# Patient Record
Sex: FEMALE | Race: WHITE | NOT HISPANIC OR LATINO | Employment: OTHER | URBAN - METROPOLITAN AREA
[De-identification: names, ages, dates, MRNs, and addresses within clinical notes are randomized per-mention and may not be internally consistent; named-entity substitution may affect disease eponyms.]

---

## 2017-01-03 ENCOUNTER — OFFICE VISIT (OUTPATIENT)
Dept: FAMILY MEDICINE CLINIC | Facility: CLINIC | Age: 57
End: 2017-01-03

## 2017-01-03 VITALS — HEIGHT: 62 IN | WEIGHT: 257.6 LBS | BODY MASS INDEX: 47.41 KG/M2 | TEMPERATURE: 97.7 F | RESPIRATION RATE: 18 BRPM

## 2017-01-03 DIAGNOSIS — E78.5 HYPERLIPIDEMIA, UNSPECIFIED HYPERLIPIDEMIA TYPE: ICD-10-CM

## 2017-01-03 DIAGNOSIS — Z00.00 HEALTH MAINTENANCE EXAMINATION: ICD-10-CM

## 2017-01-03 DIAGNOSIS — Z79.4 TYPE 2 DIABETES MELLITUS WITHOUT COMPLICATION, WITH LONG-TERM CURRENT USE OF INSULIN (HCC): ICD-10-CM

## 2017-01-03 DIAGNOSIS — Z12.11 ENCOUNTER FOR SCREENING COLONOSCOPY: ICD-10-CM

## 2017-01-03 DIAGNOSIS — I10 ESSENTIAL HYPERTENSION: Primary | ICD-10-CM

## 2017-01-03 DIAGNOSIS — K21.9 GASTROESOPHAGEAL REFLUX DISEASE WITHOUT ESOPHAGITIS: ICD-10-CM

## 2017-01-03 DIAGNOSIS — J45.20 ASTHMATIC BRONCHITIS, MILD INTERMITTENT, UNCOMPLICATED: ICD-10-CM

## 2017-01-03 DIAGNOSIS — E11.9 TYPE 2 DIABETES MELLITUS WITHOUT COMPLICATION, WITH LONG-TERM CURRENT USE OF INSULIN (HCC): ICD-10-CM

## 2017-01-03 DIAGNOSIS — M15.9 GENERALIZED OSTEOARTHRITIS: ICD-10-CM

## 2017-01-03 PROBLEM — I63.9 CEREBRAL INFARCTION (HCC): Status: ACTIVE | Noted: 2017-01-03

## 2017-01-03 PROCEDURE — 99203 OFFICE O/P NEW LOW 30 MIN: CPT | Performed by: INTERNAL MEDICINE

## 2017-01-03 RX ORDER — ATENOLOL 100 MG/1
100 TABLET ORAL DAILY
Qty: 30 TABLET | Refills: 5 | Status: SHIPPED | OUTPATIENT
Start: 2017-01-03 | End: 2017-06-17 | Stop reason: SDUPTHER

## 2017-01-03 RX ORDER — TRIAMTERENE AND HYDROCHLOROTHIAZIDE 37.5; 25 MG/1; MG/1
1 TABLET ORAL EVERY OTHER DAY
Qty: 30 TABLET | Refills: 5 | Status: SHIPPED | OUTPATIENT
Start: 2017-01-03 | End: 2018-03-28 | Stop reason: SDUPTHER

## 2017-01-03 RX ORDER — LISINOPRIL 20 MG/1
20 TABLET ORAL DAILY
Qty: 60 TABLET | Refills: 5 | Status: SHIPPED | OUTPATIENT
Start: 2017-01-03 | End: 2017-02-01 | Stop reason: SDUPTHER

## 2017-01-03 RX ORDER — AMLODIPINE BESYLATE 5 MG/1
5 TABLET ORAL DAILY
Qty: 30 TABLET | Refills: 5 | Status: SHIPPED | OUTPATIENT
Start: 2017-01-03 | End: 2017-02-09 | Stop reason: SDUPTHER

## 2017-01-03 RX ORDER — ATORVASTATIN CALCIUM 20 MG/1
20 TABLET, FILM COATED ORAL DAILY
Qty: 30 TABLET | Refills: 5 | Status: SHIPPED | OUTPATIENT
Start: 2017-01-03 | End: 2017-02-09 | Stop reason: SDUPTHER

## 2017-01-03 RX ORDER — ALBUTEROL SULFATE 90 UG/1
2 AEROSOL, METERED RESPIRATORY (INHALATION) EVERY 6 HOURS PRN
Qty: 1 INHALER | Refills: 11 | Status: SHIPPED | OUTPATIENT
Start: 2017-01-03 | End: 2019-04-09 | Stop reason: SDUPTHER

## 2017-01-03 NOTE — PROGRESS NOTES
Subjective   Bernice Rai is a 56 y.o. female. Patient is here today for a new patient visit to establish care.  She was seeing Dr. Fuentes.  The patient generally feels well aside from some left thoracic back pain that developed a few days ago while she was bending.  It hurts him with breathing but she's had no significant cough or fever or chills and it hurts also with any Movements or bending or twisting.  It's also tender to touch.  Otherwise she seems stable and is having no chest pain or breathing problems.  She does have known problems of hypertension some intermittent asthmatic bronchitis, some GERD that's doing well on Zantac, diabetes mellitus on some insulin some arthritis in the feet and hyperlipidemia.  She has a history of a temporal occipital stroke in 2001 affecting the vision in one eye.  She has had a flu shot already but has not had a colonoscopy.    Chief Complaint   Patient presents with   • Hypertension     NEW PT TO ESTABLISH   • Diabetes          Vitals:    01/03/17 0915   Resp: 18   Temp: 97.7 °F (36.5 °C)     The following portions of the patient's history were reviewed and updated as appropriate: allergies, current medications, past family history, past medical history, past social history, past surgical history and problem list.    Past Medical History   Diagnosis Date   • Diabetes mellitus    • Hypertension    • Stroke       No Known Allergies   Social History     Social History   • Marital status:      Spouse name: N/A   • Number of children: N/A   • Years of education: N/A     Occupational History   • Not on file.     Social History Main Topics   • Smoking status: Current Some Day Smoker     Packs/day: 0.50   • Smokeless tobacco: Not on file   • Alcohol use No   • Drug use: Not on file   • Sexual activity: Not on file     Other Topics Concern   • Not on file     Social History Narrative        Current Outpatient Prescriptions:   •  albuterol (PROVENTIL HFA) 108 (90 BASE)  MCG/ACT inhaler, Inhale 2 puffs Every 6 (Six) Hours As Needed for wheezing., Disp: 1 inhaler, Rfl: 11  •  amLODIPine (NORVASC) 5 MG tablet, Take 1 tablet by mouth Daily., Disp: 30 tablet, Rfl: 5  •  aspirin 81 MG chewable tablet, Chew 81 mg daily., Disp: , Rfl:   •  atenolol (TENORMIN) 100 MG tablet, Take 1 tablet by mouth Daily., Disp: 30 tablet, Rfl: 5  •  insulin NPH-insulin regular (NOVOLIN 70/30) (70-30) 100 UNIT/ML injection, Inject  under the skin. 130 150 daily / sliding scale, Disp: , Rfl:   •  Insulin Pen Needle (B-D UF III MINI PEN NEEDLES) 31G X 5 MM misc, , Disp: , Rfl:   •  lisinopril (PRINIVIL,ZESTRIL) 20 MG tablet, Take 1 tablet by mouth Daily., Disp: 60 tablet, Rfl: 5  •  traMADol (ULTRAM) 50 MG tablet, TAKE TWO TABLETS BY MOUTH ONCE DAILY AS NEEDED AS NEEDED FOR PAIN. MUST LAST 30 DAYS, Disp: 60 tablet, Rfl: 0  •  triamterene-hydrochlorothiazide (MAXZIDE-25) 37.5-25 MG per tablet, Take 1 tablet by mouth Every Other Day., Disp: 30 tablet, Rfl: 5  •  venlafaxine XR (EFFEXOR-XR) 75 MG 24 hr capsule, TAKE ONE CAPSULE BY MOUTH THREE TIMES DAILY, Disp: 90 capsule, Rfl: 0  •  atorvastatin (LIPITOR) 20 MG tablet, Take 1 tablet by mouth Daily., Disp: 30 tablet, Rfl: 5     Objective     History of Present Illness     Review of Systems   Constitutional: Negative.    HENT: Negative.    Eyes: Negative.    Respiratory: Negative for cough.    Cardiovascular: Negative.    Gastrointestinal: Negative.    Genitourinary: Negative.    Musculoskeletal: Positive for back pain.        Foot pain with a history of arthritis in the feet   Neurological: Negative.    Psychiatric/Behavioral: Negative.        Physical Exam   Constitutional: She is oriented to person, place, and time. She appears well-developed and well-nourished.   Pleasant, cooperative and in no distress but quite obese and with a blood pressure 130/70   HENT:   Head: Normocephalic and atraumatic.   Eyes: Conjunctivae are normal. Pupils are equal, round, and  reactive to light.   Neck: Normal range of motion. Neck supple.   Cardiovascular: Normal rate, regular rhythm and normal heart sounds.    Pulmonary/Chest: Effort normal and breath sounds normal. No respiratory distress. She has no wheezes. She has no rales.   Tenderness in the left back in the scapular area consistent with some muscular injury   Musculoskeletal: Normal range of motion. She exhibits no edema.   Neurological: She is alert and oriented to person, place, and time.   Skin: Skin is warm and dry.   Psychiatric: She has a normal mood and affect. Her behavior is normal.   Nursing note and vitals reviewed.      ASSESSMENT  patient currently seems stable with a well-controlled blood pressure 130/70, clear lungs and a regular cardiac rhythm.  She does have some muscular appearing left back pain that I think should resolve on its own.  Patient has a history of diabetes, hyperlipidemia, hypertension, asthmatic bronchitis, arthritis in the feet, some anxiety and depression and history of a temporal occipital stroke about 2001.  The patient's already had a flu shot and has had an eye examination several months ago and we'll try and get those records.  She's not had a colonoscopy.     Problem List Items Addressed This Visit        Cardiovascular and Mediastinum    Hypertension - Primary    Relevant Medications    atenolol (TENORMIN) 100 MG tablet    lisinopril (PRINIVIL,ZESTRIL) 20 MG tablet    amLODIPine (NORVASC) 5 MG tablet    triamterene-hydrochlorothiazide (MAXZIDE-25) 37.5-25 MG per tablet       Respiratory    Asthmatic bronchitis    Relevant Medications    albuterol (PROVENTIL HFA) 108 (90 BASE) MCG/ACT inhaler       Digestive    Gastroesophageal reflux disease       Endocrine    Diabetes mellitus       Musculoskeletal and Integument    Generalized osteoarthritis       Other    Hyperlipidemia    Relevant Medications    atorvastatin (LIPITOR) 20 MG tablet    Encounter for screening colonoscopy    Relevant  Orders    Ambulatory Referral to Gastroenterology      Other Visit Diagnoses     Health maintenance examination              PLAN  I refilled all the patient's medications as she is on for now.  I did discontinue the simvastatin because of interaction with the amlodipine and we'll try her on atorvastatin 20 mg daily in place of it.  I will refer the patient to the gastroenterologist, Dr. De La Rosa's group, for a routine colonoscopy.  I'd like to recheck her in 4-6 weeks with a CBC, CMP, lipid panel, TSH, hemoglobin A1c and urine microalbumin    There are no Patient Instructions on file for this visit.  Return in about 6 weeks (around 2/14/2017) for with labs.

## 2017-01-03 NOTE — MR AVS SNAPSHOT
Bernice Rai   1/3/2017 9:15 AM   Office Visit    Dept Phone:  [phone]   Encounter #:  [id number]    Provider:  Angel Herrera MD   Department:  [facility]                Your Full Care Plan              Today's Medication Changes          These changes are accurate as of: 1/3/17  9:51 AM.  If you have any questions, ask your nurse or doctor.               New Medication(s)Ordered:     atorvastatin 20 MG tablet   Commonly known as:  LIPITOR   Take 1 tablet by mouth Daily.   Started by:  Angel Herrera MD         Medication(s)that have changed:     albuterol 108 (90 BASE) MCG/ACT inhaler   Commonly known as:  PROVENTIL HFA   Inhale 2 puffs Every 6 (Six) Hours As Needed for wheezing.   What changed:    - how much to take  - how to take this  - when to take this  - reasons to take this   Changed by:  Angel Herrera MD       amLODIPine 5 MG tablet   Commonly known as:  NORVASC   Take 1 tablet by mouth Daily.   What changed:  See the new instructions.   Changed by:  Angel Herrera MD       atenolol 100 MG tablet   Commonly known as:  TENORMIN   Take 1 tablet by mouth Daily.   What changed:  See the new instructions.   Changed by:  Angel Herrera MD       lisinopril 20 MG tablet   Commonly known as:  PRINIVIL,ZESTRIL   Take 1 tablet by mouth Daily.   What changed:  See the new instructions.   Changed by:  Angel Herrera MD       triamterene-hydrochlorothiazide 37.5-25 MG per tablet   Commonly known as:  MAXZIDE-25   Take 1 tablet by mouth Every Other Day.   What changed:    - how much to take  - how to take this   Changed by:  Angel Herrera MD         Stop taking medication(s)listed here:     omeprazole 40 MG capsule   Commonly known as:  priLOSEC   Stopped by:  Angel Herrera MD           simvastatin 40 MG tablet   Commonly known as:  ZOCOR   Stopped by:  Angel Herrera MD                Where to Get Your  Medications      These medications were sent to [facility] [place], [place] - [address] - [phone]  - [phone]   [address] [address]     Phone:  [phone]     albuterol 108 (90 BASE) MCG/ACT inhaler    amLODIPine 5 MG tablet    atenolol 100 MG tablet    atorvastatin 20 MG tablet    lisinopril 20 MG tablet    triamterene-hydrochlorothiazide 37.5-25 MG per tablet                  Your Updated Medication List          This list is accurate as of: 1/3/17  9:51 AM.  Always use your most recent med list.                albuterol 108 (90 BASE) MCG/ACT inhaler   Commonly known as:  PROVENTIL HFA   Inhale 2 puffs Every 6 (Six) Hours As Needed for wheezing.       amLODIPine 5 MG tablet   Commonly known as:  NORVASC   Take 1 tablet by mouth Daily.       aspirin 81 MG chewable tablet       atenolol 100 MG tablet   Commonly known as:  TENORMIN   Take 1 tablet by mouth Daily.       atorvastatin 20 MG tablet   Commonly known as:  LIPITOR   Take 1 tablet by mouth Daily.       B-D UF III MINI PEN NEEDLES 31G X 5 MM misc   Generic drug:  Insulin Pen Needle       lisinopril 20 MG tablet   Commonly known as:  PRINIVIL,ZESTRIL   Take 1 tablet by mouth Daily.       NOVOLIN 70/30 (70-30) 100 UNIT/ML injection   Generic drug:  insulin NPH-insulin regular       traMADol 50 MG tablet   Commonly known as:  ULTRAM   TAKE TWO TABLETS BY MOUTH ONCE DAILY AS NEEDED AS NEEDED FOR PAIN. MUST LAST 30 DAYS       triamterene-hydrochlorothiazide 37.5-25 MG per tablet   Commonly known as:  MAXZIDE-25   Take 1 tablet by mouth Every Other Day.       venlafaxine XR 75 MG 24 hr capsule   Commonly known as:  EFFEXOR-XR   TAKE ONE CAPSULE BY MOUTH THREE TIMES DAILY               We Performed the Following     Ambulatory Referral to Gastroenterology       You Were Diagnosed With        Codes Comments    Essential hypertension    -  Primary ICD-10-CM: I10  ICD-9-CM: 401.9     Asthmatic  "bronchitis, mild intermittent, uncomplicated     ICD-10-CM: J45.20  ICD-9-CM: 493.90     Gastroesophageal reflux disease without esophagitis     ICD-10-CM: K21.9  ICD-9-CM: 530.81     Type 2 diabetes mellitus without complication, with long-term current use of insulin     ICD-10-CM: E11.9, Z79.4  ICD-9-CM: 250.00, V58.67     Generalized osteoarthritis     ICD-10-CM: M15.9  ICD-9-CM: 715.00     Health maintenance examination     ICD-10-CM: Z00.00  ICD-9-CM: V70.0     Hyperlipidemia, unspecified hyperlipidemia type     ICD-10-CM: E78.5  ICD-9-CM: 272.4     Encounter for screening colonoscopy     ICD-10-CM: Z12.11  ICD-9-CM: V76.51       Instructions     None    Patient Instructions History      Upcoming Appointments     Visit Type Date Time Department    NEW PATIENT 1/3/2017  9:15 AM [facility]      [employer] Signup     Our records indicate that you have an active [employer] account.    You can view your After Visit Summary by going to [employer] and logging in with your [employer] username and password.  If you don't have a [employer] username and password but a parent or guardian has access to your record, the parent or guardian should login with their own [employer] username and password and access your record to view the After Visit Summary.    If you have questions, you can email [email or web address] or call [phone] to talk to our [employer] staff.  Remember, [employer] is NOT to be used for urgent needs.  For medical emergencies, dial 911.               Other Info from Your Visit           Allergies     No Known Allergies      Reason for Visit     Hypertension NEW PT TO ESTABLISH    Diabetes           Vital Signs     Temperature Respirations Height Weight Body Mass Index Smoking Status    97.7 °F (36.5 °C) (Oral) 18 62\" (157.5 cm) 257 lb 9.6 oz (117 kg) 47.12 kg/m2 Current Some Day Smoker      Problems and Diagnoses Noted     Bronchitis    Stroke    Diabetes    Screen for colon " cancer    Acid reflux disease    Generalized osteoarthritis    High cholesterol or triglycerides    High blood pressure    Health maintenance examination

## 2017-01-27 DIAGNOSIS — I10 ESSENTIAL HYPERTENSION: ICD-10-CM

## 2017-01-27 DIAGNOSIS — E11.9 TYPE 2 DIABETES MELLITUS WITHOUT COMPLICATION, UNSPECIFIED LONG TERM INSULIN USE STATUS: ICD-10-CM

## 2017-01-27 DIAGNOSIS — E78.5 HYPERLIPIDEMIA, UNSPECIFIED HYPERLIPIDEMIA TYPE: Primary | ICD-10-CM

## 2017-02-01 DIAGNOSIS — I10 ESSENTIAL HYPERTENSION: ICD-10-CM

## 2017-02-01 RX ORDER — LISINOPRIL 20 MG/1
20 TABLET ORAL 2 TIMES DAILY
Qty: 60 TABLET | Refills: 5 | Status: SHIPPED | OUTPATIENT
Start: 2017-02-01 | End: 2017-08-12 | Stop reason: SDUPTHER

## 2017-02-04 LAB
ALBUMIN SERPL-MCNC: 3.3 G/DL (ref 3.5–5.2)
ALBUMIN/GLOB SERPL: 1.4 G/DL
ALP SERPL-CCNC: 74 U/L (ref 39–117)
ALT SERPL-CCNC: 13 U/L (ref 1–33)
AST SERPL-CCNC: 12 U/L (ref 1–32)
BASOPHILS # BLD AUTO: 0.03 10*3/MM3 (ref 0–0.2)
BASOPHILS NFR BLD AUTO: 0.3 % (ref 0–1.5)
BILIRUB SERPL-MCNC: 0.3 MG/DL (ref 0.1–1.2)
BUN SERPL-MCNC: 16 MG/DL (ref 6–20)
BUN/CREAT SERPL: 21.9 (ref 7–25)
CALCIUM SERPL-MCNC: 9.7 MG/DL (ref 8.6–10.5)
CHLORIDE SERPL-SCNC: 101 MMOL/L (ref 98–107)
CHOLEST SERPL-MCNC: 215 MG/DL (ref 0–200)
CO2 SERPL-SCNC: 23.5 MMOL/L (ref 22–29)
CREAT SERPL-MCNC: 0.73 MG/DL (ref 0.57–1)
EOSINOPHIL # BLD AUTO: 0.22 10*3/MM3 (ref 0–0.7)
EOSINOPHIL NFR BLD AUTO: 2.3 % (ref 0.3–6.2)
ERYTHROCYTE [DISTWIDTH] IN BLOOD BY AUTOMATED COUNT: 14 % (ref 11.7–13)
GLOBULIN SER CALC-MCNC: 2.4 GM/DL
GLUCOSE SERPL-MCNC: 215 MG/DL (ref 65–99)
HBA1C MFR BLD: 7.42 % (ref 4.8–5.6)
HCT VFR BLD AUTO: 46.6 % (ref 35.6–45.5)
HDLC SERPL-MCNC: 31 MG/DL (ref 40–60)
HGB BLD-MCNC: 15 G/DL (ref 11.9–15.5)
IMM GRANULOCYTES # BLD: 0 10*3/MM3 (ref 0–0.03)
IMM GRANULOCYTES NFR BLD: 0 % (ref 0–0.5)
LDLC SERPL CALC-MCNC: 113 MG/DL (ref 0–100)
LDLC/HDLC SERPL: 3.63 {RATIO}
LYMPHOCYTES # BLD AUTO: 3 10*3/MM3 (ref 0.9–4.8)
LYMPHOCYTES NFR BLD AUTO: 31.9 % (ref 19.6–45.3)
MCH RBC QN AUTO: 31.4 PG (ref 26.9–32)
MCHC RBC AUTO-ENTMCNC: 32.2 G/DL (ref 32.4–36.3)
MCV RBC AUTO: 97.5 FL (ref 80.5–98.2)
MICROALBUMIN UR-MCNC: 6825.7 UG/ML
MONOCYTES # BLD AUTO: 0.96 10*3/MM3 (ref 0.2–1.2)
MONOCYTES NFR BLD AUTO: 10.2 % (ref 5–12)
NEUTROPHILS # BLD AUTO: 5.2 10*3/MM3 (ref 1.9–8.1)
NEUTROPHILS NFR BLD AUTO: 55.3 % (ref 42.7–76)
PLATELET # BLD AUTO: 360 10*3/MM3 (ref 140–500)
POTASSIUM SERPL-SCNC: 4.8 MMOL/L (ref 3.5–5.2)
PROT SERPL-MCNC: 5.7 G/DL (ref 6–8.5)
RBC # BLD AUTO: 4.78 10*6/MM3 (ref 3.9–5.2)
SODIUM SERPL-SCNC: 139 MMOL/L (ref 136–145)
TRIGL SERPL-MCNC: 357 MG/DL (ref 0–150)
TSH SERPL DL<=0.005 MIU/L-ACNC: 1.69 MIU/ML (ref 0.27–4.2)
VLDLC SERPL CALC-MCNC: 71.4 MG/DL (ref 5–40)
WBC # BLD AUTO: 9.41 10*3/MM3 (ref 4.5–10.7)

## 2017-02-09 ENCOUNTER — OFFICE VISIT (OUTPATIENT)
Dept: FAMILY MEDICINE CLINIC | Facility: CLINIC | Age: 57
End: 2017-02-09

## 2017-02-09 VITALS
WEIGHT: 258 LBS | SYSTOLIC BLOOD PRESSURE: 142 MMHG | HEIGHT: 62 IN | DIASTOLIC BLOOD PRESSURE: 90 MMHG | BODY MASS INDEX: 47.48 KG/M2 | TEMPERATURE: 98.8 F | OXYGEN SATURATION: 97 % | HEART RATE: 85 BPM

## 2017-02-09 DIAGNOSIS — M15.9 GENERALIZED OSTEOARTHRITIS: ICD-10-CM

## 2017-02-09 DIAGNOSIS — I10 ESSENTIAL HYPERTENSION: Primary | ICD-10-CM

## 2017-02-09 DIAGNOSIS — Z79.4 TYPE 2 DIABETES MELLITUS WITHOUT COMPLICATION, WITH LONG-TERM CURRENT USE OF INSULIN (HCC): ICD-10-CM

## 2017-02-09 DIAGNOSIS — L30.9 DERMATITIS: ICD-10-CM

## 2017-02-09 DIAGNOSIS — Z12.31 VISIT FOR SCREENING MAMMOGRAM: ICD-10-CM

## 2017-02-09 DIAGNOSIS — E78.5 HYPERLIPIDEMIA, UNSPECIFIED HYPERLIPIDEMIA TYPE: ICD-10-CM

## 2017-02-09 DIAGNOSIS — E11.9 TYPE 2 DIABETES MELLITUS WITHOUT COMPLICATION, WITH LONG-TERM CURRENT USE OF INSULIN (HCC): ICD-10-CM

## 2017-02-09 PROCEDURE — 99214 OFFICE O/P EST MOD 30 MIN: CPT | Performed by: INTERNAL MEDICINE

## 2017-02-09 RX ORDER — DESOXIMETASONE 0.5 MG/G
CREAM TOPICAL 2 TIMES DAILY
Qty: 60 G | Refills: 1 | Status: SHIPPED | OUTPATIENT
Start: 2017-02-09 | End: 2017-02-14

## 2017-02-09 RX ORDER — AMLODIPINE BESYLATE 10 MG/1
10 TABLET ORAL DAILY
Qty: 30 TABLET | Refills: 5 | Status: SHIPPED | OUTPATIENT
Start: 2017-02-09 | End: 2017-08-12 | Stop reason: SDUPTHER

## 2017-02-09 RX ORDER — VENLAFAXINE HYDROCHLORIDE 150 MG/1
150 CAPSULE, EXTENDED RELEASE ORAL DAILY
Qty: 30 CAPSULE | Refills: 5 | Status: SHIPPED | OUTPATIENT
Start: 2017-02-09 | End: 2017-08-12 | Stop reason: SDUPTHER

## 2017-02-09 RX ORDER — ATORVASTATIN CALCIUM 40 MG/1
40 TABLET, FILM COATED ORAL DAILY
Qty: 30 TABLET | Refills: 5 | Status: SHIPPED | OUTPATIENT
Start: 2017-02-09 | End: 2017-08-12 | Stop reason: SDUPTHER

## 2017-02-09 NOTE — PROGRESS NOTES
Subjective   Bernice Rai is a 56 y.o. female. Patient is here today for follow-up on her hypertension, hyperlipidemia, diabetes mellitus, arthritis,.  The patient generally is been doing okay and her breathing is been pretty good.  She complains of some dermatitis on her left elbow and she's not had a gynecologic exam or mammograms and about 15 years.  They are going to schedule an eye exam since I got a notice from the eye doctor.  She has had a flu shot.    Chief Complaint   Patient presents with   • Follow-up     1 wk    • Labs Only     Discuss labs    • Med Refill     Effexor / tramadol           Vitals:    02/09/17 0805   BP: 142/90   Pulse: 85   Temp: 98.8 °F (37.1 °C)   SpO2: 97%     The following portions of the patient's history were reviewed and updated as appropriate: allergies, current medications, past family history, past medical history, past social history, past surgical history and problem list.    Past Medical History   Diagnosis Date   • Arthritis      FEET   • Diabetes mellitus    • Hyperlipidemia    • Hypertension    • Reflux esophagitis    • Stroke       No Known Allergies   Social History     Social History   • Marital status:      Spouse name: N/A   • Number of children: N/A   • Years of education: N/A     Occupational History   • Not on file.     Social History Main Topics   • Smoking status: Current Some Day Smoker     Packs/day: 0.50   • Smokeless tobacco: Not on file   • Alcohol use No   • Drug use: Defer   • Sexual activity: Defer     Other Topics Concern   • Not on file     Social History Narrative        Current Outpatient Prescriptions:   •  albuterol (PROVENTIL HFA) 108 (90 BASE) MCG/ACT inhaler, Inhale 2 puffs Every 6 (Six) Hours As Needed for wheezing., Disp: 1 inhaler, Rfl: 11  •  amLODIPine (NORVASC) 10 MG tablet, Take 1 tablet by mouth Daily., Disp: 30 tablet, Rfl: 5  •  aspirin 81 MG chewable tablet, Chew 81 mg daily., Disp: , Rfl:   •  atenolol (TENORMIN) 100 MG  tablet, Take 1 tablet by mouth Daily., Disp: 30 tablet, Rfl: 5  •  atorvastatin (LIPITOR) 40 MG tablet, Take 1 tablet by mouth Daily., Disp: 30 tablet, Rfl: 5  •  insulin NPH-insulin regular (NOVOLIN 70/30) (70-30) 100 UNIT/ML injection, Inject  under the skin. 130 150 daily / sliding scale, Disp: , Rfl:   •  Insulin Pen Needle (B-D UF III MINI PEN NEEDLES) 31G X 5 MM misc, , Disp: , Rfl:   •  lisinopril (PRINIVIL,ZESTRIL) 20 MG tablet, Take 1 tablet by mouth 2 (Two) Times a Day., Disp: 60 tablet, Rfl: 5  •  traMADol (ULTRAM) 50 MG tablet, TAKE TWO TABLETS BY MOUTH ONCE DAILY AS NEEDED AS NEEDED FOR PAIN. MUST LAST 30 DAYS, Disp: 60 tablet, Rfl: 0  •  triamterene-hydrochlorothiazide (MAXZIDE-25) 37.5-25 MG per tablet, Take 1 tablet by mouth Every Other Day., Disp: 30 tablet, Rfl: 5  •  venlafaxine XR (EFFEXOR-XR) 150 MG 24 hr capsule, Take 1 capsule by mouth Daily., Disp: 30 capsule, Rfl: 5  •  desoximetasone (TOPICORT) 0.05 % cream, Apply  topically 2 (Two) Times a Day., Disp: 60 g, Rfl: 1     Objective     History of Present Illness     Review of Systems   Constitutional: Negative.    HENT: Negative.    Eyes: Negative.    Respiratory: Negative.    Cardiovascular: Negative.    Gastrointestinal: Negative.    Genitourinary: Negative.    Musculoskeletal: Negative.    Skin: Negative.    Neurological: Negative.    Psychiatric/Behavioral: Negative.        Physical Exam   Constitutional: She is oriented to person, place, and time. She appears well-developed and well-nourished.   Pleasant, cooperative and in no distress with a blood pressure of about 140/90   HENT:   Head: Normocephalic and atraumatic.   Eyes: Conjunctivae are normal. Pupils are equal, round, and reactive to light.   Neck: Normal range of motion. Neck supple. No thyromegaly present.   Cardiovascular: Normal rate, regular rhythm and normal heart sounds.    Pulmonary/Chest: Effort normal and breath sounds normal. No respiratory distress. She has no wheezes.  She has no rales.   Musculoskeletal: Normal range of motion. She exhibits no edema.   Neurological: She is alert and oriented to person, place, and time.   Skin: Skin is warm and dry.   Thickened skin on the left elbow consistent with some eczema   Psychiatric: She has a normal mood and affect. Her behavior is normal.   Nursing note and vitals reviewed.      ASSESSMENT  generally the patient seems stable.  Her lungs are clear and her breathing is fine.  I get her blood pressure high at 140/90 today and she's not had any home readings.  Her CBC was essentially normal.  CMP had an elevated sugar and slightly low total protein and albumen.  Her urine microalbumin is quite high at over 6000.  Hemoglobin A1c is high at 7.42.  TSH was normal and her lipid panel was not as good as I like with the total cholesterol 2:15, HDL low at 31 LDL still high at 113 and triglycerides of 357     Problem List Items Addressed This Visit        Cardiovascular and Mediastinum    Hyperlipidemia    Relevant Medications    atorvastatin (LIPITOR) 40 MG tablet    Hypertension - Primary    Relevant Medications    amLODIPine (NORVASC) 10 MG tablet       Endocrine    Diabetes mellitus       Musculoskeletal and Integument    Generalized osteoarthritis    Dermatitis    Relevant Medications    desoximetasone (TOPICORT) 0.05 % cream      Other Visit Diagnoses     Visit for screening mammogram        Relevant Orders    Mammo Screening Bilateral With CAD    Ambulatory Referral to Gynecology          PLAN  I'm going to increase the patient's amlodipine to 10 mg daily.  I'm increasing her atorvastatin 40 mg daily.  The patient will continue her other medicines.  Encouraged her to try and lose some weight.  She needs to schedule for an eye exam.  I referred her to the gynecologist for routine care and have ordered a mammogram.  I got her some desoximetasone cream for the dermatitis on her elbow.  I want to recheck her in 3 months with a CMP, lipid panel,  hemoglobin A1c and urine microalbumin      There are no Patient Instructions on file for this visit.  Return in about 3 months (around 5/9/2017) for with labs.

## 2017-02-14 RX ORDER — CLOBETASOL PROPIONATE 0.5 MG/G
CREAM TOPICAL 2 TIMES DAILY
Qty: 45 G | Refills: 0 | Status: SHIPPED | OUTPATIENT
Start: 2017-02-14 | End: 2017-06-02

## 2017-02-15 ENCOUNTER — HOSPITAL ENCOUNTER (OUTPATIENT)
Dept: MAMMOGRAPHY | Facility: HOSPITAL | Age: 57
Discharge: HOME | End: 2017-02-15
Admitting: INTERNAL MEDICINE

## 2017-02-15 DIAGNOSIS — Z12.31 VISIT FOR SCREENING MAMMOGRAM: ICD-10-CM

## 2017-02-15 PROCEDURE — G0202 SCR MAMMO BI INCL CAD: HCPCS

## 2017-02-27 ENCOUNTER — OFFICE VISIT (OUTPATIENT)
Dept: OBSTETRICS AND GYNECOLOGY | Facility: CLINIC | Age: 57
End: 2017-02-27

## 2017-02-27 VITALS — DIASTOLIC BLOOD PRESSURE: 70 MMHG | SYSTOLIC BLOOD PRESSURE: 122 MMHG

## 2017-02-27 DIAGNOSIS — Z01.419 WELL WOMAN EXAM WITH ROUTINE GYNECOLOGICAL EXAM: Primary | ICD-10-CM

## 2017-02-27 PROCEDURE — 99386 PREV VISIT NEW AGE 40-64: CPT | Performed by: OBSTETRICS & GYNECOLOGY

## 2017-02-27 NOTE — PROGRESS NOTES
Subjective   Bernice Rai is a 56 y.o. female is here today as a self referral for annual.    History of Present Illness-here today for annual exam and checkup.  Sent by her primary care doctor, Dr. Herrera        The following portions of the patient's history were reviewed and updated as appropriate: allergies, current medications, past family history, past medical history, past social history, past surgical history and problem list.    Review of Systems   Constitutional: Negative.    HENT: Negative.    Eyes: Negative.    Respiratory: Negative.    Cardiovascular: Negative.    Gastrointestinal: Negative.    Endocrine: Negative.    Genitourinary: Negative.         Occasionally has episodes of stress incontinence.   Musculoskeletal: Negative.    Skin: Negative.    Allergic/Immunologic: Negative.    Neurological: Negative.    Hematological: Negative.    Psychiatric/Behavioral: Negative.        Objective   Physical Exam   Constitutional: She is oriented to person, place, and time. She appears well-developed and well-nourished.   HENT:   Head: Normocephalic and atraumatic.   Nose: Nose normal.   Eyes: Conjunctivae and EOM are normal. Pupils are equal, round, and reactive to light.   Neck: Normal range of motion. Neck supple. No thyromegaly present.   Cardiovascular: Normal rate, regular rhythm, normal heart sounds and intact distal pulses.  Exam reveals no gallop.    No murmur heard.  Pulmonary/Chest: Effort normal and breath sounds normal. No respiratory distress. She has no wheezes. She exhibits no mass, no tenderness, no swelling and no retraction. Right breast exhibits no inverted nipple, no mass, no nipple discharge, no skin change and no tenderness. Left breast exhibits no inverted nipple, no mass, no nipple discharge, no skin change and no tenderness.   Abdominal: Soft. Bowel sounds are normal. She exhibits no distension and no mass. There is no tenderness.   Genitourinary: Rectum normal, vagina normal and  uterus normal. There is no rash, tenderness, lesion or injury on the right labia. There is no rash, tenderness, lesion or injury on the left labia. Uterus is not enlarged and not tender. Cervix exhibits no motion tenderness and no discharge. Right adnexum displays no mass, no tenderness and no fullness. Left adnexum displays no mass, no tenderness and no fullness.   Musculoskeletal: Normal range of motion. She exhibits no edema, tenderness or deformity.   Neurological: She is alert and oriented to person, place, and time.   Skin: Skin is warm and dry.   Psychiatric: She has a normal mood and affect. Her behavior is normal. Judgment and thought content normal.   Nursing note and vitals reviewed.        Assessment/Plan   Problems Addressed this Visit     None      Visit Diagnoses     Well woman exam with routine gynecological exam    -  Primary    Relevant Orders    IGP,rfx Aptima HPV All Pth       Pap smear done today.  Mammogram done recently.  Discussed the risks and options of a TVT.  Pt not having to use a pad daily because of leakage episodes.

## 2017-02-28 LAB
CONV .: NORMAL
CYTOLOGIST CVX/VAG CYTO: NORMAL
CYTOLOGY CVX/VAG DOC THIN PREP: NORMAL
DX ICD CODE: NORMAL
HIV 1 & 2 AB SER-IMP: NORMAL
OTHER STN SPEC: NORMAL
PATH REPORT.FINAL DX SPEC: NORMAL
STAT OF ADQ CVX/VAG CYTO-IMP: NORMAL

## 2017-05-03 DIAGNOSIS — E11.9 TYPE 2 DIABETES MELLITUS WITHOUT COMPLICATION, WITH LONG-TERM CURRENT USE OF INSULIN (HCC): ICD-10-CM

## 2017-05-03 DIAGNOSIS — Z11.59 NEED FOR HEPATITIS C SCREENING TEST: Primary | ICD-10-CM

## 2017-05-03 DIAGNOSIS — Z79.4 TYPE 2 DIABETES MELLITUS WITHOUT COMPLICATION, WITH LONG-TERM CURRENT USE OF INSULIN (HCC): ICD-10-CM

## 2017-05-03 DIAGNOSIS — E78.5 HYPERLIPIDEMIA, UNSPECIFIED HYPERLIPIDEMIA TYPE: ICD-10-CM

## 2017-05-11 LAB
ALBUMIN SERPL-MCNC: 3.2 G/DL (ref 3.5–5.2)
ALBUMIN/GLOB SERPL: 1.1 G/DL
ALP SERPL-CCNC: 81 U/L (ref 39–117)
ALT SERPL-CCNC: 17 U/L (ref 1–33)
AST SERPL-CCNC: 15 U/L (ref 1–32)
BILIRUB SERPL-MCNC: 0.2 MG/DL (ref 0.1–1.2)
BUN SERPL-MCNC: 13 MG/DL (ref 6–20)
BUN/CREAT SERPL: 18.6 (ref 7–25)
CALCIUM SERPL-MCNC: 9.9 MG/DL (ref 8.6–10.5)
CHLORIDE SERPL-SCNC: 102 MMOL/L (ref 98–107)
CHOLEST SERPL-MCNC: 175 MG/DL (ref 0–200)
CO2 SERPL-SCNC: 22.9 MMOL/L (ref 22–29)
CREAT SERPL-MCNC: 0.7 MG/DL (ref 0.57–1)
GLOBULIN SER CALC-MCNC: 2.8 GM/DL
GLUCOSE SERPL-MCNC: 116 MG/DL (ref 65–99)
HBA1C MFR BLD: 6.62 % (ref 4.8–5.6)
HCV AB S/CO SERPL IA: <0.1 S/CO RATIO (ref 0–0.9)
HCV AB SERPL QL IA: NORMAL
HDLC SERPL-MCNC: 30 MG/DL (ref 40–60)
LDLC SERPL CALC-MCNC: 97 MG/DL (ref 0–100)
LDLC/HDLC SERPL: 3.23 {RATIO}
MICROALBUMIN UR-MCNC: 2455 UG/ML
POTASSIUM SERPL-SCNC: 5.1 MMOL/L (ref 3.5–5.2)
PROT SERPL-MCNC: 6 G/DL (ref 6–8.5)
SODIUM SERPL-SCNC: 139 MMOL/L (ref 136–145)
TRIGL SERPL-MCNC: 241 MG/DL (ref 0–150)
VLDLC SERPL CALC-MCNC: 48.2 MG/DL (ref 5–40)

## 2017-06-02 ENCOUNTER — OFFICE VISIT (OUTPATIENT)
Dept: FAMILY MEDICINE CLINIC | Facility: CLINIC | Age: 57
End: 2017-06-02

## 2017-06-02 VITALS
HEIGHT: 62 IN | SYSTOLIC BLOOD PRESSURE: 118 MMHG | WEIGHT: 253.8 LBS | OXYGEN SATURATION: 97 % | TEMPERATURE: 98.3 F | BODY MASS INDEX: 46.7 KG/M2 | DIASTOLIC BLOOD PRESSURE: 72 MMHG | HEART RATE: 65 BPM

## 2017-06-02 DIAGNOSIS — E11.9 TYPE 2 DIABETES MELLITUS WITHOUT COMPLICATION, WITH LONG-TERM CURRENT USE OF INSULIN (HCC): ICD-10-CM

## 2017-06-02 DIAGNOSIS — J01.40 ACUTE NON-RECURRENT PANSINUSITIS: ICD-10-CM

## 2017-06-02 DIAGNOSIS — Z79.4 TYPE 2 DIABETES MELLITUS WITHOUT COMPLICATION, WITH LONG-TERM CURRENT USE OF INSULIN (HCC): ICD-10-CM

## 2017-06-02 DIAGNOSIS — E78.5 HYPERLIPIDEMIA, UNSPECIFIED HYPERLIPIDEMIA TYPE: ICD-10-CM

## 2017-06-02 DIAGNOSIS — I10 ESSENTIAL HYPERTENSION: Primary | ICD-10-CM

## 2017-06-02 DIAGNOSIS — L98.9 SKIN LESION: ICD-10-CM

## 2017-06-02 PROCEDURE — 99214 OFFICE O/P EST MOD 30 MIN: CPT | Performed by: INTERNAL MEDICINE

## 2017-06-02 RX ORDER — AZITHROMYCIN 250 MG/1
TABLET, FILM COATED ORAL
Qty: 6 TABLET | Refills: 0 | Status: SHIPPED | OUTPATIENT
Start: 2017-06-02 | End: 2017-06-15 | Stop reason: SDUPTHER

## 2017-06-02 NOTE — PROGRESS NOTES
Subjective   Bernice Rai is a 56 y.o. female. Patient is here today for follow-up on her hypertension, hyperlipidemia, diabetes mellitus.  She generally is been doing okay but it's just got back from a trip and got ill on an airplane flight.  She's had sinus congestion, pressure and yellow drainage for 2 weeks and has not improved.  She has lost a little bit of weight and has been trying to watch her diet more closely.    Chief Complaint   Patient presents with   • Hypertension   • Cough   • Nasal Congestion     sinus drainage is yellow    • URI   • Fatigue   • Ear Fullness     left ear           Vitals:    06/02/17 0903   BP: 118/72   Pulse: 65   Temp: 98.3 °F (36.8 °C)   SpO2: 97%     The following portions of the patient's history were reviewed and updated as appropriate: allergies, current medications, past family history, past medical history, past social history, past surgical history and problem list.    Past Medical History:   Diagnosis Date   • Arthritis     FEET   • Diabetes mellitus    • Hyperlipidemia    • Hypertension    • Reflux esophagitis    • Stroke       No Known Allergies   Social History     Social History   • Marital status:      Spouse name: N/A   • Number of children: N/A   • Years of education: N/A     Occupational History   • Not on file.     Social History Main Topics   • Smoking status: Current Some Day Smoker     Packs/day: 0.50   • Smokeless tobacco: Not on file   • Alcohol use No   • Drug use: Defer   • Sexual activity: Defer     Other Topics Concern   • Not on file     Social History Narrative        Current Outpatient Prescriptions:   •  albuterol (PROVENTIL HFA) 108 (90 BASE) MCG/ACT inhaler, Inhale 2 puffs Every 6 (Six) Hours As Needed for wheezing., Disp: 1 inhaler, Rfl: 11  •  amLODIPine (NORVASC) 10 MG tablet, Take 1 tablet by mouth Daily., Disp: 30 tablet, Rfl: 5  •  aspirin 81 MG chewable tablet, Chew 81 mg daily., Disp: , Rfl:   •  atenolol (TENORMIN) 100 MG tablet,  Take 1 tablet by mouth Daily., Disp: 30 tablet, Rfl: 5  •  atorvastatin (LIPITOR) 40 MG tablet, Take 1 tablet by mouth Daily., Disp: 30 tablet, Rfl: 5  •  insulin NPH-insulin regular (NOVOLIN 70/30) (70-30) 100 UNIT/ML injection, Inject  under the skin. 130 150 daily / sliding scale, Disp: , Rfl:   •  Insulin Pen Needle (B-D UF III MINI PEN NEEDLES) 31G X 5 MM misc, , Disp: , Rfl:   •  lisinopril (PRINIVIL,ZESTRIL) 20 MG tablet, Take 1 tablet by mouth 2 (Two) Times a Day., Disp: 60 tablet, Rfl: 5  •  triamterene-hydrochlorothiazide (MAXZIDE-25) 37.5-25 MG per tablet, Take 1 tablet by mouth Every Other Day., Disp: 30 tablet, Rfl: 5  •  venlafaxine XR (EFFEXOR-XR) 150 MG 24 hr capsule, Take 1 capsule by mouth Daily., Disp: 30 capsule, Rfl: 5  •  azithromycin (ZITHROMAX Z-MONICA) 250 MG tablet, Take 2 tablets the first day, then 1 tablet daily for 4 days., Disp: 6 tablet, Rfl: 0  •  traMADol (ULTRAM) 50 MG tablet, TAKE TWO TABLETS BY MOUTH ONCE DAILY AS NEEDED AS NEEDED FOR PAIN. MUST LAST 30 DAYS, Disp: 60 tablet, Rfl: 0     Objective     History of Present Illness     Review of Systems   Constitutional: Negative.    HENT: Positive for congestion and sinus pressure.    Eyes: Negative.    Respiratory: Negative.    Cardiovascular: Negative.    Gastrointestinal: Negative.    Genitourinary: Negative.    Musculoskeletal: Negative.    Neurological: Negative.    Psychiatric/Behavioral: Negative.        Physical Exam   Constitutional: She appears well-developed and well-nourished.   Pleasant, cooperative and in no distress blood pressure of 110/60   HENT:   Head: Normocephalic and atraumatic.   Nose: Right sinus exhibits maxillary sinus tenderness and frontal sinus tenderness. Left sinus exhibits maxillary sinus tenderness and frontal sinus tenderness.   Eyes: Conjunctivae are normal.   Neck: Normal range of motion. Neck supple.   Cardiovascular: Normal rate, regular rhythm and normal heart sounds.    Pulmonary/Chest: Effort  normal and breath sounds normal. No respiratory distress. She has no wheezes. She has no rales.   Musculoskeletal: Normal range of motion.   Neurological: She is alert.   Skin: Skin is warm and dry.   Psychiatric: She has a normal mood and affect. Her behavior is normal.   Nursing note and vitals reviewed.      ASSESSMENT  overall the patient generally seems fine.  She does have some sinus congestion for 2 weeks and not improved.  Lungs sound clear and I hear nothing to suggest a pneumonia.  Her blood pressure was excellent.  CMP had an improve sugar 116 and was otherwise essentially normal and hemoglobin A1c was improved to 6.62.  Urine microalbumin was quite high but much improved.  Her lipid panel had a total cholesterol of 175, chronically low HDL but an LDL of 97.  Hepatitis C screen was negative.  Patient does have a couple of skin lesions on her face and wants to see a dermatologist.     Problem List Items Addressed This Visit        Cardiovascular and Mediastinum    Hyperlipidemia    Hypertension - Primary       Endocrine    Diabetes mellitus      Other Visit Diagnoses     Acute non-recurrent pansinusitis        Skin lesion        Relevant Orders    Ambulatory Referral to Dermatology          PLAN  I encouraged the patient to watch her diet and continue to lose some weight.  I'm referring him to Dr. Lenny Delgadillo to evaluate skin lesions on her face.  She will continue the same medications.  I'm getting her a Z-Gilberto for sinusitis.  I plan on rechecking her in 4 months with a CMP,  lipid panel, hemoglobin A1c, urine microalbumin    There are no Patient Instructions on file for this visit.  Return in about 4 months (around 10/2/2017) for with labs.

## 2017-06-15 ENCOUNTER — TELEPHONE (OUTPATIENT)
Dept: FAMILY MEDICINE CLINIC | Facility: CLINIC | Age: 57
End: 2017-06-15

## 2017-06-15 RX ORDER — AZITHROMYCIN 250 MG/1
TABLET, FILM COATED ORAL
Qty: 6 TABLET | Refills: 0 | Status: SHIPPED | OUTPATIENT
Start: 2017-06-15 | End: 2017-10-17

## 2017-06-15 NOTE — TELEPHONE ENCOUNTER
Called patient and notified her that we will refill her zpack and that she should warner an antihistamine OTC. She understood.    ----- Message from Angel Herrera MD sent at 6/15/2017  1:09 PM EDT -----  We can refill the Z-Gilberto for her and she should be on an over-the-counter any histamine like Claritin or Allegra or Zyrtec    ----- Message -----     From: Diana Soares MA     Sent: 6/15/2017  12:44 PM       To: Angel Herrera MD        ----- Message -----     From: Sylwia Hernandez     Sent: 6/15/2017  12:32 PM       To: Diana Soares MA    WANTING TO KNOW ANOTHER RX CAN BE CALLED IN FOR HER SHE IS STILL NPT ANY BETTER     STILL COUGHING AND HAS THE THICK YELLOW MUCUS STILL   EARS ARE STILL CLOGGED UP     PHARMACY   [facility]      PLEASE CALL TO LET HER KNOW IF ANYTHING WAS CALLED FOR HER     [phone]

## 2017-06-17 DIAGNOSIS — I10 ESSENTIAL HYPERTENSION: ICD-10-CM

## 2017-06-19 RX ORDER — ATENOLOL 100 MG/1
TABLET ORAL
Qty: 30 TABLET | Refills: 0 | Status: SHIPPED | OUTPATIENT
Start: 2017-06-19 | End: 2017-07-14 | Stop reason: SDUPTHER

## 2017-07-14 DIAGNOSIS — I10 ESSENTIAL HYPERTENSION: ICD-10-CM

## 2017-07-14 RX ORDER — ATENOLOL 100 MG/1
TABLET ORAL
Qty: 30 TABLET | Refills: 3 | Status: SHIPPED | OUTPATIENT
Start: 2017-07-14 | End: 2017-11-03 | Stop reason: SDUPTHER

## 2017-08-12 DIAGNOSIS — I10 ESSENTIAL HYPERTENSION: ICD-10-CM

## 2017-08-14 RX ORDER — VENLAFAXINE HYDROCHLORIDE 150 MG/1
CAPSULE, EXTENDED RELEASE ORAL
Qty: 30 CAPSULE | Refills: 5 | Status: SHIPPED | OUTPATIENT
Start: 2017-08-14 | End: 2018-01-24 | Stop reason: SDUPTHER

## 2017-08-14 RX ORDER — ATORVASTATIN CALCIUM 40 MG/1
TABLET, FILM COATED ORAL
Qty: 30 TABLET | Refills: 5 | Status: SHIPPED | OUTPATIENT
Start: 2017-08-14 | End: 2018-01-24 | Stop reason: SDUPTHER

## 2017-08-14 RX ORDER — AMLODIPINE BESYLATE 10 MG/1
TABLET ORAL
Qty: 30 TABLET | Refills: 5 | Status: SHIPPED | OUTPATIENT
Start: 2017-08-14 | End: 2018-01-24 | Stop reason: SDUPTHER

## 2017-08-14 RX ORDER — LISINOPRIL 20 MG/1
TABLET ORAL
Qty: 60 TABLET | Refills: 5 | Status: SHIPPED | OUTPATIENT
Start: 2017-08-14 | End: 2018-01-24 | Stop reason: SDUPTHER

## 2017-10-03 DIAGNOSIS — Z79.4 TYPE 2 DIABETES MELLITUS WITHOUT COMPLICATION, WITH LONG-TERM CURRENT USE OF INSULIN (HCC): ICD-10-CM

## 2017-10-03 DIAGNOSIS — E78.5 HYPERLIPIDEMIA, UNSPECIFIED HYPERLIPIDEMIA TYPE: ICD-10-CM

## 2017-10-03 DIAGNOSIS — E11.9 TYPE 2 DIABETES MELLITUS WITHOUT COMPLICATION, WITH LONG-TERM CURRENT USE OF INSULIN (HCC): ICD-10-CM

## 2017-10-10 LAB
ALBUMIN SERPL-MCNC: 3.8 G/DL (ref 3.5–5.2)
ALBUMIN/GLOB SERPL: 1.7 G/DL
ALP SERPL-CCNC: 76 U/L (ref 39–117)
ALT SERPL-CCNC: 14 U/L (ref 1–33)
AST SERPL-CCNC: 13 U/L (ref 1–32)
BILIRUB SERPL-MCNC: 0.2 MG/DL (ref 0.1–1.2)
BUN SERPL-MCNC: 18 MG/DL (ref 6–20)
BUN/CREAT SERPL: 21.7 (ref 7–25)
CALCIUM SERPL-MCNC: 9.9 MG/DL (ref 8.6–10.5)
CHLORIDE SERPL-SCNC: 104 MMOL/L (ref 98–107)
CHOLEST SERPL-MCNC: 156 MG/DL (ref 0–200)
CO2 SERPL-SCNC: 25.1 MMOL/L (ref 22–29)
CREAT SERPL-MCNC: 0.83 MG/DL (ref 0.57–1)
GLOBULIN SER CALC-MCNC: 2.2 GM/DL
GLUCOSE SERPL-MCNC: 108 MG/DL (ref 65–99)
HBA1C MFR BLD: 6.32 % (ref 4.8–5.6)
HDLC SERPL-MCNC: 28 MG/DL (ref 40–60)
LDLC SERPL CALC-MCNC: 91 MG/DL (ref 0–100)
LDLC/HDLC SERPL: 3.24 {RATIO}
MICROALBUMIN UR-MCNC: 1549.4 UG/ML
POTASSIUM SERPL-SCNC: 4.7 MMOL/L (ref 3.5–5.2)
PROT SERPL-MCNC: 6 G/DL (ref 6–8.5)
SODIUM SERPL-SCNC: 142 MMOL/L (ref 136–145)
TRIGL SERPL-MCNC: 186 MG/DL (ref 0–150)
VLDLC SERPL CALC-MCNC: 37.2 MG/DL (ref 5–40)

## 2017-10-17 ENCOUNTER — OFFICE VISIT (OUTPATIENT)
Dept: FAMILY MEDICINE CLINIC | Facility: CLINIC | Age: 57
End: 2017-10-17

## 2017-10-17 VITALS
SYSTOLIC BLOOD PRESSURE: 130 MMHG | RESPIRATION RATE: 16 BRPM | WEIGHT: 256.4 LBS | HEIGHT: 62 IN | TEMPERATURE: 98.6 F | DIASTOLIC BLOOD PRESSURE: 70 MMHG | BODY MASS INDEX: 47.18 KG/M2

## 2017-10-17 DIAGNOSIS — I10 ESSENTIAL HYPERTENSION: Primary | ICD-10-CM

## 2017-10-17 DIAGNOSIS — E11.29 TYPE 2 DIABETES MELLITUS WITH OTHER DIABETIC KIDNEY COMPLICATION, WITH LONG-TERM CURRENT USE OF INSULIN (HCC): ICD-10-CM

## 2017-10-17 DIAGNOSIS — K21.9 GASTROESOPHAGEAL REFLUX DISEASE WITHOUT ESOPHAGITIS: ICD-10-CM

## 2017-10-17 DIAGNOSIS — J45.20 MILD INTERMITTENT ASTHMATIC BRONCHITIS WITHOUT COMPLICATION: ICD-10-CM

## 2017-10-17 DIAGNOSIS — E78.5 HYPERLIPIDEMIA, UNSPECIFIED HYPERLIPIDEMIA TYPE: ICD-10-CM

## 2017-10-17 DIAGNOSIS — Z79.4 TYPE 2 DIABETES MELLITUS WITH OTHER DIABETIC KIDNEY COMPLICATION, WITH LONG-TERM CURRENT USE OF INSULIN (HCC): ICD-10-CM

## 2017-10-17 PROCEDURE — 99214 OFFICE O/P EST MOD 30 MIN: CPT | Performed by: INTERNAL MEDICINE

## 2017-10-17 PROCEDURE — 90471 IMMUNIZATION ADMIN: CPT | Performed by: INTERNAL MEDICINE

## 2017-10-17 PROCEDURE — 90656 IIV3 VACC NO PRSV 0.5 ML IM: CPT | Performed by: INTERNAL MEDICINE

## 2017-10-17 RX ORDER — AZITHROMYCIN 250 MG/1
TABLET, FILM COATED ORAL
Qty: 6 TABLET | Refills: 0 | Status: SHIPPED | OUTPATIENT
Start: 2017-10-17 | End: 2018-02-27

## 2017-10-17 NOTE — PROGRESS NOTES
Subjective   Bernice Rai is a 57 y.o. female. Patient is here today for follow-up on his hypertension, hyperlipidemia, asthmatic bronchitis, GERD, diabetes mellitus.  She is generally been feeling okay but has had some sinus congestion with yellow drainage and productive cough for over a week without improvement.  She has no fevers chills or pleurisy.    Chief Complaint   Patient presents with   • Diabetes     HLD, HTN- FOLLOW UP LABS   • Cough     CONGESTION, DRAINAGE- HAS BEEN TAKING OTC MEDS BUT NOT HELPING          Vitals:    10/17/17 0758   BP: 130/70   Resp: 16   Temp: 98.6 °F (37 °C)     The following portions of the patient's history were reviewed and updated as appropriate: allergies, current medications, past family history, past medical history, past social history, past surgical history and problem list.    Past Medical History:   Diagnosis Date   • Arthritis     FEET   • Diabetes mellitus    • Hyperlipidemia    • Hypertension    • Reflux esophagitis    • Stroke       No Known Allergies   Social History     Social History   • Marital status:      Spouse name: N/A   • Number of children: N/A   • Years of education: N/A     Occupational History   • Not on file.     Social History Main Topics   • Smoking status: Current Some Day Smoker     Packs/day: 0.50   • Smokeless tobacco: Not on file   • Alcohol use No   • Drug use: Defer   • Sexual activity: Defer     Other Topics Concern   • Not on file     Social History Narrative   • No narrative on file        Current Outpatient Prescriptions:   •  albuterol (PROVENTIL HFA) 108 (90 BASE) MCG/ACT inhaler, Inhale 2 puffs Every 6 (Six) Hours As Needed for wheezing., Disp: 1 inhaler, Rfl: 11  •  amLODIPine (NORVASC) 10 MG tablet, TAKE ONE TABLET BY MOUTH ONCE DAILY, Disp: 30 tablet, Rfl: 5  •  aspirin 81 MG chewable tablet, Chew 81 mg daily., Disp: , Rfl:   •  atenolol (TENORMIN) 100 MG tablet, TAKE ONE TABLET BY MOUTH ONCE DAILY, Disp: 30 tablet, Rfl:  3  •  atorvastatin (LIPITOR) 40 MG tablet, TAKE ONE TABLET BY MOUTH ONCE DAILY, Disp: 30 tablet, Rfl: 5  •  insulin NPH-insulin regular (NOVOLIN 70/30) (70-30) 100 UNIT/ML injection, Inject  under the skin. 130 150 daily / sliding scale, Disp: , Rfl:   •  Insulin Pen Needle (B-D UF III MINI PEN NEEDLES) 31G X 5 MM misc, , Disp: , Rfl:   •  lisinopril (PRINIVIL,ZESTRIL) 20 MG tablet, TAKE ONE TABLET BY MOUTH TWICE DAILY, Disp: 60 tablet, Rfl: 5  •  triamterene-hydrochlorothiazide (MAXZIDE-25) 37.5-25 MG per tablet, Take 1 tablet by mouth Every Other Day., Disp: 30 tablet, Rfl: 5  •  venlafaxine XR (EFFEXOR-XR) 150 MG 24 hr capsule, TAKE ONE CAPSULE BY MOUTH ONCE DAILY, Disp: 30 capsule, Rfl: 5  •  azithromycin (ZITHROMAX Z-MONICA) 250 MG tablet, Take 2 tablets the first day, then 1 tablet daily for 4 days., Disp: 6 tablet, Rfl: 0     Objective     History of Present Illness     Review of Systems   Constitutional: Negative.  Negative for chills and fever.   HENT: Positive for congestion, rhinorrhea, sinus pain and sinus pressure.    Eyes: Negative.    Respiratory: Positive for cough.    Cardiovascular: Negative.    Gastrointestinal: Negative.    Endocrine: Negative.    Genitourinary: Negative.    Musculoskeletal: Negative.    Neurological: Negative.    Psychiatric/Behavioral: Negative.        Physical Exam   Constitutional: She is oriented to person, place, and time. She appears well-developed and well-nourished.   Pleasant, cooperative no distress with a blood pressure 120/80   HENT:   Head: Normocephalic and atraumatic.   Eyes: Conjunctivae are normal. Pupils are equal, round, and reactive to light. No scleral icterus.   Neck: Normal range of motion. Neck supple. No thyromegaly present.   Cardiovascular: Normal rate, regular rhythm and normal heart sounds.    Pulmonary/Chest: Effort normal and breath sounds normal. No respiratory distress. She has no wheezes. She has no rales. She exhibits no tenderness.    Musculoskeletal: Normal range of motion. She exhibits no edema.   Neurological: She is alert and oriented to person, place, and time.   Skin: Skin is warm and dry.   Psychiatric: She has a normal mood and affect. Her behavior is normal.   Nursing note and vitals reviewed.      ASSESSMENT  CMP has an elevated sugar 108 that stable and otherwise is normal and hemoglobin A1c is improved to 6.32.  Urine microalbumin is high but improved to 1549 µg per mL.  Lipid panels generally acceptable at a total cholesterol 156, low HDL of 28 but an LDL of 91 and triglycerides slightly high at 186.  #1-hypertension, well controlled  #2-hyperlipidemia, under pretty reasonable control  #3 recent bronchitis that's not improved for over a week  #4-GERD, asymptomatic  #5-diabetes mellitus type 2, on insulin and under reasonable control     Problem List Items Addressed This Visit        Cardiovascular and Mediastinum    Hyperlipidemia    Hypertension - Primary       Respiratory    Asthmatic bronchitis       Digestive    Gastroesophageal reflux disease       Endocrine    Diabetes mellitus          PLAN  The patient received a flu shot today.  The patient will continue her current medicines.  She will try and schedule an eye exam.  I plan on rechecking her in 4 months with a CBC, CMP, lipid panel, hemoglobin A1c, urine microalbumin and TSH    There are no Patient Instructions on file for this visit.  Return in about 4 months (around 2/17/2018) for with labs.

## 2017-11-03 DIAGNOSIS — I10 ESSENTIAL HYPERTENSION: ICD-10-CM

## 2017-11-03 RX ORDER — ATENOLOL 100 MG/1
TABLET ORAL
Qty: 30 TABLET | Refills: 3 | Status: SHIPPED | OUTPATIENT
Start: 2017-11-03 | End: 2018-02-14 | Stop reason: SDUPTHER

## 2018-01-24 DIAGNOSIS — I10 ESSENTIAL HYPERTENSION: ICD-10-CM

## 2018-01-24 RX ORDER — LISINOPRIL 20 MG/1
TABLET ORAL
Qty: 60 TABLET | Refills: 5 | Status: SHIPPED | OUTPATIENT
Start: 2018-01-24 | End: 2018-03-26 | Stop reason: SDUPTHER

## 2018-01-24 RX ORDER — VENLAFAXINE HYDROCHLORIDE 150 MG/1
CAPSULE, EXTENDED RELEASE ORAL
Qty: 30 CAPSULE | Refills: 5 | Status: SHIPPED | OUTPATIENT
Start: 2018-01-24 | End: 2018-07-19 | Stop reason: SDUPTHER

## 2018-01-24 RX ORDER — ATORVASTATIN CALCIUM 40 MG/1
TABLET, FILM COATED ORAL
Qty: 30 TABLET | Refills: 5 | Status: SHIPPED | OUTPATIENT
Start: 2018-01-24 | End: 2018-03-26 | Stop reason: SDUPTHER

## 2018-01-24 RX ORDER — AMLODIPINE BESYLATE 10 MG/1
TABLET ORAL
Qty: 30 TABLET | Refills: 5 | Status: SHIPPED | OUTPATIENT
Start: 2018-01-24 | End: 2018-07-19 | Stop reason: SDUPTHER

## 2018-02-14 DIAGNOSIS — I10 ESSENTIAL HYPERTENSION: ICD-10-CM

## 2018-02-14 DIAGNOSIS — Z79.4 TYPE 2 DIABETES MELLITUS WITH OTHER DIABETIC KIDNEY COMPLICATION, WITH LONG-TERM CURRENT USE OF INSULIN (HCC): ICD-10-CM

## 2018-02-14 DIAGNOSIS — E11.29 TYPE 2 DIABETES MELLITUS WITH OTHER DIABETIC KIDNEY COMPLICATION, WITH LONG-TERM CURRENT USE OF INSULIN (HCC): ICD-10-CM

## 2018-02-14 DIAGNOSIS — E78.5 HYPERLIPIDEMIA, UNSPECIFIED HYPERLIPIDEMIA TYPE: ICD-10-CM

## 2018-02-14 RX ORDER — ATENOLOL 100 MG/1
TABLET ORAL
Qty: 30 TABLET | Refills: 3 | Status: SHIPPED | OUTPATIENT
Start: 2018-02-14 | End: 2018-07-02 | Stop reason: SDUPTHER

## 2018-02-20 LAB
ALBUMIN SERPL-MCNC: 3.7 G/DL (ref 3.5–5.2)
ALBUMIN/GLOB SERPL: 1.5 G/DL
ALP SERPL-CCNC: 86 U/L (ref 39–117)
ALT SERPL-CCNC: 11 U/L (ref 1–33)
AST SERPL-CCNC: 10 U/L (ref 1–32)
BASOPHILS # BLD AUTO: 0.05 10*3/MM3 (ref 0–0.2)
BASOPHILS NFR BLD AUTO: 0.5 % (ref 0–1.5)
BILIRUB SERPL-MCNC: 0.2 MG/DL (ref 0.1–1.2)
BUN SERPL-MCNC: 20 MG/DL (ref 6–20)
BUN/CREAT SERPL: 24.1 (ref 7–25)
CALCIUM SERPL-MCNC: 9.5 MG/DL (ref 8.6–10.5)
CHLORIDE SERPL-SCNC: 100 MMOL/L (ref 98–107)
CHOLEST SERPL-MCNC: 177 MG/DL (ref 0–200)
CO2 SERPL-SCNC: 25.1 MMOL/L (ref 22–29)
CREAT SERPL-MCNC: 0.83 MG/DL (ref 0.57–1)
EOSINOPHIL # BLD AUTO: 0.32 10*3/MM3 (ref 0–0.7)
EOSINOPHIL NFR BLD AUTO: 3 % (ref 0.3–6.2)
ERYTHROCYTE [DISTWIDTH] IN BLOOD BY AUTOMATED COUNT: 13.7 % (ref 11.7–13)
GFR SERPLBLD CREATININE-BSD FMLA CKD-EPI: 71 ML/MIN/1.73
GFR SERPLBLD CREATININE-BSD FMLA CKD-EPI: 86 ML/MIN/1.73
GLOBULIN SER CALC-MCNC: 2.4 GM/DL
GLUCOSE SERPL-MCNC: 162 MG/DL (ref 65–99)
HBA1C MFR BLD: 6.9 % (ref 4.8–5.6)
HCT VFR BLD AUTO: 47.6 % (ref 35.6–45.5)
HDLC SERPL-MCNC: 30 MG/DL (ref 40–60)
HGB BLD-MCNC: 15.2 G/DL (ref 11.9–15.5)
IMM GRANULOCYTES # BLD: 0.02 10*3/MM3 (ref 0–0.03)
IMM GRANULOCYTES NFR BLD: 0.2 % (ref 0–0.5)
LDLC SERPL CALC-MCNC: 84 MG/DL (ref 0–100)
LDLC/HDLC SERPL: 2.8 {RATIO}
LYMPHOCYTES # BLD AUTO: 3.69 10*3/MM3 (ref 0.9–4.8)
LYMPHOCYTES NFR BLD AUTO: 34.2 % (ref 19.6–45.3)
MCH RBC QN AUTO: 31.2 PG (ref 26.9–32)
MCHC RBC AUTO-ENTMCNC: 31.9 G/DL (ref 32.4–36.3)
MCV RBC AUTO: 97.7 FL (ref 80.5–98.2)
MICROALBUMIN UR-MCNC: 3017.8 UG/ML
MONOCYTES # BLD AUTO: 1.06 10*3/MM3 (ref 0.2–1.2)
MONOCYTES NFR BLD AUTO: 9.8 % (ref 5–12)
NEUTROPHILS # BLD AUTO: 5.65 10*3/MM3 (ref 1.9–8.1)
NEUTROPHILS NFR BLD AUTO: 52.3 % (ref 42.7–76)
PLATELET # BLD AUTO: 381 10*3/MM3 (ref 140–500)
POTASSIUM SERPL-SCNC: 5.1 MMOL/L (ref 3.5–5.2)
PROT SERPL-MCNC: 6.1 G/DL (ref 6–8.5)
RBC # BLD AUTO: 4.87 10*6/MM3 (ref 3.9–5.2)
SODIUM SERPL-SCNC: 140 MMOL/L (ref 136–145)
TRIGL SERPL-MCNC: 315 MG/DL (ref 0–150)
TSH SERPL DL<=0.005 MIU/L-ACNC: 2.59 MIU/ML (ref 0.27–4.2)
VLDLC SERPL CALC-MCNC: 63 MG/DL (ref 5–40)
WBC # BLD AUTO: 10.79 10*3/MM3 (ref 4.5–10.7)

## 2018-02-27 ENCOUNTER — OFFICE VISIT (OUTPATIENT)
Dept: FAMILY MEDICINE CLINIC | Facility: CLINIC | Age: 58
End: 2018-02-27

## 2018-02-27 VITALS
DIASTOLIC BLOOD PRESSURE: 74 MMHG | TEMPERATURE: 98.7 F | WEIGHT: 266.4 LBS | BODY MASS INDEX: 49.02 KG/M2 | HEIGHT: 62 IN | OXYGEN SATURATION: 96 % | HEART RATE: 68 BPM | SYSTOLIC BLOOD PRESSURE: 120 MMHG

## 2018-02-27 DIAGNOSIS — E78.5 HYPERLIPIDEMIA, UNSPECIFIED HYPERLIPIDEMIA TYPE: ICD-10-CM

## 2018-02-27 DIAGNOSIS — IMO0001 CLASS 3 OBESITY DUE TO EXCESS CALORIES WITH SERIOUS COMORBIDITY AND BODY MASS INDEX (BMI) OF 45.0 TO 49.9 IN ADULT: ICD-10-CM

## 2018-02-27 DIAGNOSIS — K21.9 GASTROESOPHAGEAL REFLUX DISEASE, ESOPHAGITIS PRESENCE NOT SPECIFIED: ICD-10-CM

## 2018-02-27 DIAGNOSIS — Z79.4 TYPE 2 DIABETES MELLITUS WITH OTHER DIABETIC KIDNEY COMPLICATION, WITH LONG-TERM CURRENT USE OF INSULIN (HCC): ICD-10-CM

## 2018-02-27 DIAGNOSIS — I10 ESSENTIAL HYPERTENSION: Primary | ICD-10-CM

## 2018-02-27 DIAGNOSIS — E11.29 TYPE 2 DIABETES MELLITUS WITH OTHER DIABETIC KIDNEY COMPLICATION, WITH LONG-TERM CURRENT USE OF INSULIN (HCC): ICD-10-CM

## 2018-02-27 PROCEDURE — 99214 OFFICE O/P EST MOD 30 MIN: CPT | Performed by: INTERNAL MEDICINE

## 2018-02-27 NOTE — PROGRESS NOTES
Subjective   Bernice Rai is a 57 y.o. female. Patient is here today for follow-up on her hypertension, hyperlipidemia, GERD, and diabetes mellitus.  She's also quite obese and has gained 10 pounds in weight since her last visit.  She's had no chest pain, shortness of breath, edema or myalgias and no hypoglycemia.  Chief Complaint   Patient presents with   • Diabetes     HLD, HTN- FOLLOW UP LABS          Vitals:    02/27/18 0910   BP: 120/74   Pulse: 68   Temp: 98.7 °F (37.1 °C)   SpO2: 96%     The following portions of the patient's history were reviewed and updated as appropriate: allergies, current medications, past family history, past medical history, past social history, past surgical history and problem list.    Past Medical History:   Diagnosis Date   • Arthritis     FEET   • Diabetes mellitus    • Hyperlipidemia    • Hypertension    • Reflux esophagitis    • Stroke       No Known Allergies   Social History     Social History   • Marital status:      Spouse name: N/A   • Number of children: N/A   • Years of education: N/A     Occupational History   • Not on file.     Social History Main Topics   • Smoking status: Current Some Day Smoker     Packs/day: 0.50   • Smokeless tobacco: Not on file   • Alcohol use No   • Drug use: Defer   • Sexual activity: Defer     Other Topics Concern   • Not on file     Social History Narrative        Current Outpatient Prescriptions:   •  albuterol (PROVENTIL HFA) 108 (90 BASE) MCG/ACT inhaler, Inhale 2 puffs Every 6 (Six) Hours As Needed for wheezing., Disp: 1 inhaler, Rfl: 11  •  amLODIPine (NORVASC) 10 MG tablet, TAKE ONE TABLET BY MOUTH ONCE DAILY, Disp: 30 tablet, Rfl: 5  •  aspirin 81 MG chewable tablet, Chew 81 mg daily., Disp: , Rfl:   •  atenolol (TENORMIN) 100 MG tablet, TAKE ONE TABLET BY MOUTH ONCE DAILY, Disp: 30 tablet, Rfl: 3  •  atorvastatin (LIPITOR) 40 MG tablet, TAKE ONE TABLET BY MOUTH ONCE DAILY, Disp: 30 tablet, Rfl: 5  •  insulin NPH-insulin  regular (NOVOLIN 70/30) (70-30) 100 UNIT/ML injection, Inject  under the skin. 130 150 daily / sliding scale, Disp: , Rfl:   •  Insulin Pen Needle (B-D UF III MINI PEN NEEDLES) 31G X 5 MM misc, , Disp: , Rfl:   •  lisinopril (PRINIVIL,ZESTRIL) 20 MG tablet, TAKE ONE TABLET BY MOUTH TWICE DAILY, Disp: 60 tablet, Rfl: 5  •  triamterene-hydrochlorothiazide (MAXZIDE-25) 37.5-25 MG per tablet, Take 1 tablet by mouth Every Other Day., Disp: 30 tablet, Rfl: 5  •  venlafaxine XR (EFFEXOR-XR) 150 MG 24 hr capsule, TAKE ONE CAPSULE BY MOUTH ONCE DAILY, Disp: 30 capsule, Rfl: 5  •  Empagliflozin 10 MG tablet, Take 10 mg by mouth Daily., Disp: 30 tablet, Rfl: 5     Objective     History of Present Illness     Review of Systems   Constitutional: Negative.    HENT: Negative.    Eyes: Negative.    Respiratory: Negative.    Cardiovascular: Negative.    Gastrointestinal: Negative.    Endocrine: Negative.    Genitourinary: Negative.    Musculoskeletal: Negative.    Skin: Negative.    Neurological: Negative.    Psychiatric/Behavioral: Negative.        Physical Exam   Constitutional: She is oriented to person, place, and time. She appears well-developed and well-nourished.   Pleasant, cooperative no distress blood pressure 130/80   HENT:   Head: Normocephalic and atraumatic.   Eyes: Conjunctivae are normal. Pupils are equal, round, and reactive to light. No scleral icterus.   Neck: Normal range of motion. Neck supple. No thyromegaly present.   Cardiovascular: Normal rate, regular rhythm and normal heart sounds.    Pulmonary/Chest: Effort normal and breath sounds normal. No respiratory distress. She has no wheezes. She has no rales.   Musculoskeletal: Normal range of motion. She exhibits no edema.   Neurological: She is alert and oriented to person, place, and time.   Skin: Skin is warm and dry.   Psychiatric: She has a normal mood and affect. Her behavior is normal.   Nursing note and vitals reviewed.      ASSESSMENT  CBC is  essentially normal.  CMP had an elevated sugar of 162 which is higher than usual and hemoglobin A1c is increased to 6.9.  Urine microalbumin is high at about 3000.  TSH was normal.  Lipid panel is stable with a total cholesterol 177, HDL low at 30, LDL of 84, triglycerides 315  #1-hypertension, adequate control  #2-hyperlipidemia, stable  #3-diabetes mellitus type 2, not as well-controlled as before  #4-obesity with 10 pound weight gain  #5-GERD, stable on over-the-counter medicine     Problem List Items Addressed This Visit        Cardiovascular and Mediastinum    Hyperlipidemia    Hypertension - Primary       Digestive    Gastroesophageal reflux disease       Endocrine    Diabetes mellitus    Relevant Medications    Empagliflozin 10 MG tablet          PLAN  I'm going to start the patient on Jardiance 10 mg daily.  She will continue her other medications.  I encouraged her to lose weight and watch her diet.  I want to recheck her in 6 weeks with a fasting BMP    There are no Patient Instructions on file for this visit.  Return in about 6 weeks (around 4/10/2018) for with labs.

## 2018-03-17 ENCOUNTER — OFFICE VISIT (OUTPATIENT)
Dept: RETAIL CLINIC | Facility: CLINIC | Age: 58
End: 2018-03-17

## 2018-03-17 VITALS
OXYGEN SATURATION: 98 % | DIASTOLIC BLOOD PRESSURE: 72 MMHG | RESPIRATION RATE: 16 BRPM | TEMPERATURE: 98.2 F | SYSTOLIC BLOOD PRESSURE: 119 MMHG | HEART RATE: 69 BPM

## 2018-03-17 DIAGNOSIS — H65.03 BILATERAL ACUTE SEROUS OTITIS MEDIA, RECURRENCE NOT SPECIFIED: Primary | ICD-10-CM

## 2018-03-17 DIAGNOSIS — B37.0 THRUSH, ORAL: ICD-10-CM

## 2018-03-17 LAB
EXPIRATION DATE: NORMAL
INTERNAL CONTROL: NORMAL
Lab: NORMAL
S PYO AG THROAT QL: NEGATIVE

## 2018-03-17 PROCEDURE — 87880 STREP A ASSAY W/OPTIC: CPT | Performed by: NURSE PRACTITIONER

## 2018-03-17 PROCEDURE — 99213 OFFICE O/P EST LOW 20 MIN: CPT | Performed by: NURSE PRACTITIONER

## 2018-03-17 RX ORDER — FLUTICASONE PROPIONATE 50 MCG
1 SPRAY, SUSPENSION (ML) NASAL 2 TIMES DAILY
Start: 2018-03-17 | End: 2018-03-31

## 2018-03-17 NOTE — PROGRESS NOTES
Subjective   Bernice Rai is a 57 y.o. female.     Earache    There is pain in the left ear. This is a new problem. The current episode started 1 to 4 weeks ago. The problem occurs constantly. The problem has been unchanged. There has been no fever. The pain is moderate. Associated symptoms include coughing (unchanged from baseline), neck pain (left sided only) and a sore throat. Pertinent negatives include no diarrhea, ear discharge, headaches, hearing loss, rhinorrhea or vomiting. She has tried NSAIDs for the symptoms. The treatment provided moderate relief. There is no history of a chronic ear infection or hearing loss.   Sore Throat    This is a new problem. The current episode started 1 to 4 weeks ago. The problem has been unchanged. Neither side of throat is experiencing more pain than the other. There has been no fever. The pain is mild. Associated symptoms include coughing (unchanged from baseline), ear pain, a plugged ear sensation and neck pain (left sided only). Pertinent negatives include no congestion, diarrhea, ear discharge, headaches, hoarse voice, shortness of breath, swollen glands, trouble swallowing or vomiting. She has had no exposure to strep or mono. She has tried NSAIDs for the symptoms. The treatment provided mild relief.       The following portions of the patient's history were reviewed and updated as appropriate: allergies, current medications, past family history, past medical history, past social history, past surgical history and problem list.    Review of Systems   Constitutional: Negative for appetite change, chills, diaphoresis, fatigue and fever.   HENT: Positive for ear pain and sore throat. Negative for congestion, ear discharge, facial swelling, hearing loss, hoarse voice, mouth sores, nosebleeds, postnasal drip, rhinorrhea, sinus pressure, sneezing, tinnitus, trouble swallowing and voice change.    Eyes: Negative for pain, discharge, redness and itching.   Respiratory:  Positive for cough (unchanged from baseline). Negative for chest tightness, shortness of breath and wheezing.    Cardiovascular: Negative for chest pain and palpitations.   Gastrointestinal: Negative for diarrhea, nausea and vomiting.   Genitourinary: Negative for decreased urine volume.   Musculoskeletal: Positive for neck pain (left sided only). Negative for myalgias and neck stiffness.   Skin: Negative for color change.   Allergic/Immunologic: Positive for environmental allergies and immunocompromised state.   Neurological: Positive for dizziness. Negative for syncope, weakness and headaches.       Objective   Physical Exam   Constitutional: She is oriented to person, place, and time. She appears well-developed and well-nourished. She is cooperative.  Non-toxic appearance. She does not appear ill. No distress.   HENT:   Right Ear: Hearing, external ear and ear canal normal. Tympanic membrane is not perforated, not erythematous, not retracted and not bulging. A middle ear effusion is present.   Left Ear: Hearing, external ear and ear canal normal. Tympanic membrane is not perforated, not erythematous, not retracted and not bulging. A middle ear effusion is present.   Nose: Rhinorrhea present. No mucosal edema. Right sinus exhibits no maxillary sinus tenderness and no frontal sinus tenderness. Left sinus exhibits no maxillary sinus tenderness and no frontal sinus tenderness.   Mouth/Throat: Uvula is midline and mucous membranes are normal. Mucous membranes are not pale, not dry and not cyanotic. No uvula swelling. Oropharyngeal exudate and posterior oropharyngeal erythema present. No posterior oropharyngeal edema. Tonsils are 2+ on the right. Tonsils are 2+ on the left. No tonsillar exudate.       Eyes: Conjunctivae and lids are normal.   Cardiovascular: Normal rate, regular rhythm, S1 normal and S2 normal.    Pulmonary/Chest: Effort normal. She has decreased breath sounds in the right upper field, the right middle  field, the right lower field, the left upper field, the left middle field and the left lower field. She has no wheezes. She has no rhonchi. She has no rales.   Abdominal: Bowel sounds are normal. There is no tenderness.   Lymphadenopathy:     She has no cervical adenopathy.   Neurological: She is alert and oriented to person, place, and time.   Skin: Skin is warm and dry. She is not diaphoretic. No pallor.   Vitals reviewed.      Assessment/Plan   Bernice was seen today for earache and sore throat.    Diagnoses and all orders for this visit:    Bilateral acute serous otitis media, recurrence not specified  -     fluticasone (FLONASE) 50 MCG/ACT nasal spray; 1 spray into each nostril 2 (Two) Times a Day for 14 days.    Thrush, oral  -     nystatin (MYCOSTATIN) 565127 UNIT/ML suspension; Swish and swallow 5 mL 4 (Four) Times a Day for 14 days.  -     POC Rapid Strep A          -     Follow up with PCP or urgent treatment for persistent or worsening symptoms     Lab Results (last 24 hours)     Procedure Component Value Units Date/Time    POC Rapid Strep A [[id number]] Collected:  03/17/18 1655    Specimen:  Swab Updated:  03/17/18 1655     Rapid Strep A Screen Negative     Internal Control Passed     Lot Number [id number]     Expiration Date 7/31/19

## 2018-03-17 NOTE — PATIENT INSTRUCTIONS
Oral Thrush, Adult  Oral thrush, also called oral candidiasis, is a fungal infection that develops in the mouth and throat and on the tongue. It causes white patches to form on the mouth and tongue. Thrush is most common in older adults, but it can occur at any age.  Many cases of thrush are mild, but this infection can also be serious. Thrush can be a repeated (recurrent) problem for certain people who have a weak body defense system (immune system). The weakness can be caused by chronic illnesses, or by taking medicines that limit the body's ability to fight infection. If a person has difficulty fighting infection, the fungus that causes thrush can spread through the body. This can cause life-threatening blood or organ infections.  What are the causes?  This condition is caused by a fungus (yeast) called Candida albicans.  · This fungus is normally present in small amounts in the mouth and on other mucous membranes. It usually causes no harm.  · If conditions are present that allow the fungus to grow without control, it invades surrounding tissues and becomes an infection.  · Other Candida species can also lead to thrush (rare).  What increases the risk?  This condition is more likely to develop in:  · People with a weakened immune system.  · Older adults.  · People with HIV (human immunodeficiency virus).  · People with diabetes.  · People with dry mouth (xerostomia).  · Pregnant women.  · People with poor dental care, especially people who have false teeth.  · People who use antibiotic medicines.  What are the signs or symptoms?  Symptoms of this condition can vary from mild and moderate to severe and persistent. Symptoms may include:  · A burning feeling in the mouth and throat. This can occur at the start of a thrush infection.  · White patches that stick to the mouth and tongue. The tissue around the patches may be red, raw, and painful. If rubbed (during tooth brushing, for example), the patches and the  tissue of the mouth may bleed easily.  · A bad taste in the mouth or difficulty tasting foods.  · A cottony feeling in the mouth.  · Pain during eating and swallowing.  · Poor appetite.  · Cracking at the corners of the mouth.  How is this diagnosed?  This condition is diagnosed based on:  · Physical exam. Your health care provider will look in your mouth.  · Health history. Your health care provider will ask you questions about your health.  How is this treated?  This condition is treated with medicines called antifungals, which prevent the growth of fungi. These medicines are either applied directly to the affected area (topical) or swallowed (oral). The treatment will depend on the severity of the condition.  Mild thrush   Mild cases of thrush may clear up with the use of an antifungal mouth rinse or lozenges. Treatment usually lasts about 14 days.  Moderate to severe thrush   · More severe thrush infections that have spread to the esophagus are treated with an oral antifungal medicine. A topical antifungal medicine may also be used.  · For some severe infections, treatment may need to continue for more than 14 days.  · Oral antifungal medicines are rarely used during pregnancy because they may be harmful to the unborn child. If you are pregnant, talk with your health care provider about options for treatment.  Persistent or recurrent thrush   For cases of thrush that do not go away or keep coming back:  · Treatment may be needed twice as long as the symptoms last.  · Treatment will include both oral and topical antifungal medicines.  · People with a weakened immune system can take an antifungal medicine on a continuous basis to prevent thrush infections.  It is important to treat conditions that make a person more likely to get thrush, such as diabetes or HIV.  Follow these instructions at home:  Medicines   · Take over-the-counter and prescription medicines only as told by your health care provider.  · Talk  with your health care provider about an over-the-counter medicine called gentian violet, which kills bacteria and fungi.  Relieving soreness and discomfort   To help reduce the discomfort of thrush:  · Drink cold liquids such as water or iced tea.  · Try flavored ice treats or frozen juices.  · Eat foods that are easy to swallow, such as gelatin, ice cream, or custard.  · Try drinking from a straw if the patches in your mouth are painful.  General instructions   · Eat plain, unflavored yogurt as directed by your health care provider. Check the label to make sure the yogurt contains live cultures. This yogurt can help healthy bacteria to grow in the mouth and can stop the growth of the fungus that causes thrush.  · If you wear dentures, remove the dentures before going to bed, brush them vigorously, and soak them in a cleaning solution as directed by your health care provider.  · Rinse your mouth with a warm salt-water mixture several times a day. To make a salt-water mixture, completely dissolve 1/2-1 tsp of salt in 1 cup of warm water.  Contact a health care provider if:  · Your symptoms are getting worse or are not improving within 7 days of starting treatment.  · You have symptoms of a spreading infection, such as white patches on the skin outside of the mouth.  This information is not intended to replace advice given to you by your health care provider. Make sure you discuss any questions you have with your health care provider.  Document Released: 09/12/2005 Document Revised: 09/11/2017 Document Reviewed: 09/11/2017  [employer] Interactive Patient Education © 2017 [employer]

## 2018-03-26 DIAGNOSIS — I10 ESSENTIAL HYPERTENSION: ICD-10-CM

## 2018-03-26 RX ORDER — ATORVASTATIN CALCIUM 40 MG/1
40 TABLET, FILM COATED ORAL DAILY
Qty: 90 TABLET | Refills: 1 | Status: SHIPPED | OUTPATIENT
Start: 2018-03-26 | End: 2018-09-06 | Stop reason: SDUPTHER

## 2018-03-26 RX ORDER — LISINOPRIL 20 MG/1
20 TABLET ORAL 2 TIMES DAILY
Qty: 180 TABLET | Refills: 1 | Status: SHIPPED | OUTPATIENT
Start: 2018-03-26 | End: 2018-07-20 | Stop reason: SDUPTHER

## 2018-03-28 RX ORDER — TRIAMTERENE AND HYDROCHLOROTHIAZIDE 37.5; 25 MG/1; MG/1
TABLET ORAL
Qty: 30 TABLET | Refills: 5 | Status: SHIPPED | OUTPATIENT
Start: 2018-03-28 | End: 2019-09-07 | Stop reason: SDUPTHER

## 2018-03-29 DIAGNOSIS — Z79.4 TYPE 2 DIABETES MELLITUS WITH OTHER DIABETIC KIDNEY COMPLICATION, WITH LONG-TERM CURRENT USE OF INSULIN (HCC): Primary | ICD-10-CM

## 2018-03-29 DIAGNOSIS — E11.29 TYPE 2 DIABETES MELLITUS WITH OTHER DIABETIC KIDNEY COMPLICATION, WITH LONG-TERM CURRENT USE OF INSULIN (HCC): Primary | ICD-10-CM

## 2018-04-03 LAB
BUN SERPL-MCNC: 15 MG/DL (ref 6–20)
BUN/CREAT SERPL: 19.5 (ref 7–25)
CALCIUM SERPL-MCNC: 10.1 MG/DL (ref 8.6–10.5)
CHLORIDE SERPL-SCNC: 104 MMOL/L (ref 98–107)
CO2 SERPL-SCNC: 23.4 MMOL/L (ref 22–29)
CREAT SERPL-MCNC: 0.77 MG/DL (ref 0.57–1)
GFR SERPLBLD CREATININE-BSD FMLA CKD-EPI: 78 ML/MIN/1.73
GFR SERPLBLD CREATININE-BSD FMLA CKD-EPI: 94 ML/MIN/1.73
GLUCOSE SERPL-MCNC: 140 MG/DL (ref 65–99)
POTASSIUM SERPL-SCNC: 4.8 MMOL/L (ref 3.5–5.2)
SODIUM SERPL-SCNC: 142 MMOL/L (ref 136–145)

## 2018-04-10 ENCOUNTER — OFFICE VISIT (OUTPATIENT)
Dept: FAMILY MEDICINE CLINIC | Facility: CLINIC | Age: 58
End: 2018-04-10

## 2018-04-10 VITALS
OXYGEN SATURATION: 97 % | TEMPERATURE: 98.7 F | HEART RATE: 75 BPM | HEIGHT: 62 IN | BODY MASS INDEX: 48.66 KG/M2 | SYSTOLIC BLOOD PRESSURE: 122 MMHG | WEIGHT: 264.4 LBS | DIASTOLIC BLOOD PRESSURE: 74 MMHG

## 2018-04-10 DIAGNOSIS — Z79.4 TYPE 2 DIABETES MELLITUS WITH OTHER DIABETIC KIDNEY COMPLICATION, WITH LONG-TERM CURRENT USE OF INSULIN (HCC): ICD-10-CM

## 2018-04-10 DIAGNOSIS — I10 ESSENTIAL HYPERTENSION: Primary | ICD-10-CM

## 2018-04-10 DIAGNOSIS — B37.31 MONILIAL VAGINITIS: ICD-10-CM

## 2018-04-10 DIAGNOSIS — K21.9 GASTROESOPHAGEAL REFLUX DISEASE, ESOPHAGITIS PRESENCE NOT SPECIFIED: ICD-10-CM

## 2018-04-10 DIAGNOSIS — E11.29 TYPE 2 DIABETES MELLITUS WITH OTHER DIABETIC KIDNEY COMPLICATION, WITH LONG-TERM CURRENT USE OF INSULIN (HCC): ICD-10-CM

## 2018-04-10 DIAGNOSIS — E78.5 HYPERLIPIDEMIA, UNSPECIFIED HYPERLIPIDEMIA TYPE: ICD-10-CM

## 2018-04-10 DIAGNOSIS — L30.9 DERMATITIS: ICD-10-CM

## 2018-04-10 PROCEDURE — 99213 OFFICE O/P EST LOW 20 MIN: CPT | Performed by: INTERNAL MEDICINE

## 2018-04-10 RX ORDER — FLUCONAZOLE 150 MG/1
150 TABLET ORAL
Qty: 4 TABLET | Refills: 0 | Status: SHIPPED | OUTPATIENT
Start: 2018-04-10 | End: 2018-07-19 | Stop reason: SDUPTHER

## 2018-07-02 DIAGNOSIS — I10 ESSENTIAL HYPERTENSION: ICD-10-CM

## 2018-07-02 RX ORDER — ATENOLOL 100 MG/1
TABLET ORAL
Qty: 30 TABLET | Refills: 3 | Status: SHIPPED | OUTPATIENT
Start: 2018-07-02 | End: 2018-10-25 | Stop reason: SDUPTHER

## 2018-07-03 DIAGNOSIS — Z79.4 TYPE 2 DIABETES MELLITUS WITH OTHER DIABETIC KIDNEY COMPLICATION, WITH LONG-TERM CURRENT USE OF INSULIN (HCC): ICD-10-CM

## 2018-07-03 DIAGNOSIS — E78.5 HYPERLIPIDEMIA, UNSPECIFIED HYPERLIPIDEMIA TYPE: ICD-10-CM

## 2018-07-03 DIAGNOSIS — E11.29 TYPE 2 DIABETES MELLITUS WITH OTHER DIABETIC KIDNEY COMPLICATION, WITH LONG-TERM CURRENT USE OF INSULIN (HCC): ICD-10-CM

## 2018-07-11 LAB
ALBUMIN SERPL-MCNC: 3.8 G/DL (ref 3.5–5.2)
ALBUMIN/GLOB SERPL: 1.8 G/DL
ALP SERPL-CCNC: 81 U/L (ref 39–117)
ALT SERPL-CCNC: 13 U/L (ref 1–33)
AST SERPL-CCNC: 9 U/L (ref 1–32)
BILIRUB SERPL-MCNC: 0.2 MG/DL (ref 0.1–1.2)
BUN SERPL-MCNC: 16 MG/DL (ref 6–20)
BUN/CREAT SERPL: 23.9 (ref 7–25)
CALCIUM SERPL-MCNC: 9.3 MG/DL (ref 8.6–10.5)
CHLORIDE SERPL-SCNC: 105 MMOL/L (ref 98–107)
CHOLEST SERPL-MCNC: 180 MG/DL (ref 0–200)
CO2 SERPL-SCNC: 24.3 MMOL/L (ref 22–29)
CREAT SERPL-MCNC: 0.67 MG/DL (ref 0.57–1)
GLOBULIN SER CALC-MCNC: 2.1 GM/DL
GLUCOSE SERPL-MCNC: 74 MG/DL (ref 65–99)
HBA1C MFR BLD: 6.6 % (ref 4.8–5.6)
HDLC SERPL-MCNC: 35 MG/DL (ref 40–60)
LDLC SERPL CALC-MCNC: 109 MG/DL (ref 0–100)
LDLC/HDLC SERPL: 3.11 {RATIO}
MICROALBUMIN UR-MCNC: 1302.1 UG/ML
POTASSIUM SERPL-SCNC: 4.9 MMOL/L (ref 3.5–5.2)
PROT SERPL-MCNC: 5.9 G/DL (ref 6–8.5)
SODIUM SERPL-SCNC: 141 MMOL/L (ref 136–145)
TRIGL SERPL-MCNC: 181 MG/DL (ref 0–150)
VLDLC SERPL CALC-MCNC: 36.2 MG/DL (ref 5–40)

## 2018-07-17 ENCOUNTER — OFFICE VISIT (OUTPATIENT)
Dept: FAMILY MEDICINE CLINIC | Facility: CLINIC | Age: 58
End: 2018-07-17

## 2018-07-17 VITALS
HEART RATE: 68 BPM | WEIGHT: 254 LBS | OXYGEN SATURATION: 97 % | HEIGHT: 62 IN | DIASTOLIC BLOOD PRESSURE: 64 MMHG | RESPIRATION RATE: 16 BRPM | SYSTOLIC BLOOD PRESSURE: 126 MMHG | BODY MASS INDEX: 46.74 KG/M2

## 2018-07-17 DIAGNOSIS — Z79.4 TYPE 2 DIABETES MELLITUS WITH OTHER DIABETIC KIDNEY COMPLICATION, WITH LONG-TERM CURRENT USE OF INSULIN (HCC): ICD-10-CM

## 2018-07-17 DIAGNOSIS — B37.31 MONILIAL VAGINITIS: ICD-10-CM

## 2018-07-17 DIAGNOSIS — IMO0001 CLASS 3 OBESITY DUE TO EXCESS CALORIES WITH SERIOUS COMORBIDITY AND BODY MASS INDEX (BMI) OF 45.0 TO 49.9 IN ADULT: ICD-10-CM

## 2018-07-17 DIAGNOSIS — E78.5 HYPERLIPIDEMIA, UNSPECIFIED HYPERLIPIDEMIA TYPE: ICD-10-CM

## 2018-07-17 DIAGNOSIS — I10 ESSENTIAL HYPERTENSION: Primary | ICD-10-CM

## 2018-07-17 DIAGNOSIS — Z12.11 ENCOUNTER FOR SCREENING COLONOSCOPY: ICD-10-CM

## 2018-07-17 DIAGNOSIS — E11.29 TYPE 2 DIABETES MELLITUS WITH OTHER DIABETIC KIDNEY COMPLICATION, WITH LONG-TERM CURRENT USE OF INSULIN (HCC): ICD-10-CM

## 2018-07-17 PROCEDURE — 99214 OFFICE O/P EST MOD 30 MIN: CPT | Performed by: INTERNAL MEDICINE

## 2018-07-17 NOTE — PROGRESS NOTES
Subjective   Bernice Rai is a 57 y.o. female. Patient is here today fo follow-up on her hypertension, hyperlipidemia, diabetes mellitus and obesity.  The patient's generally been stable.  She does get an occasional yeast infections since she's been on the diabetes pill.  She's had no hypoglycemia and generally is felt well.  She has lost about 10 pounds in weight  Chief Complaint   Patient presents with   • Diabetes   • Hyperlipidemia   • Hypertension          Vitals:    07/17/18 1430   BP: 126/64   Pulse: 68   Resp: 16   SpO2: 97%     The following portions of the patient's history were reviewed and updated as appropriate: allergies, current medications, past family history, past medical history, past social history, past surgical history and problem list.    Past Medical History:   Diagnosis Date   • Arthritis     FEET   • Diabetes mellitus (CMS/HCC)    • Hyperlipidemia    • Hypertension    • Reflux esophagitis    • Stroke (CMS/HCC)       No Known Allergies   Social History     Social History   • Marital status:      Spouse name: N/A   • Number of children: N/A   • Years of education: N/A     Occupational History   • Not on file.     Social History Main Topics   • Smoking status: Current Some Day Smoker     Packs/day: 0.50   • Smokeless tobacco: Never Used   • Alcohol use No   • Drug use: Unknown   • Sexual activity: Defer     Other Topics Concern   • Not on file     Social History Narrative   • No narrative on file        Current Outpatient Prescriptions:   •  albuterol (PROVENTIL HFA) 108 (90 BASE) MCG/ACT inhaler, Inhale 2 puffs Every 6 (Six) Hours As Needed for wheezing., Disp: 1 inhaler, Rfl: 11  •  amLODIPine (NORVASC) 10 MG tablet, TAKE ONE TABLET BY MOUTH ONCE DAILY, Disp: 30 tablet, Rfl: 5  •  aspirin 81 MG chewable tablet, Chew 81 mg daily., Disp: , Rfl:   •  atenolol (TENORMIN) 100 MG tablet, TAKE 1 TABLET BY MOUTH ONCE DAILY, Disp: 30 tablet, Rfl: 3  •  atorvastatin (LIPITOR) 40 MG tablet,  Take 1 tablet by mouth Daily., Disp: 90 tablet, Rfl: 1  •  Empagliflozin 10 MG tablet, Take 10 mg by mouth Daily., Disp: 90 tablet, Rfl: 1  •  fluconazole (DIFLUCAN) 150 MG tablet, Take 1 tablet by mouth Every 3 (Three) Days., Disp: 4 tablet, Rfl: 0  •  insulin NPH-insulin regular (NOVOLIN 70/30) (70-30) 100 UNIT/ML injection, Inject  under the skin. 130 150 daily / sliding scale, Disp: , Rfl:   •  Insulin Pen Needle (B-D UF III MINI PEN NEEDLES) 31G X 5 MM misc, , Disp: , Rfl:   •  lisinopril (PRINIVIL,ZESTRIL) 20 MG tablet, Take 1 tablet by mouth 2 (Two) Times a Day., Disp: 180 tablet, Rfl: 1  •  triamterene-hydrochlorothiazide (MAXZIDE-25) 37.5-25 MG per tablet, TAKE ONE TABLET BY MOUTH EVERY OTHER DAY, Disp: 30 tablet, Rfl: 5  •  venlafaxine XR (EFFEXOR-XR) 150 MG 24 hr capsule, TAKE ONE CAPSULE BY MOUTH ONCE DAILY, Disp: 30 capsule, Rfl: 5     Objective     History of Present Illness     Review of Systems   Constitutional: Negative.    HENT: Negative.    Eyes: Negative.    Respiratory: Negative.    Cardiovascular: Negative.    Gastrointestinal: Negative.    Endocrine: Negative.    Genitourinary: Negative.    Musculoskeletal: Negative.    Skin: Negative.    Neurological: Negative.    Psychiatric/Behavioral: Negative.        Physical Exam   Constitutional: She is oriented to person, place, and time. She appears well-developed and well-nourished.   Pleasant, cooperative no distress blood pressure control   HENT:   Head: Normocephalic and atraumatic.   Eyes: Conjunctivae are normal. Pupils are equal, round, and reactive to light. No scleral icterus.   Neck: Normal range of motion. Neck supple.   Cardiovascular: Normal rate, regular rhythm and normal heart sounds.    Pulmonary/Chest: Effort normal and breath sounds normal. No respiratory distress. She has no wheezes. She has no rales.   Musculoskeletal: Normal range of motion. She exhibits no edema.   Neurological: She is alert and oriented to person, place, and  time.   Skin: Skin is warm and dry.   Psychiatric: She has a normal mood and affect. Her behavior is normal.   Nursing note and vitals reviewed.      ASSESSMENT  CMP is stable with a normal Sugar of 74.  Hemoglobin A1c is improved and stable at 6.6.  Urine microalbumin is high but much improved at 1300.  Her lipid panel is fairly reasonable with total cholesterol 180, and HDL 35 and   #1-hypertension, well controlled  #2-hyperlipidemia, reasonable control  #3-diabetes mellitus type 2, adequate control on insulin and medication  #4-obesity with weight loss  #5-occasional yeast vaginitis       Problem List Items Addressed This Visit        Cardiovascular and Mediastinum    Hyperlipidemia    Hypertension - Primary       Endocrine    Diabetes mellitus (CMS/[facility])    Relevant Medications    Empagliflozin 10 MG tablet       Genitourinary    Monilial vaginitis       Other    Encounter for screening colonoscopy    Relevant Orders    Ambulatory Referral For Screening Colonoscopy          PLAN  The patient will continue current medicines as now and I recommended hepatitis A vaccinations.  I plan on rechecking her in 4 months with a CMP, lipid panel, hemoglobin A1c, urine microalbumin    There are no Patient Instructions on file for this visit.  Return in about 4 months (around 11/17/2018) for with labs.

## 2018-07-19 RX ORDER — FLUCONAZOLE 150 MG/1
TABLET ORAL
Qty: 4 TABLET | Refills: 0 | Status: SHIPPED | OUTPATIENT
Start: 2018-07-19 | End: 2019-02-12

## 2018-07-19 RX ORDER — VENLAFAXINE HYDROCHLORIDE 150 MG/1
CAPSULE, EXTENDED RELEASE ORAL
Qty: 30 CAPSULE | Refills: 5 | Status: SHIPPED | OUTPATIENT
Start: 2018-07-19 | End: 2018-10-25 | Stop reason: SDUPTHER

## 2018-07-19 RX ORDER — AMLODIPINE BESYLATE 10 MG/1
TABLET ORAL
Qty: 30 TABLET | Refills: 5 | Status: SHIPPED | OUTPATIENT
Start: 2018-07-19 | End: 2018-09-29 | Stop reason: SDUPTHER

## 2018-07-20 DIAGNOSIS — I10 ESSENTIAL HYPERTENSION: ICD-10-CM

## 2018-07-20 RX ORDER — LISINOPRIL 20 MG/1
TABLET ORAL
Qty: 180 TABLET | Refills: 1 | Status: SHIPPED | OUTPATIENT
Start: 2018-07-20 | End: 2019-03-28 | Stop reason: SDUPTHER

## 2018-09-06 RX ORDER — ATORVASTATIN CALCIUM 40 MG/1
TABLET, FILM COATED ORAL
Qty: 90 TABLET | Refills: 1 | Status: SHIPPED | OUTPATIENT
Start: 2018-09-06 | End: 2019-06-11 | Stop reason: SDUPTHER

## 2018-10-01 RX ORDER — AMLODIPINE BESYLATE 10 MG/1
TABLET ORAL
Qty: 90 TABLET | Refills: 1 | Status: SHIPPED | OUTPATIENT
Start: 2018-10-01 | End: 2020-01-29

## 2018-10-15 DIAGNOSIS — E11.29 TYPE 2 DIABETES MELLITUS WITH OTHER DIABETIC KIDNEY COMPLICATION, WITH LONG-TERM CURRENT USE OF INSULIN (HCC): ICD-10-CM

## 2018-10-15 DIAGNOSIS — I10 ESSENTIAL HYPERTENSION: ICD-10-CM

## 2018-10-15 DIAGNOSIS — E78.5 HYPERLIPIDEMIA, UNSPECIFIED HYPERLIPIDEMIA TYPE: Primary | ICD-10-CM

## 2018-10-15 DIAGNOSIS — Z79.4 TYPE 2 DIABETES MELLITUS WITH OTHER DIABETIC KIDNEY COMPLICATION, WITH LONG-TERM CURRENT USE OF INSULIN (HCC): ICD-10-CM

## 2018-10-18 LAB
ALBUMIN SERPL-MCNC: 3.4 G/DL (ref 3.5–5.2)
ALBUMIN/GLOB SERPL: 1.3 G/DL
ALP SERPL-CCNC: 87 U/L (ref 39–117)
ALT SERPL-CCNC: 15 U/L (ref 1–33)
AST SERPL-CCNC: 14 U/L (ref 1–32)
BILIRUB SERPL-MCNC: 0.3 MG/DL (ref 0.1–1.2)
BUN SERPL-MCNC: 13 MG/DL (ref 6–20)
BUN/CREAT SERPL: 17.6 (ref 7–25)
CALCIUM SERPL-MCNC: 9.8 MG/DL (ref 8.6–10.5)
CHLORIDE SERPL-SCNC: 103 MMOL/L (ref 98–107)
CHOLEST SERPL-MCNC: 180 MG/DL (ref 0–200)
CHOLEST/HDLC SERPL: 6.21 {RATIO}
CO2 SERPL-SCNC: 25.9 MMOL/L (ref 22–29)
CREAT SERPL-MCNC: 0.74 MG/DL (ref 0.57–1)
GLOBULIN SER CALC-MCNC: 2.6 GM/DL
GLUCOSE SERPL-MCNC: 139 MG/DL (ref 65–99)
HBA1C MFR BLD: 7.25 % (ref 4.8–5.6)
HDLC SERPL-MCNC: 29 MG/DL (ref 40–60)
LDLC SERPL CALC-MCNC: 102 MG/DL (ref 0–100)
POTASSIUM SERPL-SCNC: 5 MMOL/L (ref 3.5–5.2)
PROT SERPL-MCNC: 6 G/DL (ref 6–8.5)
SODIUM SERPL-SCNC: 141 MMOL/L (ref 136–145)
TRIGL SERPL-MCNC: 247 MG/DL (ref 0–150)
VLDLC SERPL CALC-MCNC: 49.4 MG/DL (ref 5–40)

## 2018-10-25 ENCOUNTER — OFFICE VISIT (OUTPATIENT)
Dept: FAMILY MEDICINE CLINIC | Facility: CLINIC | Age: 58
End: 2018-10-25

## 2018-10-25 VITALS
SYSTOLIC BLOOD PRESSURE: 134 MMHG | TEMPERATURE: 98.3 F | WEIGHT: 261.4 LBS | DIASTOLIC BLOOD PRESSURE: 78 MMHG | OXYGEN SATURATION: 95 % | HEIGHT: 62 IN | BODY MASS INDEX: 48.1 KG/M2 | HEART RATE: 68 BPM

## 2018-10-25 DIAGNOSIS — E11.29 TYPE 2 DIABETES MELLITUS WITH OTHER DIABETIC KIDNEY COMPLICATION, WITH LONG-TERM CURRENT USE OF INSULIN (HCC): ICD-10-CM

## 2018-10-25 DIAGNOSIS — Z23 NEED FOR IMMUNIZATION AGAINST INFLUENZA: Primary | ICD-10-CM

## 2018-10-25 DIAGNOSIS — Z79.4 TYPE 2 DIABETES MELLITUS WITH OTHER DIABETIC KIDNEY COMPLICATION, WITH LONG-TERM CURRENT USE OF INSULIN (HCC): ICD-10-CM

## 2018-10-25 DIAGNOSIS — I10 ESSENTIAL HYPERTENSION: ICD-10-CM

## 2018-10-25 DIAGNOSIS — B37.31 MONILIAL VAGINITIS: ICD-10-CM

## 2018-10-25 DIAGNOSIS — E66.01 CLASS 3 SEVERE OBESITY DUE TO EXCESS CALORIES WITH BODY MASS INDEX (BMI) OF 45.0 TO 49.9 IN ADULT, UNSPECIFIED WHETHER SERIOUS COMORBIDITY PRESENT (HCC): ICD-10-CM

## 2018-10-25 DIAGNOSIS — E78.5 HYPERLIPIDEMIA, UNSPECIFIED HYPERLIPIDEMIA TYPE: ICD-10-CM

## 2018-10-25 PROBLEM — E66.09 OBESITY DUE TO EXCESS CALORIES: Status: ACTIVE | Noted: 2018-02-27

## 2018-10-25 PROCEDURE — 90732 PPSV23 VACC 2 YRS+ SUBQ/IM: CPT | Performed by: INTERNAL MEDICINE

## 2018-10-25 PROCEDURE — G0009 ADMIN PNEUMOCOCCAL VACCINE: HCPCS | Performed by: INTERNAL MEDICINE

## 2018-10-25 PROCEDURE — 99214 OFFICE O/P EST MOD 30 MIN: CPT | Performed by: INTERNAL MEDICINE

## 2018-10-25 PROCEDURE — G0008 ADMIN INFLUENZA VIRUS VAC: HCPCS | Performed by: INTERNAL MEDICINE

## 2018-10-25 PROCEDURE — 90674 CCIIV4 VAC NO PRSV 0.5 ML IM: CPT | Performed by: INTERNAL MEDICINE

## 2018-10-25 RX ORDER — ATENOLOL 100 MG/1
100 TABLET ORAL DAILY
Qty: 90 TABLET | Refills: 3 | Status: SHIPPED | OUTPATIENT
Start: 2018-10-25 | End: 2019-10-07 | Stop reason: SDUPTHER

## 2018-10-25 RX ORDER — DESOXIMETASONE 2.5 MG/G
OINTMENT TOPICAL EVERY 12 HOURS SCHEDULED
Qty: 60 G | Refills: 3 | Status: SHIPPED | OUTPATIENT
Start: 2018-10-25 | End: 2022-07-20 | Stop reason: SDUPTHER

## 2018-10-25 RX ORDER — VENLAFAXINE HYDROCHLORIDE 150 MG/1
150 CAPSULE, EXTENDED RELEASE ORAL DAILY
Qty: 90 CAPSULE | Refills: 3 | Status: SHIPPED | OUTPATIENT
Start: 2018-10-25 | End: 2019-10-28 | Stop reason: SDUPTHER

## 2018-10-25 NOTE — PROGRESS NOTES
Subjective   Bernice Rai is a 58 y.o. female. Patient is here today for follow-up on her hypertension, hyperlipidemia, obesity, diabetes mellitus type 2.  Patient's generally doing okay but had to stop the Jardiance because of recurrent yeast infections.  Otherwise she's had no chest pain, shortness of breath, edema or significant myalgias and is tolerating other medications.  Chief Complaint   Patient presents with   • Hyperlipidemia     lab follow up          Vitals:    10/25/18 1356   BP: 134/78   Pulse: 68   Temp: 98.3 °F (36.8 °C)   SpO2: 95%     The following portions of the patient's history were reviewed and updated as appropriate: allergies, current medications, past family history, past medical history, past social history, past surgical history and problem list.    Past Medical History:   Diagnosis Date   • Arthritis     FEET   • Diabetes mellitus (CMS/[facility])    • Hyperlipidemia    • Hypertension    • Reflux esophagitis    • Stroke (CMS/[facility])       Allergies   Allergen Reactions   • Jardiance [Empagliflozin] Itching     Recurrent yeast infections      Social History     Social History   • Marital status:      Spouse name: N/A   • Number of children: N/A   • Years of education: N/A     Occupational History   • Not on file.     Social History Main Topics   • Smoking status: Current Some Day Smoker     Packs/day: 0.50   • Smokeless tobacco: Never Used   • Alcohol use No   • Drug use: Unknown   • Sexual activity: Defer     Other Topics Concern   • Not on file     Social History Narrative   • No narrative on file        Current Outpatient Prescriptions:   •  albuterol (PROVENTIL HFA) 108 (90 BASE) MCG/ACT inhaler, Inhale 2 puffs Every 6 (Six) Hours As Needed for wheezing., Disp: 1 inhaler, Rfl: 11  •  amLODIPine (NORVASC) 10 MG tablet, TAKE 1 TABLET BY MOUTH ONCE DAILY, Disp: 90 tablet, Rfl: 1  •  aspirin 81 MG chewable tablet, Chew 81 mg daily., Disp: , Rfl:   •  atenolol (TENORMIN) 100 MG tablet,  Take 1 tablet by mouth Daily., Disp: 90 tablet, Rfl: 3  •  atorvastatin (LIPITOR) 40 MG tablet, TAKE 1 TABLET BY MOUTH ONCE DAILY, Disp: 90 tablet, Rfl: 1  •  fluconazole (DIFLUCAN) 150 MG tablet, TAKE 1 TABLET BY MOUTH EVERY 3 DAYS, Disp: 4 tablet, Rfl: 0  •  insulin NPH-insulin regular (NOVOLIN 70/30) (70-30) 100 UNIT/ML injection, Inject  under the skin. 130 150 daily / sliding scale, Disp: , Rfl:   •  Insulin Pen Needle (B-D UF III MINI PEN NEEDLES) 31G X 5 MM misc, , Disp: , Rfl:   •  lisinopril (PRINIVIL,ZESTRIL) 20 MG tablet, TAKE 1 TABLET BY MOUTH TWICE DAILY, Disp: 180 tablet, Rfl: 1  •  triamterene-hydrochlorothiazide (MAXZIDE-25) 37.5-25 MG per tablet, TAKE ONE TABLET BY MOUTH EVERY OTHER DAY, Disp: 30 tablet, Rfl: 5  •  venlafaxine XR (EFFEXOR-XR) 150 MG 24 hr capsule, Take 1 capsule by mouth Daily., Disp: 90 capsule, Rfl: 3  •  desoximetasone (TOPICORT) 0.25 % ointment, Apply  topically to the appropriate area as directed Every 12 (Twelve) Hours., Disp: 60 g, Rfl: 3  •  metFORMIN (GLUCOPHAGE) 1000 MG tablet, Take 1 tablet by mouth Daily With Breakfast., Disp: 90 tablet, Rfl: 3     Objective     History of Present Illness     Review of Systems   Constitutional: Negative.    HENT: Negative.    Eyes: Negative.    Respiratory: Negative.    Cardiovascular: Negative.    Gastrointestinal: Negative.    Genitourinary: Negative.    Musculoskeletal: Negative.    Skin: Negative.    Neurological: Negative.    Psychiatric/Behavioral: Negative.        Physical Exam   Constitutional: She is oriented to person, place, and time. She appears well-developed and well-nourished.   Pleasant, cooperative no distress   HENT:   Head: Normocephalic and atraumatic.   Eyes: Pupils are equal, round, and reactive to light. Conjunctivae are normal. No scleral icterus.   Neck: Normal range of motion. Neck supple. No thyromegaly present.   Cardiovascular: Normal rate, regular rhythm and normal heart sounds.    Pulmonary/Chest: Effort  normal and breath sounds normal. No respiratory distress. She has no wheezes. She has no rales.   Musculoskeletal: Normal range of motion. She exhibits no edema.   Neurological: She is alert and oriented to person, place, and time.   Skin: Skin is warm and dry.   Psychiatric: She has a normal mood and affect. Her behavior is normal.   Nursing note and vitals reviewed.      ASSESSMENT  sugar his elevated at 139 and other values were normal and CMP.  Hemoglobin A1c is higher at 7.25 and lipid panel is a bit high with a total cholesterol 180, HDL of 29 and .  Patient has been missing some of her statin.  #1-hypertension, controlled  #2-hyperlipidemia, fair control but needs to take statin regularly  #3-obesity with no weight loss, some weight gain  #4-diabetes mellitus type 2, not optimal control of the Jardiance  #5-recurrent monilia vaginitis with Jardiance, it's been discontinued       Problem List Items Addressed This Visit        Cardiovascular and Mediastinum    Hyperlipidemia    Hypertension    Relevant Medications    atenolol (TENORMIN) 100 MG tablet       Digestive    Obesity due to excess calories       Endocrine    Diabetes mellitus (CMS/[facility])    Relevant Medications    metFORMIN (GLUCOPHAGE) 1000 MG tablet       Genitourinary    Monilial vaginitis      Other Visit Diagnoses     Need for immunization against influenza    -  Primary    Relevant Orders    Flucelvax Quad=>4Years (PFS)          PLAN  the patient received a flu shot and a Pneumovax 23 immunization today.  I'm going to have her continue current medicines and I am adding and metformin 1000 mg daily for her diabetes.  I like to recheck her in 3 months with a CMP, lipid panel, hemoglobin A1c and urine microalbumin    There are no Patient Instructions on file for this visit.  Return in about 3 months (around 1/25/2019) for with labs.

## 2018-12-10 ENCOUNTER — OFFICE VISIT (OUTPATIENT)
Dept: RETAIL CLINIC | Facility: CLINIC | Age: 58
End: 2018-12-10

## 2018-12-10 VITALS
TEMPERATURE: 98.2 F | OXYGEN SATURATION: 98 % | SYSTOLIC BLOOD PRESSURE: 139 MMHG | HEART RATE: 73 BPM | DIASTOLIC BLOOD PRESSURE: 70 MMHG

## 2018-12-10 DIAGNOSIS — J32.9 SINUSITIS, UNSPECIFIED CHRONICITY, UNSPECIFIED LOCATION: Primary | ICD-10-CM

## 2018-12-10 DIAGNOSIS — J40 BRONCHITIS: ICD-10-CM

## 2018-12-10 PROCEDURE — 99213 OFFICE O/P EST LOW 20 MIN: CPT | Performed by: NURSE PRACTITIONER

## 2018-12-10 RX ORDER — BROMPHENIRAMINE MALEATE, PSEUDOEPHEDRINE HYDROCHLORIDE, AND DEXTROMETHORPHAN HYDROBROMIDE 2; 30; 10 MG/5ML; MG/5ML; MG/5ML
SYRUP ORAL
Qty: 240 ML | Refills: 0 | Status: SHIPPED | OUTPATIENT
Start: 2018-12-10 | End: 2018-12-28

## 2018-12-10 RX ORDER — AMOXICILLIN 500 MG/1
500 CAPSULE ORAL 2 TIMES DAILY
Qty: 20 CAPSULE | Refills: 0 | Status: SHIPPED | OUTPATIENT
Start: 2018-12-10 | End: 2018-12-20

## 2018-12-10 RX ORDER — GUAIFENESIN 600 MG/1
600 TABLET, EXTENDED RELEASE ORAL 2 TIMES DAILY
Qty: 28 TABLET | Refills: 0 | Status: SHIPPED | OUTPATIENT
Start: 2018-12-10 | End: 2018-12-24

## 2018-12-10 RX ORDER — PREDNISONE 20 MG/1
10 TABLET ORAL 2 TIMES DAILY
Qty: 10 TABLET | Refills: 0 | Status: SHIPPED | OUTPATIENT
Start: 2018-12-10 | End: 2018-12-15

## 2018-12-10 NOTE — PROGRESS NOTES
Subjective:     Bernice Rai is a 58 y.o.     Sinus Problem   This is a new problem. The current episode started in the past 7 days. There has been no fever. Associated symptoms include chills, congestion, coughing, ear pain (full) and sinus pressure (left sided). Pertinent negatives include no shortness of breath or sore throat. Headaches: left. Treatments tried: flonase. The treatment provided mild relief.         The following portions of the patient's history were reviewed and updated as appropriate: allergies, current medications, past family history, past medical history, past social history, past surgical history and problem list.      Review of Systems   Constitutional: Positive for chills.   HENT: Positive for congestion, ear pain (full), postnasal drip, sinus pressure (left sided) and sinus pain (left). Negative for sore throat.    Respiratory: Positive for cough and wheezing. Negative for shortness of breath.    Cardiovascular:        Hx: hypertension   Neurological: Headaches: left.         Objective:      Physical Exam   HENT:   Head: Normocephalic and atraumatic.   Right Ear: Ear canal normal. Right ear middle ear effusion: mild serous.   Left Ear: Ear canal normal. Left ear middle ear effusion: mild serous.   Mouth/Throat: Posterior oropharyngeal erythema present. No oropharyngeal exudate.   Mild purulent nasal congestion   Cardiovascular: Normal rate, regular rhythm, S1 normal, S2 normal and normal heart sounds.   Pulmonary/Chest: Effort normal. She has rhonchi in the right upper field and the left upper field.   Coughing intemmitenty t/o visit   Lymphadenopathy:     She has no cervical adenopathy.   Vitals reviewed.          Bernice was seen today for sinus problem.    Diagnoses and all orders for this visit:    Sinusitis, unspecified chronicity, unspecified location    Bronchitis    Other orders  -     amoxicillin (AMOXIL) 500 MG capsule; Take 1 capsule by mouth 2 (Two) Times a Day for 10 days.  -      guaiFENesin (MUCINEX) 600 MG 12 hr tablet; Take 1 tablet by mouth 2 (Two) Times a Day for 14 days.  -     predniSONE (DELTASONE) 20 MG tablet; Take 0.5 tablets by mouth 2 (Two) Times a Day for 5 days.  -     brompheniramine-pseudoephedrine-DM (BROMFED DM) 30-2-10 MG/5ML syrup; 5 to 10 cc every 4 hours as needed for cough, congestion, allergies

## 2018-12-28 ENCOUNTER — OFFICE VISIT (OUTPATIENT)
Dept: FAMILY MEDICINE CLINIC | Facility: CLINIC | Age: 58
End: 2018-12-28

## 2018-12-28 VITALS
RESPIRATION RATE: 20 BRPM | DIASTOLIC BLOOD PRESSURE: 60 MMHG | HEIGHT: 62 IN | HEART RATE: 74 BPM | BODY MASS INDEX: 49.13 KG/M2 | TEMPERATURE: 98.5 F | SYSTOLIC BLOOD PRESSURE: 126 MMHG | WEIGHT: 267 LBS | OXYGEN SATURATION: 97 %

## 2018-12-28 DIAGNOSIS — F17.200 TOBACCO DEPENDENCY: ICD-10-CM

## 2018-12-28 DIAGNOSIS — H66.90 SUBACUTE OTITIS MEDIA, UNSPECIFIED OTITIS MEDIA TYPE: Primary | ICD-10-CM

## 2018-12-28 DIAGNOSIS — R05.9 COUGH: ICD-10-CM

## 2018-12-28 PROCEDURE — 99213 OFFICE O/P EST LOW 20 MIN: CPT | Performed by: INTERNAL MEDICINE

## 2018-12-28 RX ORDER — PREDNISONE 20 MG/1
20 TABLET ORAL 2 TIMES DAILY
Qty: 10 TABLET | Refills: 0 | Status: SHIPPED | OUTPATIENT
Start: 2018-12-28 | End: 2019-06-11

## 2018-12-28 RX ORDER — CEFUROXIME AXETIL 250 MG/1
250 TABLET ORAL 2 TIMES DAILY
Qty: 20 TABLET | Refills: 0 | Status: SHIPPED | OUTPATIENT
Start: 2018-12-28 | End: 2019-01-17

## 2018-12-28 NOTE — PROGRESS NOTES
Subjective   Bernice Rai is a 58 y.o. female. Patient is here today for   Chief Complaint   Patient presents with   • Sinusitis     and Bronchitis, was seen at  on 12/10/18. Not feeling better.           Vitals:    12/28/18 1540   BP: 126/60   Pulse: 74   Resp: 20   Temp: 98.5 °F (36.9 °C)   SpO2: 97%     The following portions of the patient's history were reviewed and updated as appropriate: allergies, current medications, past family history, past medical history, past social history, past surgical history and problem list.    Past Medical History:   Diagnosis Date   • Arthritis     FEET   • Diabetes mellitus (CMS/[facility])    • Hyperlipidemia    • Hypertension    • Reflux esophagitis    • Stroke (CMS/[facility])       Allergies   Allergen Reactions   • Jardiance [Empagliflozin] Itching     Recurrent yeast infections      Social History     Socioeconomic History   • Marital status:      Spouse name: Not on file   • Number of children: Not on file   • Years of education: Not on file   • Highest education level: Not on file   Social Needs   • Financial resource strain: Not on file   • Food insecurity - worry: Not on file   • Food insecurity - inability: Not on file   • Transportation needs - medical: Not on file   • Transportation needs - non-medical: Not on file   Occupational History   • Not on file   Tobacco Use   • Smoking status: Current Some Day Smoker     Packs/day: 0.50   • Smokeless tobacco: Never Used   Substance and Sexual Activity   • Alcohol use: No   • Drug use: Defer   • Sexual activity: Defer   Other Topics Concern   • Not on file   Social History Narrative   • Not on file        Current Outpatient Medications:   •  albuterol (PROVENTIL HFA) 108 (90 BASE) MCG/ACT inhaler, Inhale 2 puffs Every 6 (Six) Hours As Needed for wheezing., Disp: 1 inhaler, Rfl: 11  •  amLODIPine (NORVASC) 10 MG tablet, TAKE 1 TABLET BY MOUTH ONCE DAILY, Disp: 90 tablet, Rfl: 1  •  aspirin 81 MG chewable tablet, Chew 81 mg  daily., Disp: , Rfl:   •  atenolol (TENORMIN) 100 MG tablet, Take 1 tablet by mouth Daily., Disp: 90 tablet, Rfl: 3  •  atorvastatin (LIPITOR) 40 MG tablet, TAKE 1 TABLET BY MOUTH ONCE DAILY, Disp: 90 tablet, Rfl: 1  •  cefuroxime (CEFTIN) 250 MG tablet, Take 1 tablet by mouth 2 (Two) Times a Day., Disp: 20 tablet, Rfl: 0  •  desoximetasone (TOPICORT) 0.25 % ointment, Apply  topically to the appropriate area as directed Every 12 (Twelve) Hours., Disp: 60 g, Rfl: 3  •  fluconazole (DIFLUCAN) 150 MG tablet, TAKE 1 TABLET BY MOUTH EVERY 3 DAYS, Disp: 4 tablet, Rfl: 0  •  insulin NPH-insulin regular (NOVOLIN 70/30) (70-30) 100 UNIT/ML injection, Inject  under the skin. 130 150 daily / sliding scale, Disp: , Rfl:   •  Insulin Pen Needle (B-D UF III MINI PEN NEEDLES) 31G X 5 MM misc, , Disp: , Rfl:   •  lisinopril (PRINIVIL,ZESTRIL) 20 MG tablet, TAKE 1 TABLET BY MOUTH TWICE DAILY, Disp: 180 tablet, Rfl: 1  •  metFORMIN (GLUCOPHAGE) 1000 MG tablet, Take 1 tablet by mouth Daily With Breakfast., Disp: 90 tablet, Rfl: 3  •  predniSONE (DELTASONE) 20 MG tablet, Take 1 tablet by mouth 2 (Two) Times a Day., Disp: 10 tablet, Rfl: 0  •  triamterene-hydrochlorothiazide (MAXZIDE-25) 37.5-25 MG per tablet, TAKE ONE TABLET BY MOUTH EVERY OTHER DAY, Disp: 30 tablet, Rfl: 5  •  venlafaxine XR (EFFEXOR-XR) 150 MG 24 hr capsule, Take 1 capsule by mouth Daily., Disp: 90 capsule, Rfl: 3     Objective     History of Present Illness Bernice complains of a cough, wheezing, shortness of breath, nasal and sinus congestion, and headache that started 3 weeks ago.  She went to urgent care 2 weeks ago and was placed on amoxicillin and steroids.  She continues to cough and have wheezing as well as sinus congestion.  She continues to smoke.  She does have albuterol inhaler.    Review of Systems   Constitutional: Positive for chills. Negative for fever.   HENT: Positive for postnasal drip, rhinorrhea and sore throat. Negative for ear pain.     Respiratory: Positive for cough, shortness of breath and wheezing.    Cardiovascular: Negative for chest pain.   Musculoskeletal: Positive for myalgias.   Skin: Negative for rash.   Neurological: Positive for headaches.       Physical Exam   Constitutional: She appears well-developed and well-nourished.   HENT:   Nose: Nose normal.   Mouth/Throat: Oropharynx is clear and moist.   Right tympanic membrane is dull, erythematous, and bulging   Cardiovascular: Normal rate, regular rhythm and normal heart sounds.   Pulmonary/Chest: Effort normal. She has no rales.   Very slight wheeze   Neurological: She is alert.   Psychiatric: She has a normal mood and affect. Her behavior is normal. Judgment and thought content normal.   Vitals reviewed.      ASSESSMENT     Problem List Items Addressed This Visit        Respiratory    Cough       Nervous and Auditory    Subacute otitis media - Primary       Other    Tobacco dependency          PLAN  Patient Instructions   Start cefuroxime 250 mg twice a day and prednisone as instructed.  Discussed importance of not smoking.  Suggest nicotine patches, gum.    Return if symptoms worsen or fail to improve.

## 2018-12-28 NOTE — PATIENT INSTRUCTIONS
Start cefuroxime 250 mg twice a day and prednisone as instructed.  Discussed importance of not smoking.  Suggest nicotine patches, gum.

## 2019-01-09 DIAGNOSIS — Z79.899 LONG TERM USE OF DRUG: ICD-10-CM

## 2019-01-09 DIAGNOSIS — E78.5 HYPERLIPIDEMIA, UNSPECIFIED HYPERLIPIDEMIA TYPE: ICD-10-CM

## 2019-01-09 DIAGNOSIS — E11.9 TYPE 2 DIABETES MELLITUS WITHOUT COMPLICATION, WITHOUT LONG-TERM CURRENT USE OF INSULIN (HCC): Primary | ICD-10-CM

## 2019-01-11 LAB
ALBUMIN SERPL-MCNC: 3.2 G/DL (ref 3.5–5.2)
ALBUMIN/GLOB SERPL: 1.3 G/DL
ALP SERPL-CCNC: 64 U/L (ref 39–117)
ALT SERPL-CCNC: 12 U/L (ref 1–33)
AST SERPL-CCNC: 9 U/L (ref 1–32)
BILIRUB SERPL-MCNC: 0.3 MG/DL (ref 0.1–1.2)
BUN SERPL-MCNC: 17 MG/DL (ref 6–20)
BUN/CREAT SERPL: 25 (ref 7–25)
CALCIUM SERPL-MCNC: 9.9 MG/DL (ref 8.6–10.5)
CHLORIDE SERPL-SCNC: 106 MMOL/L (ref 98–107)
CHOLEST SERPL-MCNC: 193 MG/DL (ref 0–200)
CO2 SERPL-SCNC: 24 MMOL/L (ref 22–29)
CREAT SERPL-MCNC: 0.68 MG/DL (ref 0.57–1)
GLOBULIN SER CALC-MCNC: 2.4 GM/DL
GLUCOSE SERPL-MCNC: 163 MG/DL (ref 65–99)
HBA1C MFR BLD: 7 % (ref 4.8–5.6)
HDLC SERPL-MCNC: 32 MG/DL (ref 40–60)
LDLC SERPL CALC-MCNC: 117 MG/DL (ref 0–100)
LDLC/HDLC SERPL: 3.66 {RATIO}
MICROALBUMIN UR-MCNC: 5938.6 UG/ML
POTASSIUM SERPL-SCNC: 5.2 MMOL/L (ref 3.5–5.2)
PROT SERPL-MCNC: 5.6 G/DL (ref 6–8.5)
SODIUM SERPL-SCNC: 141 MMOL/L (ref 136–145)
TRIGL SERPL-MCNC: 219 MG/DL (ref 0–150)
VLDLC SERPL CALC-MCNC: 43.8 MG/DL (ref 5–40)

## 2019-01-17 ENCOUNTER — OFFICE VISIT (OUTPATIENT)
Dept: FAMILY MEDICINE CLINIC | Facility: CLINIC | Age: 59
End: 2019-01-17

## 2019-01-17 VITALS
HEIGHT: 62 IN | RESPIRATION RATE: 16 BRPM | SYSTOLIC BLOOD PRESSURE: 120 MMHG | DIASTOLIC BLOOD PRESSURE: 82 MMHG | OXYGEN SATURATION: 98 % | HEART RATE: 83 BPM | BODY MASS INDEX: 48.76 KG/M2 | WEIGHT: 265 LBS | TEMPERATURE: 98 F

## 2019-01-17 DIAGNOSIS — E11.29 TYPE 2 DIABETES MELLITUS WITH OTHER DIABETIC KIDNEY COMPLICATION, WITH LONG-TERM CURRENT USE OF INSULIN (HCC): ICD-10-CM

## 2019-01-17 DIAGNOSIS — E78.5 HYPERLIPIDEMIA, UNSPECIFIED HYPERLIPIDEMIA TYPE: ICD-10-CM

## 2019-01-17 DIAGNOSIS — J45.20 MILD INTERMITTENT ASTHMATIC BRONCHITIS WITHOUT COMPLICATION: ICD-10-CM

## 2019-01-17 DIAGNOSIS — I10 ESSENTIAL HYPERTENSION: Primary | ICD-10-CM

## 2019-01-17 DIAGNOSIS — Z79.4 TYPE 2 DIABETES MELLITUS WITH OTHER DIABETIC KIDNEY COMPLICATION, WITH LONG-TERM CURRENT USE OF INSULIN (HCC): ICD-10-CM

## 2019-01-17 PROCEDURE — 99214 OFFICE O/P EST MOD 30 MIN: CPT | Performed by: INTERNAL MEDICINE

## 2019-01-17 RX ORDER — AZITHROMYCIN 250 MG/1
TABLET, FILM COATED ORAL
Qty: 6 TABLET | Refills: 0 | Status: SHIPPED | OUTPATIENT
Start: 2019-01-17 | End: 2019-02-12

## 2019-01-17 NOTE — PROGRESS NOTES
Subjective   Bernice Rai is a 58 y.o. female. Patient is here today for complaints of asthmatic bronchitis and cough with yellow mucus.  Additionally she's here for follow-up on her hypertension, hyperlipidemia, diabetes mellitus type 2.  She has been on amoxicillin and cefuroxime for her chest as well as some inhalers and 2 courses of prednisone.  She continues with cough with yellow mucus, some tightness and wheezing despite albuterol.  She is tolerating thousand milligrams of metformin.but does have some diarrhea with it.  Chief Complaint   Patient presents with   • Diabetes   • Hypertension   • Hyperlipidemia          Vitals:    01/17/19 1412   BP: 120/82   Pulse: 83   Resp: 16   Temp: 98 °F (36.7 °C)   SpO2: 98%     The following portions of the patient's history were reviewed and updated as appropriate: allergies, current medications, past family history, past medical history, past social history, past surgical history and problem list.    Past Medical History:   Diagnosis Date   • Arthritis     FEET   • Diabetes mellitus (CMS/[facility])    • Hyperlipidemia    • Hypertension    • Reflux esophagitis    • Stroke (CMS/[facility])       Allergies   Allergen Reactions   • Jardiance [Empagliflozin] Itching     Recurrent yeast infections      Social History     Socioeconomic History   • Marital status:      Spouse name: Not on file   • Number of children: Not on file   • Years of education: Not on file   • Highest education level: Not on file   Social Needs   • Financial resource strain: Not on file   • Food insecurity - worry: Not on file   • Food insecurity - inability: Not on file   • Transportation needs - medical: Not on file   • Transportation needs - non-medical: Not on file   Occupational History   • Not on file   Tobacco Use   • Smoking status: Current Some Day Smoker     Packs/day: 0.50   • Smokeless tobacco: Never Used   Substance and Sexual Activity   • Alcohol use: No   • Drug use: Defer   • Sexual  activity: Defer   Other Topics Concern   • Not on file   Social History Narrative   • Not on file        Current Outpatient Medications:   •  albuterol (PROVENTIL HFA) 108 (90 BASE) MCG/ACT inhaler, Inhale 2 puffs Every 6 (Six) Hours As Needed for wheezing., Disp: 1 inhaler, Rfl: 11  •  amLODIPine (NORVASC) 10 MG tablet, TAKE 1 TABLET BY MOUTH ONCE DAILY, Disp: 90 tablet, Rfl: 1  •  aspirin 81 MG chewable tablet, Chew 81 mg daily., Disp: , Rfl:   •  atenolol (TENORMIN) 100 MG tablet, Take 1 tablet by mouth Daily., Disp: 90 tablet, Rfl: 3  •  atorvastatin (LIPITOR) 40 MG tablet, TAKE 1 TABLET BY MOUTH ONCE DAILY, Disp: 90 tablet, Rfl: 1  •  azithromycin (ZITHROMAX Z-MONICA) 250 MG tablet, Take 2 tablets the first day, then 1 tablet daily for 4 days., Disp: 6 tablet, Rfl: 0  •  desoximetasone (TOPICORT) 0.25 % ointment, Apply  topically to the appropriate area as directed Every 12 (Twelve) Hours., Disp: 60 g, Rfl: 3  •  fluconazole (DIFLUCAN) 150 MG tablet, TAKE 1 TABLET BY MOUTH EVERY 3 DAYS, Disp: 4 tablet, Rfl: 0  •  Fluticasone-Umeclidin-Vilant (TRELEGY ELLIPTA) 100-62.5-25 MCG/INH aerosol powder , Inhale 1 each Daily., Disp: 1 each, Rfl: 0  •  insulin NPH-insulin regular (NOVOLIN 70/30) (70-30) 100 UNIT/ML injection, Inject  under the skin. 130 150 daily / sliding scale, Disp: , Rfl:   •  Insulin Pen Needle (B-D UF III MINI PEN NEEDLES) 31G X 5 MM misc, , Disp: , Rfl:   •  lisinopril (PRINIVIL,ZESTRIL) 20 MG tablet, TAKE 1 TABLET BY MOUTH TWICE DAILY, Disp: 180 tablet, Rfl: 1  •  predniSONE (DELTASONE) 20 MG tablet, Take 1 tablet by mouth 2 (Two) Times a Day., Disp: 10 tablet, Rfl: 0  •  SITagliptin-MetFORMIN HCl ER (JANUMET XR) 100-1000 MG tablet, Take 1 tablet by mouth Daily., Disp: 30 tablet, Rfl: 5  •  triamterene-hydrochlorothiazide (MAXZIDE-25) 37.5-25 MG per tablet, TAKE ONE TABLET BY MOUTH EVERY OTHER DAY, Disp: 30 tablet, Rfl: 5  •  venlafaxine XR (EFFEXOR-XR) 150 MG 24 hr capsule, Take 1 capsule by mouth  Daily., Disp: 90 capsule, Rfl: 3     Objective     History of Present Illness     Review of Systems   Constitutional: Negative.    HENT: Positive for congestion and postnasal drip.    Eyes: Negative.    Respiratory: Positive for cough and wheezing.    Cardiovascular: Negative.    Gastrointestinal: Negative.    Genitourinary: Negative.    Musculoskeletal: Negative.    Skin: Negative.    Neurological: Negative.    Psychiatric/Behavioral: Negative.        Physical Exam   Constitutional: She is oriented to person, place, and time. She appears well-developed and well-nourished.   Pleasant, cooperative no distress but with a frequent cough and some audible wheezing   HENT:   Head: Normocephalic and atraumatic.   Eyes: Conjunctivae are normal. Pupils are equal, round, and reactive to light. No scleral icterus.   Neck: Normal range of motion. Neck supple.   Cardiovascular: Normal rate, regular rhythm and normal heart sounds.   Pulmonary/Chest: Effort normal. No respiratory distress. She has wheezes. She has no rales.   Musculoskeletal: Normal range of motion. She exhibits no edema.   Lymphadenopathy:     She has no cervical adenopathy.   Neurological: She is alert and oriented to person, place, and time.   Skin: Skin is warm and dry.   Psychiatric: She has a normal mood and affect. Her behavior is normal.   Nursing note and vitals reviewed.      ASSESSMENT  CMP has a sugar of 163 and some slightly low total protein and albumen but is otherwise normal.  Hemoglobin A1c is acceptable at 7.0 but I'd like it lower.  Urine microalbumin 6 extremely high at almost 6000 µg per mL.  Lipid panel has a total cholesterol 193, chronically low HDL of 32 and .  The patient's lungs sound clear but she does have some wheezing although reasonable air movement  #1-asthmatic bronchitis and sinusitis  #2-diabetes mellitus type 2  #3-hyperlipidemia, fair control  #4-hypertension, adequate control  #5-proteinuria, related to the diabetes      Problem List Items Addressed This Visit        Cardiovascular and Mediastinum    Hyperlipidemia    Hypertension - Primary       Respiratory    Asthmatic bronchitis    Relevant Medications    Fluticasone-Umeclidin-Vilant (TRELEGY ELLIPTA) 100-62.5-25 MCG/INH aerosol powder        Endocrine    Diabetes mellitus (CMS/HCC)    Relevant Medications    SITagliptin-MetFORMIN HCl ER (JANUMET XR) 100-1000 MG tablet          PLAN  I'm going to try the patient on a Z-Gilberto.  I gave her samples of Janumet -1000 to try once a day and a sample of Trelegy Ellipta to do one inhalation a day.  I'd like to recheck her in 2 weeks with a breathing test    There are no Patient Instructions on file for this visit.  Return in about 2 weeks (around 1/31/2019) for with labs.

## 2019-02-12 ENCOUNTER — OFFICE VISIT (OUTPATIENT)
Dept: FAMILY MEDICINE CLINIC | Facility: CLINIC | Age: 59
End: 2019-02-12

## 2019-02-12 VITALS
HEART RATE: 90 BPM | BODY MASS INDEX: 48.95 KG/M2 | HEIGHT: 62 IN | TEMPERATURE: 97 F | RESPIRATION RATE: 18 BRPM | DIASTOLIC BLOOD PRESSURE: 70 MMHG | OXYGEN SATURATION: 98 % | SYSTOLIC BLOOD PRESSURE: 134 MMHG | WEIGHT: 266 LBS

## 2019-02-12 DIAGNOSIS — J45.20 MILD INTERMITTENT ASTHMATIC BRONCHITIS WITHOUT COMPLICATION: ICD-10-CM

## 2019-02-12 DIAGNOSIS — R06.02 SOB (SHORTNESS OF BREATH): Primary | ICD-10-CM

## 2019-02-12 DIAGNOSIS — I10 ESSENTIAL HYPERTENSION: ICD-10-CM

## 2019-02-12 PROCEDURE — 99214 OFFICE O/P EST MOD 30 MIN: CPT | Performed by: INTERNAL MEDICINE

## 2019-02-12 PROCEDURE — 94010 BREATHING CAPACITY TEST: CPT | Performed by: INTERNAL MEDICINE

## 2019-02-12 NOTE — PROGRESS NOTES
Subjective   Bernice Rai is a 58 y.o. female. Patient is here today for follow-up on her asthmatic bronchitis that is saw couple of weeks ago.  She was wheezing at that time and I put her on a sample of Trelegy which helped a lot.  She returns for a recheck and a breathing test today and is feeling much better.  Chief Complaint   Patient presents with   • Shortness of Breath     f/u breathing  test          Vitals:    02/12/19 1547   BP: 134/70   Pulse: 90   Resp: 18   Temp: 97 °F (36.1 °C)   SpO2: 98%     The following portions of the patient's history were reviewed and updated as appropriate: allergies, current medications, past family history, past medical history, past social history, past surgical history and problem list.    Past Medical History:   Diagnosis Date   • Arthritis     FEET   • Diabetes mellitus (CMS/[facility])    • Hyperlipidemia    • Hypertension    • Reflux esophagitis    • Stroke (CMS/[facility])       Allergies   Allergen Reactions   • Jardiance [Empagliflozin] Itching     Recurrent yeast infections      Social History     Socioeconomic History   • Marital status:      Spouse name: Not on file   • Number of children: Not on file   • Years of education: Not on file   • Highest education level: Not on file   Social Needs   • Financial resource strain: Not on file   • Food insecurity - worry: Not on file   • Food insecurity - inability: Not on file   • Transportation needs - medical: Not on file   • Transportation needs - non-medical: Not on file   Occupational History   • Not on file   Tobacco Use   • Smoking status: Current Some Day Smoker     Packs/day: 0.50   • Smokeless tobacco: Never Used   Substance and Sexual Activity   • Alcohol use: No   • Drug use: Defer   • Sexual activity: Defer   Other Topics Concern   • Not on file   Social History Narrative   • Not on file        Current Outpatient Medications:   •  albuterol (PROVENTIL HFA) 108 (90 BASE) MCG/ACT inhaler, Inhale 2 puffs Every 6  (Six) Hours As Needed for wheezing., Disp: 1 inhaler, Rfl: 11  •  amLODIPine (NORVASC) 10 MG tablet, TAKE 1 TABLET BY MOUTH ONCE DAILY, Disp: 90 tablet, Rfl: 1  •  aspirin 81 MG chewable tablet, Chew 81 mg daily., Disp: , Rfl:   •  atenolol (TENORMIN) 100 MG tablet, Take 1 tablet by mouth Daily., Disp: 90 tablet, Rfl: 3  •  atorvastatin (LIPITOR) 40 MG tablet, TAKE 1 TABLET BY MOUTH ONCE DAILY, Disp: 90 tablet, Rfl: 1  •  desoximetasone (TOPICORT) 0.25 % ointment, Apply  topically to the appropriate area as directed Every 12 (Twelve) Hours., Disp: 60 g, Rfl: 3  •  Fluticasone Furoate-Vilanterol (BREO ELLIPTA) 100-25 MCG/INH inhaler, Inhale 1 puff Daily., Disp: 1 each, Rfl: 0  •  insulin NPH-insulin regular (NOVOLIN 70/30) (70-30) 100 UNIT/ML injection, Inject  under the skin. 130 150 daily / sliding scale, Disp: , Rfl:   •  Insulin Pen Needle (B-D UF III MINI PEN NEEDLES) 31G X 5 MM misc, , Disp: , Rfl:   •  lisinopril (PRINIVIL,ZESTRIL) 20 MG tablet, TAKE 1 TABLET BY MOUTH TWICE DAILY, Disp: 180 tablet, Rfl: 1  •  predniSONE (DELTASONE) 20 MG tablet, Take 1 tablet by mouth 2 (Two) Times a Day., Disp: 10 tablet, Rfl: 0  •  SITagliptin-MetFORMIN HCl ER (JANUMET XR) 100-1000 MG tablet, Take 1 tablet by mouth Daily., Disp: 30 tablet, Rfl: 5  •  triamterene-hydrochlorothiazide (MAXZIDE-25) 37.5-25 MG per tablet, TAKE ONE TABLET BY MOUTH EVERY OTHER DAY, Disp: 30 tablet, Rfl: 5  •  venlafaxine XR (EFFEXOR-XR) 150 MG 24 hr capsule, Take 1 capsule by mouth Daily., Disp: 90 capsule, Rfl: 3     Objective     History of Present Illness     Review of Systems   Constitutional: Negative.    HENT: Negative.    Eyes: Negative.    Respiratory: Negative.    Cardiovascular: Negative.    Gastrointestinal: Negative.    Genitourinary: Negative.    Musculoskeletal: Negative.    Skin: Negative.    Neurological: Negative.    Psychiatric/Behavioral: Negative.        Physical Exam   Constitutional: She is oriented to person, place, and time.  She appears well-developed and well-nourished.   Pleasant, cooperative and in no distress and obese with no cough or respiratory distress   HENT:   Head: Normocephalic and atraumatic.   Eyes: Conjunctivae are normal. Pupils are equal, round, and reactive to light. No scleral icterus.   Neck: Normal range of motion. Neck supple.   Cardiovascular: Normal rate, regular rhythm and normal heart sounds.   Pulmonary/Chest: Effort normal and breath sounds normal. No respiratory distress. She has no wheezes. She has no rales.   Musculoskeletal: Normal range of motion. She exhibits no edema.   Neurological: She is alert and oriented to person, place, and time.   Skin: Skin is warm and dry.   Psychiatric: She has a normal mood and affect. Her behavior is normal.   Nursing note and vitals reviewed.      ASSESSMENT  the patient's breathing test shows some evidence for restriction due to her obesity but no signs of significant obstruction.  Her lungs sound clear and I hear no wheezing and she's having no respiratory distress     Problem List Items Addressed This Visit        Cardiovascular and Mediastinum    Hypertension       Respiratory    Asthmatic bronchitis    Relevant Medications    Fluticasone Furoate-Vilanterol (BREO ELLIPTA) 100-25 MCG/INH inhaler      Other Visit Diagnoses     SOB (shortness of breath)    -  Primary    Relevant Orders    Breathing Capacity Test          PLAN  I gave the patient a sample of Breo Ellipta for 2 weeks and then she will just stay on her albuterol inhaler as needed.  I would like to recheck her in 4 months with a CBC, CMP, lipid panel, hemoglobin A1c, urine microalbumin and TSH and free T4    There are no Patient Instructions on file for this visit.  Return in about 4 months (around 6/12/2019) for with labs.

## 2019-03-28 DIAGNOSIS — I10 ESSENTIAL HYPERTENSION: ICD-10-CM

## 2019-03-28 RX ORDER — LISINOPRIL 20 MG/1
TABLET ORAL
Qty: 180 TABLET | Refills: 1 | Status: SHIPPED | OUTPATIENT
Start: 2019-03-28 | End: 2019-10-07 | Stop reason: SDUPTHER

## 2019-04-09 ENCOUNTER — OFFICE VISIT (OUTPATIENT)
Dept: RETAIL CLINIC | Facility: CLINIC | Age: 59
End: 2019-04-09

## 2019-04-09 VITALS
HEART RATE: 92 BPM | TEMPERATURE: 98.3 F | DIASTOLIC BLOOD PRESSURE: 68 MMHG | OXYGEN SATURATION: 97 % | SYSTOLIC BLOOD PRESSURE: 118 MMHG | RESPIRATION RATE: 18 BRPM

## 2019-04-09 DIAGNOSIS — J40 BRONCHITIS: Primary | ICD-10-CM

## 2019-04-09 DIAGNOSIS — H65.01 RIGHT ACUTE SEROUS OTITIS MEDIA, RECURRENCE NOT SPECIFIED: ICD-10-CM

## 2019-04-09 DIAGNOSIS — J01.40 ACUTE PANSINUSITIS, RECURRENCE NOT SPECIFIED: ICD-10-CM

## 2019-04-09 PROCEDURE — 99213 OFFICE O/P EST LOW 20 MIN: CPT | Performed by: NURSE PRACTITIONER

## 2019-04-09 RX ORDER — FLUTICASONE PROPIONATE 50 MCG
2 SPRAY, SUSPENSION (ML) NASAL DAILY
Qty: 15.8 ML | Refills: 0 | Status: SHIPPED | OUTPATIENT
Start: 2019-04-09

## 2019-04-09 RX ORDER — CETIRIZINE HYDROCHLORIDE 10 MG/1
10 TABLET ORAL DAILY
Qty: 30 TABLET | Refills: 0 | Status: SHIPPED | OUTPATIENT
Start: 2019-04-09 | End: 2019-05-09

## 2019-04-09 RX ORDER — ALBUTEROL SULFATE 90 UG/1
2 AEROSOL, METERED RESPIRATORY (INHALATION) EVERY 6 HOURS PRN
Qty: 1 INHALER | Refills: 11 | Status: SHIPPED | OUTPATIENT
Start: 2019-04-09 | End: 2022-03-23 | Stop reason: SDUPTHER

## 2019-04-09 RX ORDER — AZITHROMYCIN 250 MG/1
TABLET, FILM COATED ORAL
Qty: 6 TABLET | Refills: 0 | Status: SHIPPED | OUTPATIENT
Start: 2019-04-09 | End: 2019-06-11

## 2019-04-09 NOTE — PATIENT INSTRUCTIONS
Acute Bronchitis, Adult  Acute bronchitis is sudden (acute) swelling of the air tubes (bronchi) in the lungs. Acute bronchitis causes these tubes to fill with mucus, which can make it hard to breathe. It can also cause coughing or wheezing.  In adults, acute bronchitis usually goes away within 2 weeks. A cough caused by bronchitis may last up to 3 weeks. Smoking, allergies, and asthma can make the condition worse. Repeated episodes of bronchitis may cause further lung problems, such as chronic obstructive pulmonary disease (COPD).  What are the causes?  This condition can be caused by germs and by substances that irritate the lungs, including:  · Cold and flu viruses. This condition is most often caused by the same virus that causes a cold.  · Bacteria.  · Exposure to tobacco smoke, dust, fumes, and air pollution.    What increases the risk?  This condition is more likely to develop in people who:  · Have close contact with someone with acute bronchitis.  · Are exposed to lung irritants, such as tobacco smoke, dust, fumes, and vapors.  · Have a weak immune system.  · Have a respiratory condition such as asthma.    What are the signs or symptoms?  Symptoms of this condition include:  · A cough.  · Coughing up clear, yellow, or green mucus.  · Wheezing.  · Chest congestion.  · Shortness of breath.  · A fever.  · Body aches.  · Chills.  · A sore throat.    How is this diagnosed?  This condition is usually diagnosed with a physical exam. During the exam, your health care provider may order tests, such as chest X-rays, to rule out other conditions. He or she may also:  · Test a sample of your mucus for bacterial infection.  · Check the level of oxygen in your blood. This is done to check for pneumonia.  · Do a chest X-ray or lung function testing to rule out pneumonia and other conditions.  · Perform blood tests.    Your health care provider will also ask about your symptoms and medical history.  How is this  treated?  Most cases of acute bronchitis clear up over time without treatment. Your health care provider may recommend:  · Drinking more fluids. Drinking more makes your mucus thinner, which may make it easier to breathe.  · Taking a medicine for a fever or cough.  · Taking an antibiotic medicine.  · Using an inhaler to help improve shortness of breath and to control a cough.  · Using a cool mist vaporizer or humidifier to make it easier to breathe.    Follow these instructions at home:  Medicines  · Take over-the-counter and prescription medicines only as told by your health care provider.  · If you were prescribed an antibiotic, take it as told by your health care provider. Do not stop taking the antibiotic even if you start to feel better.  General instructions  · Get plenty of rest.  · Drink enough fluids to keep your urine pale yellow.  · Avoid smoking and secondhand smoke. Exposure to cigarette smoke or irritating chemicals will make bronchitis worse. If you smoke and you need help quitting, ask your health care provider. Quitting smoking will help your lungs heal faster.  · Use an inhaler, cool mist vaporizer, or humidifier as told by your health care provider.  · Keep all follow-up visits as told by your health care provider. This is important.  How is this prevented?  To lower your risk of getting this condition again:  · Wash your hands often with soap and water. If soap and water are not available, use hand .  · Avoid contact with people who have cold symptoms.  · Try not to touch your hands to your mouth, nose, or eyes.  · Make sure to get the flu shot every year.    Contact a health care provider if:  · Your symptoms do not improve in 2 weeks of treatment.  Get help right away if:  · You cough up blood.  · You have chest pain.  · You have severe shortness of breath.  · You become dehydrated.  · You faint or keep feeling like you are going to faint.  · You keep vomiting.  · You have a severe  headache.  · Your fever or chills gets worse.  This information is not intended to replace advice given to you by your health care provider. Make sure you discuss any questions you have with your health care provider.  Document Released: 01/25/2006 Document Revised: 08/01/2018 Document Reviewed: 06/07/2017  [employer] Interactive Patient Education © 2019 [employer]  Sinusitis, Adult  Sinusitis is soreness and inflammation of your sinuses. Sinuses are hollow spaces in the bones around your face. Your sinuses are located:  · Around your eyes.  · In the middle of your forehead.  · Behind your nose.  · In your cheekbones.    Your sinuses and nasal passages are lined with a stringy fluid (mucus). Mucus normally drains out of your sinuses. When your nasal tissues become inflamed or swollen, mucus can become trapped or blocked. Blocked or trapped mucus makes it difficult for air to flow through your sinuses. This allows bacteria, viruses, and funguses to grow, which leads to infection. Most infections of the sinuses are caused by a virus.  Sinusitis can develop quickly. It can last for 7?10 days (acute) or for more than 12 weeks (chronic). Sinusitis often develops after a cold.  What are the causes?  This condition is caused by anything that creates swelling in the sinuses or stops mucus from draining, including:  · Allergies.  · Asthma.  · Bacterial or viral infection.  · Abnormally shaped bones between the nasal passages.  · Nasal growths that contain mucus (nasal polyps).  · Narrow sinus openings.  · Pollutants, such as chemicals or irritants in the air.  · A foreign object stuck in the nose.  · A fungal infection. This is rare.    What increases the risk?  The following factors may make you more likely to develop this condition:  · Having allergies or asthma.  · Having had a recent cold or respiratory tract infection.  · Having structural deformities or blockages in your nose or sinuses.  · Having a weak immune  system.  · Doing a lot of swimming or diving.  · Overusing nasal sprays.  · Smoking.    What are the signs or symptoms?  The main symptoms of this condition are pain and a feeling of pressure around the affected sinuses. Other symptoms include:  · Upper toothache.  · Earache.  · Headache.  · Bad breath.  · Decreased sense of smell and taste.  · A cough that may get worse at night.  · Fatigue.  · Fever.  · Thick drainage from your nose. The drainage is often green and it may contain pus (purulent).  · Stuffy nose or congestion.  · Postnasal drip. This is when extra mucus collects in the throat or back of the nose.  · Swelling and warmth over the affected sinuses.  · Sore throat.  · Sensitivity to light.    How is this diagnosed?  This condition is diagnosed based on symptoms, a medical history, and a physical exam. To find out if your condition is acute or chronic, your health care provider may:  · Look in your nose for signs of nasal polyps.  · Tap over the affected sinus to check for signs of infection.  · View the inside of your sinuses using an imaging device that has a light attached (endoscope).    If your health care provider suspects that you have chronic sinusitis, you may also:  · Be tested for allergies.  · Have a sample of mucus taken from your nose (nasal culture) and checked for bacteria.  · Have a mucus sample examined to see if your sinusitis is related to an allergy.    If your sinusitis does not respond to treatment and it lasts longer than 8 weeks, you may have an MRI or CT scan to check your sinuses. These scans also help to determine how severe your infection is.  In rare cases, a bone biopsy may be done to rule out more serious types of fungal sinus disease.  How is this treated?  Treatment for sinusitis depends on the cause and whether your condition is chronic or acute. If a virus is causing your sinusitis, your symptoms will go away on their own within 10 days. You may be given medicines to  relieve your symptoms, including:  · Topical nasal decongestants. They shrink swollen nasal passages and let mucus drain from your sinuses.  · Antihistamines. These drugs block inflammation that is triggered by allergies. This can help to ease swelling in your nose and sinuses.  · Topical nasal corticosteroids. These are nasal sprays that ease inflammation and swelling in your nose and sinuses.  · Nasal saline washes. These rinses can help to get rid of thick mucus in your nose.    If your condition is caused by bacteria, your health care provider may recommend waiting to see if your symptoms improve. Most bacterial infections will get better without antibiotic medicine. If you have a severe infection or a weak immune system, you may be prescribed antibiotics.  Surgery may be needed to correct underlying conditions, such as narrow nasal passages. Surgery may also be needed to remove polyps.  Follow these instructions at home:  Medicines  · Take, use, or apply over-the-counter and prescription medicines only as told by your health care provider. These may include nasal sprays.  · If you were prescribed an antibiotic, take it as told by your health care provider. Do not stop taking the antibiotic even if you start to feel better.  Hydrate and Humidify  · Drink enough water to keep your urine clear or pale yellow. Staying hydrated will help to thin your mucus.  · Use a cool mist humidifier to keep the humidity level in your home above 50%.  · Inhale steam for 10-15 minutes, 3-4 times a day or as told by your health care provider. You can do this in the bathroom while a hot shower is running.  · Limit your exposure to cool or dry air.  Rest  · Rest as much as possible.  · Sleep with your head raised (elevated).  · Make sure to get enough sleep each night.  General instructions  · Apply a warm, moist washcloth to your face 3-4 times a day or as told by your health care provider. This will help with discomfort.  · Wash  your hands often with soap and water to reduce your exposure to viruses and other germs. If soap and water are not available, use hand .  · Do not smoke. Avoid being around people who are smoking (secondhand smoke).  · Keep all follow-up visits as told by your health care provider. This is important.  Contact a health care provider if:  · You have a fever.  · Your symptoms get worse.  · Your symptoms do not improve within 10 days.  Get help right away if:  · You have a severe headache.  · You have persistent vomiting.  · You have pain or swelling around your face or eyes.  · You have vision problems.  · You develop confusion.  · Your neck is stiff.  · You have trouble breathing.  This information is not intended to replace advice given to you by your health care provider. Make sure you discuss any questions you have with your health care provider.  Document Released: 12/18/2006 Document Revised: 06/28/2018 Document Reviewed: 10/12/2016  [employer] Interactive Patient Education © 2019 [employer]

## 2019-04-09 NOTE — PROGRESS NOTES
Subjective   Bernice Rai is a 58 y.o. female.     URI    This is a new problem. The current episode started in the past 7 days. The problem has been gradually worsening. There has been no fever. Associated symptoms include congestion, coughing, ear pain (left ear), headaches, rhinorrhea, sinus pain and a sore throat. Pertinent negatives include no nausea or vomiting. Associated symptoms comments: Diarrhea, ribs sore from coughing. Treatments tried: nasal spray, mucinex. The treatment provided mild relief.        The following portions of the patient's history were reviewed and updated as appropriate: allergies, current medications, past family history, past medical history, past social history, past surgical history and problem list.    Review of Systems   HENT: Positive for congestion, ear pain (left ear), rhinorrhea, sinus pressure and sore throat.    Respiratory: Positive for cough.    Cardiovascular: Negative.    Gastrointestinal: Negative.  Negative for nausea and vomiting.   Musculoskeletal: Negative.    Neurological: Positive for headache.       Objective   Physical Exam   Constitutional: She is cooperative. No distress.   HENT:   Head: Normocephalic.   Right Ear: Hearing, external ear and ear canal normal. A middle ear effusion is present.   Left Ear: Hearing, external ear and ear canal normal. A middle ear effusion is present.   Nose: Mucosal edema, rhinorrhea and sinus tenderness present. Right sinus exhibits maxillary sinus tenderness and frontal sinus tenderness. Left sinus exhibits maxillary sinus tenderness and frontal sinus tenderness.   Mouth/Throat: Oropharynx is clear and moist.   Eyes: Conjunctivae, EOM and lids are normal. Pupils are equal, round, and reactive to light.   Neck: Trachea normal and full passive range of motion without pain.   Cardiovascular: Normal rate, regular rhythm and normal pulses.   Pulmonary/Chest: Effort normal and breath sounds normal.   Lymphadenopathy:        Head  (left side): Posterior auricular adenopathy present.     She has cervical adenopathy.        Right cervical: Superficial cervical adenopathy present.        Left cervical: Superficial cervical adenopathy present.   Neurological: She is alert.   Skin: Skin is warm. Capillary refill takes less than 2 seconds.   Psychiatric: She has a normal mood and affect. Her speech is normal and behavior is normal.   Vitals reviewed.        Assessment/Plan   Bernice was seen today for uri.    Diagnoses and all orders for this visit:    Bronchitis  -     albuterol sulfate HFA (PROVENTIL HFA) 108 (90 Base) MCG/ACT inhaler; Inhale 2 puffs Every 6 (Six) Hours As Needed for Wheezing.    Acute pansinusitis, recurrence not specified  -     cetirizine (zyrTEC) 10 MG tablet; Take 1 tablet by mouth Daily for 30 days.  -     fluticasone (FLONASE) 50 MCG/ACT nasal spray; 2 sprays into the nostril(s) as directed by provider Daily.  -     azithromycin (ZITHROMAX Z-MONICA) 250 MG tablet; Take 2 tablets the first day, then 1 tablet daily for 4 days.    Right acute serous otitis media, recurrence not specified  -     cetirizine (zyrTEC) 10 MG tablet; Take 1 tablet by mouth Daily for 30 days.

## 2019-05-06 RX ORDER — AMLODIPINE BESYLATE 10 MG/1
TABLET ORAL
Qty: 90 TABLET | Refills: 1 | Status: SHIPPED | OUTPATIENT
Start: 2019-05-06 | End: 2019-06-11 | Stop reason: SDUPTHER

## 2019-05-30 DIAGNOSIS — Z79.4 TYPE 2 DIABETES MELLITUS WITH OTHER DIABETIC KIDNEY COMPLICATION, WITH LONG-TERM CURRENT USE OF INSULIN (HCC): ICD-10-CM

## 2019-05-30 DIAGNOSIS — E78.5 HYPERLIPIDEMIA, UNSPECIFIED HYPERLIPIDEMIA TYPE: Primary | ICD-10-CM

## 2019-05-30 DIAGNOSIS — E11.29 TYPE 2 DIABETES MELLITUS WITH OTHER DIABETIC KIDNEY COMPLICATION, WITH LONG-TERM CURRENT USE OF INSULIN (HCC): ICD-10-CM

## 2019-05-30 DIAGNOSIS — I10 ESSENTIAL HYPERTENSION: ICD-10-CM

## 2019-06-05 LAB
ALBUMIN SERPL-MCNC: 3.1 G/DL (ref 3.5–5.2)
ALBUMIN/CREAT UR: 5405.9 MG/G CREAT (ref 0–30)
ALBUMIN/GLOB SERPL: 1.3 G/DL
ALP SERPL-CCNC: 75 U/L (ref 39–117)
ALT SERPL-CCNC: 5 U/L (ref 1–33)
AST SERPL-CCNC: 8 U/L (ref 1–32)
BASOPHILS # BLD AUTO: 0.09 10*3/MM3 (ref 0–0.2)
BASOPHILS NFR BLD AUTO: 1.1 % (ref 0–1.5)
BILIRUB SERPL-MCNC: 0.2 MG/DL (ref 0.2–1.2)
BUN SERPL-MCNC: 12 MG/DL (ref 6–20)
BUN/CREAT SERPL: 17.6 (ref 7–25)
CALCIUM SERPL-MCNC: 10.2 MG/DL (ref 8.6–10.5)
CHLORIDE SERPL-SCNC: 105 MMOL/L (ref 98–107)
CHOLEST SERPL-MCNC: 177 MG/DL (ref 0–200)
CO2 SERPL-SCNC: 27.6 MMOL/L (ref 22–29)
CREAT SERPL-MCNC: 0.68 MG/DL (ref 0.57–1)
CREAT UR-MCNC: 101.1 MG/DL
EOSINOPHIL # BLD AUTO: 0.22 10*3/MM3 (ref 0–0.4)
EOSINOPHIL NFR BLD AUTO: 2.7 % (ref 0.3–6.2)
ERYTHROCYTE [DISTWIDTH] IN BLOOD BY AUTOMATED COUNT: 13.7 % (ref 12.3–15.4)
GLOBULIN SER CALC-MCNC: 2.3 GM/DL
GLUCOSE SERPL-MCNC: 141 MG/DL (ref 65–99)
HBA1C MFR BLD: 7.8 % (ref 4.8–5.6)
HCT VFR BLD AUTO: 47.3 % (ref 34–46.6)
HDLC SERPL-MCNC: 30 MG/DL (ref 40–60)
HGB BLD-MCNC: 14.8 G/DL (ref 12–15.9)
IMM GRANULOCYTES # BLD AUTO: 0.01 10*3/MM3 (ref 0–0.05)
IMM GRANULOCYTES NFR BLD AUTO: 0.1 % (ref 0–0.5)
LDLC SERPL CALC-MCNC: 101 MG/DL (ref 0–100)
LDLC/HDLC SERPL: 3.37 {RATIO}
LYMPHOCYTES # BLD AUTO: 2.77 10*3/MM3 (ref 0.7–3.1)
LYMPHOCYTES NFR BLD AUTO: 33.7 % (ref 19.6–45.3)
MCH RBC QN AUTO: 30.5 PG (ref 26.6–33)
MCHC RBC AUTO-ENTMCNC: 31.3 G/DL (ref 31.5–35.7)
MCV RBC AUTO: 97.5 FL (ref 79–97)
MICROALBUMIN UR-MCNC: 5465.4 UG/ML
MONOCYTES # BLD AUTO: 0.86 10*3/MM3 (ref 0.1–0.9)
MONOCYTES NFR BLD AUTO: 10.5 % (ref 5–12)
NEUTROPHILS # BLD AUTO: 4.27 10*3/MM3 (ref 1.7–7)
NEUTROPHILS NFR BLD AUTO: 51.9 % (ref 42.7–76)
NRBC BLD AUTO-RTO: 0 /100 WBC (ref 0–0.2)
PLATELET # BLD AUTO: 369 10*3/MM3 (ref 140–450)
POTASSIUM SERPL-SCNC: 4.9 MMOL/L (ref 3.5–5.2)
PROT SERPL-MCNC: 5.4 G/DL (ref 6–8.5)
RBC # BLD AUTO: 4.85 10*6/MM3 (ref 3.77–5.28)
SODIUM SERPL-SCNC: 141 MMOL/L (ref 136–145)
T4 FREE SERPL-MCNC: 1 NG/DL (ref 0.93–1.7)
TRIGL SERPL-MCNC: 230 MG/DL (ref 0–150)
TSH SERPL DL<=0.005 MIU/L-ACNC: 2.09 MIU/ML (ref 0.27–4.2)
VLDLC SERPL CALC-MCNC: 46 MG/DL
WBC # BLD AUTO: 8.22 10*3/MM3 (ref 3.4–10.8)

## 2019-06-11 ENCOUNTER — OFFICE VISIT (OUTPATIENT)
Dept: FAMILY MEDICINE CLINIC | Facility: CLINIC | Age: 59
End: 2019-06-11

## 2019-06-11 VITALS
RESPIRATION RATE: 16 BRPM | HEIGHT: 62 IN | DIASTOLIC BLOOD PRESSURE: 70 MMHG | TEMPERATURE: 98 F | HEART RATE: 69 BPM | WEIGHT: 263 LBS | OXYGEN SATURATION: 98 % | SYSTOLIC BLOOD PRESSURE: 124 MMHG | BODY MASS INDEX: 48.4 KG/M2

## 2019-06-11 DIAGNOSIS — I10 ESSENTIAL HYPERTENSION: Primary | ICD-10-CM

## 2019-06-11 DIAGNOSIS — E78.5 HYPERLIPIDEMIA, UNSPECIFIED HYPERLIPIDEMIA TYPE: ICD-10-CM

## 2019-06-11 DIAGNOSIS — K21.9 GASTROESOPHAGEAL REFLUX DISEASE, ESOPHAGITIS PRESENCE NOT SPECIFIED: ICD-10-CM

## 2019-06-11 DIAGNOSIS — J45.20 MILD INTERMITTENT ASTHMATIC BRONCHITIS WITHOUT COMPLICATION: ICD-10-CM

## 2019-06-11 DIAGNOSIS — E11.29 TYPE 2 DIABETES MELLITUS WITH OTHER DIABETIC KIDNEY COMPLICATION, WITH LONG-TERM CURRENT USE OF INSULIN (HCC): ICD-10-CM

## 2019-06-11 DIAGNOSIS — E66.01 CLASS 3 SEVERE OBESITY DUE TO EXCESS CALORIES WITH BODY MASS INDEX (BMI) OF 45.0 TO 49.9 IN ADULT, UNSPECIFIED WHETHER SERIOUS COMORBIDITY PRESENT (HCC): ICD-10-CM

## 2019-06-11 DIAGNOSIS — Z79.4 TYPE 2 DIABETES MELLITUS WITH OTHER DIABETIC KIDNEY COMPLICATION, WITH LONG-TERM CURRENT USE OF INSULIN (HCC): ICD-10-CM

## 2019-06-11 PROCEDURE — 99214 OFFICE O/P EST MOD 30 MIN: CPT | Performed by: INTERNAL MEDICINE

## 2019-06-11 RX ORDER — ATORVASTATIN CALCIUM 40 MG/1
40 TABLET, FILM COATED ORAL DAILY
Qty: 90 TABLET | Refills: 3 | Status: SHIPPED | OUTPATIENT
Start: 2019-06-11 | End: 2020-07-20

## 2019-06-11 NOTE — PROGRESS NOTES
Subjective   Bernice Rai is a 58 y.o. female. Patient is here today for follow-up on her hypertension, hyperlipidemia, asthmatic bronchitis, GERD, diabetes mellitus type 2.  Patient's generally been stable.  She reports that the Brio inhaler does better than the albuterol and she would like to get a prescription for it.  Had some stuffiness in the ears but no pain and otherwise denies any chest pain, shortness of breath, edema or myalgias.  Chief Complaint   Patient presents with   • Shortness of Breath     FOLLOW UP   • Med Refill     REQUESTING RX FOR BREO. HAS ONLY RECEIVED SAMPLE          Vitals:    06/11/19 1416   BP: 124/70   Pulse: 69   Resp: 16   Temp: 98 °F (36.7 °C)   SpO2: 98%     The following portions of the patient's history were reviewed and updated as appropriate: allergies, current medications, past family history, past medical history, past social history, past surgical history and problem list.    Past Medical History:   Diagnosis Date   • Arthritis     FEET   • Diabetes mellitus (CMS/[facility])    • Hyperlipidemia    • Hypertension    • Reflux esophagitis    • Stroke (CMS/[facility])       Allergies   Allergen Reactions   • Jardiance [Empagliflozin] Itching     Recurrent yeast infections      Social History     Socioeconomic History   • Marital status:      Spouse name: Not on file   • Number of children: Not on file   • Years of education: Not on file   • Highest education level: Not on file   Tobacco Use   • Smoking status: Current Some Day Smoker     Packs/day: 0.50   • Smokeless tobacco: Never Used   Substance and Sexual Activity   • Alcohol use: No   • Drug use: Defer   • Sexual activity: Defer        Current Outpatient Medications:   •  albuterol sulfate HFA (PROVENTIL HFA) 108 (90 Base) MCG/ACT inhaler, Inhale 2 puffs Every 6 (Six) Hours As Needed for Wheezing., Disp: 1 inhaler, Rfl: 11  •  amLODIPine (NORVASC) 10 MG tablet, TAKE 1 TABLET BY MOUTH ONCE DAILY, Disp: 90 tablet, Rfl: 1  •   aspirin 81 MG chewable tablet, Chew 81 mg daily., Disp: , Rfl:   •  atenolol (TENORMIN) 100 MG tablet, Take 1 tablet by mouth Daily., Disp: 90 tablet, Rfl: 3  •  atorvastatin (LIPITOR) 40 MG tablet, Take 1 tablet by mouth Daily., Disp: 90 tablet, Rfl: 3  •  desoximetasone (TOPICORT) 0.25 % ointment, Apply  topically to the appropriate area as directed Every 12 (Twelve) Hours., Disp: 60 g, Rfl: 3  •  fluticasone (FLONASE) 50 MCG/ACT nasal spray, 2 sprays into the nostril(s) as directed by provider Daily., Disp: 15.8 mL, Rfl: 0  •  Fluticasone Furoate-Vilanterol (BREO ELLIPTA) 100-25 MCG/INH inhaler, Inhale 1 puff Daily., Disp: 1 each, Rfl: 11  •  insulin NPH-insulin regular (NOVOLIN 70/30) (70-30) 100 UNIT/ML injection, Inject  under the skin. 130 150 daily / sliding scale, Disp: , Rfl:   •  Insulin Pen Needle (B-D UF III MINI PEN NEEDLES) 31G X 5 MM misc, , Disp: , Rfl:   •  lisinopril (PRINIVIL,ZESTRIL) 20 MG tablet, TAKE 1 TABLET BY MOUTH TWICE DAILY, Disp: 180 tablet, Rfl: 1  •  SITagliptin-MetFORMIN HCl ER (JANUMET XR) 100-1000 MG tablet, Take 1 tablet by mouth Daily., Disp: 30 tablet, Rfl: 5  •  triamterene-hydrochlorothiazide (MAXZIDE-25) 37.5-25 MG per tablet, TAKE ONE TABLET BY MOUTH EVERY OTHER DAY, Disp: 30 tablet, Rfl: 5  •  venlafaxine XR (EFFEXOR-XR) 150 MG 24 hr capsule, Take 1 capsule by mouth Daily., Disp: 90 capsule, Rfl: 3     Objective     History of Present Illness     Review of Systems   Constitutional: Negative.    HENT: Negative.    Eyes: Negative.    Respiratory: Negative.    Cardiovascular: Negative.    Gastrointestinal: Negative.    Genitourinary: Negative.    Musculoskeletal: Negative.    Skin: Negative.    Neurological: Negative.    Psychiatric/Behavioral: Negative.        Physical Exam   Constitutional: She is oriented to person, place, and time. She appears well-developed and well-nourished.   Pleasant, cooperative in no distress, obese with blood pressure 130/80   HENT:   Head:  Normocephalic and atraumatic.   Right Ear: Hearing, tympanic membrane, external ear and ear canal normal.   Left Ear: Hearing, tympanic membrane, external ear and ear canal normal.   Eyes: Conjunctivae are normal. Pupils are equal, round, and reactive to light. No scleral icterus.   Neck: Normal range of motion. Neck supple.   Cardiovascular: Normal rate, regular rhythm and normal heart sounds.   Pulmonary/Chest: Effort normal and breath sounds normal. No respiratory distress. She has no wheezes. She has no rales.   Musculoskeletal: Normal range of motion.   Neurological: She is alert and oriented to person, place, and time.   Skin: Skin is warm and dry.   Psychiatric: She has a normal mood and affect. Her behavior is normal.   Nursing note and vitals reviewed.      ASSESSMENT CBC is essentially normal.  CMP has an elevated sugar of 141 and slightly low proteins of 5.4 and was otherwise generally normal.  Lipid panel is stable with total cholesterol 177, HDL of 30, .  Hemoglobin A1c is elevated but not unreasonable at 7.8.  Urine microalbumin is high at 5400  #1-diabetes mellitus type 2, fair control  #2-hypertension, controlled  #3-hyperlipidemia, reasonable control  #4-obesity with slight weight loss  #5-GERD, controlled     Problem List Items Addressed This Visit        Cardiovascular and Mediastinum    Hyperlipidemia    Relevant Medications    atorvastatin (LIPITOR) 40 MG tablet    Hypertension - Primary       Respiratory    Asthmatic bronchitis    Relevant Medications    Fluticasone Furoate-Vilanterol (BREO ELLIPTA) 100-25 MCG/INH inhaler       Digestive    Gastroesophageal reflux disease    Obesity due to excess calories       Endocrine    Diabetes mellitus (CMS/[facility])          PLAN I encouraged the patient to continue to lose weight and watch dietary carbs.  She will continue on the same medications.  I did get her prescription for Breo Ellipta.  I would like to recheck her in 4 months with a CMP, lipid  panel, hemoglobin A1c and urine microalbumin    There are no Patient Instructions on file for this visit.  Return in about 4 months (around 10/11/2019) for with labs.

## 2019-09-09 RX ORDER — TRIAMTERENE AND HYDROCHLOROTHIAZIDE 37.5; 25 MG/1; MG/1
TABLET ORAL
Qty: 30 TABLET | Refills: 5 | Status: SHIPPED | OUTPATIENT
Start: 2019-09-09 | End: 2020-08-11 | Stop reason: SDUPTHER

## 2019-10-07 DIAGNOSIS — E78.5 HYPERLIPIDEMIA, UNSPECIFIED HYPERLIPIDEMIA TYPE: ICD-10-CM

## 2019-10-07 DIAGNOSIS — Z79.4 TYPE 2 DIABETES MELLITUS WITH OTHER DIABETIC KIDNEY COMPLICATION, WITH LONG-TERM CURRENT USE OF INSULIN (HCC): ICD-10-CM

## 2019-10-07 DIAGNOSIS — E11.29 TYPE 2 DIABETES MELLITUS WITH OTHER DIABETIC KIDNEY COMPLICATION, WITH LONG-TERM CURRENT USE OF INSULIN (HCC): ICD-10-CM

## 2019-10-07 DIAGNOSIS — I10 ESSENTIAL HYPERTENSION: ICD-10-CM

## 2019-10-07 RX ORDER — ATENOLOL 100 MG/1
TABLET ORAL
Qty: 90 TABLET | Refills: 1 | Status: SHIPPED | OUTPATIENT
Start: 2019-10-07 | End: 2020-02-26

## 2019-10-07 RX ORDER — LISINOPRIL 20 MG/1
TABLET ORAL
Qty: 180 TABLET | Refills: 1 | Status: SHIPPED | OUTPATIENT
Start: 2019-10-07 | End: 2020-02-26

## 2019-10-09 LAB
ALBUMIN SERPL-MCNC: 3.5 G/DL (ref 3.5–5.2)
ALBUMIN/GLOB SERPL: 1.8 G/DL
ALP SERPL-CCNC: 79 U/L (ref 39–117)
ALT SERPL-CCNC: 15 U/L (ref 1–33)
AST SERPL-CCNC: 12 U/L (ref 1–32)
BILIRUB SERPL-MCNC: 0.2 MG/DL (ref 0.2–1.2)
BUN SERPL-MCNC: 15 MG/DL (ref 6–20)
BUN/CREAT SERPL: 18.1 (ref 7–25)
CALCIUM SERPL-MCNC: 9.9 MG/DL (ref 8.6–10.5)
CHLORIDE SERPL-SCNC: 102 MMOL/L (ref 98–107)
CHOLEST SERPL-MCNC: 227 MG/DL (ref 0–200)
CO2 SERPL-SCNC: 26.1 MMOL/L (ref 22–29)
CREAT SERPL-MCNC: 0.83 MG/DL (ref 0.57–1)
GLOBULIN SER CALC-MCNC: 2 GM/DL
GLUCOSE SERPL-MCNC: 197 MG/DL (ref 65–99)
HBA1C MFR BLD: 7.7 % (ref 4.8–5.6)
HDLC SERPL-MCNC: 30 MG/DL (ref 40–60)
LDLC SERPL CALC-MCNC: ABNORMAL MG/DL
LDLC/HDLC SERPL: ABNORMAL {RATIO}
Lab: NORMAL
MICROALBUMIN UR-MCNC: 4478.3 UG/ML
POTASSIUM SERPL-SCNC: 4.7 MMOL/L (ref 3.5–5.2)
PROT SERPL-MCNC: 5.5 G/DL (ref 6–8.5)
SODIUM SERPL-SCNC: 139 MMOL/L (ref 136–145)
TRIGL SERPL-MCNC: 492 MG/DL (ref 0–150)
VLDLC SERPL CALC-MCNC: ABNORMAL MG/DL

## 2019-10-15 ENCOUNTER — OFFICE VISIT (OUTPATIENT)
Dept: FAMILY MEDICINE CLINIC | Facility: CLINIC | Age: 59
End: 2019-10-15

## 2019-10-15 VITALS
WEIGHT: 270 LBS | HEART RATE: 68 BPM | BODY MASS INDEX: 49.69 KG/M2 | OXYGEN SATURATION: 96 % | TEMPERATURE: 98.2 F | HEIGHT: 62 IN | RESPIRATION RATE: 16 BRPM | SYSTOLIC BLOOD PRESSURE: 120 MMHG | DIASTOLIC BLOOD PRESSURE: 72 MMHG

## 2019-10-15 DIAGNOSIS — I10 ESSENTIAL HYPERTENSION: ICD-10-CM

## 2019-10-15 DIAGNOSIS — K21.9 GASTROESOPHAGEAL REFLUX DISEASE, ESOPHAGITIS PRESENCE NOT SPECIFIED: ICD-10-CM

## 2019-10-15 DIAGNOSIS — R80.9 ALBUMINURIA: ICD-10-CM

## 2019-10-15 DIAGNOSIS — E78.5 HYPERLIPIDEMIA, UNSPECIFIED HYPERLIPIDEMIA TYPE: ICD-10-CM

## 2019-10-15 DIAGNOSIS — E11.29 TYPE 2 DIABETES MELLITUS WITH OTHER DIABETIC KIDNEY COMPLICATION, WITH LONG-TERM CURRENT USE OF INSULIN (HCC): ICD-10-CM

## 2019-10-15 DIAGNOSIS — Z23 NEED FOR IMMUNIZATION AGAINST INFLUENZA: Primary | ICD-10-CM

## 2019-10-15 DIAGNOSIS — Z79.4 TYPE 2 DIABETES MELLITUS WITH OTHER DIABETIC KIDNEY COMPLICATION, WITH LONG-TERM CURRENT USE OF INSULIN (HCC): ICD-10-CM

## 2019-10-15 DIAGNOSIS — E66.01 CLASS 3 SEVERE OBESITY DUE TO EXCESS CALORIES WITH SERIOUS COMORBIDITY AND BODY MASS INDEX (BMI) OF 45.0 TO 49.9 IN ADULT (HCC): ICD-10-CM

## 2019-10-15 PROCEDURE — 90674 CCIIV4 VAC NO PRSV 0.5 ML IM: CPT | Performed by: INTERNAL MEDICINE

## 2019-10-15 PROCEDURE — G0008 ADMIN INFLUENZA VIRUS VAC: HCPCS | Performed by: INTERNAL MEDICINE

## 2019-10-15 PROCEDURE — 90715 TDAP VACCINE 7 YRS/> IM: CPT | Performed by: INTERNAL MEDICINE

## 2019-10-15 PROCEDURE — 90471 IMMUNIZATION ADMIN: CPT | Performed by: INTERNAL MEDICINE

## 2019-10-15 PROCEDURE — 99214 OFFICE O/P EST MOD 30 MIN: CPT | Performed by: INTERNAL MEDICINE

## 2019-10-15 NOTE — PROGRESS NOTES
Subjective   Bernice Rai is a 59 y.o. female. Patient is here today for follow-up on her hypertension, hyperlipidemia, GERD, obesity, diabetes mellitus type 2 and albuminuria.  Patient's been stable and has no real acute complaints but she has not lost any significant weight.  Chief Complaint   Patient presents with   • Diabetes     HLD, HTN- FOLLOW UP LABS          Vitals:    10/15/19 1336   BP: 120/72   Pulse: 68   Resp: 16   Temp: 98.2 °F (36.8 °C)   SpO2: 96%     The following portions of the patient's history were reviewed and updated as appropriate: allergies, current medications, past family history, past medical history, past social history, past surgical history and problem list.    Past Medical History:   Diagnosis Date   • Arthritis     FEET   • Diabetes mellitus (CMS/HCC)    • Hyperlipidemia    • Hypertension    • Reflux esophagitis    • Stroke (CMS/HCC)       Allergies   Allergen Reactions   • Jardiance [Empagliflozin] Itching     Recurrent yeast infections      Social History     Socioeconomic History   • Marital status:      Spouse name: Not on file   • Number of children: Not on file   • Years of education: Not on file   • Highest education level: Not on file   Tobacco Use   • Smoking status: Current Some Day Smoker     Packs/day: 0.50   • Smokeless tobacco: Never Used   Substance and Sexual Activity   • Alcohol use: No   • Drug use: Defer   • Sexual activity: Defer        Current Outpatient Medications:   •  albuterol sulfate HFA (PROVENTIL HFA) 108 (90 Base) MCG/ACT inhaler, Inhale 2 puffs Every 6 (Six) Hours As Needed for Wheezing., Disp: 1 inhaler, Rfl: 11  •  amLODIPine (NORVASC) 10 MG tablet, TAKE 1 TABLET BY MOUTH ONCE DAILY, Disp: 90 tablet, Rfl: 1  •  aspirin 81 MG chewable tablet, Chew 81 mg daily., Disp: , Rfl:   •  atenolol (TENORMIN) 100 MG tablet, TAKE 1 TABLET BY MOUTH ONCE DAILY, Disp: 90 tablet, Rfl: 1  •  atorvastatin (LIPITOR) 40 MG tablet, Take 1 tablet by mouth Daily.,  Disp: 90 tablet, Rfl: 3  •  desoximetasone (TOPICORT) 0.25 % ointment, Apply  topically to the appropriate area as directed Every 12 (Twelve) Hours., Disp: 60 g, Rfl: 3  •  fluticasone (FLONASE) 50 MCG/ACT nasal spray, 2 sprays into the nostril(s) as directed by provider Daily., Disp: 15.8 mL, Rfl: 0  •  Fluticasone Furoate-Vilanterol (BREO ELLIPTA) 100-25 MCG/INH inhaler, Inhale 1 puff Daily., Disp: 1 each, Rfl: 11  •  insulin NPH-insulin regular (NOVOLIN 70/30) (70-30) 100 UNIT/ML injection, Inject  under the skin. 130 150 daily / sliding scale, Disp: , Rfl:   •  lisinopril (PRINIVIL,ZESTRIL) 20 MG tablet, TAKE 1 TABLET BY MOUTH TWICE DAILY, Disp: 180 tablet, Rfl: 1  •  SITagliptin-metFORMIN HCl ER (JANUMET XR) 100-1000 MG tablet, Take 1 tablet by mouth Daily., Disp: 30 tablet, Rfl: 5  •  triamterene-hydrochlorothiazide (MAXZIDE-25) 37.5-25 MG per tablet, TAKE 1 TABLET BY MOUTH EVERY OTHER DAY, Disp: 30 tablet, Rfl: 5  •  venlafaxine XR (EFFEXOR-XR) 150 MG 24 hr capsule, Take 1 capsule by mouth Daily., Disp: 90 capsule, Rfl: 3  •  Canagliflozin (INVOKANA) 100 MG tablet, Take 1 tablet by mouth Daily., Disp: 30 tablet, Rfl: 5     Objective     History of Present Illness     Review of Systems   Constitutional: Negative.    HENT: Negative.    Eyes: Negative.    Respiratory: Negative.    Cardiovascular: Negative.    Gastrointestinal: Negative.    Genitourinary: Negative.    Musculoskeletal: Negative.    Skin: Negative.    Neurological: Negative.    Psychiatric/Behavioral: Negative.        Physical Exam   Constitutional: She is oriented to person, place, and time. She appears well-developed and well-nourished.   Pleasant, cooperative no acute distress, home blood pressure readings 130/70 or less   HENT:   Head: Normocephalic and atraumatic.   Eyes: Conjunctivae are normal. Pupils are equal, round, and reactive to light. No scleral icterus.   Neck: Normal range of motion. Neck supple.   Cardiovascular: Normal rate,  regular rhythm and normal heart sounds.   Pulmonary/Chest: Effort normal and breath sounds normal. No respiratory distress. She has no wheezes. She has no rales.   Musculoskeletal: Normal range of motion. She exhibits no edema.   Neurological: She is alert and oriented to person, place, and time.   Skin: Skin is warm and dry.   Psychiatric: She has a normal mood and affect. Her behavior is normal.   Nursing note and vitals reviewed.      ASSESSMENT CMP has an elevated sugar of 197 and slightly decreased total protein of 5.5.  Hemoglobin A1c is elevated at 7.7.  Lipid panel has total cholesterol of 227, triglycerides 492 and HDL of 30.  Urine microalbumin is quite elevated at 4478.  #1-hypertension, controlled  #2-hyperlipidemia, not optimally controlled today  #3-diabetes mellitus type 2, not optimally controlled but unsure whether the patient's been on Janumet or just plain Metformin  #4-significant albuminuria  #5-obesity with no significant weight loss     Problem List Items Addressed This Visit        Cardiovascular and Mediastinum    Hyperlipidemia    Hypertension    Relevant Orders    Ambulatory Referral to Nephrology       Digestive    Gastroesophageal reflux disease    Obesity due to excess calories       Endocrine    Diabetes mellitus (CMS/[facility])    Relevant Medications    SITagliptin-metFORMIN HCl ER (JANUMET XR) 100-1000 MG tablet    Canagliflozin (INVOKANA) 100 MG tablet    Other Relevant Orders    Ambulatory Referral to Nephrology       Other    Albuminuria      Other Visit Diagnoses     Need for immunization against influenza    -  Primary    Relevant Orders    Flucelvax Quad=>4Years (PFS)          PLAN the patient received a flu shot and a Tdap immunization.  I am getting her Janumet -1000 to take once a day and also want to start her on Invokana 100 mg daily because of the recent study on kidney protection.  She will continue other medications.  I encouraged her to get the hepatitis A and  shingles immunizations and also to lose weight.  I am going to refer the patient to nephrology, Dr. Hurtado because of her albuminuria.  I would like to recheck her in 3 months with a CMP, lipid panel, hemoglobin A1c and urine microalbumin    There are no Patient Instructions on file for this visit.  Return in about 3 months (around 1/15/2020) for with labs.

## 2019-10-29 RX ORDER — AMLODIPINE BESYLATE 10 MG/1
TABLET ORAL
Qty: 90 TABLET | Refills: 0 | Status: SHIPPED | OUTPATIENT
Start: 2019-10-29 | End: 2019-12-23

## 2019-10-29 RX ORDER — VENLAFAXINE HYDROCHLORIDE 150 MG/1
CAPSULE, EXTENDED RELEASE ORAL
Qty: 90 CAPSULE | Refills: 0 | Status: SHIPPED | OUTPATIENT
Start: 2019-10-29 | End: 2020-01-29

## 2019-12-23 ENCOUNTER — OFFICE VISIT (OUTPATIENT)
Dept: RETAIL CLINIC | Facility: CLINIC | Age: 59
End: 2019-12-23

## 2019-12-23 VITALS
OXYGEN SATURATION: 94 % | TEMPERATURE: 98.1 F | SYSTOLIC BLOOD PRESSURE: 122 MMHG | DIASTOLIC BLOOD PRESSURE: 60 MMHG | HEART RATE: 81 BPM

## 2019-12-23 DIAGNOSIS — J01.40 ACUTE NON-RECURRENT PANSINUSITIS: ICD-10-CM

## 2019-12-23 DIAGNOSIS — R09.89 RHONCHI AT BOTH LUNG BASES: Primary | ICD-10-CM

## 2019-12-23 DIAGNOSIS — H65.01 NON-RECURRENT ACUTE SEROUS OTITIS MEDIA OF RIGHT EAR: ICD-10-CM

## 2019-12-23 PROCEDURE — 99213 OFFICE O/P EST LOW 20 MIN: CPT | Performed by: NURSE PRACTITIONER

## 2019-12-23 RX ORDER — FLUCONAZOLE 150 MG/1
150 TABLET ORAL ONCE
Qty: 1 TABLET | Refills: 0 | Status: SHIPPED | OUTPATIENT
Start: 2019-12-23 | End: 2019-12-23

## 2019-12-23 RX ORDER — AMOXICILLIN 500 MG/1
1000 CAPSULE ORAL 2 TIMES DAILY
Qty: 40 CAPSULE | Refills: 0 | Status: SHIPPED | OUTPATIENT
Start: 2019-12-23 | End: 2020-01-02

## 2019-12-23 NOTE — PATIENT INSTRUCTIONS
Use Albuterol inhaler every 6 hours until shortness of breath improves and then as needed for shortness of breath and wheezing.     Instructed pt. To follow-up with PCP if no improvement in symptoms.      Sinusitis, Adult  Sinusitis is inflammation of your sinuses. Sinuses are hollow spaces in the bones around your face. Your sinuses are located:  · Around your eyes.  · In the middle of your forehead.  · Behind your nose.  · In your cheekbones.  Mucus normally drains out of your sinuses. When your nasal tissues become inflamed or swollen, mucus can become trapped or blocked. This allows bacteria, viruses, and fungi to grow, which leads to infection. Most infections of the sinuses are caused by a virus.  Sinusitis can develop quickly. It can last for up to 4 weeks (acute) or for more than 12 weeks (chronic). Sinusitis often develops after a cold.  What are the causes?  This condition is caused by anything that creates swelling in the sinuses or stops mucus from draining. This includes:  · Allergies.  · Asthma.  · Infection from bacteria or viruses.  · Deformities or blockages in your nose or sinuses.  · Abnormal growths in the nose (nasal polyps).  · Pollutants, such as chemicals or irritants in the air.  · Infection from fungi (rare).  What increases the risk?  You are more likely to develop this condition if you:  · Have a weak body defense system (immune system).  · Do a lot of swimming or diving.  · Overuse nasal sprays.  · Smoke.  What are the signs or symptoms?  The main symptoms of this condition are pain and a feeling of pressure around the affected sinuses. Other symptoms include:  · Stuffy nose or congestion.  · Thick drainage from your nose.  · Swelling and warmth over the affected sinuses.  · Headache.  · Upper toothache.  · A cough that may get worse at night.  · Extra mucus that collects in the throat or the back of the nose (postnasal drip).  · Decreased sense of smell and taste.  · Fatigue.  · A  fever.  · Sore throat.  · Bad breath.  How is this diagnosed?  This condition is diagnosed based on:  · Your symptoms.  · Your medical history.  · A physical exam.  · Tests to find out if your condition is acute or chronic. This may include:  ? Checking your nose for nasal polyps.  ? Viewing your sinuses using a device that has a light (endoscope).  ? Testing for allergies or bacteria.  ? Imaging tests, such as an MRI or CT scan.  In rare cases, a bone biopsy may be done to rule out more serious types of fungal sinus disease.  How is this treated?  Treatment for sinusitis depends on the cause and whether your condition is chronic or acute.  · If caused by a virus, your symptoms should go away on their own within 10 days. You may be given medicines to relieve symptoms. They include:  ? Medicines that shrink swollen nasal passages (topical intranasal decongestants).  ? Medicines that treat allergies (antihistamines).  ? A spray that eases inflammation of the nostrils (topical intranasal corticosteroids).  ? Rinses that help get rid of thick mucus in your nose (nasal saline washes).  · If caused by bacteria, your health care provider may recommend waiting to see if your symptoms improve. Most bacterial infections will get better without antibiotic medicine. You may be given antibiotics if you have:  ? A severe infection.  ? A weak immune system.  · If caused by narrow nasal passages or nasal polyps, you may need to have surgery.  Follow these instructions at home:  Medicines  · Take, use, or apply over-the-counter and prescription medicines only as told by your health care provider. These may include nasal sprays.  · If you were prescribed an antibiotic medicine, take it as told by your health care provider. Do not stop taking the antibiotic even if you start to feel better.  Hydrate and humidify    · Drink enough fluid to keep your urine pale yellow. Staying hydrated will help to thin your mucus.  · Use a cool mist  humidifier to keep the humidity level in your home above 50%.  · Inhale steam for 10-15 minutes, 3-4 times a day, or as told by your health care provider. You can do this in the bathroom while a hot shower is running.  · Limit your exposure to cool or dry air.  Rest  · Rest as much as possible.  · Sleep with your head raised (elevated).  · Make sure you get enough sleep each night.  General instructions    · Apply a warm, moist washcloth to your face 3-4 times a day or as told by your health care provider. This will help with discomfort.  · Wash your hands often with soap and water to reduce your exposure to germs. If soap and water are not available, use hand .  · Do not smoke. Avoid being around people who are smoking (secondhand smoke).  · Keep all follow-up visits as told by your health care provider. This is important.  Contact a health care provider if:  · You have a fever.  · Your symptoms get worse.  · Your symptoms do not improve within 10 days.  Get help right away if:  · You have a severe headache.  · You have persistent vomiting.  · You have severe pain or swelling around your face or eyes.  · You have vision problems.  · You develop confusion.  · Your neck is stiff.  · You have trouble breathing.  Summary  · Sinusitis is soreness and inflammation of your sinuses. Sinuses are hollow spaces in the bones around your face.  · This condition is caused by nasal tissues that become inflamed or swollen. The swelling traps or blocks the flow of mucus. This allows bacteria, viruses, and fungi to grow, which leads to infection.  · If you were prescribed an antibiotic medicine, take it as told by your health care provider. Do not stop taking the antibiotic even if you start to feel better.  · Keep all follow-up visits as told by your health care provider. This is important.  This information is not intended to replace advice given to you by your health care provider. Make sure you discuss any questions you  have with your health care provider.  Document Released: 12/18/2006 Document Revised: 05/20/2019 Document Reviewed: 05/20/2019  [employer]vier Interactive Patient Education © 2019 [employer]

## 2019-12-23 NOTE — PROGRESS NOTES
"Subjective   Bernice Rai is a 59 y.o. female.     URI    This is a new problem. The current episode started in the past 7 days (3 days ago). The problem has been gradually worsening. Maximum temperature: hasn't taken with thermometer. Associated symptoms include congestion, coughing (productive yellow-iveth at times, mostly dry hacking cough), headaches, a plugged ear sensation, rhinorrhea, sinus pain, a sore throat and wheezing. Pertinent negatives include no diarrhea, ear pain (left worse than right), nausea, sneezing or vomiting. She has tried NSAIDs (nasacort) for the symptoms. The treatment provided mild relief.        The following portions of the patient's history were reviewed and updated as appropriate: allergies, current medications, past family history, past medical history, past social history, past surgical history and problem list.    Review of Systems   Constitutional: Positive for chills and fatigue. Negative for appetite change and fever.   HENT: Positive for congestion, postnasal drip, rhinorrhea, sinus pressure and sore throat. Negative for ear pain (left worse than right) and sneezing.    Respiratory: Positive for cough (productive yellow-iveth at times, mostly dry hacking cough), chest tightness (\"ribs are sore from coughing so much\"), shortness of breath and wheezing.    Cardiovascular: Negative.    Gastrointestinal: Negative for diarrhea, nausea and vomiting.   Musculoskeletal: Negative.    Neurological: Positive for headache.       Objective   Physical Exam   Constitutional: She is oriented to person, place, and time. She appears well-developed and well-nourished. No distress.   HENT:   Head: Normocephalic and atraumatic.   Right Ear: Hearing, external ear and ear canal normal. Tympanic membrane is erythematous and bulging. A middle ear effusion is present.   Left Ear: Hearing, external ear and ear canal normal. Tympanic membrane is erythematous. Tympanic membrane is not bulging.  No middle " ear effusion.   Nose: Congestion present. Right sinus exhibits maxillary sinus tenderness and frontal sinus tenderness. Left sinus exhibits maxillary sinus tenderness and frontal sinus tenderness.   Mouth/Throat: Uvula is midline and mucous membranes are normal. Uvula swelling present. Oropharyngeal exudate, posterior oropharyngeal edema (moderate clear postnasal drip) and posterior oropharyngeal erythema present. Tonsils are 1+ on the right. Tonsils are 1+ on the left. No tonsillar exudate.   Eyes: Pupils are equal, round, and reactive to light. Conjunctivae and EOM are normal.   Neck: Normal range of motion.   Cardiovascular: Normal rate, regular rhythm and normal heart sounds. Exam reveals no gallop and no friction rub.   No murmur heard.  Pulmonary/Chest: Effort normal. No respiratory distress. She has decreased breath sounds in the right lower field and the left lower field. She has wheezes in the right upper field and the left upper field. She has rhonchi in the right middle field, the right lower field, the left middle field and the left lower field.   Lymphadenopathy:        Head (right side): Tonsillar adenopathy present. No submental, no submandibular, no preauricular and no posterior auricular adenopathy present.        Head (left side): Tonsillar adenopathy present. No submental, no submandibular, no preauricular and no posterior auricular adenopathy present.     She has no cervical adenopathy.   Neurological: She is alert and oriented to person, place, and time.   Skin: Skin is warm and dry. She is not diaphoretic.   Psychiatric: She has a normal mood and affect.         Assessment/Plan   Diagnoses and all orders for this visit:    Rhonchi at both lung bases    Acute non-recurrent pansinusitis    Non-recurrent acute serous otitis media of right ear      Use Albuterol inhaler every 6 hours until shortness of breath improves and then as needed for shortness of breath and wheezing.     Instructed pt. To  follow-up with PCP if no improvement in symptoms.      Sinusitis, Adult  Sinusitis is inflammation of your sinuses. Sinuses are hollow spaces in the bones around your face. Your sinuses are located:  · Around your eyes.  · In the middle of your forehead.  · Behind your nose.  · In your cheekbones.  Mucus normally drains out of your sinuses. When your nasal tissues become inflamed or swollen, mucus can become trapped or blocked. This allows bacteria, viruses, and fungi to grow, which leads to infection. Most infections of the sinuses are caused by a virus.  Sinusitis can develop quickly. It can last for up to 4 weeks (acute) or for more than 12 weeks (chronic). Sinusitis often develops after a cold.  What are the causes?  This condition is caused by anything that creates swelling in the sinuses or stops mucus from draining. This includes:  · Allergies.  · Asthma.  · Infection from bacteria or viruses.  · Deformities or blockages in your nose or sinuses.  · Abnormal growths in the nose (nasal polyps).  · Pollutants, such as chemicals or irritants in the air.  · Infection from fungi (rare).  What increases the risk?  You are more likely to develop this condition if you:  · Have a weak body defense system (immune system).  · Do a lot of swimming or diving.  · Overuse nasal sprays.  · Smoke.  What are the signs or symptoms?  The main symptoms of this condition are pain and a feeling of pressure around the affected sinuses. Other symptoms include:  · Stuffy nose or congestion.  · Thick drainage from your nose.  · Swelling and warmth over the affected sinuses.  · Headache.  · Upper toothache.  · A cough that may get worse at night.  · Extra mucus that collects in the throat or the back of the nose (postnasal drip).  · Decreased sense of smell and taste.  · Fatigue.  · A fever.  · Sore throat.  · Bad breath.  How is this diagnosed?  This condition is diagnosed based on:  · Your symptoms.  · Your medical history.  · A  physical exam.  · Tests to find out if your condition is acute or chronic. This may include:  ? Checking your nose for nasal polyps.  ? Viewing your sinuses using a device that has a light (endoscope).  ? Testing for allergies or bacteria.  ? Imaging tests, such as an MRI or CT scan.  In rare cases, a bone biopsy may be done to rule out more serious types of fungal sinus disease.  How is this treated?  Treatment for sinusitis depends on the cause and whether your condition is chronic or acute.  · If caused by a virus, your symptoms should go away on their own within 10 days. You may be given medicines to relieve symptoms. They include:  ? Medicines that shrink swollen nasal passages (topical intranasal decongestants).  ? Medicines that treat allergies (antihistamines).  ? A spray that eases inflammation of the nostrils (topical intranasal corticosteroids).  ? Rinses that help get rid of thick mucus in your nose (nasal saline washes).  · If caused by bacteria, your health care provider may recommend waiting to see if your symptoms improve. Most bacterial infections will get better without antibiotic medicine. You may be given antibiotics if you have:  ? A severe infection.  ? A weak immune system.  · If caused by narrow nasal passages or nasal polyps, you may need to have surgery.  Follow these instructions at home:  Medicines  · Take, use, or apply over-the-counter and prescription medicines only as told by your health care provider. These may include nasal sprays.  · If you were prescribed an antibiotic medicine, take it as told by your health care provider. Do not stop taking the antibiotic even if you start to feel better.  Hydrate and humidify    · Drink enough fluid to keep your urine pale yellow. Staying hydrated will help to thin your mucus.  · Use a cool mist humidifier to keep the humidity level in your home above 50%.  · Inhale steam for 10-15 minutes, 3-4 times a day, or as told by your health care  provider. You can do this in the bathroom while a hot shower is running.  · Limit your exposure to cool or dry air.  Rest  · Rest as much as possible.  · Sleep with your head raised (elevated).  · Make sure you get enough sleep each night.  General instructions    · Apply a warm, moist washcloth to your face 3-4 times a day or as told by your health care provider. This will help with discomfort.  · Wash your hands often with soap and water to reduce your exposure to germs. If soap and water are not available, use hand .  · Do not smoke. Avoid being around people who are smoking (secondhand smoke).  · Keep all follow-up visits as told by your health care provider. This is important.  Contact a health care provider if:  · You have a fever.  · Your symptoms get worse.  · Your symptoms do not improve within 10 days.  Get help right away if:  · You have a severe headache.  · You have persistent vomiting.  · You have severe pain or swelling around your face or eyes.  · You have vision problems.  · You develop confusion.  · Your neck is stiff.  · You have trouble breathing.  Summary  · Sinusitis is soreness and inflammation of your sinuses. Sinuses are hollow spaces in the bones around your face.  · This condition is caused by nasal tissues that become inflamed or swollen. The swelling traps or blocks the flow of mucus. This allows bacteria, viruses, and fungi to grow, which leads to infection.  · If you were prescribed an antibiotic medicine, take it as told by your health care provider. Do not stop taking the antibiotic even if you start to feel better.  · Keep all follow-up visits as told by your health care provider. This is important.  This information is not intended to replace advice given to you by your health care provider. Make sure you discuss any questions you have with your health care provider.  Document Released: 12/18/2006 Document Revised: 05/20/2019 Document Reviewed: 05/20/2019  [employer]  Interactive Patient Education © 2019 [employer]

## 2020-01-16 DIAGNOSIS — Z79.4 TYPE 2 DIABETES MELLITUS WITH OTHER DIABETIC KIDNEY COMPLICATION, WITH LONG-TERM CURRENT USE OF INSULIN (HCC): ICD-10-CM

## 2020-01-16 DIAGNOSIS — E78.5 HYPERLIPIDEMIA, UNSPECIFIED HYPERLIPIDEMIA TYPE: ICD-10-CM

## 2020-01-16 DIAGNOSIS — E11.29 TYPE 2 DIABETES MELLITUS WITH OTHER DIABETIC KIDNEY COMPLICATION, WITH LONG-TERM CURRENT USE OF INSULIN (HCC): ICD-10-CM

## 2020-01-22 LAB
ALBUMIN SERPL-MCNC: 3.5 G/DL (ref 3.5–5.2)
ALBUMIN/GLOB SERPL: 1.8 G/DL
ALP SERPL-CCNC: 81 U/L (ref 39–117)
ALT SERPL-CCNC: 12 U/L (ref 1–33)
AST SERPL-CCNC: 13 U/L (ref 1–32)
BILIRUB SERPL-MCNC: 0.3 MG/DL (ref 0.2–1.2)
BUN SERPL-MCNC: 18 MG/DL (ref 6–20)
BUN/CREAT SERPL: 21.2 (ref 7–25)
CALCIUM SERPL-MCNC: 9.8 MG/DL (ref 8.6–10.5)
CHLORIDE SERPL-SCNC: 100 MMOL/L (ref 98–107)
CHOLEST SERPL-MCNC: 184 MG/DL (ref 0–200)
CO2 SERPL-SCNC: 22.1 MMOL/L (ref 22–29)
CREAT SERPL-MCNC: 0.85 MG/DL (ref 0.57–1)
GLOBULIN SER CALC-MCNC: 1.9 GM/DL
GLUCOSE SERPL-MCNC: 190 MG/DL (ref 65–99)
HBA1C MFR BLD: 7.4 % (ref 4.8–5.6)
HDLC SERPL-MCNC: 30 MG/DL (ref 40–60)
LDLC SERPL CALC-MCNC: 91 MG/DL (ref 0–100)
LDLC/HDLC SERPL: 3.03 {RATIO}
MICROALBUMIN UR-MCNC: 3079.1 UG/ML
POTASSIUM SERPL-SCNC: 4.5 MMOL/L (ref 3.5–5.2)
PROT SERPL-MCNC: 5.4 G/DL (ref 6–8.5)
SODIUM SERPL-SCNC: 136 MMOL/L (ref 136–145)
TRIGL SERPL-MCNC: 316 MG/DL (ref 0–150)
VLDLC SERPL CALC-MCNC: 63.2 MG/DL

## 2020-01-28 ENCOUNTER — OFFICE VISIT (OUTPATIENT)
Dept: FAMILY MEDICINE CLINIC | Facility: CLINIC | Age: 60
End: 2020-01-28

## 2020-01-28 VITALS
SYSTOLIC BLOOD PRESSURE: 120 MMHG | DIASTOLIC BLOOD PRESSURE: 66 MMHG | HEART RATE: 68 BPM | WEIGHT: 265.8 LBS | OXYGEN SATURATION: 98 % | HEIGHT: 62 IN | TEMPERATURE: 98.3 F | BODY MASS INDEX: 48.91 KG/M2 | RESPIRATION RATE: 18 BRPM

## 2020-01-28 DIAGNOSIS — E78.5 HYPERLIPIDEMIA, UNSPECIFIED HYPERLIPIDEMIA TYPE: ICD-10-CM

## 2020-01-28 DIAGNOSIS — I10 ESSENTIAL HYPERTENSION: Primary | ICD-10-CM

## 2020-01-28 DIAGNOSIS — Z79.4 TYPE 2 DIABETES MELLITUS WITH OTHER DIABETIC KIDNEY COMPLICATION, WITH LONG-TERM CURRENT USE OF INSULIN (HCC): ICD-10-CM

## 2020-01-28 DIAGNOSIS — E66.01 CLASS 3 SEVERE OBESITY DUE TO EXCESS CALORIES WITH SERIOUS COMORBIDITY AND BODY MASS INDEX (BMI) OF 45.0 TO 49.9 IN ADULT (HCC): ICD-10-CM

## 2020-01-28 DIAGNOSIS — E11.29 TYPE 2 DIABETES MELLITUS WITH OTHER DIABETIC KIDNEY COMPLICATION, WITH LONG-TERM CURRENT USE OF INSULIN (HCC): ICD-10-CM

## 2020-01-28 DIAGNOSIS — L98.9 SKIN LESION: ICD-10-CM

## 2020-01-28 DIAGNOSIS — R80.9 ALBUMINURIA: ICD-10-CM

## 2020-01-28 PROCEDURE — 99214 OFFICE O/P EST MOD 30 MIN: CPT | Performed by: INTERNAL MEDICINE

## 2020-01-28 RX ORDER — FLUCONAZOLE 150 MG/1
TABLET ORAL
Qty: 10 TABLET | Refills: 5 | Status: SHIPPED | OUTPATIENT
Start: 2020-01-28 | End: 2020-11-05 | Stop reason: SDUPTHER

## 2020-01-28 NOTE — PROGRESS NOTES
Subjective   Bernice Rai is a 59 y.o. female. Patient is here today for follow-up on her hypertension, hyperlipidemia, obesity, diabetes mellitus type 2 and albuminuria.  Patient's been stable.  She did not get in with the nephrologist.  She also has some skin lesions on her face that are changing and wants to see a dermatologist.  She has been tolerating medicines but does get some yeast infections with a Invokana although she is tolerating them.  Chief Complaint   Patient presents with   • Diabetes     HLD, HTN- FOLLOW UP LABS          Vitals:    01/28/20 1406   BP: 120/66   Pulse: 68   Resp: 18   Temp: 98.3 °F (36.8 °C)   SpO2: 98%     Body mass index is 48.6 kg/m².  The following portions of the patient's history were reviewed and updated as appropriate: allergies, current medications, past family history, past medical history, past social history, past surgical history and problem list.    Past Medical History:   Diagnosis Date   • Arthritis     FEET   • Diabetes mellitus (CMS/HCC)    • Hyperlipidemia    • Hypertension    • Reflux esophagitis    • Stroke (CMS/[facility])       Allergies   Allergen Reactions   • Jardiance [Empagliflozin] Itching     Recurrent yeast infections      Social History     Socioeconomic History   • Marital status:      Spouse name: Not on file   • Number of children: Not on file   • Years of education: Not on file   • Highest education level: Not on file   Tobacco Use   • Smoking status: Current Some Day Smoker     Packs/day: 0.50   • Smokeless tobacco: Never Used   Substance and Sexual Activity   • Alcohol use: No   • Drug use: Defer   • Sexual activity: Defer        Current Outpatient Medications:   •  albuterol sulfate HFA (PROVENTIL HFA) 108 (90 Base) MCG/ACT inhaler, Inhale 2 puffs Every 6 (Six) Hours As Needed for Wheezing., Disp: 1 inhaler, Rfl: 11  •  amLODIPine (NORVASC) 10 MG tablet, TAKE 1 TABLET BY MOUTH ONCE DAILY, Disp: 90 tablet, Rfl: 1  •  aspirin 81 MG chewable  tablet, Chew 81 mg daily., Disp: , Rfl:   •  atenolol (TENORMIN) 100 MG tablet, TAKE 1 TABLET BY MOUTH ONCE DAILY, Disp: 90 tablet, Rfl: 1  •  atorvastatin (LIPITOR) 40 MG tablet, Take 1 tablet by mouth Daily., Disp: 90 tablet, Rfl: 3  •  desoximetasone (TOPICORT) 0.25 % ointment, Apply  topically to the appropriate area as directed Every 12 (Twelve) Hours., Disp: 60 g, Rfl: 3  •  fluticasone (FLONASE) 50 MCG/ACT nasal spray, 2 sprays into the nostril(s) as directed by provider Daily., Disp: 15.8 mL, Rfl: 0  •  Fluticasone Furoate-Vilanterol (BREO ELLIPTA) 100-25 MCG/INH inhaler, Inhale 1 puff Daily., Disp: 1 each, Rfl: 11  •  insulin NPH-insulin regular (NOVOLIN 70/30) (70-30) 100 UNIT/ML injection, Inject  under the skin. 130 150 daily / sliding scale, Disp: , Rfl:   •  lisinopril (PRINIVIL,ZESTRIL) 20 MG tablet, TAKE 1 TABLET BY MOUTH TWICE DAILY, Disp: 180 tablet, Rfl: 1  •  SITagliptin-metFORMIN HCl ER (JANUMET XR) 100-1000 MG tablet, Take 1 tablet by mouth Daily., Disp: 30 tablet, Rfl: 5  •  triamterene-hydrochlorothiazide (MAXZIDE-25) 37.5-25 MG per tablet, TAKE 1 TABLET BY MOUTH EVERY OTHER DAY, Disp: 30 tablet, Rfl: 5  •  venlafaxine XR (EFFEXOR-XR) 150 MG 24 hr capsule, TAKE 1 CAPSULE BY MOUTH ONCE DAILY, Disp: 90 capsule, Rfl: 0  •  Canagliflozin (INVOKANA) 300 MG tablet, Take 300 mg by mouth Daily., Disp: 30 tablet, Rfl: 11  •  fluconazole (DIFLUCAN) 150 MG tablet, Take 1 po every 3rd day prn yeast infection, Disp: 10 tablet, Rfl: 5     Objective     History of Present Illness     Review of Systems   Constitutional: Negative.    HENT: Negative.    Respiratory: Negative.    Cardiovascular: Negative.    Gastrointestinal: Negative.    Genitourinary: Negative.    Musculoskeletal: Negative.    Skin: Negative.    Neurological: Negative.    Psychiatric/Behavioral: Negative.        Physical Exam   Constitutional: She is oriented to person, place, and time. She appears well-developed and well-nourished.    Pleasant, cooperative no acute distress but obese with blood pressure 130/80   HENT:   Head: Normocephalic and atraumatic.   Eyes: Pupils are equal, round, and reactive to light. Conjunctivae are normal. No scleral icterus.   Neck: Normal range of motion. Neck supple.   Cardiovascular: Normal rate, regular rhythm and normal heart sounds.   Pulmonary/Chest: Effort normal and breath sounds normal. No respiratory distress. She has no wheezes. She has no rales.   Musculoskeletal: Normal range of motion. She exhibits no edema.   Neurological: She is alert and oriented to person, place, and time.   Skin: Skin is warm and dry.   Psychiatric: She has a normal mood and affect. Her behavior is normal.   Nursing note and vitals reviewed.      ASSESSMENT CMP has a sugar of 190 and a total protein of 5.4 and is otherwise normal.  Lipid panel is reasonable with total cholesterol 184, and HDL of 30 and LDL of 91.  Hemoglobin A1c is improved to 7.4.  Urine microalbumin is high but improved at 3079  #1-hypertension controlled  #2-hyperlipidemia controlled  #3-diabetes mellitus type 2, slightly improved control  #4-diabetic nephropathy with significant proteinuria, improved  #5-some changing skin lesions on the face     Problem List Items Addressed This Visit        Cardiovascular and Mediastinum    Hyperlipidemia    Hypertension - Primary    Relevant Orders    Ambulatory Referral to Nephrology       Digestive    Obesity due to excess calories       Endocrine    Diabetes mellitus (CMS/[facility])    Relevant Medications    Canagliflozin (INVOKANA) 300 MG tablet    Other Relevant Orders    Ambulatory Referral to Nephrology       Musculoskeletal and Integument    Skin lesion    Relevant Orders    Ambulatory Referral to Dermatology       Other    Albuminuria    Relevant Orders    Ambulatory Referral to Nephrology          PLAN I am going to increase the patient's Invokana to 300 mg daily and continue her on other medications.  I am referring  her to nephrology because of her albuminuria and diabetes.  I am referring the patient to dermatology for the skin lesions on her face.  I would like to recheck her in 3 to 4 months with a CMP, lipid panel, hemoglobin A1c, urine microalbumin and TSH and free T4    There are no Patient Instructions on file for this visit.  Return in about 3 months (around 4/28/2020) for with labs.

## 2020-01-29 RX ORDER — VENLAFAXINE HYDROCHLORIDE 150 MG/1
CAPSULE, EXTENDED RELEASE ORAL
Qty: 90 CAPSULE | Refills: 1 | Status: SHIPPED | OUTPATIENT
Start: 2020-01-29 | End: 2020-07-20

## 2020-01-29 RX ORDER — AMLODIPINE BESYLATE 10 MG/1
TABLET ORAL
Qty: 90 TABLET | Refills: 1 | Status: SHIPPED | OUTPATIENT
Start: 2020-01-29 | End: 2020-07-20

## 2020-02-25 DIAGNOSIS — I10 ESSENTIAL HYPERTENSION: ICD-10-CM

## 2020-02-26 RX ORDER — LISINOPRIL 20 MG/1
TABLET ORAL
Qty: 180 TABLET | Refills: 0 | Status: SHIPPED | OUTPATIENT
Start: 2020-02-26 | End: 2020-06-22

## 2020-02-26 RX ORDER — ATENOLOL 100 MG/1
TABLET ORAL
Qty: 90 TABLET | Refills: 0 | Status: SHIPPED | OUTPATIENT
Start: 2020-02-26 | End: 2020-06-22

## 2020-06-20 DIAGNOSIS — I10 ESSENTIAL HYPERTENSION: ICD-10-CM

## 2020-06-22 RX ORDER — ATENOLOL 100 MG/1
TABLET ORAL
Qty: 90 TABLET | Refills: 0 | Status: SHIPPED | OUTPATIENT
Start: 2020-06-22 | End: 2020-08-04 | Stop reason: SDUPTHER

## 2020-06-22 RX ORDER — LISINOPRIL 20 MG/1
TABLET ORAL
Qty: 180 TABLET | Refills: 0 | Status: SHIPPED | OUTPATIENT
Start: 2020-06-22 | End: 2020-08-04 | Stop reason: SDUPTHER

## 2020-07-20 RX ORDER — ATORVASTATIN CALCIUM 40 MG/1
TABLET, FILM COATED ORAL
Qty: 90 TABLET | Refills: 0 | Status: SHIPPED | OUTPATIENT
Start: 2020-07-20 | End: 2020-11-03

## 2020-07-20 RX ORDER — VENLAFAXINE HYDROCHLORIDE 150 MG/1
CAPSULE, EXTENDED RELEASE ORAL
Qty: 90 CAPSULE | Refills: 0 | Status: SHIPPED | OUTPATIENT
Start: 2020-07-20 | End: 2020-08-17 | Stop reason: SDUPTHER

## 2020-07-20 RX ORDER — AMLODIPINE BESYLATE 10 MG/1
TABLET ORAL
Qty: 90 TABLET | Refills: 0 | Status: SHIPPED | OUTPATIENT
Start: 2020-07-20 | End: 2020-08-11 | Stop reason: SDUPTHER

## 2020-07-29 ENCOUNTER — OFFICE VISIT (OUTPATIENT)
Dept: FAMILY MEDICINE CLINIC | Facility: CLINIC | Age: 60
End: 2020-07-29

## 2020-07-29 ENCOUNTER — RESULTS ENCOUNTER (OUTPATIENT)
Dept: FAMILY MEDICINE CLINIC | Facility: CLINIC | Age: 60
End: 2020-07-29

## 2020-07-29 VITALS
OXYGEN SATURATION: 96 % | TEMPERATURE: 97.1 F | WEIGHT: 255.8 LBS | BODY MASS INDEX: 47.07 KG/M2 | SYSTOLIC BLOOD PRESSURE: 120 MMHG | HEIGHT: 62 IN | HEART RATE: 67 BPM | RESPIRATION RATE: 16 BRPM | DIASTOLIC BLOOD PRESSURE: 72 MMHG

## 2020-07-29 DIAGNOSIS — Z12.11 ENCOUNTER FOR SCREENING FOR MALIGNANT NEOPLASM OF COLON: ICD-10-CM

## 2020-07-29 DIAGNOSIS — Z12.11 ENCOUNTER FOR SCREENING FOR MALIGNANT NEOPLASM OF COLON: Primary | ICD-10-CM

## 2020-07-29 PROCEDURE — 96160 PT-FOCUSED HLTH RISK ASSMT: CPT | Performed by: INTERNAL MEDICINE

## 2020-07-29 PROCEDURE — G0439 PPPS, SUBSEQ VISIT: HCPCS | Performed by: INTERNAL MEDICINE

## 2020-07-29 NOTE — PROGRESS NOTES
The ABCs of the Annual Wellness Visit  Subsequent Medicare Wellness Visit    Chief Complaint   Patient presents with   • Medicare Wellness-subsequent       Subjective   History of Present Illness:  Bernice Rai is a 59 y.o. female who presents for a Subsequent Medicare Wellness Visit.    HEALTH RISK ASSESSMENT    Recent Hospitalizations:  No hospitalization(s) within the last year.    Current Medical Providers:  Patient Care Team:  Angel Herrera MD as PCP - General (Internal Medicine)    Smoking Status:  Social History     Tobacco Use   Smoking Status Current Some Day Smoker   • Packs/day: 0.50   Smokeless Tobacco Never Used       Alcohol Consumption:  Social History     Substance and Sexual Activity   Alcohol Use No       Depression Screen:   PHQ-2/PHQ-9 Depression Screening 7/29/2020   Little interest or pleasure in doing things 0   Feeling down, depressed, or hopeless 0   Total Score 0       Fall Risk Screen:  STEADI Fall Risk Assessment has not been completed.    Health Habits and Functional and Cognitive Screening:  Functional & Cognitive Status 7/29/2020   Do you have difficulty preparing food and eating? No   Do you have difficulty bathing yourself, getting dressed or grooming yourself? No   Do you have difficulty using the toilet? No   Do you have difficulty moving around from place to place? Yes   Do you have trouble with steps or getting out of a bed or a chair? Yes   Current Diet Diabetic Diet   Dental Exam Not up to date   Eye Exam Not up to date   Exercise (times per week) 2 times per week   Do you need help using the phone?  No   Are you deaf or do you have serious difficulty hearing?  No   Do you need help with transportation? No   Do you need help shopping? Yes   Do you need help preparing meals?  No   Do you need help with housework?  No   Do you need help with laundry? Yes   Do you need help taking your medications? No   Do you need help managing money? No   Do you ever drive or ride in a  car without wearing a seat belt? No   Have you felt unusual stress, anger or loneliness in the last month? Yes   Who do you live with? Spouse   If you need help, do you have trouble finding someone available to you? No   Have you been bothered in the last four weeks by sexual problems? No   Do you have difficulty concentrating, remembering or making decisions? Yes         Does the patient have evidence of cognitive impairment? No    Asprin use counseling:Taking ASA appropriately as indicated    Age-appropriate Screening Schedule:  Refer to the list below for future screening recommendations based on patient's age, sex and/or medical conditions. Orders for these recommended tests are listed in the plan section. The patient has been provided with a written plan.    Health Maintenance   Topic Date Due   • ZOSTER VACCINE (1 of 2) 08/31/2011   • COLONOSCOPY  02/22/2016   • MAMMOGRAM  02/15/2019   • DIABETIC EYE EXAM  02/27/2019   • DIABETIC FOOT EXAM  10/25/2019   • PAP SMEAR  02/27/2020   • HEMOGLOBIN A1C  07/21/2020   • INFLUENZA VACCINE  08/01/2020   • LIPID PANEL  01/21/2021   • URINE MICROALBUMIN  01/21/2021   • TDAP/TD VACCINES (3 - Td) 10/15/2029   • PNEUMOCOCCAL VACCINE (19-64 MEDIUM RISK)  Completed          The following portions of the patient's history were reviewed and updated as appropriate: allergies, current medications, past family history, past medical history, past social history, past surgical history and problem list.    Outpatient Medications Prior to Visit   Medication Sig Dispense Refill   • albuterol sulfate HFA (PROVENTIL HFA) 108 (90 Base) MCG/ACT inhaler Inhale 2 puffs Every 6 (Six) Hours As Needed for Wheezing. 1 inhaler 11   • amLODIPine (NORVASC) 10 MG tablet Take 1 tablet by mouth once daily 90 tablet 0   • aspirin 81 MG chewable tablet Chew 81 mg daily.     • atenolol (TENORMIN) 100 MG tablet Take 1 tablet by mouth once daily 90 tablet 0   • atorvastatin (LIPITOR) 40 MG tablet Take 1  tablet by mouth once daily 90 tablet 0   • Canagliflozin (INVOKANA) 300 MG tablet Take 300 mg by mouth Daily. 30 tablet 11   • desoximetasone (TOPICORT) 0.25 % ointment Apply  topically to the appropriate area as directed Every 12 (Twelve) Hours. 60 g 3   • fluconazole (DIFLUCAN) 150 MG tablet Take 1 po every 3rd day prn yeast infection 10 tablet 5   • fluticasone (FLONASE) 50 MCG/ACT nasal spray 2 sprays into the nostril(s) as directed by provider Daily. 15.8 mL 0   • Fluticasone Furoate-Vilanterol (BREO ELLIPTA) 100-25 MCG/INH inhaler Inhale 1 puff Daily. 1 each 11   • insulin NPH-insulin regular (NOVOLIN 70/30) (70-30) 100 UNIT/ML injection Inject  under the skin. 130 150 daily / sliding scale     • lisinopril (PRINIVIL,ZESTRIL) 20 MG tablet Take 1 tablet by mouth twice daily 180 tablet 0   • SITagliptin-metFORMIN HCl ER (JANUMET XR) 100-1000 MG tablet Take 1 tablet by mouth Daily. 30 tablet 5   • triamterene-hydrochlorothiazide (MAXZIDE-25) 37.5-25 MG per tablet TAKE 1 TABLET BY MOUTH EVERY OTHER DAY 30 tablet 5   • venlafaxine XR (EFFEXOR-XR) 150 MG 24 hr capsule Take 1 capsule by mouth once daily 90 capsule 0     No facility-administered medications prior to visit.        Patient Active Problem List   Diagnosis   • Asthmatic bronchitis   • Diabetes mellitus (CMS/HCC)   • Gastroesophageal reflux disease   • Generalized osteoarthritis   • Hyperlipidemia   • Hypertension   • Cerebral infarction (CMS/HCC)   • Encounter for screening colonoscopy   • Dermatitis   • Obesity due to excess calories   • Monilial vaginitis   • Subacute otitis media   • Cough   • Tobacco dependency   • Albuminuria   • Skin lesion       Advanced Care Planning:  ACP discussion was declined by the patient. Patient has an advance directive (not in EMR), copy requested.    Review of Systems    Compared to one year ago, the patient feels her physical health is better.  Compared to one year ago, the patient feels her mental health is  "better.    Reviewed chart for potential of high risk medication in the elderly: no  Reviewed chart for potential of harmful drug interactions in the elderly:no    Objective         Vitals:    07/29/20 1059   BP: 120/72   Pulse: 67   Resp: 16   Temp: 97.1 °F (36.2 °C)   TempSrc: Oral   SpO2: 96%   Weight: 116 kg (255 lb 12.8 oz)   Height: 157.5 cm (62.01\")       Body mass index is 46.77 kg/m².  Discussed the patient's BMI with her. The BMI is above average; BMI management plan is completed.    Physical Exam          Assessment/Plan   Medicare Risks and Personalized Health Plan  CMS Preventative Services Quick Reference  Diabetic Lab Screening     The above risks/problems have been discussed with the patient.  Pertinent information has been shared with the patient in the After Visit Summary.  Follow up plans and orders are seen below in the Assessment/Plan Section.    There are no diagnoses linked to this encounter.  Follow Up:  No follow-ups on file.     An After Visit Summary and PPPS were given to the patient.             "

## 2020-08-04 DIAGNOSIS — I10 ESSENTIAL HYPERTENSION: ICD-10-CM

## 2020-08-04 RX ORDER — ATENOLOL 100 MG/1
100 TABLET ORAL DAILY
Qty: 90 TABLET | Refills: 1 | Status: SHIPPED | OUTPATIENT
Start: 2020-08-04 | End: 2020-09-22

## 2020-08-04 RX ORDER — LISINOPRIL 20 MG/1
20 TABLET ORAL 2 TIMES DAILY
Qty: 180 TABLET | Refills: 1 | Status: SHIPPED | OUTPATIENT
Start: 2020-08-04 | End: 2020-09-22

## 2020-08-11 RX ORDER — TRIAMTERENE AND HYDROCHLOROTHIAZIDE 37.5; 25 MG/1; MG/1
1 TABLET ORAL EVERY OTHER DAY
Qty: 30 TABLET | Refills: 5 | Status: SHIPPED | OUTPATIENT
Start: 2020-08-11 | End: 2021-08-09

## 2020-08-11 RX ORDER — AMLODIPINE BESYLATE 10 MG/1
10 TABLET ORAL DAILY
Qty: 90 TABLET | Refills: 0 | Status: SHIPPED | OUTPATIENT
Start: 2020-08-11 | End: 2021-01-21

## 2020-08-17 RX ORDER — VENLAFAXINE HYDROCHLORIDE 150 MG/1
150 CAPSULE, EXTENDED RELEASE ORAL DAILY
Qty: 90 CAPSULE | Refills: 0 | Status: SHIPPED | OUTPATIENT
Start: 2020-08-17 | End: 2020-10-19

## 2020-09-22 DIAGNOSIS — I10 ESSENTIAL HYPERTENSION: ICD-10-CM

## 2020-09-22 RX ORDER — ATENOLOL 100 MG/1
TABLET ORAL
Qty: 90 TABLET | Refills: 0 | Status: SHIPPED | OUTPATIENT
Start: 2020-09-22 | End: 2020-12-15

## 2020-09-22 RX ORDER — LISINOPRIL 20 MG/1
TABLET ORAL
Qty: 180 TABLET | Refills: 0 | Status: SHIPPED | OUTPATIENT
Start: 2020-09-22 | End: 2020-12-15

## 2020-10-20 RX ORDER — VENLAFAXINE HYDROCHLORIDE 150 MG/1
CAPSULE, EXTENDED RELEASE ORAL
Qty: 90 CAPSULE | Refills: 1 | Status: SHIPPED | OUTPATIENT
Start: 2020-10-20 | End: 2021-05-12 | Stop reason: SDUPTHER

## 2020-10-22 DIAGNOSIS — E11.29 TYPE 2 DIABETES MELLITUS WITH OTHER DIABETIC KIDNEY COMPLICATION, WITH LONG-TERM CURRENT USE OF INSULIN (HCC): ICD-10-CM

## 2020-10-22 DIAGNOSIS — E78.5 HYPERLIPIDEMIA, UNSPECIFIED HYPERLIPIDEMIA TYPE: ICD-10-CM

## 2020-10-22 DIAGNOSIS — Z79.4 TYPE 2 DIABETES MELLITUS WITH OTHER DIABETIC KIDNEY COMPLICATION, WITH LONG-TERM CURRENT USE OF INSULIN (HCC): ICD-10-CM

## 2020-10-29 ENCOUNTER — RESULTS ENCOUNTER (OUTPATIENT)
Dept: FAMILY MEDICINE CLINIC | Facility: CLINIC | Age: 60
End: 2020-10-29

## 2020-10-29 DIAGNOSIS — E78.5 HYPERLIPIDEMIA, UNSPECIFIED HYPERLIPIDEMIA TYPE: ICD-10-CM

## 2020-10-29 DIAGNOSIS — E11.29 TYPE 2 DIABETES MELLITUS WITH OTHER DIABETIC KIDNEY COMPLICATION, WITH LONG-TERM CURRENT USE OF INSULIN (HCC): ICD-10-CM

## 2020-10-29 DIAGNOSIS — Z79.4 TYPE 2 DIABETES MELLITUS WITH OTHER DIABETIC KIDNEY COMPLICATION, WITH LONG-TERM CURRENT USE OF INSULIN (HCC): ICD-10-CM

## 2020-10-30 LAB
ALBUMIN SERPL-MCNC: 3.8 G/DL (ref 3.5–5.2)
ALBUMIN/GLOB SERPL: 2.2 G/DL
ALP SERPL-CCNC: 88 U/L (ref 39–117)
ALT SERPL-CCNC: 14 U/L (ref 1–33)
AST SERPL-CCNC: 11 U/L (ref 1–32)
BILIRUB SERPL-MCNC: 0.2 MG/DL (ref 0–1.2)
BUN SERPL-MCNC: 17 MG/DL (ref 6–20)
BUN/CREAT SERPL: 20.7 (ref 7–25)
CALCIUM SERPL-MCNC: 9.5 MG/DL (ref 8.6–10.5)
CHLORIDE SERPL-SCNC: 105 MMOL/L (ref 98–107)
CHOLEST SERPL-MCNC: 173 MG/DL (ref 0–200)
CO2 SERPL-SCNC: 26.2 MMOL/L (ref 22–29)
CREAT SERPL-MCNC: 0.82 MG/DL (ref 0.57–1)
GLOBULIN SER CALC-MCNC: 1.7 GM/DL
GLUCOSE SERPL-MCNC: 144 MG/DL (ref 65–99)
HBA1C MFR BLD: 7.2 % (ref 4.8–5.6)
HDLC SERPL-MCNC: 32 MG/DL (ref 40–60)
LDLC SERPL CALC-MCNC: 102 MG/DL (ref 0–100)
LDLC/HDLC SERPL: 3 {RATIO}
MICROALBUMIN UR-MCNC: 3967.6 UG/ML
POTASSIUM SERPL-SCNC: 4.5 MMOL/L (ref 3.5–5.2)
PROT SERPL-MCNC: 5.5 G/DL (ref 6–8.5)
SODIUM SERPL-SCNC: 141 MMOL/L (ref 136–145)
T4 FREE SERPL-MCNC: 1.05 NG/DL (ref 0.93–1.7)
TRIGL SERPL-MCNC: 225 MG/DL (ref 0–150)
TSH SERPL DL<=0.005 MIU/L-ACNC: 2.32 UIU/ML (ref 0.27–4.2)
VLDLC SERPL CALC-MCNC: 39 MG/DL (ref 5–40)

## 2020-11-03 RX ORDER — ATORVASTATIN CALCIUM 40 MG/1
TABLET, FILM COATED ORAL
Qty: 90 TABLET | Refills: 0 | Status: SHIPPED | OUTPATIENT
Start: 2020-11-03 | End: 2021-05-12 | Stop reason: SDUPTHER

## 2020-11-05 ENCOUNTER — OFFICE VISIT (OUTPATIENT)
Dept: FAMILY MEDICINE CLINIC | Facility: CLINIC | Age: 60
End: 2020-11-05

## 2020-11-05 VITALS
HEART RATE: 80 BPM | SYSTOLIC BLOOD PRESSURE: 120 MMHG | TEMPERATURE: 98.4 F | WEIGHT: 260.8 LBS | HEIGHT: 62 IN | BODY MASS INDEX: 47.99 KG/M2 | RESPIRATION RATE: 16 BRPM | OXYGEN SATURATION: 99 % | DIASTOLIC BLOOD PRESSURE: 64 MMHG

## 2020-11-05 DIAGNOSIS — E11.29 TYPE 2 DIABETES MELLITUS WITH OTHER DIABETIC KIDNEY COMPLICATION, WITH LONG-TERM CURRENT USE OF INSULIN (HCC): ICD-10-CM

## 2020-11-05 DIAGNOSIS — R80.9 ALBUMINURIA: ICD-10-CM

## 2020-11-05 DIAGNOSIS — E66.01 CLASS 3 SEVERE OBESITY DUE TO EXCESS CALORIES WITH SERIOUS COMORBIDITY AND BODY MASS INDEX (BMI) OF 45.0 TO 49.9 IN ADULT (HCC): ICD-10-CM

## 2020-11-05 DIAGNOSIS — I10 ESSENTIAL HYPERTENSION: Primary | ICD-10-CM

## 2020-11-05 DIAGNOSIS — K21.9 GASTROESOPHAGEAL REFLUX DISEASE, UNSPECIFIED WHETHER ESOPHAGITIS PRESENT: ICD-10-CM

## 2020-11-05 DIAGNOSIS — E78.5 HYPERLIPIDEMIA, UNSPECIFIED HYPERLIPIDEMIA TYPE: ICD-10-CM

## 2020-11-05 DIAGNOSIS — B37.31 MONILIAL VAGINITIS: ICD-10-CM

## 2020-11-05 DIAGNOSIS — Z79.4 TYPE 2 DIABETES MELLITUS WITH OTHER DIABETIC KIDNEY COMPLICATION, WITH LONG-TERM CURRENT USE OF INSULIN (HCC): ICD-10-CM

## 2020-11-05 PROCEDURE — 99214 OFFICE O/P EST MOD 30 MIN: CPT | Performed by: INTERNAL MEDICINE

## 2020-11-05 RX ORDER — FLUCONAZOLE 150 MG/1
TABLET ORAL
Qty: 30 TABLET | Refills: 5 | Status: SHIPPED | OUTPATIENT
Start: 2020-11-05 | End: 2022-07-20

## 2020-11-05 NOTE — PROGRESS NOTES
Subjective   Bernice Rai is a 60 y.o. female. Patient is here today for follow-up on her hypertension, hyperlipidemia, GERD, obesity, diabetes mellitus type 2 and frequent monilia vaginitis.  The patient's weight is up about 5 pounds.  She is having problems affording the Jardiance or Invokana.  She has been using over-the-counter medications for yeast infection.  Otherwise she has had no chest pain, shortness of breath, edema or myalgias and no hypoglycemia.  Chief Complaint   Patient presents with   • Diabetes     HTN, HLD- FOLLOW UP LABS          Vitals:    11/05/20 0943   BP: 120/64   Pulse: 80   Resp: 16   Temp: 98.4 °F (36.9 °C)   SpO2: 99%     Body mass index is 47.69 kg/m².  The following portions of the patient's history were reviewed and updated as appropriate: allergies, current medications, past family history, past medical history, past social history, past surgical history and problem list.    Past Medical History:   Diagnosis Date   • Arthritis     FEET   • Diabetes mellitus (CMS/[facility])    • Hyperlipidemia    • Hypertension    • Reflux esophagitis    • Stroke (CMS/[facility])       Allergies   Allergen Reactions   • Jardiance [Empagliflozin] Itching     Recurrent yeast infections      Social History     Socioeconomic History   • Marital status:      Spouse name: Not on file   • Number of children: Not on file   • Years of education: Not on file   • Highest education level: Not on file   Tobacco Use   • Smoking status: Current Some Day Smoker     Packs/day: 0.50   • Smokeless tobacco: Never Used   Substance and Sexual Activity   • Alcohol use: No   • Drug use: Defer   • Sexual activity: Defer        Current Outpatient Medications:   •  albuterol sulfate HFA (PROVENTIL HFA) 108 (90 Base) MCG/ACT inhaler, Inhale 2 puffs Every 6 (Six) Hours As Needed for Wheezing., Disp: 1 inhaler, Rfl: 11  •  amLODIPine (NORVASC) 10 MG tablet, Take 1 tablet by mouth Daily., Disp: 90 tablet, Rfl: 0  •  aspirin 81 MG  chewable tablet, Chew 81 mg daily., Disp: , Rfl:   •  atenolol (TENORMIN) 100 MG tablet, Take 1 tablet by mouth once daily, Disp: 90 tablet, Rfl: 0  •  atorvastatin (LIPITOR) 40 MG tablet, Take 1 tablet by mouth once daily, Disp: 90 tablet, Rfl: 0  •  Canagliflozin (INVOKANA) 300 MG tablet, Take 300 mg by mouth Daily., Disp: 30 tablet, Rfl: 11  •  desoximetasone (TOPICORT) 0.25 % ointment, Apply  topically to the appropriate area as directed Every 12 (Twelve) Hours., Disp: 60 g, Rfl: 3  •  fluconazole (Diflucan) 150 MG tablet, Take 1 po every 3rd day prn yeast infection, Disp: 30 tablet, Rfl: 5  •  fluticasone (FLONASE) 50 MCG/ACT nasal spray, 2 sprays into the nostril(s) as directed by provider Daily., Disp: 15.8 mL, Rfl: 0  •  Fluticasone Furoate-Vilanterol (BREO ELLIPTA) 100-25 MCG/INH inhaler, Inhale 1 puff Daily., Disp: 1 each, Rfl: 11  •  insulin NPH-insulin regular (NOVOLIN 70/30) (70-30) 100 UNIT/ML injection, Inject  under the skin. 130 150 daily / sliding scale, Disp: , Rfl:   •  lisinopril (PRINIVIL,ZESTRIL) 20 MG tablet, Take 1 tablet by mouth twice daily, Disp: 180 tablet, Rfl: 0  •  triamterene-hydrochlorothiazide (MAXZIDE-25) 37.5-25 MG per tablet, Take 1 tablet by mouth Every Other Day., Disp: 30 tablet, Rfl: 5  •  venlafaxine XR (EFFEXOR-XR) 150 MG 24 hr capsule, Take 1 capsule by mouth once daily, Disp: 90 capsule, Rfl: 1     Objective     History of Present Illness     Review of Systems   Constitutional: Positive for fatigue.   HENT: Negative.    Respiratory: Negative.    Cardiovascular: Negative.  Negative for chest pain.   Gastrointestinal: Negative.    Endocrine: Positive for polydipsia and polyphagia. Negative for polyuria.   Genitourinary: Negative.    Musculoskeletal: Negative.    Skin: Negative.  Negative for pallor.   Neurological: Positive for dizziness and weakness. Negative for tremors, seizures, speech difficulty and headaches.   Psychiatric/Behavioral: Negative for confusion. The  patient is nervous/anxious.        Physical Exam  Vitals signs and nursing note reviewed.   Constitutional:       General: She is not in acute distress.     Appearance: Normal appearance. She is obese. She is not ill-appearing.   HENT:      Head: Normocephalic and atraumatic.   Eyes:      General: No scleral icterus.     Conjunctiva/sclera: Conjunctivae normal.   Neck:      Musculoskeletal: Normal range of motion and neck supple.   Cardiovascular:      Rate and Rhythm: Normal rate and regular rhythm.      Heart sounds: Normal heart sounds.   Pulmonary:      Effort: Pulmonary effort is normal. No respiratory distress.      Breath sounds: Normal breath sounds. No wheezing, rhonchi or rales.   Musculoskeletal: Normal range of motion.   Skin:     General: Skin is warm and dry.   Neurological:      General: No focal deficit present.      Mental Status: She is alert and oriented to person, place, and time.   Psychiatric:         Mood and Affect: Mood normal.         Behavior: Behavior normal.         ASSESSMENT TSH and free T4 are normal.  Urine microalbumin remains significantly elevated at 3967.  CMP had an improved sugar of 144 and total protein minimally low at 5.5 and otherwise normal.  Hemoglobin A1c is improved to 7.2.  Lipid panel is stable with total cholesterol 173, HDL 32 and   #1-hyperlipidemia, reasonable control on medication  #2-hypertension, well controlled on medication  #3-GERD, stable  #4-diabetes mellitus type 2, stable with fairly acceptable hemoglobin A1c, on medications  #5-obesity with 5 pound weight gain  #6-monilia vaginitis, related to diabetes and medications     Problems Addressed this Visit        Cardiovascular and Mediastinum    Hyperlipidemia    Hypertension - Primary       Digestive    Gastroesophageal reflux disease    Obesity due to excess calories       Endocrine    Diabetes mellitus (CMS/[facility])       Genitourinary    Monilial vaginitis    Relevant Medications    fluconazole  (Diflucan) 150 MG tablet       Other    Albuminuria      Diagnoses       Codes Comments    Essential hypertension    -  Primary ICD-10-CM: I10  ICD-9-CM: 401.9     Hyperlipidemia, unspecified hyperlipidemia type     ICD-10-CM: E78.5  ICD-9-CM: 272.4     Gastroesophageal reflux disease, unspecified whether esophagitis present     ICD-10-CM: K21.9  ICD-9-CM: 530.81     Class 3 severe obesity due to excess calories with serious comorbidity and body mass index (BMI) of 45.0 to 49.9 in adult (CMS/[facility])     ICD-10-CM: E66.01, Z68.42  ICD-9-CM: 278.01, V85.42     Type 2 diabetes mellitus with other diabetic kidney complication, with long-term current use of insulin (CMS/[facility])     ICD-10-CM: E11.29, Z79.4  ICD-9-CM: 250.40, V58.67     Monilial vaginitis     ICD-10-CM: B37.3  ICD-9-CM: 112.1     Albuminuria     ICD-10-CM: R80.9  ICD-9-CM: 791.0           PLAN the patient wants to wait to see nephrology until the COVID-19 pandemic settles down some and I am agreeable with this.  I gave her samples of Farxiga 10 mg to try for the diabetes and she will check and see if any of them are cheaper for her.  She will continue current medicines as now.  I am starting her on Diflucan 150 mg every third day as needed for monilia vaginitis.  I plan on rechecking her in 6 months with a CBC, CMP, lipid panel, hemoglobin A1c, urine microalbumin    There are no Patient Instructions on file for this visit.  Return in about 6 months (around 5/5/2021) for with labs.

## 2020-12-15 DIAGNOSIS — I10 ESSENTIAL HYPERTENSION: ICD-10-CM

## 2020-12-15 RX ORDER — LISINOPRIL 20 MG/1
TABLET ORAL
Qty: 180 TABLET | Refills: 1 | Status: SHIPPED | OUTPATIENT
Start: 2020-12-15 | End: 2020-12-18 | Stop reason: SDUPTHER

## 2020-12-15 RX ORDER — ATENOLOL 100 MG/1
TABLET ORAL
Qty: 90 TABLET | Refills: 1 | Status: SHIPPED | OUTPATIENT
Start: 2020-12-15 | End: 2020-12-18 | Stop reason: SDUPTHER

## 2020-12-18 DIAGNOSIS — I10 ESSENTIAL HYPERTENSION: ICD-10-CM

## 2020-12-18 RX ORDER — LISINOPRIL 20 MG/1
20 TABLET ORAL 2 TIMES DAILY
Qty: 180 TABLET | Refills: 1 | Status: SHIPPED | OUTPATIENT
Start: 2020-12-18 | End: 2021-09-15 | Stop reason: SDUPTHER

## 2020-12-18 RX ORDER — ATENOLOL 100 MG/1
100 TABLET ORAL DAILY
Qty: 90 TABLET | Refills: 1 | Status: SHIPPED | OUTPATIENT
Start: 2020-12-18 | End: 2021-09-15 | Stop reason: SDUPTHER

## 2021-01-21 RX ORDER — AMLODIPINE BESYLATE 10 MG/1
TABLET ORAL
Qty: 90 TABLET | Refills: 2 | Status: SHIPPED | OUTPATIENT
Start: 2021-01-21 | End: 2021-09-15 | Stop reason: SDUPTHER

## 2021-03-26 ENCOUNTER — BULK ORDERING (OUTPATIENT)
Dept: CASE MANAGEMENT | Facility: OTHER | Age: 61
End: 2021-03-26

## 2021-03-26 DIAGNOSIS — Z23 IMMUNIZATION DUE: ICD-10-CM

## 2021-04-01 ENCOUNTER — IMMUNIZATION (OUTPATIENT)
Dept: VACCINE CLINIC | Facility: HOSPITAL | Age: 61
End: 2021-04-01

## 2021-04-01 DIAGNOSIS — Z23 IMMUNIZATION DUE: ICD-10-CM

## 2021-04-01 PROCEDURE — 91300 HC SARSCOV02 VAC 30MCG/0.3ML IM: CPT | Performed by: INTERNAL MEDICINE

## 2021-04-01 PROCEDURE — 0001A: CPT | Performed by: INTERNAL MEDICINE

## 2021-04-22 ENCOUNTER — IMMUNIZATION (OUTPATIENT)
Dept: VACCINE CLINIC | Facility: HOSPITAL | Age: 61
End: 2021-04-22

## 2021-04-22 PROCEDURE — 91300 HC SARSCOV02 VAC 30MCG/0.3ML IM: CPT | Performed by: INTERNAL MEDICINE

## 2021-04-22 PROCEDURE — 0002A: CPT | Performed by: INTERNAL MEDICINE

## 2021-04-27 DIAGNOSIS — E78.5 HYPERLIPIDEMIA, UNSPECIFIED HYPERLIPIDEMIA TYPE: Primary | ICD-10-CM

## 2021-04-27 DIAGNOSIS — Z79.4 TYPE 2 DIABETES MELLITUS WITH OTHER DIABETIC KIDNEY COMPLICATION, WITH LONG-TERM CURRENT USE OF INSULIN (HCC): ICD-10-CM

## 2021-04-27 DIAGNOSIS — E11.29 TYPE 2 DIABETES MELLITUS WITH OTHER DIABETIC KIDNEY COMPLICATION, WITH LONG-TERM CURRENT USE OF INSULIN (HCC): ICD-10-CM

## 2021-05-12 ENCOUNTER — OFFICE VISIT (OUTPATIENT)
Dept: FAMILY MEDICINE CLINIC | Facility: CLINIC | Age: 61
End: 2021-05-12

## 2021-05-12 VITALS
DIASTOLIC BLOOD PRESSURE: 70 MMHG | RESPIRATION RATE: 18 BRPM | OXYGEN SATURATION: 100 % | WEIGHT: 262.6 LBS | BODY MASS INDEX: 48.32 KG/M2 | HEART RATE: 62 BPM | SYSTOLIC BLOOD PRESSURE: 120 MMHG | HEIGHT: 62 IN | TEMPERATURE: 97.8 F

## 2021-05-12 DIAGNOSIS — R80.1 PERSISTENT PROTEINURIA: ICD-10-CM

## 2021-05-12 DIAGNOSIS — E66.01 CLASS 3 SEVERE OBESITY DUE TO EXCESS CALORIES WITH SERIOUS COMORBIDITY AND BODY MASS INDEX (BMI) OF 45.0 TO 49.9 IN ADULT (HCC): ICD-10-CM

## 2021-05-12 DIAGNOSIS — I10 ESSENTIAL HYPERTENSION: ICD-10-CM

## 2021-05-12 DIAGNOSIS — E11.29 TYPE 2 DIABETES MELLITUS WITH OTHER DIABETIC KIDNEY COMPLICATION, WITH LONG-TERM CURRENT USE OF INSULIN (HCC): Primary | ICD-10-CM

## 2021-05-12 DIAGNOSIS — E78.5 HYPERLIPIDEMIA, UNSPECIFIED HYPERLIPIDEMIA TYPE: ICD-10-CM

## 2021-05-12 DIAGNOSIS — Z79.4 TYPE 2 DIABETES MELLITUS WITH OTHER DIABETIC KIDNEY COMPLICATION, WITH LONG-TERM CURRENT USE OF INSULIN (HCC): Primary | ICD-10-CM

## 2021-05-12 PROCEDURE — 99214 OFFICE O/P EST MOD 30 MIN: CPT | Performed by: INTERNAL MEDICINE

## 2021-05-12 RX ORDER — ATORVASTATIN CALCIUM 40 MG/1
40 TABLET, FILM COATED ORAL DAILY
Qty: 90 TABLET | Refills: 2 | Status: SHIPPED | OUTPATIENT
Start: 2021-05-12 | End: 2022-03-23

## 2021-05-12 RX ORDER — VENLAFAXINE HYDROCHLORIDE 150 MG/1
150 CAPSULE, EXTENDED RELEASE ORAL DAILY
Qty: 90 CAPSULE | Refills: 3 | Status: SHIPPED | OUTPATIENT
Start: 2021-05-12 | End: 2022-03-23 | Stop reason: SDUPTHER

## 2021-05-12 NOTE — PROGRESS NOTES
Subjective   Bernice Rai is a 60 y.o. female. Patient is here today for follow-up on her hypertension, hyperlipidemia, diabetes mellitus type 2, severe obesity, persistent proteinuria.  Patient's generally been feeling stable and has no acute complaints.  She could not stay on Invokana because of loose stools.  She has been continuing with insulin shots twice a day  Chief Complaint   Patient presents with   • Hyperlipidemia     HYPERTENSION, DIABETES- FOLLOW UP LABS           Vitals:    05/12/21 1307   BP: 120/70   Pulse: 62   Resp: 18   Temp: 97.8 °F (36.6 °C)   SpO2: 100%     Body mass index is 48.02 kg/m².  The following portions of the patient's history were reviewed and updated as appropriate: allergies, current medications, past family history, past medical history, past social history, past surgical history and problem list.    Past Medical History:   Diagnosis Date   • Arthritis     FEET   • Diabetes mellitus (CMS/HCC)    • Hyperlipidemia    • Hypertension    • Reflux esophagitis    • Stroke (CMS/[facility])       Allergies   Allergen Reactions   • Jardiance [Empagliflozin] Itching     Recurrent yeast infections      Social History     Socioeconomic History   • Marital status:      Spouse name: Not on file   • Number of children: Not on file   • Years of education: Not on file   • Highest education level: Not on file   Tobacco Use   • Smoking status: Current Some Day Smoker     Packs/day: 0.50   • Smokeless tobacco: Never Used   Substance and Sexual Activity   • Alcohol use: No   • Drug use: Defer   • Sexual activity: Defer        Current Outpatient Medications:   •  albuterol sulfate HFA (PROVENTIL HFA) 108 (90 Base) MCG/ACT inhaler, Inhale 2 puffs Every 6 (Six) Hours As Needed for Wheezing., Disp: 1 inhaler, Rfl: 11  •  amLODIPine (NORVASC) 10 MG tablet, Take 1 tablet by mouth once daily, Disp: 90 tablet, Rfl: 2  •  aspirin 81 MG chewable tablet, Chew 81 mg daily., Disp: , Rfl:   •  atenolol (TENORMIN)  100 MG tablet, Take 1 tablet by mouth Daily., Disp: 90 tablet, Rfl: 1  •  atorvastatin (LIPITOR) 40 MG tablet, Take 1 tablet by mouth Daily., Disp: 90 tablet, Rfl: 2  •  desoximetasone (TOPICORT) 0.25 % ointment, Apply  topically to the appropriate area as directed Every 12 (Twelve) Hours., Disp: 60 g, Rfl: 3  •  fluconazole (Diflucan) 150 MG tablet, Take 1 po every 3rd day prn yeast infection, Disp: 30 tablet, Rfl: 5  •  fluticasone (FLONASE) 50 MCG/ACT nasal spray, 2 sprays into the nostril(s) as directed by provider Daily., Disp: 15.8 mL, Rfl: 0  •  Fluticasone Furoate-Vilanterol (Breo Ellipta) 100-25 MCG/INH inhaler, Inhale 1 puff Daily., Disp: 1 each, Rfl: 11  •  insulin NPH-insulin regular (NOVOLIN 70/30) (70-30) 100 UNIT/ML injection, Inject  under the skin. 130 150 daily / sliding scale, Disp: , Rfl:   •  lisinopril (PRINIVIL,ZESTRIL) 20 MG tablet, Take 1 tablet by mouth 2 (Two) Times a Day., Disp: 180 tablet, Rfl: 1  •  triamterene-hydrochlorothiazide (MAXZIDE-25) 37.5-25 MG per tablet, Take 1 tablet by mouth Every Other Day., Disp: 30 tablet, Rfl: 5  •  venlafaxine XR (EFFEXOR-XR) 150 MG 24 hr capsule, Take 1 capsule by mouth Daily., Disp: 90 capsule, Rfl: 3     Objective     History of Present Illness     Review of Systems   Constitutional: Positive for fatigue.   HENT: Negative.    Respiratory: Negative.    Cardiovascular: Negative.  Negative for chest pain.   Gastrointestinal: Negative.    Endocrine: Negative.  Negative for polydipsia, polyphagia and polyuria.   Genitourinary: Negative.    Musculoskeletal: Negative.    Skin: Negative.  Negative for pallor.   Neurological: Positive for dizziness, tremors and weakness. Negative for seizures, speech difficulty and headaches.   Psychiatric/Behavioral: Negative for confusion. The patient is nervous/anxious.        Physical Exam  Vitals (133/80) and nursing note reviewed.   Constitutional:       General: She is not in acute distress.     Appearance: Normal  appearance. She is obese. She is not ill-appearing.   HENT:      Head: Normocephalic and atraumatic.   Eyes:      General: No scleral icterus.     Conjunctiva/sclera: Conjunctivae normal.   Cardiovascular:      Rate and Rhythm: Normal rate and regular rhythm.      Heart sounds: Normal heart sounds.   Pulmonary:      Effort: Pulmonary effort is normal. No respiratory distress.      Breath sounds: Normal breath sounds. No wheezing or rales.   Musculoskeletal:         General: Normal range of motion.      Cervical back: Normal range of motion and neck supple.   Skin:     General: Skin is warm and dry.   Neurological:      General: No focal deficit present.      Mental Status: She is alert and oriented to person, place, and time.   Psychiatric:         Mood and Affect: Mood normal.         Behavior: Behavior normal.         ASSESSMENT CBC is normal.  CMP had a sugar of 111 and a total protein low at 5.5 and albumin low at 3.4 and otherwise normal.  Hemoglobin A1c is improved at 6.7.  Urine microalbumin is quite high at 5600 and lipid panel is high with total cholesterol 212, HDL 32 and   #1-diabetes mellitus type 2  #2-severe proteinuria  #3-hypertension  #4-hyperlipidemia, stable     Problems Addressed this Visit        Cardiac and Vasculature    Hyperlipidemia    Relevant Medications    atorvastatin (LIPITOR) 40 MG tablet    Hypertension    Relevant Orders    Ambulatory Referral to Nephrology       Endocrine and Metabolic    Diabetes mellitus (CMS/[facility]) - Primary    Relevant Orders    Ambulatory Referral to Endocrinology       Genitourinary and Reproductive     Persistent proteinuria    Relevant Orders    Ambulatory Referral to Nephrology      Diagnoses       Codes Comments    Type 2 diabetes mellitus with other diabetic kidney complication, with long-term current use of insulin (CMS/[facility])    -  Primary ICD-10-CM: E11.29, Z79.4  ICD-9-CM: 250.40, V58.67     Persistent proteinuria     ICD-10-CM: R80.1  ICD-9-CM:  791.0     Essential hypertension     ICD-10-CM: I10  ICD-9-CM: 401.9     Hyperlipidemia, unspecified hyperlipidemia type     ICD-10-CM: E78.5  ICD-9-CM: 272.4           PLAN I am going to try and get the patient with endocrinology to help better manage her diabetes and with nephrology for her persistent proteinuria.  She will continue other medicines and I would like to recheck her in 4 months with a CMP, lipid panel, hemoglobin A1c, urine microalbumin and TSH and free T4    There are no Patient Instructions on file for this visit.  Return in about 4 months (around 9/12/2021) for with labs.

## 2021-08-09 RX ORDER — TRIAMTERENE AND HYDROCHLOROTHIAZIDE 37.5; 25 MG/1; MG/1
1 TABLET ORAL EVERY OTHER DAY
Qty: 45 TABLET | Refills: 0 | Status: SHIPPED | OUTPATIENT
Start: 2021-08-09 | End: 2021-09-15 | Stop reason: SDUPTHER

## 2021-09-08 DIAGNOSIS — E78.5 HYPERLIPIDEMIA, UNSPECIFIED HYPERLIPIDEMIA TYPE: Primary | ICD-10-CM

## 2021-09-08 DIAGNOSIS — Z79.4 TYPE 2 DIABETES MELLITUS WITH OTHER DIABETIC KIDNEY COMPLICATION, WITH LONG-TERM CURRENT USE OF INSULIN (HCC): ICD-10-CM

## 2021-09-08 DIAGNOSIS — E11.29 TYPE 2 DIABETES MELLITUS WITH OTHER DIABETIC KIDNEY COMPLICATION, WITH LONG-TERM CURRENT USE OF INSULIN (HCC): ICD-10-CM

## 2021-09-08 LAB
ALBUMIN SERPL-MCNC: 3.2 G/DL (ref 3.5–5.2)
ALBUMIN/GLOB SERPL: 2 G/DL
ALP SERPL-CCNC: 84 U/L (ref 39–117)
ALT SERPL-CCNC: 9 U/L (ref 1–33)
AST SERPL-CCNC: 11 U/L (ref 1–32)
BILIRUB SERPL-MCNC: 0.2 MG/DL (ref 0–1.2)
BUN SERPL-MCNC: 11 MG/DL (ref 8–23)
BUN/CREAT SERPL: 15.3 (ref 7–25)
CALCIUM SERPL-MCNC: 9.5 MG/DL (ref 8.6–10.5)
CHLORIDE SERPL-SCNC: 105 MMOL/L (ref 98–107)
CHOLEST SERPL-MCNC: 301 MG/DL (ref 0–200)
CO2 SERPL-SCNC: 22.6 MMOL/L (ref 22–29)
CREAT SERPL-MCNC: 0.72 MG/DL (ref 0.57–1)
GLOBULIN SER CALC-MCNC: 1.6 GM/DL
GLUCOSE SERPL-MCNC: 99 MG/DL (ref 65–99)
HBA1C MFR BLD: 6.7 % (ref 4.8–5.6)
HDLC SERPL-MCNC: 30 MG/DL (ref 40–60)
LDLC SERPL CALC-MCNC: 194 MG/DL (ref 0–100)
LDLC/HDLC SERPL: 6.51 {RATIO}
POTASSIUM SERPL-SCNC: 4.2 MMOL/L (ref 3.5–5.2)
PROT SERPL-MCNC: 4.8 G/DL (ref 6–8.5)
SODIUM SERPL-SCNC: 137 MMOL/L (ref 136–145)
T4 FREE SERPL-MCNC: 0.98 NG/DL (ref 0.93–1.7)
TRIGL SERPL-MCNC: 379 MG/DL (ref 0–150)
TSH SERPL DL<=0.005 MIU/L-ACNC: 3.36 UIU/ML (ref 0.27–4.2)
VLDLC SERPL CALC-MCNC: 77 MG/DL (ref 5–40)

## 2021-09-09 LAB
ALBUMIN/CREAT UR: 7190 MG/G CREAT (ref 0–29)
CREAT UR-MCNC: 52.3 MG/DL
MICROALBUMIN UR-MCNC: 3760.6 UG/ML

## 2021-09-15 ENCOUNTER — OFFICE VISIT (OUTPATIENT)
Dept: FAMILY MEDICINE CLINIC | Facility: CLINIC | Age: 61
End: 2021-09-15

## 2021-09-15 VITALS
BODY MASS INDEX: 48.36 KG/M2 | RESPIRATION RATE: 18 BRPM | OXYGEN SATURATION: 96 % | HEIGHT: 62 IN | SYSTOLIC BLOOD PRESSURE: 130 MMHG | HEART RATE: 72 BPM | WEIGHT: 262.8 LBS | TEMPERATURE: 96.9 F | DIASTOLIC BLOOD PRESSURE: 70 MMHG

## 2021-09-15 DIAGNOSIS — M15.9 GENERALIZED OSTEOARTHRITIS: ICD-10-CM

## 2021-09-15 DIAGNOSIS — E78.5 HYPERLIPIDEMIA, UNSPECIFIED HYPERLIPIDEMIA TYPE: ICD-10-CM

## 2021-09-15 DIAGNOSIS — I10 ESSENTIAL HYPERTENSION: Primary | ICD-10-CM

## 2021-09-15 DIAGNOSIS — R80.1 PERSISTENT PROTEINURIA: ICD-10-CM

## 2021-09-15 DIAGNOSIS — E66.01 CLASS 3 SEVERE OBESITY DUE TO EXCESS CALORIES WITH SERIOUS COMORBIDITY AND BODY MASS INDEX (BMI) OF 45.0 TO 49.9 IN ADULT (HCC): ICD-10-CM

## 2021-09-15 DIAGNOSIS — Z79.4 TYPE 2 DIABETES MELLITUS WITH OTHER DIABETIC KIDNEY COMPLICATION, WITH LONG-TERM CURRENT USE OF INSULIN (HCC): ICD-10-CM

## 2021-09-15 DIAGNOSIS — E11.29 TYPE 2 DIABETES MELLITUS WITH OTHER DIABETIC KIDNEY COMPLICATION, WITH LONG-TERM CURRENT USE OF INSULIN (HCC): ICD-10-CM

## 2021-09-15 DIAGNOSIS — Z12.31 SCREENING MAMMOGRAM, ENCOUNTER FOR: ICD-10-CM

## 2021-09-15 PROCEDURE — 99214 OFFICE O/P EST MOD 30 MIN: CPT | Performed by: INTERNAL MEDICINE

## 2021-09-15 RX ORDER — TRIAMTERENE AND HYDROCHLOROTHIAZIDE 37.5; 25 MG/1; MG/1
1 TABLET ORAL EVERY OTHER DAY
Qty: 45 TABLET | Refills: 3 | Status: SHIPPED | OUTPATIENT
Start: 2021-09-15 | End: 2022-03-23

## 2021-09-15 RX ORDER — ATENOLOL 100 MG/1
100 TABLET ORAL DAILY
Qty: 90 TABLET | Refills: 3 | Status: SHIPPED | OUTPATIENT
Start: 2021-09-15 | End: 2022-08-15

## 2021-09-15 RX ORDER — AMLODIPINE BESYLATE 10 MG/1
10 TABLET ORAL DAILY
Qty: 90 TABLET | Refills: 3 | Status: SHIPPED | OUTPATIENT
Start: 2021-09-15 | End: 2023-02-17

## 2021-09-15 RX ORDER — LISINOPRIL 20 MG/1
20 TABLET ORAL 2 TIMES DAILY
Qty: 180 TABLET | Refills: 3 | Status: SHIPPED | OUTPATIENT
Start: 2021-09-15 | End: 2022-08-15

## 2021-09-15 NOTE — PROGRESS NOTES
Subjective   Bernice Rai is a 61 y.o. female. Patient is here today for follow-up on her hypertension, hyperlipidemia, diabetes mellitus type 2, obesity, proteinuria and albuminuria.  Patient's generally stable.  Her weight is stable.  She has a Cologuard test but has not opened it yet.  She is not having any really acute complaints.  Chief Complaint   Patient presents with   • Hypertension     HYPERLIPIDEMIA, DIABETES- F/U LABS          Vitals:    09/15/21 1252   BP: 130/70   Pulse: 72   Resp: 18   Temp: 96.9 °F (36.1 °C)   SpO2: 96%     Body mass index is 48.05 kg/m².  The following portions of the patient's history were reviewed and updated as appropriate: allergies, current medications, past family history, past medical history, past social history, past surgical history and problem list.    Past Medical History:   Diagnosis Date   • Arthritis     FEET   • Diabetes mellitus (CMS/HCC)    • Hyperlipidemia    • Hypertension    • Reflux esophagitis    • Stroke (CMS/[facility])       Allergies   Allergen Reactions   • Jardiance [Empagliflozin] Itching     Recurrent yeast infections      Social History     Socioeconomic History   • Marital status:      Spouse name: Not on file   • Number of children: Not on file   • Years of education: Not on file   • Highest education level: Not on file   Tobacco Use   • Smoking status: Current Some Day Smoker     Packs/day: 0.50   • Smokeless tobacco: Never Used   Substance and Sexual Activity   • Alcohol use: No   • Drug use: Defer   • Sexual activity: Defer        Current Outpatient Medications:   •  albuterol sulfate HFA (PROVENTIL HFA) 108 (90 Base) MCG/ACT inhaler, Inhale 2 puffs Every 6 (Six) Hours As Needed for Wheezing., Disp: 1 inhaler, Rfl: 11  •  amLODIPine (NORVASC) 10 MG tablet, Take 1 tablet by mouth Daily., Disp: 90 tablet, Rfl: 3  •  aspirin 81 MG chewable tablet, Chew 81 mg daily., Disp: , Rfl:   •  atenolol (TENORMIN) 100 MG tablet, Take 1 tablet by mouth  Daily., Disp: 90 tablet, Rfl: 3  •  atorvastatin (LIPITOR) 40 MG tablet, Take 1 tablet by mouth Daily., Disp: 90 tablet, Rfl: 2  •  desoximetasone (TOPICORT) 0.25 % ointment, Apply  topically to the appropriate area as directed Every 12 (Twelve) Hours., Disp: 60 g, Rfl: 3  •  fluconazole (Diflucan) 150 MG tablet, Take 1 po every 3rd day prn yeast infection, Disp: 30 tablet, Rfl: 5  •  fluticasone (FLONASE) 50 MCG/ACT nasal spray, 2 sprays into the nostril(s) as directed by provider Daily., Disp: 15.8 mL, Rfl: 0  •  Fluticasone Furoate-Vilanterol (Breo Ellipta) 100-25 MCG/INH inhaler, Inhale 1 puff Daily., Disp: 1 each, Rfl: 11  •  insulin NPH-insulin regular (NOVOLIN 70/30) (70-30) 100 UNIT/ML injection, Inject  under the skin. 130 150 daily / sliding scale, Disp: , Rfl:   •  lisinopril (PRINIVIL,ZESTRIL) 20 MG tablet, Take 1 tablet by mouth 2 (Two) Times a Day., Disp: 180 tablet, Rfl: 3  •  triamterene-hydrochlorothiazide (MAXZIDE-25) 37.5-25 MG per tablet, Take 1 tablet by mouth Every Other Day., Disp: 45 tablet, Rfl: 3  •  venlafaxine XR (EFFEXOR-XR) 150 MG 24 hr capsule, Take 1 capsule by mouth Daily., Disp: 90 capsule, Rfl: 3     Objective     History of Present Illness     Review of Systems   Constitutional: Negative.    HENT: Negative.    Respiratory: Negative.    Cardiovascular: Negative.    Gastrointestinal: Negative.    Genitourinary: Negative.    Musculoskeletal: Negative.    Skin: Negative.    Neurological: Negative.    Hematological: Negative.    Psychiatric/Behavioral: Negative.        Physical Exam  Vitals (130/70) and nursing note reviewed.   Constitutional:       General: She is not in acute distress.     Appearance: Normal appearance. She is obese. She is not ill-appearing.   HENT:      Head: Normocephalic and atraumatic.   Eyes:      General: No scleral icterus.     Conjunctiva/sclera: Conjunctivae normal.   Cardiovascular:      Rate and Rhythm: Normal rate and regular rhythm.      Heart sounds:  Normal heart sounds.   Pulmonary:      Effort: Pulmonary effort is normal. No respiratory distress.      Breath sounds: Normal breath sounds. No wheezing or rales.   Musculoskeletal:         General: Normal range of motion.      Cervical back: Normal range of motion and neck supple.      Right lower leg: No edema.      Left lower leg: No edema.   Skin:     General: Skin is warm and dry.   Neurological:      General: No focal deficit present.      Mental Status: She is alert and oriented to person, place, and time.   Psychiatric:         Mood and Affect: Mood normal.         Behavior: Behavior normal.         ASSESSMENT urine microalbumin is quite elevated at 3760.  Lipid panel is quite high because patient is not been taking her medication with a total cholesterol of 301, HDL of 30 and .  CMP had a sugar of 99, total protein low at 4.8 and albumin low at 3.2 and other values normal.  Hemoglobin A1c was stable at 6.7.  TSH and free T4 were normal.  #1-hypertension controlled  #2-proteinuria, albuminuria, severe  #3-diabetes mellitus type 2  #4-hyperlipidemia, noncompliant on medication  #5-severe obesity with no significant weight loss     Problems Addressed this Visit        Cardiac and Vasculature    Hyperlipidemia    Hypertension - Primary    Relevant Medications    lisinopril (PRINIVIL,ZESTRIL) 20 MG tablet    atenolol (TENORMIN) 100 MG tablet    triamterene-hydrochlorothiazide (MAXZIDE-25) 37.5-25 MG per tablet    amLODIPine (NORVASC) 10 MG tablet       Endocrine and Metabolic    Diabetes mellitus (CMS/HCC)    Relevant Orders    Ambulatory Referral to Nephrology    Ambulatory Referral to Endocrinology    Obesity due to excess calories       Genitourinary and Reproductive     Persistent proteinuria    Relevant Orders    Ambulatory Referral to Nephrology    Ambulatory Referral to Endocrinology       Musculoskeletal and Injuries    Generalized osteoarthritis      Other Visit Diagnoses     Screening  mammogram, encounter for        Relevant Orders    Mammo Screening Bilateral With CAD      Diagnoses       Codes Comments    Essential hypertension    -  Primary ICD-10-CM: I10  ICD-9-CM: 401.9     Hyperlipidemia, unspecified hyperlipidemia type     ICD-10-CM: E78.5  ICD-9-CM: 272.4     Type 2 diabetes mellitus with other diabetic kidney complication, with long-term current use of insulin (CMS/[facility])     ICD-10-CM: E11.29, Z79.4  ICD-9-CM: 250.40, V58.67     Class 3 severe obesity due to excess calories with serious comorbidity and body mass index (BMI) of 45.0 to 49.9 in adult (CMS/[facility])     ICD-10-CM: E66.01, Z68.42  ICD-9-CM: 278.01, V85.42     Persistent proteinuria     ICD-10-CM: R80.1  ICD-9-CM: 791.0     Generalized osteoarthritis     ICD-10-CM: M15.9  ICD-9-CM: 715.00     Screening mammogram, encounter for     ICD-10-CM: Z12.31  ICD-9-CM: V76.12           PLAN patient needs to get her eyes checked.  She has a Cologuard test at home that she needs to complete.  I am referring the patient to nephrology for her proteinuria and also to endocrinology for her diabetes.  She is also due to see the gynecologist.  She will continue current medicines as now but needs to take the atorvastatin regularly.  I plan on rechecking her in 6 months with a CBC, CMP, lipid panel, hemoglobin A1c, urine microalbumin    There are no Patient Instructions on file for this visit.  Return in about 6 months (around 3/15/2022) for with labs.

## 2021-09-21 ENCOUNTER — TRANSCRIBE ORDERS (OUTPATIENT)
Dept: ADMINISTRATIVE | Facility: HOSPITAL | Age: 61
End: 2021-09-21

## 2021-09-21 DIAGNOSIS — Z12.31 VISIT FOR SCREENING MAMMOGRAM: Primary | ICD-10-CM

## 2021-11-10 ENCOUNTER — HOSPITAL ENCOUNTER (OUTPATIENT)
Dept: MAMMOGRAPHY | Facility: HOSPITAL | Age: 61
Discharge: HOME | End: 2021-11-10
Admitting: INTERNAL MEDICINE

## 2021-11-10 DIAGNOSIS — Z12.31 VISIT FOR SCREENING MAMMOGRAM: ICD-10-CM

## 2021-11-10 PROCEDURE — 77067 SCR MAMMO BI INCL CAD: CPT

## 2021-11-10 PROCEDURE — 77063 BREAST TOMOSYNTHESIS BI: CPT

## 2022-03-08 DIAGNOSIS — Z79.4 TYPE 2 DIABETES MELLITUS WITH OTHER DIABETIC KIDNEY COMPLICATION, WITH LONG-TERM CURRENT USE OF INSULIN: ICD-10-CM

## 2022-03-08 DIAGNOSIS — E11.29 TYPE 2 DIABETES MELLITUS WITH OTHER DIABETIC KIDNEY COMPLICATION, WITH LONG-TERM CURRENT USE OF INSULIN: ICD-10-CM

## 2022-03-08 DIAGNOSIS — E78.5 HYPERLIPIDEMIA, UNSPECIFIED HYPERLIPIDEMIA TYPE: Primary | ICD-10-CM

## 2022-03-23 ENCOUNTER — OFFICE VISIT (OUTPATIENT)
Dept: FAMILY MEDICINE CLINIC | Facility: CLINIC | Age: 62
End: 2022-03-23

## 2022-03-23 VITALS
DIASTOLIC BLOOD PRESSURE: 80 MMHG | WEIGHT: 260.8 LBS | TEMPERATURE: 96.6 F | RESPIRATION RATE: 18 BRPM | SYSTOLIC BLOOD PRESSURE: 130 MMHG | BODY MASS INDEX: 47.99 KG/M2 | OXYGEN SATURATION: 97 % | HEART RATE: 70 BPM | HEIGHT: 62 IN

## 2022-03-23 DIAGNOSIS — J40 BRONCHITIS: ICD-10-CM

## 2022-03-23 DIAGNOSIS — I10 PRIMARY HYPERTENSION: ICD-10-CM

## 2022-03-23 DIAGNOSIS — E66.01 CLASS 3 SEVERE OBESITY DUE TO EXCESS CALORIES WITH SERIOUS COMORBIDITY AND BODY MASS INDEX (BMI) OF 45.0 TO 49.9 IN ADULT: ICD-10-CM

## 2022-03-23 DIAGNOSIS — R80.1 PERSISTENT PROTEINURIA: ICD-10-CM

## 2022-03-23 DIAGNOSIS — Z79.4 TYPE 2 DIABETES MELLITUS WITH OTHER DIABETIC KIDNEY COMPLICATION, WITH LONG-TERM CURRENT USE OF INSULIN: ICD-10-CM

## 2022-03-23 DIAGNOSIS — E78.5 HYPERLIPIDEMIA, UNSPECIFIED HYPERLIPIDEMIA TYPE: Primary | ICD-10-CM

## 2022-03-23 DIAGNOSIS — E11.29 TYPE 2 DIABETES MELLITUS WITH OTHER DIABETIC KIDNEY COMPLICATION, WITH LONG-TERM CURRENT USE OF INSULIN: ICD-10-CM

## 2022-03-23 DIAGNOSIS — K21.9 GASTROESOPHAGEAL REFLUX DISEASE, UNSPECIFIED WHETHER ESOPHAGITIS PRESENT: ICD-10-CM

## 2022-03-23 PROCEDURE — 99214 OFFICE O/P EST MOD 30 MIN: CPT | Performed by: INTERNAL MEDICINE

## 2022-03-23 RX ORDER — LISINOPRIL 20 MG/1
TABLET ORAL
COMMUNITY
Start: 2022-02-27 | End: 2022-03-23 | Stop reason: SDUPTHER

## 2022-03-23 RX ORDER — VENLAFAXINE HYDROCHLORIDE 150 MG/1
150 CAPSULE, EXTENDED RELEASE ORAL DAILY
Qty: 90 CAPSULE | Refills: 3 | Status: SHIPPED | OUTPATIENT
Start: 2022-03-23 | End: 2023-01-17

## 2022-03-23 RX ORDER — FINERENONE 20 MG/1
1 TABLET, FILM COATED ORAL DAILY
COMMUNITY
Start: 2022-03-18 | End: 2022-10-25 | Stop reason: SDUPTHER

## 2022-03-23 RX ORDER — ROSUVASTATIN CALCIUM 10 MG/1
10 TABLET, COATED ORAL DAILY
Qty: 30 TABLET | Refills: 5 | Status: SHIPPED | OUTPATIENT
Start: 2022-03-23 | End: 2022-10-25

## 2022-03-23 RX ORDER — ALBUTEROL SULFATE 90 UG/1
2 AEROSOL, METERED RESPIRATORY (INHALATION) EVERY 6 HOURS PRN
Qty: 18 G | Refills: 11 | Status: SHIPPED | OUTPATIENT
Start: 2022-03-23

## 2022-03-23 RX ORDER — HYDRALAZINE HYDROCHLORIDE 50 MG/1
TABLET, FILM COATED ORAL 3 TIMES DAILY
COMMUNITY
End: 2022-07-20

## 2022-03-23 NOTE — PROGRESS NOTES
Subjective   Bernice Rai is a 61 y.o. female. Patient is here today for follow-up on her hyperlipidemia, hypertension, diabetes mellitus type 2, obesity, kidney disease with proteinuria.  Patient's generally been stable.  She is followed by renal and also sees endocrinology.  She continues extremely obese.  Chief Complaint   Patient presents with   • Hypertension     HYPERLIPIDEMIA, DIABETES- F/U LABS          Vitals:    03/23/22 1357   BP: 130/80   Pulse: 70   Resp: 18   Temp: 96.6 °F (35.9 °C)   SpO2: 97%     Body mass index is 47.69 kg/m².  The following portions of the patient's history were reviewed and updated as appropriate: allergies, current medications, past family history, past medical history, past social history, past surgical history and problem list.    Past Medical History:   Diagnosis Date   • Allergic Years ago    Took allergy shots   • Anxiety     Stroke   • Arthritis     FEET   • Asthma     Inhaler   • Clotting disorder (HCC)     Taking asa   • COPD (chronic obstructive pulmonary disease) ([facility])     At times bronchitis   • Depression 10/22/01    Stroke   • Diabetes mellitus ([facility])    • GERD (gastroesophageal reflux disease) 1987    Gerd   • Hyperlipidemia    • Hypertension    • Obesity 1988   • Reflux esophagitis    • Renal insufficiency 2021   • Stroke ([facility])    • Urinary tract infection     Occasionaly   • Visual impairment 10/2001    1/2 vision each eye      Allergies   Allergen Reactions   • Jardiance [Empagliflozin] Itching     Recurrent yeast infections      Social History     Socioeconomic History   • Marital status:    Tobacco Use   • Smoking status: Current Every Day Smoker     Packs/day: 0.50     Years: 40.00     Pack years: 20.00     Types: Cigarettes   • Smokeless tobacco: Never Used   Substance and Sexual Activity   • Alcohol use: No   • Drug use: Never   • Sexual activity: Not Currently     Partners: Male     Birth control/protection: Post-menopausal        Current  Outpatient Medications:   •  albuterol sulfate HFA (Proventil HFA) 108 (90 Base) MCG/ACT inhaler, Inhale 2 puffs Every 6 (Six) Hours As Needed for Wheezing., Disp: 18 g, Rfl: 11  •  amLODIPine (NORVASC) 10 MG tablet, Take 1 tablet by mouth Daily., Disp: 90 tablet, Rfl: 3  •  aspirin 81 MG chewable tablet, Chew 81 mg daily., Disp: , Rfl:   •  atenolol (TENORMIN) 100 MG tablet, Take 1 tablet by mouth Daily., Disp: 90 tablet, Rfl: 3  •  desoximetasone (TOPICORT) 0.25 % ointment, Apply  topically to the appropriate area as directed Every 12 (Twelve) Hours., Disp: 60 g, Rfl: 3  •  fluconazole (Diflucan) 150 MG tablet, Take 1 po every 3rd day prn yeast infection, Disp: 30 tablet, Rfl: 5  •  fluticasone (FLONASE) 50 MCG/ACT nasal spray, 2 sprays into the nostril(s) as directed by provider Daily., Disp: 15.8 mL, Rfl: 0  •  Fluticasone Furoate-Vilanterol (Breo Ellipta) 100-25 MCG/INH inhaler, Inhale 1 puff Daily., Disp: 1 each, Rfl: 11  •  hydrALAZINE (APRESOLINE) 50 MG tablet, 3 (Three) Times a Day., Disp: , Rfl:   •  insulin NPH-insulin regular (humuLIN 70/30,novoLIN 70/30) (70-30) 100 UNIT/ML injection, Inject  under the skin. 130 150 daily / sliding scale, Disp: , Rfl:   •  Kerendia 20 MG tablet, Take 1 tablet by mouth Daily., Disp: , Rfl:   •  lisinopril (PRINIVIL,ZESTRIL) 20 MG tablet, Take 1 tablet by mouth 2 (Two) Times a Day., Disp: 180 tablet, Rfl: 3  •  metFORMIN (GLUCOPHAGE) 500 MG tablet, , Disp: , Rfl:   •  PROBIOTIC PRODUCT PO, Take  by mouth., Disp: , Rfl:   •  venlafaxine XR (EFFEXOR-XR) 150 MG 24 hr capsule, Take 1 capsule by mouth Daily., Disp: 90 capsule, Rfl: 3  •  rosuvastatin (Crestor) 10 MG tablet, Take 1 tablet by mouth Daily., Disp: 30 tablet, Rfl: 5     Objective     History of Present Illness     Review of Systems   All other systems reviewed and are negative.      Physical Exam  Vitals and nursing note reviewed.   Constitutional:       General: She is not in acute distress.     Appearance: Normal  appearance. She is obese. She is not ill-appearing.   HENT:      Head: Normocephalic and atraumatic.   Cardiovascular:      Rate and Rhythm: Normal rate and regular rhythm.      Heart sounds: Normal heart sounds.   Pulmonary:      Effort: Pulmonary effort is normal. No respiratory distress.      Breath sounds: Normal breath sounds. No wheezing or rales.   Skin:     General: Skin is warm and dry.   Neurological:      General: No focal deficit present.      Mental Status: She is alert and oriented to person, place, and time.   Psychiatric:         Mood and Affect: Mood normal.         Behavior: Behavior normal.         ASSESSMENT CBC was normal.  CMP had a sugar of 138, total protein low at 5.8 and albumin low at 3.3 and other values normal and urine microalbumin was 7667.  Hemoglobin A1c is 7.2, a bit high and lipid panel was somewhat improved with total cholesterol 225, HDL 29,   #1-renal disease with proteinuria  #2-diabetes mellitus type 2  #3-hypertension, controlled  #4-hyperlipidemia  #5-obesity     Problems Addressed this Visit        Cardiac and Vasculature    Hyperlipidemia - Primary    Relevant Medications    rosuvastatin (Crestor) 10 MG tablet    Hypertension    Relevant Medications    hydrALAZINE (APRESOLINE) 50 MG tablet       Endocrine and Metabolic    Diabetes mellitus (HCC)    Relevant Medications    metFORMIN (GLUCOPHAGE) 500 MG tablet    Obesity due to excess calories       Gastrointestinal Abdominal     Gastroesophageal reflux disease       Genitourinary and Reproductive     Persistent proteinuria      Other Visit Diagnoses     Bronchitis        Relevant Medications    Fluticasone Furoate-Vilanterol (Breo Ellipta) 100-25 MCG/INH inhaler    albuterol sulfate HFA (Proventil HFA) 108 (90 Base) MCG/ACT inhaler      Diagnoses       Codes Comments    Hyperlipidemia, unspecified hyperlipidemia type    -  Primary ICD-10-CM: E78.5  ICD-9-CM: 272.4     Primary hypertension     ICD-10-CM:  I10  ICD-9-CM: 401.9     Type 2 diabetes mellitus with other diabetic kidney complication, with long-term current use of insulin ([facility])     ICD-10-CM: E11.29, Z79.4  ICD-9-CM: 250.40, V58.67     Class 3 severe obesity due to excess calories with serious comorbidity and body mass index (BMI) of 45.0 to 49.9 in adult ([facility])     ICD-10-CM: E66.01, Z68.42  ICD-9-CM: 278.01, V85.42     Gastroesophageal reflux disease, unspecified whether esophagitis present     ICD-10-CM: K21.9  ICD-9-CM: 530.81     Persistent proteinuria     ICD-10-CM: R80.1  ICD-9-CM: 791.0     Bronchitis     ICD-10-CM: J40  ICD-9-CM: 490           PLAN I refilled patient's medications.  Because of complaints of diarrhea with the atorvastatin, I am going to try her on rosuvastatin 10 mg daily.  She will continue other medicines as now she has follow-up with nephrology and endocrinology.  I would like to recheck her in 4 months with laboratory studies    There are no Patient Instructions on file for this visit.  Return in about 4 months (around 7/23/2022) for with labs.

## 2022-07-15 LAB
ALBUMIN SERPL-MCNC: 3.1 G/DL (ref 3.8–4.9)
ALBUMIN/CREAT UR: 3837 MG/G CREAT (ref 0–29)
ALBUMIN/GLOB SERPL: 1.2 {RATIO} (ref 1.2–2.2)
ALP SERPL-CCNC: 78 IU/L (ref 44–121)
ALT SERPL-CCNC: 11 IU/L (ref 0–32)
AST SERPL-CCNC: 18 IU/L (ref 0–40)
BASOPHILS # BLD AUTO: 0.1 X10E3/UL (ref 0–0.2)
BASOPHILS NFR BLD AUTO: 1 %
BILIRUB SERPL-MCNC: <0.2 MG/DL (ref 0–1.2)
BUN SERPL-MCNC: 21 MG/DL (ref 8–27)
BUN/CREAT SERPL: 19 (ref 12–28)
CALCIUM SERPL-MCNC: 9.6 MG/DL (ref 8.7–10.3)
CHLORIDE SERPL-SCNC: 104 MMOL/L (ref 96–106)
CHOLEST SERPL-MCNC: 264 MG/DL (ref 100–199)
CO2 SERPL-SCNC: 20 MMOL/L (ref 20–29)
CREAT SERPL-MCNC: 1.1 MG/DL (ref 0.57–1)
CREAT UR-MCNC: 206.3 MG/DL
EGFRCR SERPLBLD CKD-EPI 2021: 58 ML/MIN/1.73
EOSINOPHIL # BLD AUTO: 0.2 X10E3/UL (ref 0–0.4)
EOSINOPHIL NFR BLD AUTO: 2 %
ERYTHROCYTE [DISTWIDTH] IN BLOOD BY AUTOMATED COUNT: 12.1 % (ref 11.7–15.4)
GLOBULIN SER CALC-MCNC: 2.6 G/DL (ref 1.5–4.5)
GLUCOSE SERPL-MCNC: 136 MG/DL (ref 65–99)
HBA1C MFR BLD: 6.8 % (ref 4.8–5.6)
HCT VFR BLD AUTO: 45.1 % (ref 34–46.6)
HDLC SERPL-MCNC: 28 MG/DL
HGB BLD-MCNC: 15.2 G/DL (ref 11.1–15.9)
IMM GRANULOCYTES # BLD AUTO: 0 X10E3/UL (ref 0–0.1)
IMM GRANULOCYTES NFR BLD AUTO: 0 %
LDLC SERPL CALC-MCNC: 175 MG/DL (ref 0–99)
LDLC/HDLC SERPL: 6.3 RATIO (ref 0–3.2)
LYMPHOCYTES # BLD AUTO: 2.3 X10E3/UL (ref 0.7–3.1)
LYMPHOCYTES NFR BLD AUTO: 22 %
MCH RBC QN AUTO: 30.3 PG (ref 26.6–33)
MCHC RBC AUTO-ENTMCNC: 33.7 G/DL (ref 31.5–35.7)
MCV RBC AUTO: 90 FL (ref 79–97)
MICROALBUMIN UR-MCNC: 7916.4 UG/ML
MONOCYTES # BLD AUTO: 0.9 X10E3/UL (ref 0.1–0.9)
MONOCYTES NFR BLD AUTO: 9 %
NEUTROPHILS # BLD AUTO: 6.6 X10E3/UL (ref 1.4–7)
NEUTROPHILS NFR BLD AUTO: 66 %
PLATELET # BLD AUTO: 406 X10E3/UL (ref 150–450)
POTASSIUM SERPL-SCNC: 5 MMOL/L (ref 3.5–5.2)
PROT SERPL-MCNC: 5.7 G/DL (ref 6–8.5)
RBC # BLD AUTO: 5.02 X10E6/UL (ref 3.77–5.28)
SODIUM SERPL-SCNC: 139 MMOL/L (ref 134–144)
TRIGL SERPL-MCNC: 316 MG/DL (ref 0–149)
VLDLC SERPL CALC-MCNC: 61 MG/DL (ref 5–40)
WBC # BLD AUTO: 10.1 X10E3/UL (ref 3.4–10.8)

## 2022-07-20 ENCOUNTER — OFFICE VISIT (OUTPATIENT)
Dept: FAMILY MEDICINE CLINIC | Facility: CLINIC | Age: 62
End: 2022-07-20

## 2022-07-20 VITALS
WEIGHT: 254.8 LBS | OXYGEN SATURATION: 98 % | DIASTOLIC BLOOD PRESSURE: 80 MMHG | TEMPERATURE: 96.6 F | HEIGHT: 62 IN | SYSTOLIC BLOOD PRESSURE: 140 MMHG | HEART RATE: 98 BPM | BODY MASS INDEX: 46.89 KG/M2

## 2022-07-20 DIAGNOSIS — R80.1 PERSISTENT PROTEINURIA: ICD-10-CM

## 2022-07-20 DIAGNOSIS — I10 PRIMARY HYPERTENSION: ICD-10-CM

## 2022-07-20 DIAGNOSIS — E11.29 TYPE 2 DIABETES MELLITUS WITH OTHER DIABETIC KIDNEY COMPLICATION, WITH LONG-TERM CURRENT USE OF INSULIN: ICD-10-CM

## 2022-07-20 DIAGNOSIS — E78.5 HYPERLIPIDEMIA, UNSPECIFIED HYPERLIPIDEMIA TYPE: Primary | ICD-10-CM

## 2022-07-20 DIAGNOSIS — R80.9 ALBUMINURIA: ICD-10-CM

## 2022-07-20 DIAGNOSIS — E66.01 CLASS 3 SEVERE OBESITY DUE TO EXCESS CALORIES WITH SERIOUS COMORBIDITY AND BODY MASS INDEX (BMI) OF 45.0 TO 49.9 IN ADULT: ICD-10-CM

## 2022-07-20 DIAGNOSIS — K21.9 GASTROESOPHAGEAL REFLUX DISEASE, UNSPECIFIED WHETHER ESOPHAGITIS PRESENT: ICD-10-CM

## 2022-07-20 DIAGNOSIS — Z79.4 TYPE 2 DIABETES MELLITUS WITH OTHER DIABETIC KIDNEY COMPLICATION, WITH LONG-TERM CURRENT USE OF INSULIN: ICD-10-CM

## 2022-07-20 PROCEDURE — 99214 OFFICE O/P EST MOD 30 MIN: CPT | Performed by: INTERNAL MEDICINE

## 2022-07-20 RX ORDER — DESOXIMETASONE 2.5 MG/G
OINTMENT TOPICAL EVERY 12 HOURS SCHEDULED
Qty: 60 G | Refills: 3 | Status: SHIPPED | OUTPATIENT
Start: 2022-07-20

## 2022-07-20 RX ORDER — HYDRALAZINE HYDROCHLORIDE 50 MG/1
TABLET, FILM COATED ORAL
COMMUNITY
Start: 2022-04-23 | End: 2022-10-25 | Stop reason: SDUPTHER

## 2022-07-20 RX ORDER — PIOGLITAZONEHYDROCHLORIDE 30 MG/1
30 TABLET ORAL DAILY
Qty: 90 TABLET | Refills: 3 | Status: SHIPPED | OUTPATIENT
Start: 2022-07-20 | End: 2022-10-25 | Stop reason: SDUPTHER

## 2022-08-14 DIAGNOSIS — I10 ESSENTIAL HYPERTENSION: ICD-10-CM

## 2022-08-15 RX ORDER — ATENOLOL 100 MG/1
TABLET ORAL
Qty: 90 TABLET | Refills: 0 | Status: SHIPPED | OUTPATIENT
Start: 2022-08-15 | End: 2022-11-28

## 2022-08-15 RX ORDER — LISINOPRIL 20 MG/1
TABLET ORAL
Qty: 180 TABLET | Refills: 0 | Status: SHIPPED | OUTPATIENT
Start: 2022-08-15 | End: 2022-11-01

## 2022-10-25 ENCOUNTER — OFFICE VISIT (OUTPATIENT)
Dept: FAMILY MEDICINE CLINIC | Facility: CLINIC | Age: 62
End: 2022-10-25

## 2022-10-25 VITALS
DIASTOLIC BLOOD PRESSURE: 60 MMHG | SYSTOLIC BLOOD PRESSURE: 124 MMHG | BODY MASS INDEX: 46.56 KG/M2 | HEIGHT: 62 IN | HEART RATE: 60 BPM | OXYGEN SATURATION: 96 % | TEMPERATURE: 97.9 F | WEIGHT: 253 LBS

## 2022-10-25 DIAGNOSIS — R80.1 PERSISTENT PROTEINURIA: ICD-10-CM

## 2022-10-25 DIAGNOSIS — Z87.891 PERSONAL HISTORY OF NICOTINE DEPENDENCE: ICD-10-CM

## 2022-10-25 DIAGNOSIS — E66.01 CLASS 3 SEVERE OBESITY DUE TO EXCESS CALORIES WITH SERIOUS COMORBIDITY AND BODY MASS INDEX (BMI) OF 45.0 TO 49.9 IN ADULT: ICD-10-CM

## 2022-10-25 DIAGNOSIS — F17.200 TOBACCO DEPENDENCY: ICD-10-CM

## 2022-10-25 DIAGNOSIS — E78.5 HYPERLIPIDEMIA, UNSPECIFIED HYPERLIPIDEMIA TYPE: Primary | ICD-10-CM

## 2022-10-25 DIAGNOSIS — R39.81 FUNCTIONAL URINARY INCONTINENCE: ICD-10-CM

## 2022-10-25 DIAGNOSIS — I10 PRIMARY HYPERTENSION: ICD-10-CM

## 2022-10-25 DIAGNOSIS — E11.29 TYPE 2 DIABETES MELLITUS WITH OTHER DIABETIC KIDNEY COMPLICATION, WITH LONG-TERM CURRENT USE OF INSULIN: ICD-10-CM

## 2022-10-25 DIAGNOSIS — Z12.11 ENCOUNTER FOR SCREENING FOR MALIGNANT NEOPLASM OF COLON: ICD-10-CM

## 2022-10-25 DIAGNOSIS — Z79.4 TYPE 2 DIABETES MELLITUS WITH OTHER DIABETIC KIDNEY COMPLICATION, WITH LONG-TERM CURRENT USE OF INSULIN: ICD-10-CM

## 2022-10-25 PROCEDURE — 99214 OFFICE O/P EST MOD 30 MIN: CPT | Performed by: INTERNAL MEDICINE

## 2022-10-25 RX ORDER — ATENOLOL 100 MG/1
TABLET ORAL
COMMUNITY
Start: 2022-08-31 | End: 2022-10-25

## 2022-10-25 RX ORDER — FINERENONE 20 MG/1
1 TABLET, FILM COATED ORAL DAILY
Qty: 90 TABLET
Start: 2022-10-25

## 2022-10-25 RX ORDER — ROSUVASTATIN CALCIUM 10 MG/1
TABLET, COATED ORAL
Qty: 90 TABLET | Refills: 0 | Status: SHIPPED | OUTPATIENT
Start: 2022-10-25 | End: 2023-01-20

## 2022-10-25 RX ORDER — FLUTICASONE FUROATE AND VILANTEROL 100; 25 UG/1; UG/1
POWDER RESPIRATORY (INHALATION)
COMMUNITY
Start: 2022-07-30 | End: 2022-10-25 | Stop reason: SDUPTHER

## 2022-10-25 RX ORDER — FINERENONE 20 MG/1
TABLET, FILM COATED ORAL
COMMUNITY
Start: 2022-08-01 | End: 2022-10-25 | Stop reason: SDUPTHER

## 2022-10-25 RX ORDER — FLUCONAZOLE 150 MG/1
TABLET ORAL
COMMUNITY

## 2022-10-25 RX ORDER — PIOGLITAZONEHYDROCHLORIDE 45 MG/1
45 TABLET ORAL DAILY
Qty: 90 TABLET | Refills: 3 | Status: SHIPPED | OUTPATIENT
Start: 2022-10-25

## 2022-10-25 RX ORDER — HYDRALAZINE HYDROCHLORIDE 50 MG/1
TABLET, FILM COATED ORAL
COMMUNITY
Start: 2022-07-30

## 2022-10-25 RX ORDER — VENLAFAXINE HYDROCHLORIDE 150 MG/1
CAPSULE, EXTENDED RELEASE ORAL
COMMUNITY
Start: 2022-07-30

## 2022-10-25 NOTE — PROGRESS NOTES
Subjective   Bernice Rai is a 62 y.o. female. Patient is here today for follow-up of her hypertension, hyperlipidemia, diabetes mellitus type 2, obesity and proteinuria.  Patient's generally stable.  She does complain of increasing urinary incontinence without significant urge symptoms.  She is due for gynecology appointment     Chief Complaint   Patient presents with   • Diabetes   • Hyperlipidemia     LAB FU          Vitals:    10/25/22 1306   BP: 124/60   Pulse: 60   Temp: 97.9 °F (36.6 °C)   SpO2: 96%     Body mass index is 46.26 kg/m².  The following portions of the patient's history were reviewed and updated as appropriate: allergies, current medications, past family history, past medical history, past social history, past surgical history and problem list.    Past Medical History:   Diagnosis Date   • Allergic Years ago    Took allergy shots   • Anxiety     Stroke   • Arthritis     FEET   • Asthma     Inhaler   • Clotting disorder (HCC)     Taking asa   • COPD (chronic obstructive pulmonary disease) ([facility])     At times bronchitis   • Depression 10/22/01    Stroke   • Diabetes mellitus ([facility])    • GERD (gastroesophageal reflux disease) 1987    Gerd   • Hyperlipidemia    • Hypertension    • Obesity 1988   • Reflux esophagitis    • Renal insufficiency 2021   • Stroke ([facility])    • Urinary tract infection     Occasionaly   • Visual impairment 10/2001    1/2 vision each eye      Allergies   Allergen Reactions   • Jardiance [Empagliflozin] Itching     Recurrent yeast infections      Social History     Socioeconomic History   • Marital status:    Tobacco Use   • Smoking status: Every Day     Packs/day: 0.50     Years: 40.00     Pack years: 20.00     Types: Cigarettes   • Smokeless tobacco: Never   Substance and Sexual Activity   • Alcohol use: No   • Drug use: Never   • Sexual activity: Not Currently     Partners: Male     Birth control/protection: Post-menopausal        Current Outpatient Medications:   •   albuterol sulfate HFA (Proventil HFA) 108 (90 Base) MCG/ACT inhaler, Inhale 2 puffs Every 6 (Six) Hours As Needed for Wheezing., Disp: 18 g, Rfl: 11  •  amLODIPine (NORVASC) 10 MG tablet, Take 1 tablet by mouth Daily., Disp: 90 tablet, Rfl: 3  •  aspirin 81 MG chewable tablet, Chew 81 mg daily., Disp: , Rfl:   •  atenolol (TENORMIN) 100 MG tablet, Take 1 tablet by mouth once daily, Disp: 90 tablet, Rfl: 0  •  desoximetasone (TOPICORT) 0.25 % ointment, Apply  topically to the appropriate area as directed Every 12 (Twelve) Hours., Disp: 60 g, Rfl: 3  •  fluconazole (DIFLUCAN) 150 MG tablet, , Disp: , Rfl:   •  fluticasone (FLONASE) 50 MCG/ACT nasal spray, 2 sprays into the nostril(s) as directed by provider Daily., Disp: 15.8 mL, Rfl: 0  •  Fluticasone Furoate-Vilanterol (Breo Ellipta) 100-25 MCG/INH inhaler, Inhale 1 puff Daily., Disp: 1 each, Rfl: 11  •  hydrALAZINE (APRESOLINE) 50 MG tablet, , Disp: , Rfl:   •  insulin NPH-insulin regular (humuLIN 70/30,novoLIN 70/30) (70-30) 100 UNIT/ML injection, Inject  under the skin. 130 150 daily / sliding scale, Disp: , Rfl:   •  Kerendia 20 MG tablet, Take 1 tablet by mouth Daily., Disp: 90 tablet, Rfl:   •  lisinopril (PRINIVIL,ZESTRIL) 20 MG tablet, Take 1 tablet by mouth twice daily, Disp: 180 tablet, Rfl: 0  •  Loperamide HCl (ANTI-DIARRHEAL PO), Take  by mouth. 1-2 pills a day, Disp: , Rfl:   •  metFORMIN (GLUCOPHAGE) 500 MG tablet, , Disp: , Rfl:   •  pioglitazone (Actos) 45 MG tablet, Take 1 tablet by mouth Daily., Disp: 90 tablet, Rfl: 3  •  PROBIOTIC PRODUCT PO, Take  by mouth., Disp: , Rfl:   •  rosuvastatin (CRESTOR) 10 MG tablet, Take 1 tablet by mouth once daily, Disp: 90 tablet, Rfl: 0  •  venlafaxine XR (EFFEXOR-XR) 150 MG 24 hr capsule, Take 1 capsule by mouth Daily., Disp: 90 capsule, Rfl: 3  •  venlafaxine XR (EFFEXOR-XR) 150 MG 24 hr capsule, , Disp: , Rfl:   •  atenolol (TENORMIN) 100 MG tablet, , Disp: , Rfl:      Objective     History of Present  Illness     Review of Systems    Physical Exam  Vitals and nursing note reviewed.   Constitutional:       General: She is not in acute distress.     Appearance: Normal appearance. She is obese. She is not ill-appearing.   HENT:      Head: Normocephalic and atraumatic.   Cardiovascular:      Rate and Rhythm: Normal rate and regular rhythm.      Heart sounds: Normal heart sounds.   Pulmonary:      Effort: Pulmonary effort is normal. No respiratory distress.      Breath sounds: Normal breath sounds. No wheezing or rales.   Musculoskeletal:         General: Normal range of motion.   Skin:     General: Skin is warm and dry.   Neurological:      General: No focal deficit present.      Mental Status: She is alert and oriented to person, place, and time.   Psychiatric:         Mood and Affect: Mood normal.         Behavior: Behavior normal.         ASSESSMENT CMP has a sugar of 192, total protein 5.7 and albumin 3.5, both slightly low and other values normal.  Urine microalbumin is quite high at 8700 mcg/mL.  Lipid panel is much improved with total cholesterol 160, HDL 29, LDL 99.  Hemoglobin A1cwas stable at 6.8.  1.-Hypertension controlled on medication  #2-hyperlipidemia controlled on medication  #3-diabetes mellitus type 2 with acceptable hemoglobin A1c  #4-severe obesity with no significant weight loss  #5-urinary incontinence          Problems Addressed this Visit        Cardiac and Vasculature    Hyperlipidemia - Primary    Hypertension    Relevant Medications    atenolol (TENORMIN) 100 MG tablet    hydrALAZINE (APRESOLINE) 50 MG tablet       Endocrine and Metabolic    Diabetes mellitus (HCC)    Relevant Medications    pioglitazone (Actos) 45 MG tablet    Obesity due to excess calories       Genitourinary and Reproductive     Persistent proteinuria       Tobacco    Tobacco dependency    Relevant Orders     CT Chest Low Dose Cancer Screening WO   Other Visit Diagnoses     Functional urinary incontinence         Relevant Orders    Ambulatory Referral to Gynecology    Encounter for screening for malignant neoplasm of colon        Relevant Orders    Cologuard - Stool, Per Rectum    Personal history of nicotine dependence        Relevant Orders     CT Chest Low Dose Cancer Screening WO      Diagnoses       Codes Comments    Hyperlipidemia, unspecified hyperlipidemia type    -  Primary ICD-10-CM: E78.5  ICD-9-CM: 272.4     Primary hypertension     ICD-10-CM: I10  ICD-9-CM: 401.9     Type 2 diabetes mellitus with other diabetic kidney complication, with long-term current use of insulin (HCC)     ICD-10-CM: E11.29, Z79.4  ICD-9-CM: 250.40, V58.67     Class 3 severe obesity due to excess calories with serious comorbidity and body mass index (BMI) of 45.0 to 49.9 in adult (HCC)     ICD-10-CM: E66.01, Z68.42  ICD-9-CM: 278.01, V85.42     Persistent proteinuria     ICD-10-CM: R80.1  ICD-9-CM: 791.0     Functional urinary incontinence     ICD-10-CM: R39.81  ICD-9-CM: 788.91     Encounter for screening for malignant neoplasm of colon     ICD-10-CM: Z12.11  ICD-9-CM: V76.51     Tobacco dependency     ICD-10-CM: F17.200  ICD-9-CM: 305.1     Personal history of nicotine dependence     ICD-10-CM: Z87.891  ICD-9-CM: V15.82           PLAN I am referring the patient to gynecology because she is due and also because of the urinary incontinence.  I am increasing the patient's pioglitazone to 45 milligrams daily for her diabetes.  I have also ordered a low-dose CT scan because of her smoking history and of ordered a Cologuard test as she is due for that.  I did recommend the patient get a COVID booster and a flu shot.  I would like to recheck her in 4 months with laboratory studies    There are no Patient Instructions on file for this visit.  Return in about 4 months (around 2/25/2023) for with labs.

## 2022-11-01 DIAGNOSIS — I10 ESSENTIAL HYPERTENSION: ICD-10-CM

## 2022-11-01 RX ORDER — LISINOPRIL 20 MG/1
TABLET ORAL
Qty: 180 TABLET | Refills: 1 | Status: SHIPPED | OUTPATIENT
Start: 2022-11-01

## 2022-11-18 ENCOUNTER — APPOINTMENT (OUTPATIENT)
Dept: CT IMAGING | Facility: HOSPITAL | Age: 62
End: 2022-11-18

## 2022-11-25 DIAGNOSIS — I10 ESSENTIAL HYPERTENSION: ICD-10-CM

## 2022-11-28 RX ORDER — ATENOLOL 100 MG/1
TABLET ORAL
Qty: 90 TABLET | Refills: 0 | Status: SHIPPED | OUTPATIENT
Start: 2022-11-28 | End: 2023-01-20

## 2023-01-10 ENCOUNTER — APPOINTMENT (OUTPATIENT)
Dept: GENERAL RADIOLOGY | Facility: HOSPITAL | Age: 63
End: 2023-01-10
Payer: MEDICARE

## 2023-01-10 ENCOUNTER — HOSPITAL ENCOUNTER (EMERGENCY)
Facility: HOSPITAL | Age: 63
Discharge: HOME | End: 2023-01-10
Attending: EMERGENCY MEDICINE | Admitting: EMERGENCY MEDICINE
Payer: MEDICARE

## 2023-01-10 VITALS
OXYGEN SATURATION: 93 % | BODY MASS INDEX: 42.33 KG/M2 | TEMPERATURE: 97.4 F | HEIGHT: 62 IN | RESPIRATION RATE: 22 BRPM | HEART RATE: 73 BPM | DIASTOLIC BLOOD PRESSURE: 73 MMHG | SYSTOLIC BLOOD PRESSURE: 153 MMHG | WEIGHT: 230 LBS

## 2023-01-10 DIAGNOSIS — R05.9 COUGH, UNSPECIFIED TYPE: ICD-10-CM

## 2023-01-10 DIAGNOSIS — J18.9 COMMUNITY ACQUIRED PNEUMONIA OF LEFT LUNG, UNSPECIFIED PART OF LUNG: Primary | ICD-10-CM

## 2023-01-10 DIAGNOSIS — R07.89 CHEST TIGHTNESS: ICD-10-CM

## 2023-01-10 DIAGNOSIS — Z87.891 HISTORY OF SMOKING: ICD-10-CM

## 2023-01-10 LAB
ALBUMIN SERPL-MCNC: 3.7 G/DL (ref 3.5–5.2)
ALBUMIN/GLOB SERPL: 1.4 G/DL
ALP SERPL-CCNC: 88 U/L (ref 39–117)
ALT SERPL W P-5'-P-CCNC: 9 U/L (ref 1–33)
ANION GAP SERPL CALCULATED.3IONS-SCNC: 12.1 MMOL/L (ref 5–15)
AST SERPL-CCNC: 9 U/L (ref 1–32)
BASOPHILS # BLD AUTO: 0.08 10*3/MM3 (ref 0–0.2)
BASOPHILS NFR BLD AUTO: 0.7 % (ref 0–1.5)
BILIRUB SERPL-MCNC: 0.2 MG/DL (ref 0–1.2)
BUN SERPL-MCNC: 11 MG/DL (ref 8–23)
BUN/CREAT SERPL: 11.5 (ref 7–25)
CALCIUM SPEC-SCNC: 10.2 MG/DL (ref 8.6–10.5)
CHLORIDE SERPL-SCNC: 108 MMOL/L (ref 98–107)
CO2 SERPL-SCNC: 22.9 MMOL/L (ref 22–29)
CREAT SERPL-MCNC: 0.96 MG/DL (ref 0.57–1)
DEPRECATED RDW RBC AUTO: 39.2 FL (ref 37–54)
EGFRCR SERPLBLD CKD-EPI 2021: 67.5 ML/MIN/1.73
EOSINOPHIL # BLD AUTO: 0.05 10*3/MM3 (ref 0–0.4)
EOSINOPHIL NFR BLD AUTO: 0.4 % (ref 0.3–6.2)
ERYTHROCYTE [DISTWIDTH] IN BLOOD BY AUTOMATED COUNT: 12 % (ref 12.3–15.4)
GLOBULIN UR ELPH-MCNC: 2.7 GM/DL
GLUCOSE SERPL-MCNC: 180 MG/DL (ref 65–99)
HCT VFR BLD AUTO: 46 % (ref 34–46.6)
HGB BLD-MCNC: 15.8 G/DL (ref 12–15.9)
IMM GRANULOCYTES # BLD AUTO: 0.05 10*3/MM3 (ref 0–0.05)
IMM GRANULOCYTES NFR BLD AUTO: 0.4 % (ref 0–0.5)
LIPASE SERPL-CCNC: 8 U/L (ref 13–60)
LYMPHOCYTES # BLD AUTO: 1.88 10*3/MM3 (ref 0.7–3.1)
LYMPHOCYTES NFR BLD AUTO: 15.7 % (ref 19.6–45.3)
MAGNESIUM SERPL-MCNC: 1.7 MG/DL (ref 1.6–2.4)
MCH RBC QN AUTO: 30.2 PG (ref 26.6–33)
MCHC RBC AUTO-ENTMCNC: 34.3 G/DL (ref 31.5–35.7)
MCV RBC AUTO: 87.8 FL (ref 79–97)
MONOCYTES # BLD AUTO: 0.85 10*3/MM3 (ref 0.1–0.9)
MONOCYTES NFR BLD AUTO: 7.1 % (ref 5–12)
NEUTROPHILS NFR BLD AUTO: 75.7 % (ref 42.7–76)
NEUTROPHILS NFR BLD AUTO: 9.06 10*3/MM3 (ref 1.7–7)
NRBC BLD AUTO-RTO: 0 /100 WBC (ref 0–0.2)
PLATELET # BLD AUTO: 402 10*3/MM3 (ref 140–450)
PMV BLD AUTO: 9.3 FL (ref 6–12)
POTASSIUM SERPL-SCNC: 3.9 MMOL/L (ref 3.5–5.2)
PROT SERPL-MCNC: 6.4 G/DL (ref 6–8.5)
QT INTERVAL: 411 MS
RBC # BLD AUTO: 5.24 10*6/MM3 (ref 3.77–5.28)
SODIUM SERPL-SCNC: 143 MMOL/L (ref 136–145)
TROPONIN T SERPL-MCNC: <0.01 NG/ML (ref 0–0.03)
WBC NRBC COR # BLD: 11.97 10*3/MM3 (ref 3.4–10.8)

## 2023-01-10 PROCEDURE — 83690 ASSAY OF LIPASE: CPT | Performed by: EMERGENCY MEDICINE

## 2023-01-10 PROCEDURE — 99284 EMERGENCY DEPT VISIT MOD MDM: CPT

## 2023-01-10 PROCEDURE — 71045 X-RAY EXAM CHEST 1 VIEW: CPT

## 2023-01-10 PROCEDURE — 83735 ASSAY OF MAGNESIUM: CPT | Performed by: EMERGENCY MEDICINE

## 2023-01-10 PROCEDURE — 93005 ELECTROCARDIOGRAM TRACING: CPT

## 2023-01-10 PROCEDURE — 93010 ELECTROCARDIOGRAM REPORT: CPT | Performed by: INTERNAL MEDICINE

## 2023-01-10 PROCEDURE — 85025 COMPLETE CBC W/AUTO DIFF WBC: CPT | Performed by: EMERGENCY MEDICINE

## 2023-01-10 PROCEDURE — 80053 COMPREHEN METABOLIC PANEL: CPT | Performed by: EMERGENCY MEDICINE

## 2023-01-10 PROCEDURE — 84484 ASSAY OF TROPONIN QUANT: CPT | Performed by: EMERGENCY MEDICINE

## 2023-01-10 RX ORDER — AZITHROMYCIN 250 MG/1
250 TABLET, FILM COATED ORAL DAILY
Qty: 6 TABLET | Refills: 0 | Status: SHIPPED | OUTPATIENT
Start: 2023-01-10

## 2023-01-10 RX ORDER — AMOXICILLIN AND CLAVULANATE POTASSIUM 875; 125 MG/1; MG/1
1 TABLET, FILM COATED ORAL 2 TIMES DAILY
Qty: 14 TABLET | Refills: 0 | Status: SHIPPED | OUTPATIENT
Start: 2023-01-10 | End: 2023-01-17

## 2023-01-10 NOTE — ED TRIAGE NOTES
Patient to er from home with c/o she stopped smoking two days ago. Patient stated this pain is feeling started two days ago.  Patient c/o pain in upper back and chest. Patient stated she has chronic soa and dry cough. Patient has mask on in triage along with staff.

## 2023-01-10 NOTE — ED PROVIDER NOTES
EMERGENCY DEPARTMENT ENCOUNTER    Room Number:  15/15  Date of encounter:  1/10/2023  PCP: Angel Herrera MD  Historian: Patient      HPI:  Chief Complaint: Chest pain  A complete HPI/ROS/PMH/PSH/SH/FH are unobtainable due to: None    Context: Bernice Rai is a 62 y.o. female who presents to the ED via private vehicle for evaluation for intermittent achiness across the chest and back For last 2 to 3 days.  Symptoms are intermittent, 10 the last several hours.  No associated dizziness, Annas, diaphoresis, nausea, vomiting, shortness of breath.  Patient is an active smoker.  Has a chronic cough without aggravating pains with coughing.  Denies any significant alleviation or aggravation of discomfort with movement or change in position.  No recent travel or recent surgeries.  Denies any primary family history of coronary disease.  No personal history of coronary disease.      MEDICAL RECORD REVIEW    External (non-ED) record review: No prior significant coronary work-up noted on chart review in epic    PAST MEDICAL HISTORY  Active Ambulatory Problems     Diagnosis Date Noted   • Asthmatic bronchitis 05/11/2016   • Diabetes mellitus (HCC) 05/11/2016   • Gastroesophageal reflux disease 05/11/2016   • Generalized osteoarthritis 05/11/2016   • Hyperlipidemia 05/11/2016   • Hypertension 05/11/2016   • Cerebral infarction (HCC) 01/03/2017   • Encounter for screening colonoscopy 01/03/2017   • Dermatitis 02/09/2017   • Obesity due to excess calories 02/27/2018   • Monilial vaginitis 04/10/2018   • Subacute otitis media 12/28/2018   • Cough 12/28/2018   • Tobacco dependency 12/28/2018   • Albuminuria 10/15/2019   • Skin lesion 01/28/2020   • Persistent proteinuria 05/12/2021     Resolved Ambulatory Problems     Diagnosis Date Noted   • No Resolved Ambulatory Problems     Past Medical History:   Diagnosis Date   • Allergic Years ago   • Anxiety    • Arthritis    • Asthma    • Clotting disorder (HCC)    • COPD (chronic  obstructive pulmonary disease) (HCC)    • Depression 10/22/01   • GERD (gastroesophageal reflux disease)    • Obesity    • Reflux esophagitis    • Renal insufficiency    • Stroke (HCC)    • Urinary tract infection    • Visual impairment 10/2001         PAST SURGICAL HISTORY  Past Surgical History:   Procedure Laterality Date   •  SECTION      x 2   • OTHER SURGICAL HISTORY      FATTY TUMOR REMOVED RIGHT HIP   • UMBILICAL HERNIA REPAIR      Mid abdominal hernia         FAMILY HISTORY  Family History   Problem Relation Age of Onset   • Diabetes Mother    • Hypertension Mother    • Hyperlipidemia Mother    • Heart disease Mother    • Depression Mother    • Macular degeneration Mother    • Arthritis Mother    • Thyroid disease Mother    • Other Mother         Brain tumors no cancer   • Diabetes Father    • Hypertension Father    • Pancreatic cancer Father    • Cancer Father    • Other Father         Pancreatic cancer   • Cancer Maternal Grandmother    • Colon cancer Other          SOCIAL HISTORY  Social History     Socioeconomic History   • Marital status:    Tobacco Use   • Smoking status: Every Day     Packs/day: 0.50     Years: 40.00     Pack years: 20.00     Types: Cigarettes   • Smokeless tobacco: Never   Substance and Sexual Activity   • Alcohol use: No   • Drug use: Never   • Sexual activity: Not Currently     Partners: Male     Birth control/protection: Post-menopausal         ALLERGIES  Jardiance [empagliflozin]        REVIEW OF SYSTEMS  Review of Systems     All systems reviewed and negative except for those discussed in HPI.       PHYSICAL EXAM    I have reviewed the triage vital signs and nursing notes.    ED Triage Vitals   Temp Heart Rate Resp BP SpO2   01/10/23 1106 01/10/23 1106 01/10/23 1106 01/10/23 1118 01/10/23 1106   97.4 °F (36.3 °C) 88 20 141/77 94 %      Temp src Heart Rate Source Patient Position BP Location FiO2 (%)   01/10/23 1106 01/10/23 1106 01/10/23 1118 01/10/23  1118 --   Tympanic Monitor Lying Right arm        Physical Exam  General: No acute distress, nontoxic  HEENT: Mucous membranes moist, atraumatic, EOMI  Neck: Full ROM  Pulm: Symmetric chest rise, nonlabored, lungs with faint end expiratory wheezes bilaterally  Cardiovascular: Regular rate and rhythm, intact distal pulses  GI: Soft, nontender, nondistended, no rebound, no guarding, bowel sounds present  MSK: Full ROM, no deformity  Skin: Warm, dry  Neuro: Awake, alert, oriented x 4, GCS 15, moving all extremities, no focal deficits  Psych: Calm, cooperative      N95, protective eye goggles, and gloves used during this encounter. Patient in surgical mask.      LAB RESULTS  Recent Results (from the past 24 hour(s))   ECG 12 Lead Chest Pain    Collection Time: 01/10/23 11:15 AM   Result Value Ref Range    QT Interval 411 ms   Comprehensive Metabolic Panel    Collection Time: 01/10/23 11:36 AM    Specimen: Blood   Result Value Ref Range    Glucose 180 (H) 65 - 99 mg/dL    BUN 11 8 - 23 mg/dL    Creatinine 0.96 0.57 - 1.00 mg/dL    Sodium 143 136 - 145 mmol/L    Potassium 3.9 3.5 - 5.2 mmol/L    Chloride 108 (H) 98 - 107 mmol/L    CO2 22.9 22.0 - 29.0 mmol/L    Calcium 10.2 8.6 - 10.5 mg/dL    Total Protein 6.4 6.0 - 8.5 g/dL    Albumin 3.7 3.5 - 5.2 g/dL    ALT (SGPT) 9 1 - 33 U/L    AST (SGOT) 9 1 - 32 U/L    Alkaline Phosphatase 88 39 - 117 U/L    Total Bilirubin 0.2 0.0 - 1.2 mg/dL    Globulin 2.7 gm/dL    A/G Ratio 1.4 g/dL    BUN/Creatinine Ratio 11.5 7.0 - 25.0    Anion Gap 12.1 5.0 - 15.0 mmol/L    eGFR 67.5 >60.0 mL/min/1.73   Lipase    Collection Time: 01/10/23 11:36 AM    Specimen: Blood   Result Value Ref Range    Lipase 8 (L) 13 - 60 U/L   Troponin    Collection Time: 01/10/23 11:36 AM    Specimen: Blood   Result Value Ref Range    Troponin T <0.010 0.000 - 0.030 ng/mL   Magnesium    Collection Time: 01/10/23 11:36 AM    Specimen: Blood   Result Value Ref Range    Magnesium 1.7 1.6 - 2.4 mg/dL   CBC Auto  Differential    Collection Time: 01/10/23 11:36 AM    Specimen: Blood   Result Value Ref Range    WBC 11.97 (H) 3.40 - 10.80 10*3/mm3    RBC 5.24 3.77 - 5.28 10*6/mm3    Hemoglobin 15.8 12.0 - 15.9 g/dL    Hematocrit 46.0 34.0 - 46.6 %    MCV 87.8 79.0 - 97.0 fL    MCH 30.2 26.6 - 33.0 pg    MCHC 34.3 31.5 - 35.7 g/dL    RDW 12.0 (L) 12.3 - 15.4 %    RDW-SD 39.2 37.0 - 54.0 fl    MPV 9.3 6.0 - 12.0 fL    Platelets 402 140 - 450 10*3/mm3    Neutrophil % 75.7 42.7 - 76.0 %    Lymphocyte % 15.7 (L) 19.6 - 45.3 %    Monocyte % 7.1 5.0 - 12.0 %    Eosinophil % 0.4 0.3 - 6.2 %    Basophil % 0.7 0.0 - 1.5 %    Immature Grans % 0.4 0.0 - 0.5 %    Neutrophils, Absolute 9.06 (H) 1.70 - 7.00 10*3/mm3    Lymphocytes, Absolute 1.88 0.70 - 3.10 10*3/mm3    Monocytes, Absolute 0.85 0.10 - 0.90 10*3/mm3    Eosinophils, Absolute 0.05 0.00 - 0.40 10*3/mm3    Basophils, Absolute 0.08 0.00 - 0.20 10*3/mm3    Immature Grans, Absolute 0.05 0.00 - 0.05 10*3/mm3    nRBC 0.0 0.0 - 0.2 /100 WBC       Ordered the above labs and independently reviewed the results.        RADIOLOGY  XR Chest 1 View    Result Date: 1/10/2023  XR CHEST 1 VW-  HISTORY: Female who is 62 years-old,  chest pain  TECHNIQUE: Frontal view of the chest  COMPARISON: None available  FINDINGS: Heart, mediastinum and pulmonary vasculature are unremarkable. Minimal peripheral left basilar atelectasis/infiltrate/effusion. No pneumothorax. Old granulomatous disease is seen. No acute osseous process.      Minimal peripheral left basilar atelectasis/infiltrate/effusion.  This report was finalized on 1/10/2023 12:51 PM by Dr. Bud Pendleton M.D.        I ordered the above noted radiological studies. Reviewed by me.  See dictation for official radiology interpretation.      PROCEDURES    Procedures  EKG    EKG Time: 1115  Rhythm/Rate: Sinus rhythm with rate of 75  Borderline right axis  WV interval 202  Isolated T wave inversion in lead III   No STEMI     Interpreted  Contemporaneously by me, independently viewed  No prior for visual comparison      MEDICATIONS GIVEN IN ER    Medications - No data to display      PROGRESS, DATA ANALYSIS, CONSULTS, AND MEDICAL DECISION MAKING    All labs have been independently reviewed by me.  All radiology studies have been reviewed by me and discussed with radiologist dictating the report.   EKG's independently viewed and interpreted by me.  Discussion below represents my analysis of pertinent findings related to patient's condition, differential diagnosis, treatment plan and final disposition.    Differential Diagnosis and Plan: HEART score 4 (pre-troponin), initial concern for ACS, GERD, musculoskeletal chest wall pain, pneumonia, bronchitis, COPD exacerbation, deaeration, renal failure, lecture abnormalities, anemia, among others.  Plan for labs, chest x-ray, EKG, and reevaluation with results.    Additional sources:  - Discussed/ obtained information from independent historians:   None     - Chronic or social conditions impacting care: None     - Shared decision making:   Patient fully updated on agreement with the course and plan moving forward    ED Course as of 01/10/23 1413   Tue Baltazar 10, 2023   1227 WBC(!): 11.97 [DC]   1227 Hemoglobin: 15.8 [DC]   1227 Troponin T: <0.010 [DC]   1227 Magnesium: 1.7 [DC]   1238 Troponin T: <0.010 [DC]   1238 Glucose(!): 180 [DC]   1238 BUN: 11 [DC]   1238 Creatinine: 0.96 [DC]   1238 Sodium: 143 [DC]   1238 Potassium: 3.9 [DC]   1238 ALT (SGPT): 9 [DC]   1238 AST (SGOT): 9 [DC]   1238 Alkaline Phosphatase: 88 [DC]   1238 Total Bilirubin: 0.2 [DC]   1257 XR Chest 1 View  My personal interpretation of the chest x-ray does not demonstrate any pneumothorax [DC]   1257 XR Chest 1 View  Reviewed radiology report [DC]   1301 Patient updated the findings today, I suspect may be a mild pneumonia as a potential source of issues, she does show some tiny potential infiltrate on x-ray with a mild leukocytosis.  I do  not feel strongly that this is ACS at this time based on description of symptoms and findings.  I think a course of antibiotics to be reasonable with close outpatient follow-up, ED return for worsening symptoms as needed.  Patient is comfortable with the plan moving forward. [DC]      ED Course User Index  [DC] Kee Stanley MD       Orders Placed During This Visit:  Orders Placed This Encounter   Procedures   • XR Chest 1 View   • Comprehensive Metabolic Panel   • Lipase   • Troponin   • Magnesium   • CBC Auto Differential   • ECG 12 Lead Chest Pain   • CBC & Differential       Additional orders considered but not placed:      Independent interpretation of labs, radiology studies, and discussions with consultants: See ED Course        AS OF 14:13 EST VITALS:    BP - 153/73  HR - 73  TEMP - 97.4 °F (36.3 °C) (Tympanic)  02 SATS - 93%        DIAGNOSIS  Final diagnoses:   Community acquired pneumonia of left lung, unspecified part of lung   Chest tightness   Cough, unspecified type   History of smoking         DISPOSITION  DISCHARGE    Patient discharged in stable condition.    Reviewed implications of results, diagnosis, meds, responsibility to follow up, warning signs and symptoms of possible worsening, potential complications and reasons to return to ER. If your blood pressure > 120/80 please follow up with your primary care provider for further management.    Patient/Family voiced understanding of above instructions.    Discussed plan for discharge, as there is no emergent indication for admission. Pt/family is agreeable and understands need for follow up and repeat testing.  Pt is aware that discharge does not mean that nothing is wrong but it indicates no emergency is present that requires admission and they must continue care with follow-up as given below or physician of their choice.     FOLLOW-UP  [facility] Emergency Department  [address] [facility]  [address] [phone]    As needed, If symptoms worsen    Angel Herrera MD  [address] [facility] [id number] [phone]    Schedule an appointment as soon as possible for a visit   for recheck within 1-2 weeks         Medication List      New Prescriptions    amoxicillin-clavulanate 875-125 MG per tablet  Commonly known as: AUGMENTIN  Take 1 tablet by mouth 2 (Two) Times a Day for 7 days.     azithromycin 250 MG tablet  Commonly known as: ZITHROMAX  Take 1 tablet by mouth Daily. Take 2 tablets the first day, then 1 tablet daily for 4 days.           Where to Get Your Medications      These medications were sent to [facility] [address] [place] [address] [facility] [phone] [facility] [phone] [place] [id number]    Phone: [phone]   · amoxicillin-clavulanate 875-125 MG per tablet  · azithromycin 250 MG tablet                       --    Please note that portions of this were completed with a voice recognition program.       Note Disclaimer: At [facility], we believe that sharing information builds trust and better relationships. You are receiving this note because you are receiving care at [facility] or recently visited. It is possible you will see health information before a provider has talked with you about it. This kind of information can be easy to misunderstand. To help you fully understand what it means for your health, we urge you to discuss this note with your provider.         Kee Stanley MD  01/10/23 8968

## 2023-01-10 NOTE — DISCHARGE INSTRUCTIONS
Complete course of antibiotics as prescribed, Tylenol for pain as needed, stay well-hydrated, PCP follow-up within 1 to 2 weeks for recheck, ED return for worsening symptoms as needed.

## 2023-01-17 ENCOUNTER — OFFICE VISIT (OUTPATIENT)
Dept: FAMILY MEDICINE CLINIC | Facility: CLINIC | Age: 63
End: 2023-01-17
Payer: MEDICARE

## 2023-01-17 VITALS
DIASTOLIC BLOOD PRESSURE: 78 MMHG | HEIGHT: 62 IN | RESPIRATION RATE: 17 BRPM | SYSTOLIC BLOOD PRESSURE: 148 MMHG | OXYGEN SATURATION: 97 % | BODY MASS INDEX: 42.06 KG/M2 | HEART RATE: 67 BPM

## 2023-01-17 DIAGNOSIS — R35.0 URINARY FREQUENCY: Primary | ICD-10-CM

## 2023-01-17 DIAGNOSIS — R05.1 ACUTE COUGH: ICD-10-CM

## 2023-01-17 DIAGNOSIS — E66.01 CLASS 3 SEVERE OBESITY DUE TO EXCESS CALORIES WITH SERIOUS COMORBIDITY AND BODY MASS INDEX (BMI) OF 40.0 TO 44.9 IN ADULT: ICD-10-CM

## 2023-01-17 DIAGNOSIS — J45.20 MILD INTERMITTENT ASTHMATIC BRONCHITIS WITHOUT COMPLICATION: ICD-10-CM

## 2023-01-17 DIAGNOSIS — I10 PRIMARY HYPERTENSION: ICD-10-CM

## 2023-01-17 DIAGNOSIS — F17.200 TOBACCO DEPENDENCY: ICD-10-CM

## 2023-01-17 LAB
BILIRUB BLD-MCNC: NEGATIVE MG/DL
CLARITY, POC: ABNORMAL
COLOR UR: YELLOW
EXPIRATION DATE: ABNORMAL
GLUCOSE UR STRIP-MCNC: NEGATIVE MG/DL
KETONES UR QL: ABNORMAL
LEUKOCYTE EST, POC: NEGATIVE
Lab: ABNORMAL
NITRITE UR-MCNC: NEGATIVE MG/ML
PH UR: 5 [PH] (ref 5–8)
PROT UR STRIP-MCNC: ABNORMAL MG/DL
RBC # UR STRIP: ABNORMAL /UL
SP GR UR: 1.03 (ref 1–1.03)
UROBILINOGEN UR QL: ABNORMAL

## 2023-01-17 PROCEDURE — 81003 URINALYSIS AUTO W/O SCOPE: CPT | Performed by: INTERNAL MEDICINE

## 2023-01-17 PROCEDURE — 99214 OFFICE O/P EST MOD 30 MIN: CPT | Performed by: INTERNAL MEDICINE

## 2023-01-17 RX ORDER — INFLUENZA A VIRUS A/MICHIGAN/45/2015 X-275 (H1N1) ANTIGEN (FORMALDEHYDE INACTIVATED), INFLUENZA A VIRUS A/SINGAPORE/INFIMH-16-0019/2016 IVR-186 (H3N2) ANTIGEN (FORMALDEHYDE INACTIVATED), INFLUENZA B VIRUS B/PHUKET/3073/2013 ANTIGEN (FORMALDEHYDE INACTIVATED), AND INFLUENZA B VIRUS B/MARYLAND/15/2016 BX-69A ANTIGEN (FORMALDEHYDE INACTIVATED) 15; 15; 15; 15 UG/.5ML; UG/.5ML; UG/.5ML; UG/.5ML
INJECTION, SUSPENSION INTRAMUSCULAR
COMMUNITY
Start: 2022-10-25

## 2023-01-17 NOTE — PROGRESS NOTES
"Subjective   Bernice Rai is a 62 y.o. female. Patient is here today for follow-up from urgent care for an upper respiratory infection.  Chest x-ray showed some atelectasis, possible effusion or infiltrate in the left lung.  Patient was put on a Z-Gilberto and Augmentin and is just about finished up the Augmentin.  She is stable and is having no fevers chills or pleurisy.  She still has some cough that is productive at times.  She does not feel worse but does not feel significantly improved yet.  She also is having some urinary frequency but not any dysuria and is wondering if she might be developing a UTI    Chief Complaint   Patient presents with   • Pneumonia     Follow up  ER 1/10/23; \"still feel crappy.\"          Vitals:    01/17/23 1425   BP: 148/78   Pulse: 67   Resp: 17   SpO2: 97%     Body mass index is 42.06 kg/m².  The following portions of the patient's history were reviewed and updated as appropriate: allergies, current medications, past family history, past medical history, past social history, past surgical history and problem list.    Past Medical History:   Diagnosis Date   • Allergic Years ago    Took allergy shots   • Anxiety     Stroke   • Arthritis     FEET   • Asthma     Inhaler   • Clotting disorder (HCC)     Taking asa   • COPD (chronic obstructive pulmonary disease) ([facility])     At times bronchitis   • Depression 10/22/01    Stroke   • Diabetes mellitus ([facility])    • GERD (gastroesophageal reflux disease) 1987    Gerd   • Hyperlipidemia    • Hypertension    • Obesity 1988   • Reflux esophagitis    • Renal insufficiency 2021   • Stroke (HCC)    • Urinary tract infection     Occasionaly   • Visual impairment 10/2001    1/2 vision each eye      Allergies   Allergen Reactions   • Jardiance [Empagliflozin] Itching     Recurrent yeast infections      Social History     Socioeconomic History   • Marital status:    Tobacco Use   • Smoking status: Every Day     Packs/day: 0.50     Years: 40.00     " Pack years: 20.00     Types: Cigarettes   • Smokeless tobacco: Never   Substance and Sexual Activity   • Alcohol use: No   • Drug use: Never   • Sexual activity: Not Currently     Partners: Male     Birth control/protection: Post-menopausal        Current Outpatient Medications:   •  albuterol sulfate HFA (Proventil HFA) 108 (90 Base) MCG/ACT inhaler, Inhale 2 puffs Every 6 (Six) Hours As Needed for Wheezing., Disp: 18 g, Rfl: 11  •  amLODIPine (NORVASC) 10 MG tablet, Take 1 tablet by mouth Daily., Disp: 90 tablet, Rfl: 3  •  aspirin 81 MG chewable tablet, Chew 81 mg daily., Disp: , Rfl:   •  atenolol (TENORMIN) 100 MG tablet, Take 1 tablet by mouth once daily, Disp: 90 tablet, Rfl: 0  •  desoximetasone (TOPICORT) 0.25 % ointment, Apply  topically to the appropriate area as directed Every 12 (Twelve) Hours., Disp: 60 g, Rfl: 3  •  fluticasone (FLONASE) 50 MCG/ACT nasal spray, 2 sprays into the nostril(s) as directed by provider Daily., Disp: 15.8 mL, Rfl: 0  •  Fluticasone Furoate-Vilanterol (Breo Ellipta) 100-25 MCG/INH inhaler, Inhale 1 puff Daily., Disp: 1 each, Rfl: 11  •  hydrALAZINE (APRESOLINE) 50 MG tablet, , Disp: , Rfl:   •  Kerendia 20 MG tablet, Take 1 tablet by mouth Daily., Disp: 90 tablet, Rfl:   •  lisinopril (PRINIVIL,ZESTRIL) 20 MG tablet, Take 1 tablet by mouth twice daily, Disp: 180 tablet, Rfl: 1  •  Loperamide HCl (ANTI-DIARRHEAL PO), Take  by mouth. 1-2 pills a day, Disp: , Rfl:   •  metFORMIN (GLUCOPHAGE) 500 MG tablet, , Disp: , Rfl:   •  pioglitazone (Actos) 45 MG tablet, Take 1 tablet by mouth Daily., Disp: 90 tablet, Rfl: 3  •  PROBIOTIC PRODUCT PO, Take  by mouth., Disp: , Rfl:   •  rosuvastatin (CRESTOR) 10 MG tablet, Take 1 tablet by mouth once daily, Disp: 90 tablet, Rfl: 0  •  venlafaxine XR (EFFEXOR-XR) 150 MG 24 hr capsule, , Disp: , Rfl:   •  amoxicillin-clavulanate (AUGMENTIN) 875-125 MG per tablet, Take 1 tablet by mouth 2 (Two) Times a Day for 7 days., Disp: 14 tablet, Rfl:  0  •  azithromycin (ZITHROMAX) 250 MG tablet, Take 1 tablet by mouth Daily. Take 2 tablets the first day, then 1 tablet daily for 4 days., Disp: 6 tablet, Rfl: 0  •  fluconazole (DIFLUCAN) 150 MG tablet, , Disp: , Rfl:   •  Fluzone Quadrivalent 0.5 ML suspension prefilled syringe injection, , Disp: , Rfl:   •  Moderna COVID-19 Bival Booster 50 MCG/0.5ML suspension, , Disp: , Rfl:      Objective     History of Present Illness     Review of Systems    Physical Exam  Vitals and nursing note reviewed.   Constitutional:       General: She is not in acute distress.     Appearance: Normal appearance. She is obese. She is not ill-appearing.   HENT:      Head: Normocephalic and atraumatic.   Cardiovascular:      Rate and Rhythm: Normal rate and regular rhythm.      Heart sounds: Normal heart sounds.   Pulmonary:      Effort: Pulmonary effort is normal. No respiratory distress.      Breath sounds: Normal breath sounds. No wheezing, rhonchi or rales.   Chest:      Chest wall: No tenderness.   Skin:     General: Skin is warm and dry.   Neurological:      General: No focal deficit present.      Mental Status: She is alert and oriented to person, place, and time.   Psychiatric:         Mood and Affect: Mood normal.         Behavior: Behavior normal.         ASSESSMENT CBC from January 10 had a slightly high white count of 11,900 with a minimal left shift, CMP with elevated sugar but otherwise normal and essentially normal lipase troponin and magnesium.  Chest x-ray as mentioned had a little something in the left lung that could be atelectasis, effusion or possibly an infiltrate  #1-upper respiratory infection, possible slight pneumonia  #2-urinary frequency, rule out UTI  #3-tobacco use  #4-obesity     Problems Addressed this Visit    None  Diagnoses    None.         PLAN urinalysis and urine culture are pending.  Patient will finish up the Augmentin and continue with plenty of fluids and an over-the-counter cough medicine.  I  expect her to start improving.  She is going to use nicotine patches to help try and get off the cigarettes.  She is already scheduled for follow-up with me in early March with labs and will keep that appointment and will need to have a repeat chest x-ray or the low-dose CT scan done.    There are no Patient Instructions on file for this visit.  No follow-ups on file.

## 2023-01-19 LAB
BACTERIA UR CULT: NO GROWTH
BACTERIA UR CULT: NORMAL

## 2023-01-20 DIAGNOSIS — I10 ESSENTIAL HYPERTENSION: ICD-10-CM

## 2023-01-20 RX ORDER — ROSUVASTATIN CALCIUM 10 MG/1
TABLET, COATED ORAL
Qty: 90 TABLET | Refills: 0 | Status: SHIPPED | OUTPATIENT
Start: 2023-01-20

## 2023-01-20 RX ORDER — ATENOLOL 100 MG/1
TABLET ORAL
Qty: 90 TABLET | Refills: 0 | Status: SHIPPED | OUTPATIENT
Start: 2023-01-20

## 2023-02-17 RX ORDER — AMLODIPINE BESYLATE 10 MG/1
TABLET ORAL
Qty: 90 TABLET | Refills: 0 | Status: SHIPPED | OUTPATIENT
Start: 2023-02-17

## 2023-03-01 LAB
ALBUMIN SERPL-MCNC: 3.8 G/DL (ref 3.5–5.2)
ALBUMIN/CREAT UR: 4581 MG/G CREAT (ref 0–29)
ALBUMIN/GLOB SERPL: 1.9 G/DL
ALP SERPL-CCNC: 76 U/L (ref 39–117)
ALT SERPL-CCNC: <5 U/L (ref 1–33)
AST SERPL-CCNC: 6 U/L (ref 1–32)
BILIRUB SERPL-MCNC: 0.2 MG/DL (ref 0–1.2)
BUN SERPL-MCNC: 18 MG/DL (ref 8–23)
BUN/CREAT SERPL: 20.5 (ref 7–25)
CALCIUM SERPL-MCNC: 10.1 MG/DL (ref 8.6–10.5)
CHLORIDE SERPL-SCNC: 107 MMOL/L (ref 98–107)
CHOLEST SERPL-MCNC: 165 MG/DL (ref 0–200)
CO2 SERPL-SCNC: 22 MMOL/L (ref 22–29)
CREAT SERPL-MCNC: 0.88 MG/DL (ref 0.57–1)
CREAT UR-MCNC: 128.8 MG/DL
EGFRCR SERPLBLD CKD-EPI 2021: 74.9 ML/MIN/1.73
ERYTHROCYTE [DISTWIDTH] IN BLOOD BY AUTOMATED COUNT: 12.7 % (ref 12.3–15.4)
GLOBULIN SER CALC-MCNC: 2 GM/DL
GLUCOSE SERPL-MCNC: 104 MG/DL (ref 65–99)
HBA1C MFR BLD: 6.3 % (ref 4.8–5.6)
HCT VFR BLD AUTO: 47.9 % (ref 34–46.6)
HDLC SERPL-MCNC: 31 MG/DL (ref 40–60)
HGB BLD-MCNC: 15.8 G/DL (ref 12–15.9)
LDLC SERPL CALC-MCNC: 95 MG/DL (ref 0–100)
MCH RBC QN AUTO: 29.5 PG (ref 26.6–33)
MCHC RBC AUTO-ENTMCNC: 33 G/DL (ref 31.5–35.7)
MCV RBC AUTO: 89.5 FL (ref 79–97)
MICROALBUMIN UR-MCNC: 5900.5 UG/ML
PLATELET # BLD AUTO: 447 10*3/MM3 (ref 140–450)
POTASSIUM SERPL-SCNC: 4.3 MMOL/L (ref 3.5–5.2)
PROT SERPL-MCNC: 5.8 G/DL (ref 6–8.5)
RBC # BLD AUTO: 5.35 10*6/MM3 (ref 3.77–5.28)
SODIUM SERPL-SCNC: 142 MMOL/L (ref 136–145)
T4 FREE SERPL-MCNC: 1.12 NG/DL (ref 0.93–1.7)
TRIGL SERPL-MCNC: 225 MG/DL (ref 0–150)
TSH SERPL DL<=0.005 MIU/L-ACNC: 3.27 UIU/ML (ref 0.27–4.2)
VLDLC SERPL CALC-MCNC: 39 MG/DL (ref 5–40)
WBC # BLD AUTO: 10.33 10*3/MM3 (ref 3.4–10.8)

## 2023-03-06 ENCOUNTER — HOSPITAL ENCOUNTER (OUTPATIENT)
Dept: CT IMAGING | Facility: HOSPITAL | Age: 63
Discharge: HOME | End: 2023-03-06
Admitting: INTERNAL MEDICINE
Payer: MEDICARE

## 2023-03-06 DIAGNOSIS — F17.200 TOBACCO DEPENDENCY: ICD-10-CM

## 2023-03-06 DIAGNOSIS — Z87.891 PERSONAL HISTORY OF NICOTINE DEPENDENCE: ICD-10-CM

## 2023-03-06 PROCEDURE — 71271 CT THORAX LUNG CANCER SCR C-: CPT

## 2023-04-10 RX ORDER — ROSUVASTATIN CALCIUM 10 MG/1
TABLET, COATED ORAL
Qty: 90 TABLET | Refills: 0 | Status: SHIPPED | OUTPATIENT
Start: 2023-04-10

## 2023-05-04 ENCOUNTER — OFFICE VISIT (OUTPATIENT)
Dept: FAMILY MEDICINE CLINIC | Facility: CLINIC | Age: 63
End: 2023-05-04
Payer: MEDICARE

## 2023-05-04 VITALS
TEMPERATURE: 98 F | HEART RATE: 73 BPM | OXYGEN SATURATION: 98 % | SYSTOLIC BLOOD PRESSURE: 138 MMHG | DIASTOLIC BLOOD PRESSURE: 80 MMHG | BODY MASS INDEX: 42.06 KG/M2 | RESPIRATION RATE: 16 BRPM | HEIGHT: 62 IN

## 2023-05-04 DIAGNOSIS — J02.9 ACUTE PHARYNGITIS, UNSPECIFIED ETIOLOGY: Primary | ICD-10-CM

## 2023-05-04 PROCEDURE — 3075F SYST BP GE 130 - 139MM HG: CPT | Performed by: INTERNAL MEDICINE

## 2023-05-04 PROCEDURE — 3044F HG A1C LEVEL LT 7.0%: CPT | Performed by: INTERNAL MEDICINE

## 2023-05-04 PROCEDURE — 99213 OFFICE O/P EST LOW 20 MIN: CPT | Performed by: INTERNAL MEDICINE

## 2023-05-04 PROCEDURE — 3079F DIAST BP 80-89 MM HG: CPT | Performed by: INTERNAL MEDICINE

## 2023-05-04 RX ORDER — PIOGLITAZONEHYDROCHLORIDE 30 MG/1
1 TABLET ORAL DAILY
COMMUNITY
Start: 2023-04-10

## 2023-05-04 NOTE — PROGRESS NOTES
Subjective   Bernice Rai is a 62 y.o. female. Patient is here today for pharyngitis.  Patient tells me she had a recent root canal and was on clindamycin and has developed some sore throat on the right.  She is wondering whether she is redeveloped a yeast infection.  She denies any fevers or chills.  She is off antibiotics now.  Chief Complaint   Patient presents with   • Cough     Over 7 days   • Sore Throat   • Med Refill     Atenolol, lisinopril, effexor, rosuvastain          Vitals:    05/04/23 1347   BP: 138/80   Pulse: 73   Resp: 16   Temp: 98 °F (36.7 °C)   SpO2: 98%     Body mass index is 42.06 kg/m².  The following portions of the patient's history were reviewed and updated as appropriate: allergies, current medications, past family history, past medical history, past social history, past surgical history and problem list.    Past Medical History:   Diagnosis Date   • Allergic Years ago    Took allergy shots   • Anxiety     Stroke   • Arthritis     FEET   • Asthma     Inhaler   • Clotting disorder     Taking asa   • COPD (chronic obstructive pulmonary disease)     At times bronchitis   • Depression 10/22/01    Stroke   • Diabetes mellitus    • GERD (gastroesophageal reflux disease) 1987    Gerd   • Hyperlipidemia    • Hypertension    • Obesity 1988   • Reflux esophagitis    • Renal insufficiency 2021   • Stroke    • Urinary tract infection     Occasionaly   • Visual impairment 10/2001    1/2 vision each eye      Allergies   Allergen Reactions   • Jardiance [Empagliflozin] Itching     Recurrent yeast infections      Social History     Socioeconomic History   • Marital status:    Tobacco Use   • Smoking status: Every Day     Packs/day: 0.50     Years: 40.00     Pack years: 20.00     Types: Cigarettes   • Smokeless tobacco: Never   Substance and Sexual Activity   • Alcohol use: No   • Drug use: Never   • Sexual activity: Not Currently     Partners: Male     Birth control/protection: Post-menopausal         Current Outpatient Medications:   •  albuterol sulfate HFA (Proventil HFA) 108 (90 Base) MCG/ACT inhaler, Inhale 2 puffs Every 6 (Six) Hours As Needed for Wheezing., Disp: 18 g, Rfl: 11  •  amLODIPine (NORVASC) 10 MG tablet, Take 1 tablet by mouth once daily, Disp: 90 tablet, Rfl: 0  •  aspirin 81 MG chewable tablet, Chew 1 tablet Daily., Disp: , Rfl:   •  atenolol (TENORMIN) 100 MG tablet, Take 1 tablet by mouth once daily, Disp: 90 tablet, Rfl: 0  •  desoximetasone (TOPICORT) 0.25 % ointment, Apply  topically to the appropriate area as directed Every 12 (Twelve) Hours., Disp: 60 g, Rfl: 3  •  fluticasone (FLONASE) 50 MCG/ACT nasal spray, 2 sprays into the nostril(s) as directed by provider Daily., Disp: 15.8 mL, Rfl: 0  •  Fluticasone Furoate-Vilanterol (Breo Ellipta) 100-25 MCG/INH inhaler, Inhale 1 puff Daily., Disp: 1 each, Rfl: 11  •  Fluzone Quadrivalent 0.5 ML suspension prefilled syringe injection, , Disp: , Rfl:   •  hydrALAZINE (APRESOLINE) 50 MG tablet, , Disp: , Rfl:   •  Kerendia 20 MG tablet, Take 1 tablet by mouth Daily., Disp: 90 tablet, Rfl:   •  lisinopril (PRINIVIL,ZESTRIL) 20 MG tablet, Take 1 tablet by mouth twice daily, Disp: 180 tablet, Rfl: 1  •  Loperamide HCl (ANTI-DIARRHEAL PO), Take  by mouth. 1-2 pills a day, Disp: , Rfl:   •  metFORMIN (GLUCOPHAGE) 500 MG tablet, , Disp: , Rfl:   •  pioglitazone (ACTOS) 30 MG tablet, Take 1 tablet by mouth Daily., Disp: , Rfl:   •  PROBIOTIC PRODUCT PO, Take  by mouth., Disp: , Rfl:   •  rosuvastatin (CRESTOR) 10 MG tablet, Take 1 tablet by mouth once daily, Disp: 90 tablet, Rfl: 0  •  venlafaxine XR (EFFEXOR-XR) 150 MG 24 hr capsule, , Disp: , Rfl:   •  azithromycin (ZITHROMAX) 250 MG tablet, Take 1 tablet by mouth Daily. Take 2 tablets the first day, then 1 tablet daily for 4 days. (Patient not taking: Reported on 5/4/2023), Disp: 6 tablet, Rfl: 0  •  fluconazole (DIFLUCAN) 150 MG tablet, , Disp: , Rfl:   •  Moderna COVID-19 Bival Booster 50  MCG/0.5ML suspension, , Disp: , Rfl:   •  pioglitazone (Actos) 45 MG tablet, Take 1 tablet by mouth Daily. (Patient not taking: Reported on 5/4/2023), Disp: 90 tablet, Rfl: 3     Objective     History of Present Illness     Review of Systems    Physical Exam  Vitals and nursing note reviewed.   Constitutional:       General: She is not in acute distress.     Appearance: Normal appearance. She is obese. She is not ill-appearing.   HENT:      Head: Normocephalic and atraumatic.      Mouth/Throat:      Mouth: Mucous membranes are moist. Oral lesions present.      Pharynx: Oropharynx is clear. Uvula midline. No oropharyngeal exudate or posterior oropharyngeal erythema.      Comments: There is a small less than 1 cm benign-appearing mass in the right tonsillar area that the patient says has been present for years and evaluated by ENT  Cardiovascular:      Rate and Rhythm: Normal rate and regular rhythm.      Heart sounds: Normal heart sounds.   Pulmonary:      Effort: Pulmonary effort is normal. No respiratory distress.      Breath sounds: Normal breath sounds. No wheezing or rales.   Lymphadenopathy:      Cervical: No cervical adenopathy.   Neurological:      Mental Status: She is alert.         Assessment    ASSESSMENT rapid strep testing was negative.  Influenza testing was negative.  COVID-19 testing was negative.    #1-pharyngitis, uncertain etiology  #2-history of thrush in the past      Problems Addressed this Visit    None  Visit Diagnoses     Acute pharyngitis, unspecified etiology    -  Primary      Diagnoses       Codes Comments    Acute pharyngitis, unspecified etiology    -  Primary ICD-10-CM: J02.9  ICD-9-CM: 462           PLAN I am going to cover the patient with some nystatin suspension swish and swallow.  I plan on rechecking her in 4 months with a CBC, CMP, hemoglobin A1c, lipid panel and urine microalbumin    There are no Patient Instructions on file for this visit.  No follow-ups on file.

## 2023-05-12 DIAGNOSIS — I10 ESSENTIAL HYPERTENSION: ICD-10-CM

## 2023-05-12 RX ORDER — ATENOLOL 100 MG/1
TABLET ORAL
Qty: 90 TABLET | Refills: 0 | Status: SHIPPED | OUTPATIENT
Start: 2023-05-12

## 2023-05-12 RX ORDER — LISINOPRIL 20 MG/1
TABLET ORAL
Qty: 180 TABLET | Refills: 0 | Status: SHIPPED | OUTPATIENT
Start: 2023-05-12

## 2023-05-12 NOTE — TELEPHONE ENCOUNTER
"Ov 5/4/23      Rx recorded in chart but not ordered by you. Patient says neurologist ordered \"20 years ago after stroke\" but you have ordered before.    Please advise of rf  "

## 2023-05-16 RX ORDER — VENLAFAXINE HYDROCHLORIDE 150 MG/1
150 CAPSULE, EXTENDED RELEASE ORAL DAILY
Qty: 90 CAPSULE | Refills: 1 | Status: SHIPPED | OUTPATIENT
Start: 2023-05-16

## 2023-05-31 RX ORDER — FLUCONAZOLE 150 MG/1
150 TABLET ORAL DAILY
Qty: 2 TABLET | Refills: 2 | Status: SHIPPED | OUTPATIENT
Start: 2023-05-31

## 2023-05-31 NOTE — TELEPHONE ENCOUNTER
Caller: Fredi Bernice CHRISTOPHER    Relationship: Self    Best call back number: [phone]    Requested Prescriptions:   Requested Prescriptions     Pending Prescriptions Disp Refills   • fluconazole (DIFLUCAN) 150 MG tablet          Pharmacy where request should be sent: [address] - [phone]  - [phone] [facility]     Last office visit with prescribing clinician: 5/4/2023   Last telemedicine visit with prescribing clinician: Visit date not found   Next office visit with prescribing clinician: Visit date not found     Additional details provided by patient: PATIENT STATES THAT SHE IS OUT OF THIS MEDICATION.    Does the patient have less than a 3 day supply:  [x] Yes  [] No    Would you like a call back once the refill request has been completed: [x] Yes [] No    If the office needs to give you a call back, can they leave a voicemail: [x] Yes [] No    PLEASE ADVISE.    Sylwia Heard Rep   05/31/23 10:30 EDT

## 2023-06-12 RX ORDER — AMLODIPINE BESYLATE 10 MG/1
TABLET ORAL
Qty: 90 TABLET | Refills: 0 | Status: SHIPPED | OUTPATIENT
Start: 2023-06-12

## 2023-08-08 ENCOUNTER — OFFICE VISIT (OUTPATIENT)
Dept: OBSTETRICS AND GYNECOLOGY | Facility: CLINIC | Age: 63
End: 2023-08-08
Payer: MEDICARE

## 2023-08-08 ENCOUNTER — TELEPHONE (OUTPATIENT)
Dept: OBSTETRICS AND GYNECOLOGY | Facility: CLINIC | Age: 63
End: 2023-08-08

## 2023-08-08 VITALS
BODY MASS INDEX: 39.38 KG/M2 | WEIGHT: 214 LBS | HEIGHT: 62 IN | DIASTOLIC BLOOD PRESSURE: 73 MMHG | SYSTOLIC BLOOD PRESSURE: 156 MMHG

## 2023-08-08 DIAGNOSIS — Z78.0 POSTMENOPAUSAL STATUS: ICD-10-CM

## 2023-08-08 DIAGNOSIS — Z12.31 BREAST CANCER SCREENING BY MAMMOGRAM: ICD-10-CM

## 2023-08-08 DIAGNOSIS — Z12.4 CERVICAL CANCER SCREENING: ICD-10-CM

## 2023-08-08 DIAGNOSIS — Z01.419 WELL WOMAN EXAM WITH ROUTINE GYNECOLOGICAL EXAM: Primary | ICD-10-CM

## 2023-08-08 DIAGNOSIS — I10 ESSENTIAL HYPERTENSION: ICD-10-CM

## 2023-08-08 DIAGNOSIS — N39.41 URGE INCONTINENCE: ICD-10-CM

## 2023-08-08 RX ORDER — OXYBUTYNIN CHLORIDE 2.5 MG/1
2.5 TABLET ORAL 2 TIMES DAILY
Qty: 60 TABLET | Refills: 1 | Status: SHIPPED | OUTPATIENT
Start: 2023-08-08 | End: 2023-08-08

## 2023-08-08 RX ORDER — LISINOPRIL 20 MG/1
TABLET ORAL
Qty: 180 TABLET | Refills: 0 | Status: SHIPPED | OUTPATIENT
Start: 2023-08-08

## 2023-08-08 RX ORDER — ATENOLOL 100 MG/1
TABLET ORAL
Qty: 90 TABLET | Refills: 0 | Status: SHIPPED | OUTPATIENT
Start: 2023-08-08

## 2023-08-08 RX ORDER — OXYBUTYNIN CHLORIDE 5 MG/1
5 TABLET ORAL 2 TIMES DAILY
Qty: 90 TABLET | Refills: 2 | Status: SHIPPED | OUTPATIENT
Start: 2023-08-08 | End: 2024-08-07

## 2023-08-08 NOTE — PROGRESS NOTES
GYN Annual Exam     CC- Here for annual exam and complains of urinary leakage     Bernice Rai is a 62 y.o. postmenopausal female who presents for annual well woman exam. Denies vaginal bleeding or vasomotor symptoms. She is concerned today regarding urinary incontinence- urge incontinence. She reports that she will leak urine at night after waking up when she cannot make it to the bathroom. She also sometimes leaks when she coughs. She has to wear a diaper everyday and consciously holds it as she sometimes cannot make it to the restroom.     OB History          2    Para   2    Term   2            AB        Living             SAB        IAB        Ectopic        Molar        Multiple        Live Births                  Menopause 50s   Current contraception: post menopausal status  History of abnormal Pap smear: no  Family history of uterine, colon or ovarian cancer: no  History of abnormal mammogram: no  Family history of breast cancer: no  Last Pap : 17- NILM   Last mammogram: 11/10/21- BIRADS-1  Last colonoscopy: Cologuard - last year.  Last DEXA: n/a; Frax score 5.5%, 0.4%   Parental Hip Fracture: denies     Past Medical History:   Diagnosis Date    Allergic Years ago    Took allergy shots    Anxiety     Stroke    Arthritis     FEET    Asthma     Inhaler    Clotting disorder     Taking asa    COPD (chronic obstructive pulmonary disease)     At times bronchitis    Depression 10/22/01    Stroke    Diabetes mellitus     GERD (gastroesophageal reflux disease)     Gerd    Hyperlipidemia     Hypertension     Obesity     Reflux esophagitis     Renal insufficiency     Stroke     Urinary tract infection     Occasionaly    Visual impairment 10/2001    1/2 vision each eye       Past Surgical History:   Procedure Laterality Date     SECTION      x 2    OTHER SURGICAL HISTORY      FATTY TUMOR REMOVED RIGHT HIP    UMBILICAL HERNIA REPAIR      Mid abdominal hernia         Current  Outpatient Medications:     albuterol sulfate HFA (Proventil HFA) 108 (90 Base) MCG/ACT inhaler, Inhale 2 puffs Every 6 (Six) Hours As Needed for Wheezing., Disp: 18 g, Rfl: 11    amLODIPine (NORVASC) 10 MG tablet, Take 1 tablet by mouth once daily, Disp: 90 tablet, Rfl: 0    aspirin 81 MG chewable tablet, Chew 1 tablet Daily., Disp: , Rfl:     desoximetasone (TOPICORT) 0.25 % ointment, Apply  topically to the appropriate area as directed Every 12 (Twelve) Hours., Disp: 60 g, Rfl: 3    fluticasone (FLONASE) 50 MCG/ACT nasal spray, 2 sprays into the nostril(s) as directed by provider Daily., Disp: 15.8 mL, Rfl: 0    Fluticasone Furoate-Vilanterol (Breo Ellipta) 100-25 MCG/INH inhaler, Inhale 1 puff Daily., Disp: 1 each, Rfl: 11    hydrALAZINE (APRESOLINE) 50 MG tablet, , Disp: , Rfl:     Kerendia 20 MG tablet, Take 1 tablet by mouth Daily., Disp: 90 tablet, Rfl:     Loperamide HCl (ANTI-DIARRHEAL PO), Take  by mouth. 1-2 pills a day, Disp: , Rfl:     metFORMIN (GLUCOPHAGE) 500 MG tablet, , Disp: , Rfl:     nystatin (MYCOSTATIN) 100,000 unit/mL suspension, Swish and swallow 5 mL 4 (Four) Times a Day., Disp: 200 mL, Rfl: 0    pioglitazone (Actos) 45 MG tablet, Take 1 tablet by mouth Daily., Disp: 90 tablet, Rfl: 3    PROBIOTIC PRODUCT PO, Take  by mouth., Disp: , Rfl:     venlafaxine XR (EFFEXOR-XR) 150 MG 24 hr capsule, Take 1 capsule by mouth once daily, Disp: 90 capsule, Rfl: 1    atenolol (TENORMIN) 100 MG tablet, Take 1 tablet by mouth once daily, Disp: 90 tablet, Rfl: 0    fluconazole (DIFLUCAN) 150 MG tablet, Take 1 tablet by mouth Daily. (Patient not taking: Reported on 8/8/2023), Disp: 2 tablet, Rfl: 2    Fluzone Quadrivalent 0.5 ML suspension prefilled syringe injection, , Disp: , Rfl:     lisinopril (PRINIVIL,ZESTRIL) 20 MG tablet, Take 1 tablet by mouth twice daily, Disp: 180 tablet, Rfl: 0    Moderna COVID-19 Bival Booster 50 MCG/0.5ML suspension, , Disp: , Rfl:     oxybutynin (DITROPAN) 5 MG tablet, Take  "1 tablet by mouth 2 (Two) Times a Day., Disp: 90 tablet, Rfl: 2    pioglitazone (ACTOS) 30 MG tablet, Take 1 tablet by mouth once daily, Disp: 90 tablet, Rfl: 0    rosuvastatin (CRESTOR) 10 MG tablet, TAKE 1 TABLET BY MOUTH ONCE DAILY (NEED APPOINTMENT), Disp: 90 tablet, Rfl: 0    Allergies   Allergen Reactions    Jardiance [Empagliflozin] Itching     Recurrent yeast infections       Social History     Tobacco Use    Smoking status: Every Day     Packs/day: 0.50     Years: 40.00     Pack years: 20.00     Types: Cigarettes    Smokeless tobacco: Never   Substance Use Topics    Alcohol use: No    Drug use: Never       Family History   Problem Relation Age of Onset    Diabetes Mother     Hypertension Mother     Hyperlipidemia Mother     Heart disease Mother     Depression Mother     Macular degeneration Mother     Arthritis Mother     Thyroid disease Mother     Other Mother         Brain tumors no cancer    Diabetes Father     Hypertension Father     Pancreatic cancer Father     Cancer Father     Other Father         Pancreatic cancer    Cancer Maternal Grandmother     Colon cancer Other        Review of Systems   Genitourinary:  Positive for urinary incontinence.   All other systems reviewed and are negative.    /73   Ht 157.5 cm (62.01\")   Wt 97.1 kg (214 lb)   BMI 39.13 kg/mý     Physical Exam  Vitals reviewed. Exam conducted with a chaperone present.   Constitutional:       General: She is not in acute distress.  HENT:      Head: Normocephalic and atraumatic.      Right Ear: External ear normal.      Left Ear: External ear normal.   Eyes:      Extraocular Movements: Extraocular movements intact.      Pupils: Pupils are equal, round, and reactive to light.   Cardiovascular:      Rate and Rhythm: Normal rate.   Pulmonary:      Effort: Pulmonary effort is normal. No respiratory distress.   Chest:   Breasts:     Right: No swelling, bleeding, inverted nipple, mass, nipple discharge, skin change or tenderness.    "   Left: No swelling, bleeding, inverted nipple, mass, nipple discharge, skin change or tenderness.   Abdominal:      General: There is no distension.      Palpations: Abdomen is soft.      Tenderness: There is no abdominal tenderness. There is no guarding or rebound.   Genitourinary:     General: Normal vulva.      Exam position: Lithotomy position.      Labia:         Right: No rash, tenderness, lesion or injury.         Left: No rash, tenderness, lesion or injury.       Urethra: No prolapse or urethral swelling.      Vagina: No vaginal discharge, erythema, tenderness, bleeding or lesions.      Cervix: Normal.      Uterus: Not enlarged, not fixed and not tender.       Adnexa:         Right: No mass, tenderness or fullness.          Left: No mass, tenderness or fullness.     Musculoskeletal:         General: No deformity. Normal range of motion.      Cervical back: Normal range of motion and neck supple.   Lymphadenopathy:      Upper Body:      Right upper body: No supraclavicular or axillary adenopathy.      Left upper body: No supraclavicular or axillary adenopathy.      Lower Body: No right inguinal adenopathy. No left inguinal adenopathy.   Skin:     General: Skin is warm and dry.   Neurological:      General: No focal deficit present.      Mental Status: She is alert and oriented to person, place, and time.   Psychiatric:         Mood and Affect: Mood normal.         Behavior: Behavior normal.     Assessment     1) GYN annual well woman exam.   2) Postmenopausal status  3) Cervical cancer screening  4) Breast cancer screening   5) Urge incontinence      Plan     1) Breast Health - Clinical breast exam & mammogram yearly, Self breast awareness monthly. Will obtain mammogram for breast cancer screening at her follow up visit.   2) Pap - Collected pap smear with HPV cotesting for cervical cancer screening.   3) Smoking status- Current every day smoker. Encouraged smoking cessation.   4) Colon health - screening  colonoscopy recommended if not up to date  5) Bone health - Weight bearing exercise, dietary calcium recommendations and vitamin D reviewed.   6) Activity recommends - Adult 150-300 min/week of multi-component physical activities that include balance training, aerobic and physical strengthening.    7) Urge incontinence- Prescribed Oxybutynin 5 mg BID for management of urge incontinence. She has no contraindications to use. Reviewed common side effects associated with medication and advised the patient to contact the office for any concerns. Will have her follow up in 1 month to evaluate how her symptoms are going.     Melissa Castillo MD

## 2023-08-08 NOTE — TELEPHONE ENCOUNTER
[facility] is calling about Oxybutynin and says they do not have Oxybutin 2.5. They are wanting to know if you can send in updated prescription for 5mg or something else for pt    Please advise,   Thank you

## 2023-08-09 RX ORDER — PIOGLITAZONEHYDROCHLORIDE 30 MG/1
TABLET ORAL
Qty: 90 TABLET | Refills: 0 | Status: SHIPPED | OUTPATIENT
Start: 2023-08-09

## 2023-08-09 RX ORDER — ROSUVASTATIN CALCIUM 10 MG/1
TABLET, COATED ORAL
Qty: 90 TABLET | Refills: 0 | Status: SHIPPED | OUTPATIENT
Start: 2023-08-09

## 2023-08-11 ENCOUNTER — TELEPHONE (OUTPATIENT)
Dept: FAMILY MEDICINE CLINIC | Facility: CLINIC | Age: 63
End: 2023-08-11
Payer: MEDICARE

## 2023-08-11 LAB
CYTOLOGIST CVX/VAG CYTO: NORMAL
CYTOLOGY CVX/VAG DOC CYTO: NORMAL
CYTOLOGY CVX/VAG DOC THIN PREP: NORMAL
DX ICD CODE: NORMAL
HIV 1 & 2 AB SER-IMP: NORMAL
HPV I/H RISK 4 DNA CVX QL PROBE+SIG AMP: NEGATIVE
OTHER STN SPEC: NORMAL
STAT OF ADQ CVX/VAG CYTO-IMP: NORMAL

## 2023-08-11 NOTE — TELEPHONE ENCOUNTER
Caller: Bernice Rai    Relationship: Self    Best call back number: [phone]     What orders are you requesting (i.e. lab or imaging): IMAGING - CT CHEST    In what timeframe would the patient need to come in: AS SOON AS AVAILABLE    Where will you receive your lab/imaging services: [facility]

## 2023-08-14 NOTE — TELEPHONE ENCOUNTER
LVM for pt to return call. Detailed message left asking her why she needs CT and I will forward message to PCP who will decide about ordering, or, if she needs appointment.    OK FOR HUB TO NOTE REASON IF SHE CALLS BACK OF WHY SHE NEEDS CT, THANKS!

## 2023-08-14 NOTE — TELEPHONE ENCOUNTER
HUB RELAYED MESSAGE STATES WAS TOLD BY PCP THAT SHE WOULD NEED ANOTHER CT SCAN IN 6 MONTHS FROM LAST CT SCAN

## 2023-08-16 DIAGNOSIS — R91.1 PULMONARY NODULE: Primary | ICD-10-CM

## 2023-08-30 RX ORDER — AMLODIPINE BESYLATE 10 MG/1
TABLET ORAL
Qty: 90 TABLET | Refills: 0 | Status: SHIPPED | OUTPATIENT
Start: 2023-08-30

## 2023-09-07 ENCOUNTER — OFFICE VISIT (OUTPATIENT)
Dept: FAMILY MEDICINE CLINIC | Facility: CLINIC | Age: 63
End: 2023-09-07
Payer: MEDICARE

## 2023-09-07 VITALS
HEIGHT: 62 IN | HEART RATE: 49 BPM | TEMPERATURE: 97.9 F | BODY MASS INDEX: 40.7 KG/M2 | OXYGEN SATURATION: 98 % | WEIGHT: 221.2 LBS | DIASTOLIC BLOOD PRESSURE: 65 MMHG | RESPIRATION RATE: 16 BRPM | SYSTOLIC BLOOD PRESSURE: 125 MMHG

## 2023-09-07 DIAGNOSIS — R80.1 PERSISTENT PROTEINURIA: ICD-10-CM

## 2023-09-07 DIAGNOSIS — K21.9 GASTROESOPHAGEAL REFLUX DISEASE, UNSPECIFIED WHETHER ESOPHAGITIS PRESENT: ICD-10-CM

## 2023-09-07 DIAGNOSIS — E66.01 CLASS 3 SEVERE OBESITY DUE TO EXCESS CALORIES WITH SERIOUS COMORBIDITY AND BODY MASS INDEX (BMI) OF 40.0 TO 44.9 IN ADULT: ICD-10-CM

## 2023-09-07 DIAGNOSIS — I10 PRIMARY HYPERTENSION: Primary | ICD-10-CM

## 2023-09-07 DIAGNOSIS — E78.5 HYPERLIPIDEMIA, UNSPECIFIED HYPERLIPIDEMIA TYPE: ICD-10-CM

## 2023-09-07 DIAGNOSIS — M15.9 GENERALIZED OSTEOARTHRITIS: ICD-10-CM

## 2023-09-07 DIAGNOSIS — F17.200 TOBACCO DEPENDENCY: ICD-10-CM

## 2023-09-07 DIAGNOSIS — Z79.4 TYPE 2 DIABETES MELLITUS WITH OTHER DIABETIC KIDNEY COMPLICATION, WITH LONG-TERM CURRENT USE OF INSULIN: ICD-10-CM

## 2023-09-07 DIAGNOSIS — E11.29 TYPE 2 DIABETES MELLITUS WITH OTHER DIABETIC KIDNEY COMPLICATION, WITH LONG-TERM CURRENT USE OF INSULIN: ICD-10-CM

## 2023-09-07 PROCEDURE — 3074F SYST BP LT 130 MM HG: CPT | Performed by: INTERNAL MEDICINE

## 2023-09-07 PROCEDURE — 99214 OFFICE O/P EST MOD 30 MIN: CPT | Performed by: INTERNAL MEDICINE

## 2023-09-07 PROCEDURE — 3044F HG A1C LEVEL LT 7.0%: CPT | Performed by: INTERNAL MEDICINE

## 2023-09-07 PROCEDURE — 3078F DIAST BP <80 MM HG: CPT | Performed by: INTERNAL MEDICINE

## 2023-09-07 RX ORDER — OXYBUTYNIN CHLORIDE 2.5 MG/1
2.5 TABLET ORAL 2 TIMES DAILY
COMMUNITY
Start: 2023-08-08

## 2023-09-07 NOTE — PROGRESS NOTES
Subjective   Bernice Rai is a 63 y.o. female. Patient is here today for follow-up on her hypertension, hyperlipidemia, obesity, diabetes mellitus type 2, GERD and kidney disease with proteinuria.  Patient is generally stable.  She did see gynecology recently is and is on oxybutynin for urge incontinence.  She saw endocrinology in July with no changes made.  Her only new complaint is of left knee pain which has been bothering her quite a bit.  She is never seen anybody for it.  Chief Complaint   Patient presents with    Hypertension    Knee Pain     Started hurting a couple weeks ago, not sure what she did.           Vitals:    09/07/23 1309   BP: 125/65   Pulse: (!) 49   Resp: 16   Temp: 97.9 °F (36.6 °C)   SpO2: 98%     Body mass index is 40.45 kg/m².  The following portions of the patient's history were reviewed and updated as appropriate: allergies, current medications, past family history, past medical history, past social history, past surgical history and problem list.    Past Medical History:   Diagnosis Date    Allergic Years ago    Took allergy shots    Anxiety     Stroke    Arthritis     FEET    Asthma     Inhaler    Clotting disorder     Taking asa    COPD (chronic obstructive pulmonary disease)     At times bronchitis    Depression 10/22/01    Stroke    Diabetes mellitus     GERD (gastroesophageal reflux disease) 1987    Gerd    Hyperlipidemia     Hypertension     Obesity 1988    Reflux esophagitis     Renal insufficiency 2021    Stroke     Urinary tract infection     Occasionaly    Visual impairment 10/2001    1/2 vision each eye      Allergies   Allergen Reactions    Jardiance [Empagliflozin] Itching     Recurrent yeast infections      Social History     Socioeconomic History    Marital status:    Tobacco Use    Smoking status: Every Day     Packs/day: 0.50     Years: 40.00     Pack years: 20.00     Types: Cigarettes    Smokeless tobacco: Never   Substance and Sexual Activity    Alcohol use:  No    Drug use: Never    Sexual activity: Not Currently     Partners: Male     Birth control/protection: Post-menopausal        Current Outpatient Medications:     albuterol sulfate HFA (Proventil HFA) 108 (90 Base) MCG/ACT inhaler, Inhale 2 puffs Every 6 (Six) Hours As Needed for Wheezing., Disp: 18 g, Rfl: 11    amLODIPine (NORVASC) 10 MG tablet, Take 1 tablet by mouth once daily, Disp: 90 tablet, Rfl: 0    aspirin 81 MG chewable tablet, Chew 1 tablet Daily., Disp: , Rfl:     atenolol (TENORMIN) 100 MG tablet, Take 1 tablet by mouth once daily, Disp: 90 tablet, Rfl: 0    desoximetasone (TOPICORT) 0.25 % ointment, Apply  topically to the appropriate area as directed Every 12 (Twelve) Hours., Disp: 60 g, Rfl: 3    fluconazole (DIFLUCAN) 150 MG tablet, Take 1 tablet by mouth Daily., Disp: 2 tablet, Rfl: 2    fluticasone (FLONASE) 50 MCG/ACT nasal spray, 2 sprays into the nostril(s) as directed by provider Daily., Disp: 15.8 mL, Rfl: 0    Fluticasone Furoate-Vilanterol (Breo Ellipta) 100-25 MCG/INH inhaler, Inhale 1 puff Daily., Disp: 1 each, Rfl: 11    hydrALAZINE (APRESOLINE) 50 MG tablet, , Disp: , Rfl:     Kerendia 20 MG tablet, Take 1 tablet by mouth Daily., Disp: 90 tablet, Rfl:     lisinopril (PRINIVIL,ZESTRIL) 20 MG tablet, Take 1 tablet by mouth twice daily, Disp: 180 tablet, Rfl: 0    Loperamide HCl (ANTI-DIARRHEAL PO), Take  by mouth. 1-2 pills a day, Disp: , Rfl:     metFORMIN (GLUCOPHAGE) 500 MG tablet, , Disp: , Rfl:     nystatin (MYCOSTATIN) 100,000 unit/mL suspension, Swish and swallow 5 mL 4 (Four) Times a Day., Disp: 200 mL, Rfl: 0    oxybutynin (DITROPAN) 5 MG tablet, Take 1 tablet by mouth 2 (Two) Times a Day., Disp: 90 tablet, Rfl: 2    Oxybutynin Chloride 2.5 MG tablet, Take 2.5 tablets by mouth 2 (Two) Times a Day., Disp: , Rfl:     pioglitazone (ACTOS) 30 MG tablet, Take 1 tablet by mouth once daily, Disp: 90 tablet, Rfl: 0    PROBIOTIC PRODUCT PO, Take  by mouth., Disp: , Rfl:     rosuvastatin  (CRESTOR) 10 MG tablet, TAKE 1 TABLET BY MOUTH ONCE DAILY (NEED APPOINTMENT), Disp: 90 tablet, Rfl: 0    venlafaxine XR (EFFEXOR-XR) 150 MG 24 hr capsule, Take 1 capsule by mouth once daily, Disp: 90 capsule, Rfl: 1    Fluzone Quadrivalent 0.5 ML suspension prefilled syringe injection, , Disp: , Rfl:     Moderna COVID-19 Bival Booster 50 MCG/0.5ML suspension, , Disp: , Rfl:     pioglitazone (Actos) 45 MG tablet, Take 1 tablet by mouth Daily., Disp: 90 tablet, Rfl: 3     Objective     History of Present Illness     Review of Systems    Physical Exam  Vitals and nursing note reviewed.   Constitutional:       General: She is not in acute distress.     Appearance: Normal appearance. She is obese. She is not ill-appearing.   HENT:      Head: Normocephalic and atraumatic.   Cardiovascular:      Rate and Rhythm: Normal rate and regular rhythm.      Heart sounds: Normal heart sounds.   Pulmonary:      Effort: Pulmonary effort is normal. No respiratory distress.      Breath sounds: Normal breath sounds. No wheezing or rales.   Skin:     General: Skin is warm and dry.   Neurological:      General: No focal deficit present.      Mental Status: She is alert and oriented to person, place, and time.   Psychiatric:         Mood and Affect: Mood normal.         Behavior: Behavior normal.       Assessment    ASSESSMENT there are no recent labs to review and the patient will get them drawn today.  #1-hyperlipidemia, asymptomatic  #2-hypertension controlled on medication  #3-hyperlipidemia, on medication  #4-diabetes mellitus type 2, followed by endocrinology  #5-obesity with slight weight gain  #6-proteinuria, followed by nephrology  #7-borderline bradycardia, asymptomatic    Problems Addressed this Visit          Cardiac and Vasculature    Hyperlipidemia    Relevant Orders    Lipid Panel With LDL / HDL Ratio    Hypertension - Primary    Relevant Orders    CBC & Differential    Comprehensive Metabolic Panel    TSH    T4, Free        Endocrine and Metabolic    Diabetes mellitus    Relevant Orders    Comprehensive Metabolic Panel    Hemoglobin A1c    TSH    T4, Free    Microalbumin / Creatinine Urine Ratio - Urine, Clean Catch    Obesity due to excess calories    Relevant Orders    TSH    T4, Free       Gastrointestinal Abdominal     Gastroesophageal reflux disease       Genitourinary and Reproductive     Persistent proteinuria    Relevant Orders    Microalbumin / Creatinine Urine Ratio - Urine, Clean Catch       Musculoskeletal and Injuries    Generalized osteoarthritis    Relevant Orders    Ambulatory Referral to Orthopedic Surgery       Tobacco    Tobacco dependency     Diagnoses         Codes Comments    Primary hypertension    -  Primary ICD-10-CM: I10  ICD-9-CM: 401.9     Hyperlipidemia, unspecified hyperlipidemia type     ICD-10-CM: E78.5  ICD-9-CM: 272.4     Class 3 severe obesity due to excess calories with serious comorbidity and body mass index (BMI) of 40.0 to 44.9 in adult     ICD-10-CM: E66.01, Z68.41  ICD-9-CM: 278.01, V85.41     Type 2 diabetes mellitus with other diabetic kidney complication, with long-term current use of insulin     ICD-10-CM: E11.29, Z79.4  ICD-9-CM: 250.40, V58.67     Gastroesophageal reflux disease, unspecified whether esophagitis present     ICD-10-CM: K21.9  ICD-9-CM: 530.81     Persistent proteinuria     ICD-10-CM: R80.1  ICD-9-CM: 791.0     Generalized osteoarthritis     ICD-10-CM: M15.9  ICD-9-CM: 715.00     Tobacco dependency     ICD-10-CM: F17.200  ICD-9-CM: 305.1             PLAN the patient will continue current medicines as now.  She will get labs drawn before she leaves and I will review the results.  She is already scheduled for CT scan of the lungs for follow-up.  I encouraged her to try and lose weight and watch dietary carbs and sweets.  Because of her left knee pain, I am referring her to orthopedic surgery.  I did recommend a flu, RSV and COVID-19 vaccinations this fall as well as the  shingles immunizations.  Presuming her laboratory studies are okay as well as her CT scan of the chest, she can be seen in 6 months with a CBC, CMP, hemoglobin A1c, lipid panel, TSH and free T4 and urine microalbumin    There are no Patient Instructions on file for this visit.  Return in about 6 months (around 3/7/2024) for with labs.

## 2023-09-08 LAB
ALBUMIN SERPL-MCNC: 3.9 G/DL (ref 3.9–4.9)
ALBUMIN/CREAT UR: 1585 MG/G CREAT (ref 0–29)
ALBUMIN/GLOB SERPL: 1.6 {RATIO} (ref 1.2–2.2)
ALP SERPL-CCNC: 81 IU/L (ref 44–121)
ALT SERPL-CCNC: 17 IU/L (ref 0–32)
AST SERPL-CCNC: 15 IU/L (ref 0–40)
BASOPHILS # BLD AUTO: 0.1 X10E3/UL (ref 0–0.2)
BASOPHILS NFR BLD AUTO: 1 %
BILIRUB SERPL-MCNC: <0.2 MG/DL (ref 0–1.2)
BUN SERPL-MCNC: 24 MG/DL (ref 8–27)
BUN/CREAT SERPL: 26 (ref 12–28)
CALCIUM SERPL-MCNC: 9.9 MG/DL (ref 8.7–10.3)
CHLORIDE SERPL-SCNC: 102 MMOL/L (ref 96–106)
CHOLEST SERPL-MCNC: 213 MG/DL (ref 100–199)
CO2 SERPL-SCNC: 22 MMOL/L (ref 20–29)
CREAT SERPL-MCNC: 0.94 MG/DL (ref 0.57–1)
CREAT UR-MCNC: 67.9 MG/DL
EGFRCR SERPLBLD CKD-EPI 2021: 69 ML/MIN/1.73
EOSINOPHIL # BLD AUTO: 0.3 X10E3/UL (ref 0–0.4)
EOSINOPHIL NFR BLD AUTO: 3 %
ERYTHROCYTE [DISTWIDTH] IN BLOOD BY AUTOMATED COUNT: 12.7 % (ref 11.7–15.4)
GLOBULIN SER CALC-MCNC: 2.5 G/DL (ref 1.5–4.5)
GLUCOSE SERPL-MCNC: 103 MG/DL (ref 70–99)
HBA1C MFR BLD: 6.2 % (ref 4.8–5.6)
HCT VFR BLD AUTO: 42.6 % (ref 34–46.6)
HDLC SERPL-MCNC: 44 MG/DL
HGB BLD-MCNC: 13.9 G/DL (ref 11.1–15.9)
IMM GRANULOCYTES # BLD AUTO: 0 X10E3/UL (ref 0–0.1)
IMM GRANULOCYTES NFR BLD AUTO: 0 %
LDLC SERPL CALC-MCNC: 126 MG/DL (ref 0–99)
LDLC/HDLC SERPL: 2.9 RATIO (ref 0–3.2)
LYMPHOCYTES # BLD AUTO: 3.4 X10E3/UL (ref 0.7–3.1)
LYMPHOCYTES NFR BLD AUTO: 34 %
MCH RBC QN AUTO: 30.4 PG (ref 26.6–33)
MCHC RBC AUTO-ENTMCNC: 32.6 G/DL (ref 31.5–35.7)
MCV RBC AUTO: 93 FL (ref 79–97)
MICROALBUMIN UR-MCNC: 1076.5 UG/ML
MONOCYTES # BLD AUTO: 0.8 X10E3/UL (ref 0.1–0.9)
MONOCYTES NFR BLD AUTO: 9 %
NEUTROPHILS # BLD AUTO: 5.2 X10E3/UL (ref 1.4–7)
NEUTROPHILS NFR BLD AUTO: 53 %
PLATELET # BLD AUTO: 353 X10E3/UL (ref 150–450)
POTASSIUM SERPL-SCNC: 5.1 MMOL/L (ref 3.5–5.2)
PROT SERPL-MCNC: 6.4 G/DL (ref 6–8.5)
RBC # BLD AUTO: 4.57 X10E6/UL (ref 3.77–5.28)
SODIUM SERPL-SCNC: 137 MMOL/L (ref 134–144)
T4 FREE SERPL-MCNC: 1.12 NG/DL (ref 0.82–1.77)
TRIGL SERPL-MCNC: 245 MG/DL (ref 0–149)
TSH SERPL DL<=0.005 MIU/L-ACNC: 2.17 UIU/ML (ref 0.45–4.5)
VLDLC SERPL CALC-MCNC: 43 MG/DL (ref 5–40)
WBC # BLD AUTO: 9.9 X10E3/UL (ref 3.4–10.8)

## 2023-09-19 ENCOUNTER — HOSPITAL ENCOUNTER (OUTPATIENT)
Dept: CT IMAGING | Facility: HOSPITAL | Age: 63
Discharge: HOME | End: 2023-09-19
Admitting: INTERNAL MEDICINE
Payer: MEDICARE

## 2023-09-19 DIAGNOSIS — R91.1 PULMONARY NODULE: ICD-10-CM

## 2023-09-19 PROCEDURE — 71250 CT THORAX DX C-: CPT

## 2023-09-28 DIAGNOSIS — I10 ESSENTIAL HYPERTENSION: ICD-10-CM

## 2023-09-29 ENCOUNTER — TELEPHONE (OUTPATIENT)
Dept: FAMILY MEDICINE CLINIC | Facility: CLINIC | Age: 63
End: 2023-09-29

## 2023-09-29 RX ORDER — FLUCONAZOLE 150 MG/1
TABLET ORAL
Qty: 2 TABLET | Refills: 0 | Status: SHIPPED | OUTPATIENT
Start: 2023-09-29

## 2023-09-29 RX ORDER — ATENOLOL 100 MG/1
TABLET ORAL
Qty: 90 TABLET | Refills: 0 | Status: SHIPPED | OUTPATIENT
Start: 2023-09-29

## 2023-09-29 RX ORDER — LISINOPRIL 20 MG/1
TABLET ORAL
Qty: 180 TABLET | Refills: 0 | Status: SHIPPED | OUTPATIENT
Start: 2023-09-29

## 2023-09-29 NOTE — TELEPHONE ENCOUNTER
Hub staff attempted to follow warm transfer process and was unsuccessful     Caller: FRIDA    Relationship to patient:     Best call back number: [phone]    Patient is needing: FRIDA WITH [facility]    CALLED THE MEDICATION     fluconazole (DIFLUCAN) 150 MG tablet     MG IS ON BACK ORDER.  THE PHARMACIST IS WANTING TO CHANGE 100 MG AND PLEASE CALL THEM BACK TO LET THEM KNOW.

## 2023-11-06 RX ORDER — VENLAFAXINE HYDROCHLORIDE 150 MG/1
150 CAPSULE, EXTENDED RELEASE ORAL DAILY
Qty: 90 CAPSULE | Refills: 0 | Status: SHIPPED | OUTPATIENT
Start: 2023-11-06

## 2023-11-20 RX ORDER — AMLODIPINE BESYLATE 10 MG/1
TABLET ORAL
Qty: 90 TABLET | Refills: 0 | Status: SHIPPED | OUTPATIENT
Start: 2023-11-20

## 2023-12-11 RX ORDER — FLUCONAZOLE 150 MG/1
150 TABLET ORAL DAILY
Qty: 2 TABLET | Refills: 0 | Status: SHIPPED | OUTPATIENT
Start: 2023-12-11

## 2024-01-24 RX ORDER — FLUCONAZOLE 150 MG/1
150 TABLET ORAL DAILY
Qty: 2 TABLET | Refills: 0 | Status: SHIPPED | OUTPATIENT
Start: 2024-01-24

## 2024-01-24 NOTE — TELEPHONE ENCOUNTER
Called  By another provider.  Miladis Gonzalez,Clinic Rn  [facility]     Rx Refill Note  Requested Prescriptions     Pending Prescriptions Disp Refills    fluconazole (DIFLUCAN) 150 MG tablet 2 tablet 0     Sig: Take 1 tablet by mouth Daily.      Last office visit with prescribing clinician: Visit date not found   Last telemedicine visit with prescribing clinician: Visit date not found   Next office visit with prescribing clinician: 3/1/2024

## 2024-02-08 DIAGNOSIS — I10 ESSENTIAL HYPERTENSION: ICD-10-CM

## 2024-02-08 RX ORDER — ATENOLOL 100 MG/1
100 TABLET ORAL DAILY
Qty: 90 TABLET | Refills: 1 | Status: SHIPPED | OUTPATIENT
Start: 2024-02-08

## 2024-02-08 RX ORDER — PIOGLITAZONEHYDROCHLORIDE 30 MG/1
30 TABLET ORAL DAILY
Qty: 90 TABLET | Refills: 1 | Status: SHIPPED | OUTPATIENT
Start: 2024-02-08

## 2024-02-08 RX ORDER — ROSUVASTATIN CALCIUM 10 MG/1
TABLET, COATED ORAL
Qty: 90 TABLET | Refills: 1 | Status: SHIPPED | OUTPATIENT
Start: 2024-02-08

## 2024-02-08 RX ORDER — OXYBUTYNIN CHLORIDE 5 MG/1
5 TABLET ORAL 2 TIMES DAILY
Qty: 90 TABLET | Refills: 0 | Status: SHIPPED | OUTPATIENT
Start: 2024-02-08

## 2024-02-08 NOTE — TELEPHONE ENCOUNTER
Pt requesting Oxybutynin refill please.     Pharmacy -   [id number] - [facility] [address] - [phone]  - [phone] FX

## 2024-02-13 DIAGNOSIS — I10 ESSENTIAL HYPERTENSION: ICD-10-CM

## 2024-02-13 RX ORDER — VENLAFAXINE HYDROCHLORIDE 150 MG/1
150 CAPSULE, EXTENDED RELEASE ORAL DAILY
Qty: 90 CAPSULE | Refills: 1 | Status: SHIPPED | OUTPATIENT
Start: 2024-02-13

## 2024-02-13 RX ORDER — LISINOPRIL 20 MG/1
20 TABLET ORAL 2 TIMES DAILY
Qty: 180 TABLET | Refills: 1 | Status: SHIPPED | OUTPATIENT
Start: 2024-02-13

## 2024-02-13 RX ORDER — AMLODIPINE BESYLATE 10 MG/1
10 TABLET ORAL DAILY
Qty: 90 TABLET | Refills: 1 | Status: SHIPPED | OUTPATIENT
Start: 2024-02-13

## 2024-03-01 ENCOUNTER — OFFICE VISIT (OUTPATIENT)
Dept: FAMILY MEDICINE CLINIC | Facility: CLINIC | Age: 64
End: 2024-03-01
Payer: MEDICARE

## 2024-03-01 VITALS
HEART RATE: 58 BPM | WEIGHT: 246 LBS | BODY MASS INDEX: 45.27 KG/M2 | HEIGHT: 62 IN | RESPIRATION RATE: 16 BRPM | OXYGEN SATURATION: 98 % | SYSTOLIC BLOOD PRESSURE: 138 MMHG | DIASTOLIC BLOOD PRESSURE: 84 MMHG

## 2024-03-01 DIAGNOSIS — Z79.4 TYPE 2 DIABETES MELLITUS WITH OTHER DIABETIC KIDNEY COMPLICATION, WITH LONG-TERM CURRENT USE OF INSULIN: ICD-10-CM

## 2024-03-01 DIAGNOSIS — Z12.31 ENCOUNTER FOR SCREENING MAMMOGRAM FOR MALIGNANT NEOPLASM OF BREAST: ICD-10-CM

## 2024-03-01 DIAGNOSIS — Z23 NEED FOR PROPHYLACTIC VACCINATION AGAINST STREPTOCOCCUS PNEUMONIAE (PNEUMOCOCCUS): Primary | ICD-10-CM

## 2024-03-01 DIAGNOSIS — F17.200 TOBACCO DEPENDENCY: ICD-10-CM

## 2024-03-01 DIAGNOSIS — E78.5 HYPERLIPIDEMIA, UNSPECIFIED HYPERLIPIDEMIA TYPE: ICD-10-CM

## 2024-03-01 DIAGNOSIS — E11.29 TYPE 2 DIABETES MELLITUS WITH OTHER DIABETIC KIDNEY COMPLICATION, WITH LONG-TERM CURRENT USE OF INSULIN: ICD-10-CM

## 2024-03-01 DIAGNOSIS — Z87.891 PERSONAL HISTORY OF NICOTINE DEPENDENCE: ICD-10-CM

## 2024-03-01 PROBLEM — R05.9 COUGH: Status: RESOLVED | Noted: 2018-12-28 | Resolved: 2024-03-01

## 2024-03-01 PROBLEM — L98.9 SKIN LESION: Status: RESOLVED | Noted: 2020-01-28 | Resolved: 2024-03-01

## 2024-03-01 PROBLEM — H66.90 SUBACUTE OTITIS MEDIA: Status: RESOLVED | Noted: 2018-12-28 | Resolved: 2024-03-01

## 2024-03-01 PROBLEM — Z12.11 ENCOUNTER FOR SCREENING COLONOSCOPY: Status: RESOLVED | Noted: 2017-01-03 | Resolved: 2024-03-01

## 2024-03-01 RX ORDER — VARENICLINE TARTRATE 1 MG/1
1 TABLET, FILM COATED ORAL 2 TIMES DAILY
Qty: 60 TABLET | Refills: 0 | Status: SHIPPED | OUTPATIENT
Start: 2024-03-01 | End: 2024-07-29

## 2024-03-01 NOTE — PROGRESS NOTES
The ABCs of the Annual Wellness Visit  Subsequent Medicare Wellness Visit    Subjective    Bernice Rai is a 63 y.o. female who presents for a Subsequent Medicare Wellness Visit.    The following portions of the patient's history were reviewed and   updated as appropriate: allergies, current medications, past family history, past medical history, past social history, past surgical history, and problem list.    Compared to one year ago, the patient feels her physical   health is the same.    Compared to one year ago, the patient feels her mental   health is worse.    Recent Hospitalizations:  She was not admitted to the hospital during the last year.       Current Medical Providers:  Patient Care Team:  Lio Francis MD as PCP - General (Internal Medicine)    Outpatient Medications Prior to Visit   Medication Sig Dispense Refill    albuterol sulfate HFA (Proventil HFA) 108 (90 Base) MCG/ACT inhaler Inhale 2 puffs Every 6 (Six) Hours As Needed for Wheezing. 18 g 11    amLODIPine (NORVASC) 10 MG tablet Take 1 tablet by mouth Daily. 90 tablet 1    aspirin 81 MG chewable tablet Chew 1 tablet Daily.      atenolol (TENORMIN) 100 MG tablet Take 1 tablet by mouth Daily. 90 tablet 1    desoximetasone (TOPICORT) 0.25 % ointment Apply  topically to the appropriate area as directed Every 12 (Twelve) Hours. 60 g 3    fluticasone (FLONASE) 50 MCG/ACT nasal spray 2 sprays into the nostril(s) as directed by provider Daily. 15.8 mL 0    Fluticasone Furoate-Vilanterol (Breo Ellipta) 100-25 MCG/INH inhaler Inhale 1 puff Daily. 1 each 11    hydrALAZINE (APRESOLINE) 50 MG tablet       Kerendia 20 MG tablet Take 1 tablet by mouth Daily. 90 tablet     lisinopril (PRINIVIL,ZESTRIL) 20 MG tablet Take 1 tablet by mouth 2 (Two) Times a Day. 180 tablet 1    Loperamide HCl (ANTI-DIARRHEAL PO) Take  by mouth. 1-2 pills a day      metFORMIN (GLUCOPHAGE) 500 MG tablet       nystatin (MYCOSTATIN) 100,000 unit/mL suspension Swish and  "swallow 5 mL 4 (Four) Times a Day. 200 mL 0    oxybutynin (DITROPAN) 5 MG tablet Take 1 tablet by mouth twice daily 90 tablet 0    Oxybutynin Chloride 2.5 MG tablet Take 2.5 tablets by mouth 2 (Two) Times a Day.      pioglitazone (ACTOS) 30 MG tablet Take 1 tablet by mouth Daily. 90 tablet 1    PROBIOTIC PRODUCT PO Take  by mouth.      rosuvastatin (CRESTOR) 10 MG tablet TAKE 1 TABLET BY MOUTH ONCE DAILY (NEED APPOINTMENT) 90 tablet 1    venlafaxine XR (EFFEXOR-XR) 150 MG 24 hr capsule Take 1 capsule by mouth Daily. 90 capsule 1    fluconazole (DIFLUCAN) 150 MG tablet Take 1 tablet by mouth Daily. 2 tablet 0     No facility-administered medications prior to visit.       No opioid medication identified on active medication list. I have reviewed chart for other potential  high risk medication/s and harmful drug interactions in the elderly.        Aspirin is on active medication list. Aspirin use is indicated based on review of current medical condition/s. Pros and cons of this therapy have been discussed today. Benefits of this medication outweigh potential harm.  Patient has been encouraged to continue taking this medication.  .      Patient Active Problem List   Diagnosis    Diabetes mellitus    Gastroesophageal reflux disease    Generalized osteoarthritis    Hyperlipidemia    Hypertension    Cerebral infarction    Dermatitis    Obesity due to excess calories    Monilial vaginitis    Tobacco dependency    Albuminuria    Persistent proteinuria     Advance Care Planning   Advance Care Planning     Advance Directive is not on file.  ACP discussion was held with the patient during this visit. Patient does not have an advance directive, information provided.     Objective    Vitals:    03/01/24 1305   BP: 138/84   BP Location: Right arm   Patient Position: Sitting   Cuff Size: Adult   Pulse: 58   Resp: 16   SpO2: 98%   Weight: 112 kg (246 lb)   Height: 157.5 cm (62.01\")     Estimated body mass index is 44.98 kg/m² as " "calculated from the following:    Height as of this encounter: 157.5 cm (62.01\").    Weight as of this encounter: 112 kg (246 lb).    Class 3 Severe Obesity (BMI >=40). Obesity-related health conditions include the following: hypertension, diabetes mellitus, and dyslipidemias. Obesity is worsening. BMI is is above average; BMI management plan is completed. We discussed portion control, increasing exercise, and joining a fitness center or start home based exercise program.      Does the patient have evidence of cognitive impairment? No          HEALTH RISK ASSESSMENT    Smoking Status:  Social History     Tobacco Use   Smoking Status Every Day    Packs/day: 0.50    Years: 40.00    Additional pack years: 0.00    Total pack years: 20.00    Types: Cigarettes   Smokeless Tobacco Never     Alcohol Consumption:  Social History     Substance and Sexual Activity   Alcohol Use No     Fall Risk Screen:    MALGORZATA Fall Risk Assessment was completed, and patient is at HIGH risk for falls. Assessment completed on:3/1/2024    Depression Screening:      3/1/2024     1:04 PM   PHQ-2/PHQ-9 Depression Screening   Little Interest or Pleasure in Doing Things 2-->more than half the days   Feeling Down, Depressed or Hopeless 1-->several days   Trouble Falling or Staying Asleep, or Sleeping Too Much 2-->more than half the days   Feeling Tired or Having Little Energy 2-->more than half the days   Poor Appetite or Overeating 1-->several days   Feeling Bad about Yourself - or that You are a Failure or Have Let Yourself or Your Family Down 0-->not at all   Trouble Concentrating on Things, Such as Reading the Newspaper or Watching Television 1-->several days   Moving or Speaking So Slowly that Other People Could Have Noticed? Or the Opposite - Being So Fidgety 1-->several days   Thoughts that You Would be Better Off Dead or of Hurting Yourself in Some Way 0-->not at all   PHQ-9: Brief Depression Severity Measure Score 10       Health Habits and " Functional and Cognitive Screening:      3/1/2024     1:02 PM   Functional & Cognitive Status   Do you have difficulty preparing food and eating? No   Do you have difficulty bathing yourself, getting dressed or grooming yourself? Yes   Do you have difficulty using the toilet? No   Do you have difficulty moving around from place to place? No   Do you have trouble with steps or getting out of a bed or a chair? Yes   Current Diet Limited Junk Food   Dental Exam Up to date   Eye Exam Up to date   Exercise (times per week) 1 times per week   Current Exercises Include Stationary Bicycling/Spin Class   Do you need help using the phone?  Yes   Are you deaf or do you have serious difficulty hearing?  No   Do you need help to go to places out of walking distance? Yes   Do you need help shopping? No   Do you need help preparing meals?  Yes   Do you need help with housework?  Yes   Do you need help with laundry? No   Do you need help taking your medications? No   Do you need help managing money? No   Do you ever drive or ride in a car without wearing a seat belt? No   Have you felt unusual stress, anger or loneliness in the last month? Yes   Who do you live with? Spouse   If you need help, do you have trouble finding someone available to you? No   Have you been bothered in the last four weeks by sexual problems? Yes   Do you have difficulty concentrating, remembering or making decisions? Yes       Age-appropriate Screening Schedule:  Refer to the list below for future screening recommendations based on patient's age, sex and/or medical conditions. Orders for these recommended tests are listed in the plan section. The patient has been provided with a written plan.    Health Maintenance   Topic Date Due    ZOSTER VACCINE (1 of 2) Never done    Pneumococcal Vaccine 0-64 (2 of 2 - PCV) 10/25/2019    RSV Vaccine - Adults (1 - 1-dose 60+ series) Never done    DIABETIC EYE EXAM  11/03/2022    INFLUENZA VACCINE  08/01/2023    COVID-19  "Vaccine (5 - 2023-24 season) 09/01/2023    DIABETIC FOOT EXAM  10/25/2023    MAMMOGRAM  11/10/2023    COLORECTAL CANCER SCREENING  11/22/2025 (Originally 1960)    HEMOGLOBIN A1C  03/07/2024    LIPID PANEL  09/07/2024    URINE MICROALBUMIN  09/07/2024    LUNG CANCER SCREENING  09/19/2024    ANNUAL WELLNESS VISIT  03/01/2025    BMI FOLLOWUP  03/01/2025    PAP SMEAR  08/08/2026    TDAP/TD VACCINES (3 - Td or Tdap) 10/15/2029    HEPATITIS C SCREENING  Completed                  CMS Preventative Services Quick Reference  Risk Factors Identified During Encounter  Immunizations Discussed/Encouraged: Prevnar 20 (Pneumococcal 20-valent conjugate), Shingrix, COVID19, and RSV (Respiratory Syncytial Virus)  Tobacco Use/Dependance Risk (use dotphrase .tobaccocessation for documentation)  The above risks/problems have been discussed with the patient.  Pertinent information has been shared with the patient in the After Visit Summary.  An After Visit Summary and PPPS were made available to the patient.    Follow Up:   Next Medicare Wellness visit to be scheduled in 1 year.       Additional E&M Note during same encounter follows:  Patient has multiple medical problems which are significant and separately identifiable that require additional work above and beyond the Medicare Wellness Visit.      Chief Complaint  Medicare Wellness-subsequent    Subjective          Bernice Rai is also being seen today for tobacco use, DMII, HTN. She continues to smoke, but is interested in quitting. She doesn't exercise.         Objective   Vital Signs:  /84 (BP Location: Right arm, Patient Position: Sitting, Cuff Size: Adult)   Pulse 58   Resp 16   Ht 157.5 cm (62.01\")   Wt 112 kg (246 lb)   SpO2 98%   BMI 44.98 kg/m²     Physical Exam  Vitals reviewed.   Constitutional:       Appearance: Normal appearance. She is obese.   Pulmonary:      Effort: Pulmonary effort is normal.   Neurological:      Mental Status: She is alert and " oriented to person, place, and time.   Psychiatric:         Mood and Affect: Mood normal.         Behavior: Behavior normal.          The following data was reviewed by: Lio Francis MD on 03/01/2024:  Common labs          9/7/2023    13:48   Common Labs   Glucose 103    BUN 24    Creatinine 0.94    Sodium 137    Potassium 5.1    Chloride 102    Calcium 9.9    Total Protein 6.4    Albumin 3.9    Total Bilirubin <0.2    Alkaline Phosphatase 81    AST (SGOT) 15    ALT (SGPT) 17    WBC 9.9    Hemoglobin 13.9    Hematocrit 42.6    Platelets 353    Total Cholesterol 213    Triglycerides 245    HDL Cholesterol 44    LDL Cholesterol  126    Hemoglobin A1C 6.2    Microalbumin, Urine 1,076.5      Data reviewed : 3/1/2024         Assessment and Plan   Diagnoses and all orders for this visit:    1. Need for prophylactic vaccination against Streptococcus pneumoniae (pneumococcus) (Primary)  -     Pneumococcal Conjugate Vaccine 20-Valent All    2. Tobacco dependency  -     varenicline (Chantix Continuing Month Gilberto) 1 MG tablet; Take 1 tablet by mouth 2 (Two) Times a Day for 150 days.  Dispense: 60 tablet; Refill: 0  -      CT Chest Low Dose Cancer Screening WO; Future    3. Type 2 diabetes mellitus with other diabetic kidney complication, with long-term current use of insulin  -     Hemoglobin A1c  -     Microalbumin / Creatinine Urine Ratio - Urine, Clean Catch  -     Comprehensive Metabolic Panel    4. Hyperlipidemia, unspecified hyperlipidemia type  -     Lipid Panel  -     Apolipoprotein B    5. Personal history of nicotine dependence  -      CT Chest Low Dose Cancer Screening WO; Future    6. Encounter for screening mammogram for malignant neoplasm of breast  -     Mammo screening digital tomosynthesis bilateral w CAD; Future    No changes to medication regimen at this time. Will repeat labs today and follow-up in one month to review and adjust regimen, if needed. She has multiple cardiac risk factors (HTN, HLD,  DMII, smoking) that aren't well controlled. Smoking cessation is imperative. Start Chantix and dual NRT. Lung and breast cancer screenings due and ordered. Discussed immunizations, as above.          Follow Up   No follow-ups on file.  Patient was given instructions and counseling regarding her condition or for health maintenance advice. Please see specific information pulled into the AVS if appropriate.

## 2024-03-03 LAB
ALBUMIN SERPL-MCNC: 3.6 G/DL (ref 3.9–4.9)
ALBUMIN/CREAT UR: 5131 MG/G CREAT (ref 0–29)
ALBUMIN/GLOB SERPL: 1.5 {RATIO} (ref 1.2–2.2)
ALP SERPL-CCNC: 94 IU/L (ref 44–121)
ALT SERPL-CCNC: 10 IU/L (ref 0–32)
APO B SERPL-MCNC: 128 MG/DL
AST SERPL-CCNC: 14 IU/L (ref 0–40)
BILIRUB SERPL-MCNC: <0.2 MG/DL (ref 0–1.2)
BUN SERPL-MCNC: 28 MG/DL (ref 8–27)
BUN/CREAT SERPL: 27 (ref 12–28)
CALCIUM SERPL-MCNC: 9.9 MG/DL (ref 8.7–10.3)
CHLORIDE SERPL-SCNC: 104 MMOL/L (ref 96–106)
CHOLEST SERPL-MCNC: 216 MG/DL (ref 100–199)
CO2 SERPL-SCNC: 21 MMOL/L (ref 20–29)
CREAT SERPL-MCNC: 1.05 MG/DL (ref 0.57–1)
CREAT UR-MCNC: 82.6 MG/DL
EGFRCR SERPLBLD CKD-EPI 2021: 60 ML/MIN/1.73
GLOBULIN SER CALC-MCNC: 2.4 G/DL (ref 1.5–4.5)
GLUCOSE SERPL-MCNC: 153 MG/DL (ref 70–99)
HBA1C MFR BLD: 7.6 % (ref 4.8–5.6)
HDLC SERPL-MCNC: 38 MG/DL
LDLC SERPL CALC-MCNC: 141 MG/DL (ref 0–99)
MICROALBUMIN UR-MCNC: 4238.5 UG/ML
POTASSIUM SERPL-SCNC: 5.5 MMOL/L (ref 3.5–5.2)
PROT SERPL-MCNC: 6 G/DL (ref 6–8.5)
SODIUM SERPL-SCNC: 138 MMOL/L (ref 134–144)
TRIGL SERPL-MCNC: 202 MG/DL (ref 0–149)
VLDLC SERPL CALC-MCNC: 37 MG/DL (ref 5–40)

## 2024-03-15 ENCOUNTER — HOSPITAL ENCOUNTER (OUTPATIENT)
Dept: MAMMOGRAPHY | Facility: HOSPITAL | Age: 64
Discharge: HOME | End: 2024-03-15
Payer: MEDICARE

## 2024-03-15 ENCOUNTER — HOSPITAL ENCOUNTER (OUTPATIENT)
Dept: CT IMAGING | Facility: HOSPITAL | Age: 64
Discharge: HOME | End: 2024-03-15
Payer: MEDICARE

## 2024-03-15 DIAGNOSIS — F17.200 TOBACCO DEPENDENCY: ICD-10-CM

## 2024-03-15 DIAGNOSIS — Z12.31 ENCOUNTER FOR SCREENING MAMMOGRAM FOR MALIGNANT NEOPLASM OF BREAST: ICD-10-CM

## 2024-03-15 DIAGNOSIS — Z87.891 PERSONAL HISTORY OF NICOTINE DEPENDENCE: ICD-10-CM

## 2024-03-15 PROCEDURE — 71271 CT THORAX LUNG CANCER SCR C-: CPT

## 2024-03-15 PROCEDURE — 77063 BREAST TOMOSYNTHESIS BI: CPT

## 2024-03-15 PROCEDURE — 77067 SCR MAMMO BI INCL CAD: CPT

## 2024-03-19 ENCOUNTER — TELEPHONE (OUTPATIENT)
Dept: FAMILY MEDICINE CLINIC | Facility: CLINIC | Age: 64
End: 2024-03-19
Payer: MEDICARE

## 2024-03-19 NOTE — TELEPHONE ENCOUNTER
WOODT MESSAGE SENT,APPT CANCELLED, OKAY FOR HUB TO RELAY AND SCHEDULE, THANKS    Hello,   I'm writing to inform you that Dr. Francis is no longer with [facility]. Your future appointments have been cancelled. I can easily transfer your care to another provider in our office including:     [address]     Please call our office and we will be glad to assist you. Our office number is [phone].  Thank you for entrusting us with your care. We apologize for the inconvenience.     Sincerely,  [facility]

## 2024-03-20 ENCOUNTER — PATIENT MESSAGE (OUTPATIENT)
Dept: FAMILY MEDICINE CLINIC | Facility: CLINIC | Age: 64
End: 2024-03-20
Payer: MEDICARE

## 2024-03-27 RX ORDER — OXYBUTYNIN CHLORIDE 5 MG/1
5 TABLET ORAL 2 TIMES DAILY
Qty: 90 TABLET | Refills: 0 | Status: SHIPPED | OUTPATIENT
Start: 2024-03-27

## 2024-04-05 ENCOUNTER — TELEPHONE (OUTPATIENT)
Dept: FAMILY MEDICINE CLINIC | Facility: CLINIC | Age: 64
End: 2024-04-05

## 2024-04-05 RX ORDER — FLUCONAZOLE 150 MG/1
150 TABLET ORAL DAILY
Qty: 2 TABLET | Refills: 0 | OUTPATIENT
Start: 2024-04-05

## 2024-05-01 ENCOUNTER — OFFICE VISIT (OUTPATIENT)
Dept: FAMILY MEDICINE CLINIC | Facility: CLINIC | Age: 64
End: 2024-05-01
Payer: MEDICARE

## 2024-05-01 VITALS
DIASTOLIC BLOOD PRESSURE: 78 MMHG | OXYGEN SATURATION: 97 % | RESPIRATION RATE: 16 BRPM | HEART RATE: 89 BPM | TEMPERATURE: 98.4 F | HEIGHT: 62 IN | BODY MASS INDEX: 45.08 KG/M2 | SYSTOLIC BLOOD PRESSURE: 128 MMHG | WEIGHT: 245 LBS

## 2024-05-01 DIAGNOSIS — R80.9 MICROALBUMINURIA: ICD-10-CM

## 2024-05-01 DIAGNOSIS — I10 ESSENTIAL HYPERTENSION: ICD-10-CM

## 2024-05-01 DIAGNOSIS — F41.9 ANXIETY AND DEPRESSION: ICD-10-CM

## 2024-05-01 DIAGNOSIS — B35.4 TINEA CORPORIS: ICD-10-CM

## 2024-05-01 DIAGNOSIS — E78.2 MIXED HYPERLIPIDEMIA: ICD-10-CM

## 2024-05-01 DIAGNOSIS — F32.A ANXIETY AND DEPRESSION: ICD-10-CM

## 2024-05-01 DIAGNOSIS — R26.89 BALANCE PROBLEM: ICD-10-CM

## 2024-05-01 DIAGNOSIS — F17.200 TOBACCO DEPENDENCY: ICD-10-CM

## 2024-05-01 DIAGNOSIS — E11.29 TYPE 2 DIABETES MELLITUS WITH OTHER DIABETIC KIDNEY COMPLICATION, WITH LONG-TERM CURRENT USE OF INSULIN: Primary | ICD-10-CM

## 2024-05-01 DIAGNOSIS — Z79.4 TYPE 2 DIABETES MELLITUS WITH OTHER DIABETIC KIDNEY COMPLICATION, WITH LONG-TERM CURRENT USE OF INSULIN: Primary | ICD-10-CM

## 2024-05-01 PROBLEM — M19.90 OSTEOARTHRITIS: Status: ACTIVE | Noted: 2021-09-30

## 2024-05-01 PROBLEM — N05.9 FIBRILLARY GLOMERULONEPHRITIS: Status: ACTIVE | Noted: 2024-05-01

## 2024-05-01 PROCEDURE — 1159F MED LIST DOCD IN RCRD: CPT | Performed by: NURSE PRACTITIONER

## 2024-05-01 PROCEDURE — 99214 OFFICE O/P EST MOD 30 MIN: CPT | Performed by: NURSE PRACTITIONER

## 2024-05-01 PROCEDURE — 3051F HG A1C>EQUAL 7.0%<8.0%: CPT | Performed by: NURSE PRACTITIONER

## 2024-05-01 PROCEDURE — 3074F SYST BP LT 130 MM HG: CPT | Performed by: NURSE PRACTITIONER

## 2024-05-01 PROCEDURE — 3078F DIAST BP <80 MM HG: CPT | Performed by: NURSE PRACTITIONER

## 2024-05-01 PROCEDURE — 1160F RVW MEDS BY RX/DR IN RCRD: CPT | Performed by: NURSE PRACTITIONER

## 2024-05-01 RX ORDER — ROSUVASTATIN CALCIUM 10 MG/1
TABLET, COATED ORAL
Qty: 90 TABLET | Refills: 1 | Status: SHIPPED | OUTPATIENT
Start: 2024-05-01

## 2024-05-01 RX ORDER — ATENOLOL 100 MG/1
100 TABLET ORAL DAILY
Qty: 90 TABLET | Refills: 1 | Status: SHIPPED | OUTPATIENT
Start: 2024-05-01

## 2024-05-01 RX ORDER — VENLAFAXINE HYDROCHLORIDE 150 MG/1
150 CAPSULE, EXTENDED RELEASE ORAL DAILY
Qty: 90 CAPSULE | Refills: 1 | Status: SHIPPED | OUTPATIENT
Start: 2024-05-01

## 2024-05-01 RX ORDER — EZETIMIBE 10 MG/1
10 TABLET ORAL DAILY
Qty: 90 TABLET | Refills: 1 | Status: SHIPPED | OUTPATIENT
Start: 2024-05-01

## 2024-05-01 RX ORDER — AMLODIPINE BESYLATE 10 MG/1
10 TABLET ORAL DAILY
Qty: 90 TABLET | Refills: 1 | Status: SHIPPED | OUTPATIENT
Start: 2024-05-01

## 2024-05-01 RX ORDER — FLUCONAZOLE 150 MG/1
150 TABLET ORAL ONCE
COMMUNITY
End: 2024-05-01

## 2024-05-01 RX ORDER — LISINOPRIL 20 MG/1
20 TABLET ORAL 2 TIMES DAILY
Qty: 180 TABLET | Refills: 1 | Status: SHIPPED | OUTPATIENT
Start: 2024-05-01

## 2024-05-01 RX ORDER — VARENICLINE TARTRATE 1 MG/1
1 TABLET, FILM COATED ORAL 2 TIMES DAILY
Qty: 60 TABLET | Refills: 0 | Status: SHIPPED | OUTPATIENT
Start: 2024-05-01

## 2024-05-01 NOTE — PROGRESS NOTES
Subjective     Bernice Rai is a 63 y.o.. female.     Patient here today to become established.     Patient with problem list that includes hypertension, hyperlipidemia, diabetes, anxiety, depression and smoker. Patient with history of CVA.    Patient stating she sees a endocrinologist for her diabetes. Patient does not know who that is and can not recall when she last saw them. Can not find a endocrinologist note in Patient's chart. Patient can not discuss diabetes medications. Patient stating she is on insulin, but does not know which type and does not know how much. Patient stating her last time seeing endocrinologist was at least over 6 months ago.    Patient stating she is trying to eat healthier, admits to eating out, is trying to drink more water, admits to drinking diet pepsi at least 3 a day. Patient stating she gets exercise by gardening and doing stretches.       The following portions of the patient's history were reviewed and updated as appropriate: allergies, current medications, past family history, past medical history, past social history, past surgical history and problem list.    Past Medical History:   Diagnosis Date    Allergic Years ago    Took allergy shots    Anxiety     Stroke    Arthritis     FEET    Asthma     Inhaler    Clotting disorder     Taking asa    COPD (chronic obstructive pulmonary disease)     At times bronchitis    Depression 10/22/01    Stroke    Diabetes mellitus     GERD (gastroesophageal reflux disease)     Gerd    Hyperlipidemia     Hypertension     Obesity     Reflux esophagitis     Renal insufficiency     Stroke     Urinary tract infection     Occasionaly    Visual impairment 10/2001    1/2 vision each eye       Past Surgical History:   Procedure Laterality Date     SECTION      x 2    OTHER SURGICAL HISTORY      FATTY TUMOR REMOVED RIGHT HIP    UMBILICAL HERNIA REPAIR      Mid abdominal hernia       Review of Systems   Constitutional: Negative.   "  Respiratory: Negative.     Cardiovascular: Negative.    Psychiatric/Behavioral:  Negative for dysphoric mood, self-injury and suicidal ideas. The patient is not nervous/anxious.        Allergies   Allergen Reactions    Jardiance [Empagliflozin] Itching     Recurrent yeast infections       Objective     Vitals:    05/01/24 1312   BP: 128/78   Pulse: 89   Resp: 16   Temp: 98.4 °F (36.9 °C)   SpO2: 97%   Weight: 111 kg (245 lb)   Height: 157.5 cm (62.01\")     Body mass index is 44.8 kg/m².    Physical Exam  Vitals reviewed.   HENT:      Head: Normocephalic.   Eyes:      Pupils: Pupils are equal, round, and reactive to light.   Cardiovascular:      Rate and Rhythm: Normal rate and regular rhythm.   Pulmonary:      Effort: Pulmonary effort is normal.      Breath sounds: Normal breath sounds.   Musculoskeletal:      Right lower leg: No edema.      Left lower leg: No edema.   Skin:     General: Skin is warm and dry.      Comments: Left breast: minor dark pink area noted, no discharge noted   Right breast: red macular rash, no discharge noted  Groin: unable to assess due to Patient declining   Neurological:      Mental Status: She is alert and oriented to person, place, and time.   Psychiatric:         Speech: Speech normal.         Behavior: Behavior is cooperative.           Current Outpatient Medications:     albuterol sulfate HFA (Proventil HFA) 108 (90 Base) MCG/ACT inhaler, Inhale 2 puffs Every 6 (Six) Hours As Needed for Wheezing., Disp: 18 g, Rfl: 11    amLODIPine (NORVASC) 10 MG tablet, Take 1 tablet by mouth Daily., Disp: 90 tablet, Rfl: 1    aspirin 81 MG chewable tablet, Chew 1 tablet Daily., Disp: , Rfl:     atenolol (TENORMIN) 100 MG tablet, Take 1 tablet by mouth Daily., Disp: 90 tablet, Rfl: 1    desoximetasone (TOPICORT) 0.25 % ointment, Apply  topically to the appropriate area as directed Every 12 (Twelve) Hours., Disp: 60 g, Rfl: 3    fluticasone (FLONASE) 50 MCG/ACT nasal spray, 2 sprays into the " nostril(s) as directed by provider Daily., Disp: 15.8 mL, Rfl: 0    Fluticasone Furoate-Vilanterol (Breo Ellipta) 100-25 MCG/INH inhaler, Inhale 1 puff Daily., Disp: 1 each, Rfl: 11    hydrALAZINE (APRESOLINE) 50 MG tablet, , Disp: , Rfl:     Kerendia 20 MG tablet, Take 1 tablet by mouth Daily., Disp: 90 tablet, Rfl:     lisinopril (PRINIVIL,ZESTRIL) 20 MG tablet, Take 1 tablet by mouth 2 (Two) Times a Day., Disp: 180 tablet, Rfl: 1    Loperamide HCl (ANTI-DIARRHEAL PO), Take  by mouth. 1-2 pills a day, Disp: , Rfl:     metFORMIN (GLUCOPHAGE) 500 MG tablet, , Disp: , Rfl:     nystatin (MYCOSTATIN) 100,000 unit/mL suspension, Swish and swallow 5 mL 4 (Four) Times a Day., Disp: 200 mL, Rfl: 0    oxybutynin (DITROPAN) 5 MG tablet, Take 1 tablet by mouth twice daily, Disp: 90 tablet, Rfl: 0    pioglitazone (ACTOS) 30 MG tablet, Take 1 tablet by mouth Daily., Disp: 90 tablet, Rfl: 1    PROBIOTIC PRODUCT PO, Take  by mouth., Disp: , Rfl:     rosuvastatin (CRESTOR) 10 MG tablet, TAKE 1 TABLET BY MOUTH ONCE DAILY (NEED APPOINTMENT), Disp: 90 tablet, Rfl: 1    varenicline (Chantix Continuing Month Gilberto) 1 MG tablet, Take 1 tablet by mouth 2 (Two) Times a Day., Disp: 60 tablet, Rfl: 0    venlafaxine XR (EFFEXOR-XR) 150 MG 24 hr capsule, Take 1 capsule by mouth Daily., Disp: 90 capsule, Rfl: 1    ciclopirox (LOPROX) 0.77 % cream, Apply 1 Application topically to the appropriate area as directed 2 (Two) Times a Day., Disp: 30 g, Rfl: 2    ezetimibe (Zetia) 10 MG tablet, Take 1 tablet by mouth Daily., Disp: 90 tablet, Rfl: 1    Recent Results (from the past 2016 hour(s))   Hemoglobin A1c    Collection Time: 03/01/24  1:46 PM    Specimen: Blood   Result Value Ref Range    Hemoglobin A1C 7.6 (H) 4.8 - 5.6 %   Microalbumin / Creatinine Urine Ratio - Urine, Clean Catch    Collection Time: 03/01/24  1:46 PM    Specimen: Urine, Clean Catch   Result Value Ref Range    Creatinine, Urine 82.6 Not Estab. mg/dL    Microalbumin, Urine  4,238.5 Not Estab. ug/mL    Microalbumin/Creatinine Ratio 5,131 (H) 0 - 29 mg/g creat   Lipid Panel    Collection Time: 03/01/24  1:46 PM    Specimen: Blood   Result Value Ref Range    Total Cholesterol 216 (H) 100 - 199 mg/dL    Triglycerides 202 (H) 0 - 149 mg/dL    HDL Cholesterol 38 (L) >39 mg/dL    VLDL Cholesterol Elliott 37 5 - 40 mg/dL    LDL Chol Calc (NIH) 141 (H) 0 - 99 mg/dL   Apolipoprotein B    Collection Time: 03/01/24  1:46 PM    Specimen: Blood   Result Value Ref Range    Apolipoprotein B 128 (H) <90 mg/dL   Comprehensive Metabolic Panel    Collection Time: 03/01/24  1:46 PM    Specimen: Blood   Result Value Ref Range    Glucose 153 (H) 70 - 99 mg/dL    BUN 28 (H) 8 - 27 mg/dL    Creatinine 1.05 (H) 0.57 - 1.00 mg/dL    EGFR Result 60 >59 mL/min/1.73    BUN/Creatinine Ratio 27 12 - 28    Sodium 138 134 - 144 mmol/L    Potassium 5.5 (H) 3.5 - 5.2 mmol/L    Chloride 104 96 - 106 mmol/L    Total CO2 21 20 - 29 mmol/L    Calcium 9.9 8.7 - 10.3 mg/dL    Total Protein 6.0 6.0 - 8.5 g/dL    Albumin 3.6 (L) 3.9 - 4.9 g/dL    Globulin 2.4 1.5 - 4.5 g/dL    A/G Ratio 1.5 1.2 - 2.2    Total Bilirubin <0.2 0.0 - 1.2 mg/dL    Alkaline Phosphatase 94 44 - 121 IU/L    AST (SGOT) 14 0 - 40 IU/L    ALT (SGPT) 10 0 - 32 IU/L       CT Chest Low Dose Cancer Screening WO  CT CHEST LOW DOSE CANCER SCREENING WO-     Radiation dose reduction techniques were utilized, including automated  exposure control and exposure modulation based on body size.     Low dose screening lung CT     Clinical: minimum of 30 pack year history of smoking     CTDI 5.4 mGy     .5 mGycm     COMPARISON examination 9/19/2023     FINDINGS: There is coronary artery calcification. Heart size within  normal limits. The esophagus is normal. Atherosclerotic calcification of  a normal diameter aorta. Old granulomatous disease right lung.     There are small noncalcified pulmonary nodules measuring up to 6 mm.  These are similar to the previous  examination. No new pulmonary nodule  has developed. No effusion or acute airspace disease.     Limited images through the upper abdomen demonstrate a stable hypodense  left adrenal nodule, likely adenoma.        Conclusion: Stable noncalcified pulmonary nodules measuring up to 6 mm.     Lung Rads category: 3     Recommendations: 6-month low-dose CT chest              This report was finalized on 3/19/2024 8:20 AM by Dr. Winston Thomas M.D  on Workstation: [id number]           Diagnoses and all orders for this visit:    1. Type 2 diabetes mellitus with other diabetic kidney complication, with long-term current use of insulin (Primary)  -     Ambulatory Referral to Endocrinology    2. Microalbuminuria    3. Mixed hyperlipidemia  -     ezetimibe (Zetia) 10 MG tablet; Take 1 tablet by mouth Daily.  Dispense: 90 tablet; Refill: 1  -     rosuvastatin (CRESTOR) 10 MG tablet; TAKE 1 TABLET BY MOUTH ONCE DAILY (NEED APPOINTMENT)  Dispense: 90 tablet; Refill: 1    4. Essential hypertension  -     amLODIPine (NORVASC) 10 MG tablet; Take 1 tablet by mouth Daily.  Dispense: 90 tablet; Refill: 1  -     atenolol (TENORMIN) 100 MG tablet; Take 1 tablet by mouth Daily.  Dispense: 90 tablet; Refill: 1  -     lisinopril (PRINIVIL,ZESTRIL) 20 MG tablet; Take 1 tablet by mouth 2 (Two) Times a Day.  Dispense: 180 tablet; Refill: 1    5. Anxiety and depression  -     venlafaxine XR (EFFEXOR-XR) 150 MG 24 hr capsule; Take 1 capsule by mouth Daily.  Dispense: 90 capsule; Refill: 1    6. Tinea corporis  -     ciclopirox (LOPROX) 0.77 % cream; Apply 1 Application topically to the appropriate area as directed 2 (Two) Times a Day.  Dispense: 30 g; Refill: 2    7. Tobacco dependency  -     varenicline (Chantix Continuing Month Gilberto) 1 MG tablet; Take 1 tablet by mouth 2 (Two) Times a Day.  Dispense: 60 tablet; Refill: 0    8. Balance problem  -     Ambulatory Referral to Physical Therapy      Patient Instructions   Diabetes: referring to  endocrinology; unable to discover where Patient goes and what medication she is taking. need to get Patient on a diabetes regimen and keep her motivated to stay on medication as prescribed.    Microalbuminuria:  Patient sees nephrologist Dr. Sow at [facility], follow up as directed/as needed.     Hyperlipidemia: Patient's labs from March shows an uncontrolled cholesterol levels, starting Patient on ezetimibe 10 mg 1 tab daily, continue rosuvastatin 10 mg 1 tab daily; recommend Patient to work on eating a heart healthy low fat low low sugar diet, drink plenty of water with goal 64 oz a day and exercise 2-3 times a week, for more than 30 minutes at a time.    Hypertension: stable, continue amlodipine 10 mg 1 tab daily, atenolol 100 mg 1 tab daily, and lisinopril 20 mg 1 tab daily.     Anxiety and depression: stable, continue effexor  mg 1 tab daily. recommend getting regular exercise, getting outside, getting plenty of sleep, eating a heart healthy diet, and finding activities that you enjoy doing and making time to do them. National Suicide Prevention Lifeline 988. This is free, 24-hour help.    Tinea corporis: Discussed using otc zeazorb powder to areas at beginning of symptoms (to left under breast location at this time). sending over script for ciclopirox cream, to be used once a rash has developed and is red/inflamed (to right under breast location at this time).     Smoker: Patient stating she is doing well on chantix and is requesting refill. Chantix script sent to Patient's pharmacy.     Balance disorder: referring to physical therapy for eval/tx.       Return for fasting labs and follow up in sept.

## 2024-05-01 NOTE — PATIENT INSTRUCTIONS
Diabetes: referring to endocrinology; unable to discover where Patient goes and what medication she is taking. need to get Patient on a diabetes regimen and keep her motivated to stay on medication as prescribed.    Microalbuminuria:  Patient sees nephrologist Dr. Sow at [facility], follow up as directed/as needed.     Hyperlipidemia: Patient's labs from March shows an uncontrolled cholesterol levels, starting Patient on ezetimibe 10 mg 1 tab daily, continue rosuvastatin 10 mg 1 tab daily; recommend Patient to work on eating a heart healthy low fat low low sugar diet, drink plenty of water with goal 64 oz a day and exercise 2-3 times a week, for more than 30 minutes at a time.    Hypertension: stable, continue amlodipine 10 mg 1 tab daily, atenolol 100 mg 1 tab daily, and lisinopril 20 mg 1 tab daily.     Anxiety and depression: stable, continue effexor  mg 1 tab daily. recommend getting regular exercise, getting outside, getting plenty of sleep, eating a heart healthy diet, and finding activities that you enjoy doing and making time to do them. National Suicide Prevention Lifeline 988. This is free, 24-hour help.    Tinea corporis: Discussed using otc zeazorb powder to areas at beginning of symptoms (to left under breast location at this time). sending over script for ciclopirox cream, to be used once a rash has developed and is red/inflamed (to right under breast location at this time).     Smoker: Patient stating she is doing well on chantix and is requesting refill. Chantix script sent to Patient's pharmacy.     Balance disorder: referring to physical therapy for eval/tx.

## 2024-05-09 ENCOUNTER — TELEPHONE (OUTPATIENT)
Dept: FAMILY MEDICINE CLINIC | Facility: CLINIC | Age: 64
End: 2024-05-09

## 2024-05-09 NOTE — TELEPHONE ENCOUNTER
Provider: SHANEL RIVERS    Caller: MIKAELA WITH [facility]     Relationship to Patient: THERAPIST    Phone Number: [phone]     Reason for Call: [facility] IS NOT ABLE TO CONTACT THE PATIENT AND HAS LEFT SEVERAL MESSAGES ON HER PHONE THAT SHE HAS NOT RESPONDED TO.     MIKAELA IS JUST CALLING TO ADVISE SHANEL RIVERS OF THIS ISSUE.

## 2024-05-23 DIAGNOSIS — F17.200 TOBACCO DEPENDENCY: ICD-10-CM

## 2024-05-23 RX ORDER — VARENICLINE TARTRATE 1 MG/1
1 TABLET, FILM COATED ORAL 2 TIMES DAILY
Qty: 60 TABLET | Refills: 0 | Status: SHIPPED | OUTPATIENT
Start: 2024-05-23

## 2024-05-23 NOTE — TELEPHONE ENCOUNTER
Rx Refill Note  Requested Prescriptions     Pending Prescriptions Disp Refills    varenicline (Chantix Continuing Month Gilberto) 1 MG tablet 60 tablet 0     Sig: Take 1 tablet by mouth 2 (Two) Times a Day.      Last office visit with prescribing clinician: 5/1/2024   Last telemedicine visit with prescribing clinician: Visit date not found   Next office visit with prescribing clinician: 11/8/2024                         Would you like a call back once the refill request has been completed: [] Yes [] No    If the office needs to give you a call back, can they leave a voicemail: [] Yes [] No    Concha Schwartz MA  05/23/24, 07:37 EDT

## 2024-09-16 DIAGNOSIS — E11.29 TYPE 2 DIABETES MELLITUS WITH OTHER DIABETIC KIDNEY COMPLICATION, WITH LONG-TERM CURRENT USE OF INSULIN: Primary | ICD-10-CM

## 2024-09-16 DIAGNOSIS — Z79.4 TYPE 2 DIABETES MELLITUS WITH OTHER DIABETIC KIDNEY COMPLICATION, WITH LONG-TERM CURRENT USE OF INSULIN: Primary | ICD-10-CM

## 2024-09-16 RX ORDER — PIOGLITAZONEHYDROCHLORIDE 30 MG/1
30 TABLET ORAL DAILY
Qty: 90 TABLET | Refills: 1 | Status: SHIPPED | OUTPATIENT
Start: 2024-09-16

## 2024-10-24 DIAGNOSIS — E78.2 MIXED HYPERLIPIDEMIA: ICD-10-CM

## 2024-10-24 RX ORDER — EZETIMIBE 10 MG/1
10 TABLET ORAL DAILY
Qty: 90 TABLET | Refills: 1 | Status: SHIPPED | OUTPATIENT
Start: 2024-10-24

## 2024-11-01 DIAGNOSIS — E11.29 TYPE 2 DIABETES MELLITUS WITH OTHER DIABETIC KIDNEY COMPLICATION, WITH LONG-TERM CURRENT USE OF INSULIN: ICD-10-CM

## 2024-11-01 DIAGNOSIS — E78.2 MIXED HYPERLIPIDEMIA: Primary | ICD-10-CM

## 2024-11-01 DIAGNOSIS — Z79.4 TYPE 2 DIABETES MELLITUS WITH OTHER DIABETIC KIDNEY COMPLICATION, WITH LONG-TERM CURRENT USE OF INSULIN: ICD-10-CM

## 2024-11-01 DIAGNOSIS — R80.9 MICROALBUMINURIA: ICD-10-CM

## 2024-11-01 DIAGNOSIS — I10 ESSENTIAL HYPERTENSION: ICD-10-CM

## 2024-11-05 LAB
ALBUMIN SERPL-MCNC: 3.5 G/DL (ref 3.9–4.9)
ALBUMIN/CREAT UR: 3201 MG/G CREAT (ref 0–29)
ALP SERPL-CCNC: 82 IU/L (ref 44–121)
ALT SERPL-CCNC: 10 IU/L (ref 0–32)
APPEARANCE UR: ABNORMAL
AST SERPL-CCNC: 11 IU/L (ref 0–40)
BACTERIA #/AREA URNS HPF: ABNORMAL /[HPF]
BASOPHILS # BLD AUTO: 0.1 X10E3/UL (ref 0–0.2)
BASOPHILS NFR BLD AUTO: 1 %
BILIRUB SERPL-MCNC: 0.2 MG/DL (ref 0–1.2)
BILIRUB UR QL STRIP: NEGATIVE
BUN SERPL-MCNC: 25 MG/DL (ref 8–27)
BUN/CREAT SERPL: 24 (ref 12–28)
CALCIUM SERPL-MCNC: 9.3 MG/DL (ref 8.7–10.3)
CASTS URNS QL MICRO: ABNORMAL /LPF
CHLORIDE SERPL-SCNC: 111 MMOL/L (ref 96–106)
CHOLEST SERPL-MCNC: 186 MG/DL (ref 100–199)
CO2 SERPL-SCNC: 16 MMOL/L (ref 20–29)
COLOR UR: YELLOW
CREAT SERPL-MCNC: 1.05 MG/DL (ref 0.57–1)
CREAT UR-MCNC: 92.7 MG/DL
EGFRCR SERPLBLD CKD-EPI 2021: 60 ML/MIN/1.73
EOSINOPHIL # BLD AUTO: 0.2 X10E3/UL (ref 0–0.4)
EOSINOPHIL NFR BLD AUTO: 2 %
EPI CELLS #/AREA URNS HPF: >10 /HPF (ref 0–10)
ERYTHROCYTE [DISTWIDTH] IN BLOOD BY AUTOMATED COUNT: 12.1 % (ref 11.7–15.4)
GLOBULIN SER CALC-MCNC: 2.2 G/DL (ref 1.5–4.5)
GLUCOSE SERPL-MCNC: 194 MG/DL (ref 70–99)
GLUCOSE UR QL STRIP: ABNORMAL
HBA1C MFR BLD: 7.8 % (ref 4.8–5.6)
HCT VFR BLD AUTO: 41.2 % (ref 34–46.6)
HDLC SERPL-MCNC: 34 MG/DL
HGB BLD-MCNC: 13 G/DL (ref 11.1–15.9)
HGB UR QL STRIP: NEGATIVE
IMM GRANULOCYTES # BLD AUTO: 0 X10E3/UL (ref 0–0.1)
IMM GRANULOCYTES NFR BLD AUTO: 0 %
KETONES UR QL STRIP: NEGATIVE
LDLC SERPL CALC-MCNC: 111 MG/DL (ref 0–99)
LDLC/HDLC SERPL: 3.3 RATIO (ref 0–3.2)
LEUKOCYTE ESTERASE UR QL STRIP: ABNORMAL
LYMPHOCYTES # BLD AUTO: 2.6 X10E3/UL (ref 0.7–3.1)
LYMPHOCYTES NFR BLD AUTO: 29 %
MCH RBC QN AUTO: 29.4 PG (ref 26.6–33)
MCHC RBC AUTO-ENTMCNC: 31.6 G/DL (ref 31.5–35.7)
MCV RBC AUTO: 93 FL (ref 79–97)
MICRO URNS: ABNORMAL
MICROALBUMIN UR-MCNC: 2967.1 UG/ML
MONOCYTES # BLD AUTO: 0.9 X10E3/UL (ref 0.1–0.9)
MONOCYTES NFR BLD AUTO: 10 %
NEUTROPHILS # BLD AUTO: 5.4 X10E3/UL (ref 1.4–7)
NEUTROPHILS NFR BLD AUTO: 58 %
NITRITE UR QL STRIP: NEGATIVE
PH UR STRIP: 5.5 [PH] (ref 5–7.5)
PLATELET # BLD AUTO: 342 X10E3/UL (ref 150–450)
POTASSIUM SERPL-SCNC: 5.2 MMOL/L (ref 3.5–5.2)
PROT SERPL-MCNC: 5.7 G/DL (ref 6–8.5)
PROT UR QL STRIP: ABNORMAL
RBC # BLD AUTO: 4.42 X10E6/UL (ref 3.77–5.28)
RBC #/AREA URNS HPF: ABNORMAL /HPF (ref 0–2)
SODIUM SERPL-SCNC: 139 MMOL/L (ref 134–144)
SP GR UR STRIP: 1.02 (ref 1–1.03)
TRIGL SERPL-MCNC: 233 MG/DL (ref 0–149)
TSH SERPL DL<=0.005 MIU/L-ACNC: 2.89 UIU/ML (ref 0.45–4.5)
UROBILINOGEN UR STRIP-MCNC: 0.2 MG/DL (ref 0.2–1)
VLDLC SERPL CALC-MCNC: 41 MG/DL (ref 5–40)
WBC # BLD AUTO: 9.2 X10E3/UL (ref 3.4–10.8)
WBC #/AREA URNS HPF: >30 /HPF (ref 0–5)

## 2024-11-08 ENCOUNTER — OFFICE VISIT (OUTPATIENT)
Dept: FAMILY MEDICINE CLINIC | Facility: CLINIC | Age: 64
End: 2024-11-08
Payer: MEDICARE

## 2024-11-08 VITALS
SYSTOLIC BLOOD PRESSURE: 118 MMHG | TEMPERATURE: 97.7 F | BODY MASS INDEX: 46.74 KG/M2 | HEART RATE: 60 BPM | WEIGHT: 254 LBS | HEIGHT: 62 IN | RESPIRATION RATE: 16 BRPM | OXYGEN SATURATION: 97 % | DIASTOLIC BLOOD PRESSURE: 62 MMHG

## 2024-11-08 DIAGNOSIS — F32.A ANXIETY AND DEPRESSION: ICD-10-CM

## 2024-11-08 DIAGNOSIS — R05.1 ACUTE COUGH: ICD-10-CM

## 2024-11-08 DIAGNOSIS — R82.90 ABNORMAL URINALYSIS: ICD-10-CM

## 2024-11-08 DIAGNOSIS — N32.81 OAB (OVERACTIVE BLADDER): ICD-10-CM

## 2024-11-08 DIAGNOSIS — J01.10 ACUTE FRONTAL SINUSITIS, RECURRENCE NOT SPECIFIED: ICD-10-CM

## 2024-11-08 DIAGNOSIS — I10 ESSENTIAL HYPERTENSION: Primary | ICD-10-CM

## 2024-11-08 DIAGNOSIS — N18.31 STAGE 3A CHRONIC KIDNEY DISEASE: ICD-10-CM

## 2024-11-08 DIAGNOSIS — R91.8 PULMONARY NODULES: ICD-10-CM

## 2024-11-08 DIAGNOSIS — Z79.4 TYPE 2 DIABETES MELLITUS WITH OTHER DIABETIC KIDNEY COMPLICATION, WITH LONG-TERM CURRENT USE OF INSULIN: ICD-10-CM

## 2024-11-08 DIAGNOSIS — E11.29 TYPE 2 DIABETES MELLITUS WITH OTHER DIABETIC KIDNEY COMPLICATION, WITH LONG-TERM CURRENT USE OF INSULIN: ICD-10-CM

## 2024-11-08 DIAGNOSIS — J02.9 PHARYNGITIS, UNSPECIFIED ETIOLOGY: ICD-10-CM

## 2024-11-08 DIAGNOSIS — F41.9 ANXIETY AND DEPRESSION: ICD-10-CM

## 2024-11-08 DIAGNOSIS — E78.2 MIXED HYPERLIPIDEMIA: ICD-10-CM

## 2024-11-08 LAB
EXPIRATION DATE: NORMAL
EXPIRATION DATE: NORMAL
FLUAV AG UPPER RESP QL IA.RAPID: NOT DETECTED
FLUBV AG UPPER RESP QL IA.RAPID: NOT DETECTED
INTERNAL CONTROL: NORMAL
INTERNAL CONTROL: NORMAL
Lab: NORMAL
Lab: NORMAL
S PYO AG THROAT QL: NEGATIVE
SARS-COV-2 AG UPPER RESP QL IA.RAPID: NOT DETECTED

## 2024-11-08 PROCEDURE — 99214 OFFICE O/P EST MOD 30 MIN: CPT | Performed by: NURSE PRACTITIONER

## 2024-11-08 PROCEDURE — 1160F RVW MEDS BY RX/DR IN RCRD: CPT | Performed by: NURSE PRACTITIONER

## 2024-11-08 PROCEDURE — 1125F AMNT PAIN NOTED PAIN PRSNT: CPT | Performed by: NURSE PRACTITIONER

## 2024-11-08 PROCEDURE — 1159F MED LIST DOCD IN RCRD: CPT | Performed by: NURSE PRACTITIONER

## 2024-11-08 PROCEDURE — 87880 STREP A ASSAY W/OPTIC: CPT | Performed by: NURSE PRACTITIONER

## 2024-11-08 PROCEDURE — 3078F DIAST BP <80 MM HG: CPT | Performed by: NURSE PRACTITIONER

## 2024-11-08 PROCEDURE — 3074F SYST BP LT 130 MM HG: CPT | Performed by: NURSE PRACTITIONER

## 2024-11-08 PROCEDURE — 87428 SARSCOV & INF VIR A&B AG IA: CPT | Performed by: NURSE PRACTITIONER

## 2024-11-08 PROCEDURE — 3051F HG A1C>EQUAL 7.0%<8.0%: CPT | Performed by: NURSE PRACTITIONER

## 2024-11-08 RX ORDER — ROSUVASTATIN CALCIUM 20 MG/1
20 TABLET, COATED ORAL DAILY
Qty: 90 TABLET | Refills: 3 | Status: SHIPPED | OUTPATIENT
Start: 2024-11-08

## 2024-11-08 RX ORDER — DOXAZOSIN 2 MG/1
2 TABLET ORAL NIGHTLY
COMMUNITY

## 2024-11-08 RX ORDER — ORAL SEMAGLUTIDE 3 MG/1
3 TABLET ORAL DAILY
Qty: 30 TABLET | Refills: 0 | Status: SHIPPED | OUTPATIENT
Start: 2024-11-08

## 2024-11-08 NOTE — PROGRESS NOTES
Subjective     Bernice Rai is a 64 y.o.. female.     Patient here today for follow up on diabetes, hyperlipidemia, hypertension, anxiety and depression.     Patient states she is on insulin, does not know type, not prescribed by me. As of yet Patient has not seen endocrinologist, was referred to endocrinology 5/2024.     Patient stating she is eating somewhat healthy, drinking some water and does not exercise.     Patient c/o having URI today.     Patient sees nephrologist Dr. Sow at [facility]    ezetimibe 10 mg 1 tab daily, rosuvastatin 10 mg 1 tab daily   amlodipine 10 mg 1 tab daily, atenolol 100 mg 1 tab daily, and lisinopril 20 mg 1 tab daily.   Effexor  mg 1 tab daily  Chantix         Sore Throat   This is a new problem. The current episode started 1 to 4 weeks ago.   Pain is worse on left side(s). There has been no fever. The pain is at a severity of 3/10. Associated symptoms include congestion, coughing, diarrhea, ear pain, headaches, a hoarse voice, neck pain and trouble swallowing. Pertinent negatives include no abdominal pain, drooling, shortness of breath, swollen glands or vomiting.       The following portions of the patient's history were reviewed and updated as appropriate: allergies, current medications, past family history, past medical history, past social history, past surgical history and problem list.    Past Medical History:   Diagnosis Date    Allergic Years ago    Took allergy shots    Anxiety     Stroke    Arthritis     FEET    Asthma     Inhaler    Clotting disorder     Taking asa    COPD (chronic obstructive pulmonary disease)     At times bronchitis    Depression 10/22/01    Stroke    Diabetes mellitus     GERD (gastroesophageal reflux disease) 1987    Gerd    Hyperlipidemia     Hypertension     Obesity 1988    Reflux esophagitis     Renal insufficiency 2021    Stroke     Urinary tract infection     Occasionaly    Visual impairment 10/2001    1/2 vision each  "eye       Past Surgical History:   Procedure Laterality Date     SECTION      x 2    OTHER SURGICAL HISTORY      FATTY TUMOR REMOVED RIGHT HIP    UMBILICAL HERNIA REPAIR      Mid abdominal hernia       Review of Systems   HENT:  Positive for congestion, ear pain, hoarse voice, sore throat and trouble swallowing. Negative for drooling.    Respiratory:  Positive for cough. Negative for shortness of breath.    Gastrointestinal:  Positive for diarrhea. Negative for abdominal pain and vomiting.   Musculoskeletal:  Positive for neck pain.   Neurological:  Positive for headaches.       Allergies   Allergen Reactions    Jardiance [Empagliflozin] Itching     Recurrent yeast infections       Objective     Vitals:    24 1350   BP: 118/62   BP Location: Right arm   Patient Position: Sitting   Cuff Size: Adult   Pulse: 60   Resp: 16   Temp: 97.7 °F (36.5 °C)   TempSrc: Temporal   SpO2: 97%   Weight: 115 kg (254 lb)   Height: 157.5 cm (62.01\")     Body mass index is 46.45 kg/m².    Physical Exam  Vitals reviewed.   Constitutional:       Appearance: She is well-developed.   HENT:      Head: Normocephalic and atraumatic.      Right Ear: Tympanic membrane normal. A middle ear effusion is present. Tympanic membrane is not erythematous.      Left Ear: Tympanic membrane normal. A middle ear effusion is present. Tympanic membrane is not erythematous.      Nose: Nose normal. Congestion present.      Right Sinus: Frontal sinus tenderness present.      Left Sinus: Frontal sinus tenderness present.      Mouth/Throat:      Mouth: Mucous membranes are moist.      Pharynx: Postnasal drip present. No pharyngeal swelling, oropharyngeal exudate or posterior oropharyngeal erythema.   Eyes:      Conjunctiva/sclera: Conjunctivae normal.      Pupils: Pupils are equal, round, and reactive to light.   Cardiovascular:      Rate and Rhythm: Normal rate and regular rhythm.      Heart sounds: No murmur heard.  Pulmonary:      Effort: " Pulmonary effort is normal.      Breath sounds: No wheezing, rhonchi or rales.   Musculoskeletal:         General: Normal range of motion.   Lymphadenopathy:      Cervical: Cervical adenopathy (shotty) present.   Skin:     General: Skin is warm and dry.   Neurological:      Mental Status: She is alert and oriented to person, place, and time.           Current Outpatient Medications:     albuterol sulfate HFA (Proventil HFA) 108 (90 Base) MCG/ACT inhaler, Inhale 2 puffs Every 6 (Six) Hours As Needed for Wheezing., Disp: 18 g, Rfl: 11    amLODIPine (NORVASC) 10 MG tablet, Take 1 tablet by mouth Daily., Disp: 90 tablet, Rfl: 1    aspirin 81 MG chewable tablet, Chew 1 tablet Daily., Disp: , Rfl:     atenolol (TENORMIN) 100 MG tablet, Take 1 tablet by mouth Daily., Disp: 90 tablet, Rfl: 1    ciclopirox (LOPROX) 0.77 % cream, Apply 1 Application topically to the appropriate area as directed 2 (Two) Times a Day., Disp: 30 g, Rfl: 2    desoximetasone (TOPICORT) 0.25 % ointment, Apply  topically to the appropriate area as directed Every 12 (Twelve) Hours., Disp: 60 g, Rfl: 3    doxazosin (CARDURA) 2 MG tablet, Take 1 tablet by mouth Every Night., Disp: , Rfl:     Fluticasone Furoate-Vilanterol (Breo Ellipta) 100-25 MCG/INH inhaler, Inhale 1 puff Daily., Disp: 1 each, Rfl: 11    Kerendia 20 MG tablet, Take 1 tablet by mouth Daily., Disp: 90 tablet, Rfl:     lisinopril (PRINIVIL,ZESTRIL) 20 MG tablet, Take 1 tablet by mouth 2 (Two) Times a Day., Disp: 180 tablet, Rfl: 1    Loperamide HCl (ANTI-DIARRHEAL PO), Take  by mouth. 1-2 pills a day, Disp: , Rfl:     oxybutynin (DITROPAN) 5 MG tablet, Take 1 tablet by mouth twice daily, Disp: 90 tablet, Rfl: 0    pioglitazone (ACTOS) 30 MG tablet, Take 1 tablet by mouth Daily., Disp: 90 tablet, Rfl: 1    PROBIOTIC PRODUCT PO, Take  by mouth., Disp: , Rfl:     venlafaxine XR (EFFEXOR-XR) 150 MG 24 hr capsule, Take 1 capsule by mouth Daily., Disp: 90 capsule, Rfl: 1     amoxicillin-clavulanate (AUGMENTIN) 875-125 MG per tablet, Take 1 tablet by mouth 2 (Two) Times a Day for 7 days., Disp: 14 tablet, Rfl: 0    nystatin (MYCOSTATIN) 100,000 unit/mL suspension, Swish and swallow 5 mL 4 (Four) Times a Day., Disp: 200 mL, Rfl: 0    rosuvastatin (Crestor) 20 MG tablet, Take 1 tablet by mouth Daily., Disp: 90 tablet, Rfl: 3    Semaglutide (Rybelsus) 3 MG tablet, Take 1 tablet by mouth Daily., Disp: 30 tablet, Rfl: 0    Recent Results (from the past 12 weeks)   CBC & Differential    Collection Time: 11/04/24 12:51 PM    Specimen: Blood   Result Value Ref Range    WBC 9.2 3.4 - 10.8 x10E3/uL    RBC 4.42 3.77 - 5.28 x10E6/uL    Hemoglobin 13.0 11.1 - 15.9 g/dL    Hematocrit 41.2 34.0 - 46.6 %    MCV 93 79 - 97 fL    MCH 29.4 26.6 - 33.0 pg    MCHC 31.6 31.5 - 35.7 g/dL    RDW 12.1 11.7 - 15.4 %    Platelets 342 150 - 450 x10E3/uL    Neutrophil Rel % 58 Not Estab. %    Lymphocyte Rel % 29 Not Estab. %    Monocyte Rel % 10 Not Estab. %    Eosinophil Rel % 2 Not Estab. %    Basophil Rel % 1 Not Estab. %    Neutrophils Absolute 5.4 1.4 - 7.0 x10E3/uL    Lymphocytes Absolute 2.6 0.7 - 3.1 x10E3/uL    Monocytes Absolute 0.9 0.1 - 0.9 x10E3/uL    Eosinophils Absolute 0.2 0.0 - 0.4 x10E3/uL    Basophils Absolute 0.1 0.0 - 0.2 x10E3/uL    Immature Granulocyte Rel % 0 Not Estab. %    Immature Grans Absolute 0.0 0.0 - 0.1 x10E3/uL   Comprehensive Metabolic Panel    Collection Time: 11/04/24 12:51 PM    Specimen: Blood   Result Value Ref Range    Glucose 194 (H) 70 - 99 mg/dL    BUN 25 8 - 27 mg/dL    Creatinine 1.05 (H) 0.57 - 1.00 mg/dL    EGFR Result 60 >59 mL/min/1.73    BUN/Creatinine Ratio 24 12 - 28    Sodium 139 134 - 144 mmol/L    Potassium 5.2 3.5 - 5.2 mmol/L    Chloride 111 (H) 96 - 106 mmol/L    Total CO2 16 (L) 20 - 29 mmol/L    Calcium 9.3 8.7 - 10.3 mg/dL    Total Protein 5.7 (L) 6.0 - 8.5 g/dL    Albumin 3.5 (L) 3.9 - 4.9 g/dL    Globulin 2.2 1.5 - 4.5 g/dL    Total Bilirubin 0.2 0.0 -  1.2 mg/dL    Alkaline Phosphatase 82 44 - 121 IU/L    AST (SGOT) 11 0 - 40 IU/L    ALT (SGPT) 10 0 - 32 IU/L   Lipid Panel With LDL / HDL Ratio    Collection Time: 11/04/24 12:51 PM    Specimen: Blood   Result Value Ref Range    Total Cholesterol 186 100 - 199 mg/dL    Triglycerides 233 (H) 0 - 149 mg/dL    HDL Cholesterol 34 (L) >39 mg/dL    VLDL Cholesterol Elliott 41 (H) 5 - 40 mg/dL    LDL Chol Calc (NIH) 111 (H) 0 - 99 mg/dL    LDL/HDL RATIO 3.3 (H) 0.0 - 3.2 ratio   Hemoglobin A1c    Collection Time: 11/04/24 12:51 PM    Specimen: Blood   Result Value Ref Range    Hemoglobin A1C 7.8 (H) 4.8 - 5.6 %   TSH Rfx On Abnormal To Free T4    Collection Time: 11/04/24 12:51 PM    Specimen: Blood   Result Value Ref Range    TSH 2.890 0.450 - 4.500 uIU/mL   Urinalysis With Microscopic If Indicated (No Culture) - Urine, Clean Catch    Collection Time: 11/04/24 12:51 PM    Specimen: Urine, Clean Catch   Result Value Ref Range    Specific Gravity, UA 1.017 1.005 - 1.030    pH, UA 5.5 5.0 - 7.5    Color, UA Yellow Yellow    Appearance, UA Cloudy (A) Clear    Leukocytes, UA 1+ (A) Negative    Protein 4+ (A) Negative/Trace    Glucose, UA 1+ (A) Negative    Ketones Negative Negative    Blood, UA Negative Negative    Bilirubin, UA Negative Negative    Urobilinogen, UA 0.2 0.2 - 1.0 mg/dL    Nitrite, UA Negative Negative    Microscopic Examination See below:    Microalbumin / Creatinine Urine Ratio - Urine, Clean Catch    Collection Time: 11/04/24 12:51 PM    Specimen: Urine, Clean Catch   Result Value Ref Range    Creatinine, Urine 92.7 Not Estab. mg/dL    Microalbumin, Urine 2,967.1 Not Estab. ug/mL    Microalbumin/Creatinine Ratio 3,201 (H) 0 - 29 mg/g creat   Microscopic Examination -    Collection Time: 11/04/24 12:51 PM   Result Value Ref Range    WBC, UA >30 (A) 0 - 5 /hpf    RBC, UA 0-2 0 - 2 /hpf    Epithelial Cells (non renal) >10 (A) 0 - 10 /hpf    Casts None seen None seen /lpf    Bacteria, UA Moderate (A) None seen/Few    Covid-19 + Flu A&B AG, Veritor    Collection Time: 11/08/24  2:16 PM    Specimen: Swab   Result Value Ref Range    SARS Antigen Not Detected Not Detected, Presumptive Negative    Influenza A Antigen DENTON Not Detected Not Detected    Influenza B Antigen DENTON Not Detected Not Detected    Internal Control Passed Passed    Lot Number [id number]     Expiration Date 03/11/2025    POCT rapid strep A    Collection Time: 11/08/24  2:17 PM    Specimen: Swab   Result Value Ref Range    Rapid Strep A Screen Negative Negative, VALID, INVALID, Not Performed    Internal Control Passed Passed    Lot Number [id number]     Expiration Date 10/30/2026        CT Chest Low Dose Cancer Screening WO  CT CHEST LOW DOSE CANCER SCREENING WO-     Radiation dose reduction techniques were utilized, including automated  exposure control and exposure modulation based on body size.     Low dose screening lung CT     Clinical: minimum of 30 pack year history of smoking     CTDI 5.4 mGy     .5 mGycm     COMPARISON examination 9/19/2023     FINDINGS: There is coronary artery calcification. Heart size within  normal limits. The esophagus is normal. Atherosclerotic calcification of  a normal diameter aorta. Old granulomatous disease right lung.     There are small noncalcified pulmonary nodules measuring up to 6 mm.  These are similar to the previous examination. No new pulmonary nodule  has developed. No effusion or acute airspace disease.     Limited images through the upper abdomen demonstrate a stable hypodense  left adrenal nodule, likely adenoma.        Conclusion: Stable noncalcified pulmonary nodules measuring up to 6 mm.     Lung Rads category: 3     Recommendations: 6-month low-dose CT chest              This report was finalized on 3/19/2024 8:20 AM by Dr. Winston Thomas M.D  on Workstation: [id number]           Diagnoses and all orders for this visit:    1. Essential hypertension (Primary)    2. Mixed hyperlipidemia  -     rosuvastatin  (Crestor) 20 MG tablet; Take 1 tablet by mouth Daily.  Dispense: 90 tablet; Refill: 3    3. Type 2 diabetes mellitus with other diabetic kidney complication, with long-term current use of insulin  -     Semaglutide (Rybelsus) 3 MG tablet; Take 1 tablet by mouth Daily.  Dispense: 30 tablet; Refill: 0    4. Stage 3a chronic kidney disease    5. Anxiety and depression    6. Pulmonary nodules  -     CT Chest With Contrast; Future    7. OAB (overactive bladder)    8. Acute frontal sinusitis, recurrence not specified  -     amoxicillin-clavulanate (AUGMENTIN) 875-125 MG per tablet; Take 1 tablet by mouth 2 (Two) Times a Day for 7 days.  Dispense: 14 tablet; Refill: 0    9. Pharyngitis, unspecified etiology  -     Covid-19 + Flu A&B AG, Veritor  -     POCT rapid strep A    10. Acute cough  -     Covid-19 + Flu A&B AG, Veritor    11. Abnormal urinalysis        Patient Instructions   Hypertension: stable, continue amlodipine 10 mg 1 tab daily, atenolol 100 mg 1 tab daily, lisinopril 20 mg 1 tab twice a day and doxazosin 2 mg daily    Hyperlipidemia: uncontrolled, triglycerides 233, VLDL 41 and ; LDL goal less than 70. Rosuvastatin increased to 20 mg daily.     Diabetes: uncontrolled, Patient referred to endocrinology but has yet to set up an appt. Patient stating she is on insulin but has yet to bring in script for provider to see what and how much she is taking, Patient still unable to state type and dosage of insulin. Patient no longer taking her metformin. Patient stating she is taking actos 30 mg daily, starting Patient on Rybelsus 3 mg daily today. Patient does not have a personal history of thyroid cancer or pancreatitis; Patient does not have a family history of thyroid cancer or pancreatitis. Recommending Patient bring in insulin script/bottle/box for correct documentation. Recommend Patient to follow up with the endocrinologist referral.    CKD: stable, continue Kerendia as prescribed by nephrologist   Juanjose at [facility]    Anxiety/depression: stable, continue Effexor  mg 1 tab daily    Pulmonary nodules: CT chest done in March 2024 showed small noncalcified pulmonary nodules measuring up to 6 mm with recommendation for recheck in 6 months. Ordering CT chest for further eval.    OAB: continue with oxybutynin 5 mg 1 tab twice a day as prescribed by Dr. Melissa Castillo, GYN.    Sinusitis/pharyngitis/cough: Augmentin bid x 7 days script sent to pharmacy; recommend Patient to drink plenty of fluids-water preferably, eat a heart healthy diet, get plenty of sleep and do warm salt water gargles twice a day until feeling better    Abnormal u/a: treating sinusitis with Augmentin to get the cross benefit of treating UTI as well.     Requesting staff to get an updated medication list from Patient's pharmacy.     Return for fasting labs in 6 months, recheck in 6 months.    Answers submitted by the patient for this visit:  Primary Reason for Visit (Submitted on 11/5/2024)  What is the primary reason for your visit?: Sore Throat  Sore Throat Questionnaire (Submitted on 11/5/2024)  Chief Complaint: Sore throat  drainage: Yes

## 2024-11-13 PROBLEM — N32.81 OAB (OVERACTIVE BLADDER): Status: ACTIVE | Noted: 2024-11-13

## 2024-11-13 PROBLEM — N18.31 STAGE 3A CHRONIC KIDNEY DISEASE: Status: ACTIVE | Noted: 2024-11-13

## 2024-11-13 PROBLEM — R91.8 PULMONARY NODULES: Status: ACTIVE | Noted: 2024-11-13

## 2024-11-13 NOTE — PATIENT INSTRUCTIONS
Hypertension: stable, continue amlodipine 10 mg 1 tab daily, atenolol 100 mg 1 tab daily, lisinopril 20 mg 1 tab twice a day and doxazosin 2 mg daily    Hyperlipidemia: uncontrolled, triglycerides 233, VLDL 41 and ; LDL goal less than 70. Rosuvastatin increased to 20 mg daily.     Diabetes: uncontrolled, Patient referred to endocrinology but has yet to set up an appt. Patient stating she is on insulin but has yet to bring in script for provider to see what and how much she is taking, Patient still unable to state type and dosage of insulin. Patient no longer taking her metformin. Patient stating she is taking actos 30 mg daily, starting Patient on Rybelsus 3 mg daily today. Patient does not have a personal history of thyroid cancer or pancreatitis; Patient does not have a family history of thyroid cancer or pancreatitis. Recommending Patient bring in insulin script/bottle/box for correct documentation. Recommend Patient to follow up with the endocrinologist referral.    CKD: stable, continue Kerendia as prescribed by nephrologist Dr. Sow at [facility]    Anxiety/depression: stable, continue Effexor  mg 1 tab daily    Pulmonary nodules: CT chest done in March 2024 showed small noncalcified pulmonary nodules measuring up to 6 mm with recommendation for recheck in 6 months. Ordering CT chest for further eval.    OAB: continue with oxybutynin 5 mg 1 tab twice a day as prescribed by Dr. Melissa Castillo, GYN.    Sinusitis/pharyngitis/cough: Augmentin bid x 7 days script sent to pharmacy; recommend Patient to drink plenty of fluids-water preferably, eat a heart healthy diet, get plenty of sleep and do warm salt water gargles twice a day until feeling better    Abnormal u/a: treating sinusitis with Augmentin to get the cross benefit of treating UTI as well.     Requesting staff to get an updated medication list from Patient's pharmacy.

## 2024-12-04 DIAGNOSIS — Z87.891 PERSONAL HISTORY OF NICOTINE DEPENDENCE: ICD-10-CM

## 2024-12-04 DIAGNOSIS — F17.200 TOBACCO DEPENDENCY: Primary | ICD-10-CM

## 2025-01-06 DIAGNOSIS — B35.4 TINEA CORPORIS: ICD-10-CM

## 2025-01-08 DIAGNOSIS — I10 ESSENTIAL HYPERTENSION: ICD-10-CM

## 2025-01-08 RX ORDER — CICLOPIROX OLAMINE 7.7 MG/G
CREAM TOPICAL
Qty: 30 G | Refills: 0 | Status: SHIPPED | OUTPATIENT
Start: 2025-01-08

## 2025-01-08 RX ORDER — LISINOPRIL 20 MG/1
20 TABLET ORAL 2 TIMES DAILY
Qty: 180 TABLET | Refills: 1 | Status: SHIPPED | OUTPATIENT
Start: 2025-01-08

## 2025-01-19 DIAGNOSIS — B35.4 TINEA CORPORIS: ICD-10-CM

## 2025-01-20 RX ORDER — CICLOPIROX OLAMINE 7.7 MG/G
CREAM TOPICAL
Qty: 30 G | Refills: 0 | Status: SHIPPED | OUTPATIENT
Start: 2025-01-20

## 2025-01-27 DIAGNOSIS — I10 ESSENTIAL HYPERTENSION: ICD-10-CM

## 2025-01-27 DIAGNOSIS — F32.A ANXIETY AND DEPRESSION: ICD-10-CM

## 2025-01-27 DIAGNOSIS — F41.9 ANXIETY AND DEPRESSION: ICD-10-CM

## 2025-01-27 RX ORDER — ATENOLOL 100 MG/1
100 TABLET ORAL DAILY
Qty: 90 TABLET | Refills: 1 | Status: SHIPPED | OUTPATIENT
Start: 2025-01-27 | End: 2025-01-28

## 2025-01-27 RX ORDER — AMLODIPINE BESYLATE 10 MG/1
10 TABLET ORAL DAILY
Qty: 90 TABLET | Refills: 1 | Status: SHIPPED | OUTPATIENT
Start: 2025-01-27

## 2025-01-27 RX ORDER — VENLAFAXINE HYDROCHLORIDE 150 MG/1
150 CAPSULE, EXTENDED RELEASE ORAL DAILY
Qty: 90 CAPSULE | Refills: 0 | Status: SHIPPED | OUTPATIENT
Start: 2025-01-27

## 2025-01-28 RX ORDER — ATENOLOL 100 MG/1
100 TABLET ORAL DAILY
Qty: 90 TABLET | Refills: 1 | Status: SHIPPED | OUTPATIENT
Start: 2025-01-28

## 2025-02-11 DIAGNOSIS — E11.29 TYPE 2 DIABETES MELLITUS WITH OTHER DIABETIC KIDNEY COMPLICATION, WITH LONG-TERM CURRENT USE OF INSULIN: ICD-10-CM

## 2025-02-11 DIAGNOSIS — R80.9 MICROALBUMINURIA: ICD-10-CM

## 2025-02-11 DIAGNOSIS — I10 ESSENTIAL HYPERTENSION: Primary | ICD-10-CM

## 2025-02-11 DIAGNOSIS — Z79.4 TYPE 2 DIABETES MELLITUS WITH OTHER DIABETIC KIDNEY COMPLICATION, WITH LONG-TERM CURRENT USE OF INSULIN: ICD-10-CM

## 2025-02-11 DIAGNOSIS — R82.90 ABNORMAL URINALYSIS: ICD-10-CM

## 2025-02-11 DIAGNOSIS — E78.2 MIXED HYPERLIPIDEMIA: ICD-10-CM

## 2025-03-01 LAB
ALBUMIN SERPL-MCNC: 3.5 G/DL (ref 3.9–4.9)
ALBUMIN/CREAT UR: 1466 MG/G CREAT (ref 0–29)
ALP SERPL-CCNC: 88 IU/L (ref 44–121)
ALT SERPL-CCNC: 10 IU/L (ref 0–32)
APPEARANCE UR: CLEAR
AST SERPL-CCNC: 10 IU/L (ref 0–40)
BACTERIA #/AREA URNS HPF: NORMAL /[HPF]
BASOPHILS # BLD AUTO: 0.1 X10E3/UL (ref 0–0.2)
BASOPHILS NFR BLD AUTO: 1 %
BILIRUB SERPL-MCNC: 0.2 MG/DL (ref 0–1.2)
BILIRUB UR QL STRIP: NEGATIVE
BUN SERPL-MCNC: 34 MG/DL (ref 8–27)
BUN/CREAT SERPL: 22 (ref 12–28)
CALCIUM SERPL-MCNC: 9.4 MG/DL (ref 8.7–10.3)
CASTS URNS QL MICRO: NORMAL /LPF
CHLORIDE SERPL-SCNC: 105 MMOL/L (ref 96–106)
CHOLEST SERPL-MCNC: 157 MG/DL (ref 100–199)
CO2 SERPL-SCNC: 15 MMOL/L (ref 20–29)
COLOR UR: YELLOW
CREAT SERPL-MCNC: 1.53 MG/DL (ref 0.57–1)
CREAT UR-MCNC: 138 MG/DL
EGFRCR SERPLBLD CKD-EPI 2021: 38 ML/MIN/1.73
EOSINOPHIL # BLD AUTO: 0.2 X10E3/UL (ref 0–0.4)
EOSINOPHIL NFR BLD AUTO: 2 %
EPI CELLS #/AREA URNS HPF: NORMAL /HPF (ref 0–10)
ERYTHROCYTE [DISTWIDTH] IN BLOOD BY AUTOMATED COUNT: 11.9 % (ref 11.7–15.4)
GLOBULIN SER CALC-MCNC: 2.2 G/DL (ref 1.5–4.5)
GLUCOSE SERPL-MCNC: 171 MG/DL (ref 70–99)
GLUCOSE UR QL STRIP: ABNORMAL
HBA1C MFR BLD: 7.4 % (ref 4.8–5.6)
HCT VFR BLD AUTO: 40.3 % (ref 34–46.6)
HDLC SERPL-MCNC: 30 MG/DL
HGB BLD-MCNC: 12.8 G/DL (ref 11.1–15.9)
HGB UR QL STRIP: NEGATIVE
IMM GRANULOCYTES # BLD AUTO: 0 X10E3/UL (ref 0–0.1)
IMM GRANULOCYTES NFR BLD AUTO: 0 %
KETONES UR QL STRIP: NEGATIVE
LDLC SERPL CALC-MCNC: 99 MG/DL (ref 0–99)
LDLC/HDLC SERPL: 3.3 RATIO (ref 0–3.2)
LEUKOCYTE ESTERASE UR QL STRIP: NEGATIVE
LYMPHOCYTES # BLD AUTO: 2.2 X10E3/UL (ref 0.7–3.1)
LYMPHOCYTES NFR BLD AUTO: 24 %
MCH RBC QN AUTO: 29.4 PG (ref 26.6–33)
MCHC RBC AUTO-ENTMCNC: 31.8 G/DL (ref 31.5–35.7)
MCV RBC AUTO: 93 FL (ref 79–97)
MICRO URNS: ABNORMAL
MICROALBUMIN UR-MCNC: 2023.1 UG/ML
MONOCYTES # BLD AUTO: 0.8 X10E3/UL (ref 0.1–0.9)
MONOCYTES NFR BLD AUTO: 9 %
NEUTROPHILS # BLD AUTO: 5.8 X10E3/UL (ref 1.4–7)
NEUTROPHILS NFR BLD AUTO: 64 %
NITRITE UR QL STRIP: NEGATIVE
PH UR STRIP: 5.5 [PH] (ref 5–7.5)
PLATELET # BLD AUTO: 318 X10E3/UL (ref 150–450)
POTASSIUM SERPL-SCNC: 6 MMOL/L (ref 3.5–5.2)
PROT SERPL-MCNC: 5.7 G/DL (ref 6–8.5)
PROT UR QL STRIP: ABNORMAL
RBC # BLD AUTO: 4.35 X10E6/UL (ref 3.77–5.28)
RBC #/AREA URNS HPF: NORMAL /HPF (ref 0–2)
SODIUM SERPL-SCNC: 138 MMOL/L (ref 134–144)
SP GR UR STRIP: 1.02 (ref 1–1.03)
TRIGL SERPL-MCNC: 158 MG/DL (ref 0–149)
UROBILINOGEN UR STRIP-MCNC: 0.2 MG/DL (ref 0.2–1)
VLDLC SERPL CALC-MCNC: 28 MG/DL (ref 5–40)
WBC # BLD AUTO: 9.1 X10E3/UL (ref 3.4–10.8)
WBC #/AREA URNS HPF: NORMAL /HPF (ref 0–5)

## 2025-03-11 ENCOUNTER — OFFICE VISIT (OUTPATIENT)
Dept: FAMILY MEDICINE CLINIC | Facility: CLINIC | Age: 65
End: 2025-03-11
Payer: MEDICARE

## 2025-03-11 VITALS
RESPIRATION RATE: 16 BRPM | HEIGHT: 62 IN | BODY MASS INDEX: 47.35 KG/M2 | SYSTOLIC BLOOD PRESSURE: 126 MMHG | WEIGHT: 257.3 LBS | DIASTOLIC BLOOD PRESSURE: 64 MMHG | TEMPERATURE: 97.7 F | HEART RATE: 58 BPM | OXYGEN SATURATION: 96 %

## 2025-03-11 DIAGNOSIS — R26.89 BALANCE DISORDER: ICD-10-CM

## 2025-03-11 DIAGNOSIS — Z91.81 AT MODERATE RISK FOR FALL: ICD-10-CM

## 2025-03-11 DIAGNOSIS — J35.8 TONSILLAR MASS: ICD-10-CM

## 2025-03-11 DIAGNOSIS — D17.79 LIPOMA OF OTHER SPECIFIED SITES: ICD-10-CM

## 2025-03-11 DIAGNOSIS — F41.9 ANXIETY AND DEPRESSION: ICD-10-CM

## 2025-03-11 DIAGNOSIS — R68.89 ABNORMAL EAR, NOSE, AND THROAT EVALUATION: ICD-10-CM

## 2025-03-11 DIAGNOSIS — F17.200 TOBACCO DEPENDENCY: ICD-10-CM

## 2025-03-11 DIAGNOSIS — Z79.4 TYPE 2 DIABETES MELLITUS WITH OTHER DIABETIC KIDNEY COMPLICATION, WITH LONG-TERM CURRENT USE OF INSULIN: ICD-10-CM

## 2025-03-11 DIAGNOSIS — E11.29 TYPE 2 DIABETES MELLITUS WITH OTHER DIABETIC KIDNEY COMPLICATION, WITH LONG-TERM CURRENT USE OF INSULIN: ICD-10-CM

## 2025-03-11 DIAGNOSIS — N18.31 STAGE 3A CHRONIC KIDNEY DISEASE: ICD-10-CM

## 2025-03-11 DIAGNOSIS — Z00.00 MEDICARE ANNUAL WELLNESS VISIT, SUBSEQUENT: Primary | ICD-10-CM

## 2025-03-11 DIAGNOSIS — E87.5 HYPERKALEMIA: ICD-10-CM

## 2025-03-11 DIAGNOSIS — G89.29 CHRONIC PAIN OF LEFT KNEE: ICD-10-CM

## 2025-03-11 DIAGNOSIS — M25.562 CHRONIC PAIN OF LEFT KNEE: ICD-10-CM

## 2025-03-11 DIAGNOSIS — F32.A ANXIETY AND DEPRESSION: ICD-10-CM

## 2025-03-11 PROBLEM — D17.9 LIPOMA: Status: ACTIVE | Noted: 2025-03-11

## 2025-03-11 RX ORDER — BUSPIRONE HYDROCHLORIDE 7.5 MG/1
7.5 TABLET ORAL DAILY
Qty: 30 TABLET | Refills: 2 | Status: SHIPPED | OUTPATIENT
Start: 2025-03-11

## 2025-03-11 RX ORDER — HUMAN INSULIN 100 [IU]/ML
INJECTION, SUSPENSION SUBCUTANEOUS
COMMUNITY

## 2025-03-11 RX ORDER — HUMAN INSULIN 100 [USP'U]/ML
INJECTION, SUSPENSION SUBCUTANEOUS
COMMUNITY

## 2025-03-11 NOTE — ASSESSMENT & PLAN NOTE
Ordering u/s then plan to refer to general surgeon.    Orders:    US nonvascular extremity limited; Future

## 2025-03-11 NOTE — PROGRESS NOTES
Subjective   The ABCs of the Annual Wellness Visit  Medicare Wellness Visit      Bernice Rai is a 64 y.o. patient who presents for a Medicare Wellness Visit.    The following portions of the patient's history were reviewed and   updated as appropriate: allergies, current medications, past family history, past medical history, past social history, past surgical history, and problem list.    Compared to one year ago, the patient's physical   health is the same. Right hip hurting-had lipomas-removed, regrown, right hand hurting-swelling off and on  Compared to one year ago, the patient's mental   health is worse.    Recent Hospitalizations:  She was not admitted to the hospital during the last year.     Current Medical Providers:  Patient Care Team:  Ashley Holt APRN as PCP - General (Family Medicine)    Outpatient Medications Prior to Visit   Medication Sig Dispense Refill    albuterol sulfate HFA (Proventil HFA) 108 (90 Base) MCG/ACT inhaler Inhale 2 puffs Every 6 (Six) Hours As Needed for Wheezing. 18 g 11    amLODIPine (NORVASC) 10 MG tablet Take 1 tablet by mouth Daily. 90 tablet 1    aspirin 81 MG chewable tablet Chew 1 tablet Daily.      atenolol (TENORMIN) 100 MG tablet Take 1 tablet by mouth Daily. 90 tablet 1    ciclopirox (LOPROX) 0.77 % cream APPLY 1 APPLICATION TOPICALLY TO THE APPROPRIATE AREA AS DIRECTED 2 TIMES DAILY 30 g 0    desoximetasone (TOPICORT) 0.25 % ointment Apply  topically to the appropriate area as directed Every 12 (Twelve) Hours. 60 g 3    doxazosin (CARDURA) 2 MG tablet Take 1 tablet by mouth Every Night.      Fluticasone Furoate-Vilanterol (Breo Ellipta) 100-25 MCG/INH inhaler Inhale 1 puff Daily. 1 each 11    insulin NPH (NovoLIN N) 100 UNIT/ML injection Patient stating she takes 30-45 units in evening      insulin NPH-insulin regular (NovoLIN 70/30) (70-30) 100 UNIT/ML injection Patient stating she takes 50 units in am      Kerendia 20 MG tablet Take 1 tablet by mouth Daily.  90 tablet     lisinopril (PRINIVIL,ZESTRIL) 20 MG tablet Take 1 tablet by mouth 2 (Two) Times a Day. 180 tablet 1    oxybutynin (DITROPAN) 5 MG tablet Take 1 tablet by mouth twice daily 90 tablet 0    pioglitazone (ACTOS) 30 MG tablet Take 1 tablet by mouth Daily. 90 tablet 1    PROBIOTIC PRODUCT PO Take  by mouth.      rosuvastatin (Crestor) 20 MG tablet Take 1 tablet by mouth Daily. 90 tablet 3    venlafaxine XR (EFFEXOR-XR) 150 MG 24 hr capsule Take 1 capsule by mouth Daily. 90 capsule 0    Loperamide HCl (ANTI-DIARRHEAL PO) Take  by mouth. 1-2 pills a day      nystatin (MYCOSTATIN) 100,000 unit/mL suspension Swish and swallow 5 mL 4 (Four) Times a Day. 200 mL 0    Semaglutide (Rybelsus) 3 MG tablet Take 1 tablet by mouth Daily. 30 tablet 0     No facility-administered medications prior to visit.     No opioid medication identified on active medication list. I have reviewed chart for other potential  high risk medication/s and harmful drug interactions in the elderly.      Aspirin is on active medication list. Aspirin use is indicated based on review of current medical condition/s. Pros and cons of this therapy have been discussed today. Benefits of this medication outweigh potential harm.  Patient has been encouraged to continue taking this medication.  .      Patient Active Problem List   Diagnosis    Diabetes mellitus    Gastroesophageal reflux disease    Generalized osteoarthritis    Hyperlipidemia    Essential hypertension    Cerebral infarction    Dermatitis    Obesity due to excess calories    Monilial vaginitis    Tobacco dependency    Albuminuria    Persistent proteinuria    Osteoarthritis    Tobacco dependence syndrome    Anxiety and depression    Balance problem    Tinea corporis    Microalbuminuria    Fibrillary glomerulonephritis    Pulmonary nodules    OAB (overactive bladder)    Stage 3a chronic kidney disease    Hyperkalemia    Tonsillar mass    Abnormal ear, nose, and throat evaluation    Chronic  "pain of left knee    Lipoma    At moderate risk for fall     Advance Care Planning Advance Directive is not on file.  ACP discussion was held with the patient during this visit. Patient does not have an advance directive, declines further assistance.            Objective   Vitals:    03/11/25 1310   BP: 126/64   BP Location: Right arm   Patient Position: Sitting   Cuff Size: Adult   Pulse: 58   Resp: 16   Temp: 97.7 °F (36.5 °C)   TempSrc: Temporal   SpO2: 96%   Weight: 117 kg (257 lb 4.8 oz)   Height: 157.5 cm (62.01\")   PainSc: 8    PainLoc: Hip  Comment: hands/hip       Estimated body mass index is 47.05 kg/m² as calculated from the following:    Height as of this encounter: 157.5 cm (62.01\").    Weight as of this encounter: 117 kg (257 lb 4.8 oz).       Does the patient have evidence of cognitive impairment? No  Lab Results   Component Value Date    CHLPL 157 02/28/2025    TRIG 158 (H) 02/28/2025    HDL 30 (L) 02/28/2025    LDL 99 02/28/2025    VLDL 28 02/28/2025    HGBA1C 7.4 (H) 02/28/2025                                                                                                Health  Risk Assessment    Smoking Status:  Social History     Tobacco Use   Smoking Status Every Day    Current packs/day: 1.00    Average packs/day: 1 pack/day for 45.2 years (45.2 ttl pk-yrs)    Types: Cigarettes    Start date: 1980    Passive exposure: Current   Smokeless Tobacco Never     Alcohol Consumption:  Social History     Substance and Sexual Activity   Alcohol Use No       Fall Risk Screen  STEADI Fall Risk Assessment was completed, and patient is at MODERATE risk for falls. Assessment completed on:3/11/2025    Depression Screening   Little interest or pleasure in doing things? Nearly every day   Feeling down, depressed, or hopeless? Nearly every day   PHQ-2 Total Score 6   Trouble falling or staying asleep, or sleeping too much? Several days   Feeling tired or having little energy? Nearly every day   Poor appetite or " overeating? Nearly every day   Feeling bad about yourself - or that you are a failure or have let yourself or your family down? Not at all   Trouble concentrating on things, such as reading the newspaper or watching television? Not at all   Moving or speaking so slowly that other people could have noticed? Or the opposite - being so fidgety or restless that you have been moving around a lot more than usual? Not at all     Thoughts that you would be better off dead, or of hurting yourself in some way? Not at all   PHQ-9 Total Score 13   If you checked off any problems, how difficult have these problems made it for you to do your work, take care of things at home, or get along with other people? Somewhat difficult        Health Habits and Functional and Cognitive Screening:      3/9/2025    10:23 AM   Functional & Cognitive Status   Do you have difficulty preparing food and eating? Yes   Do you have difficulty bathing yourself, getting dressed or grooming yourself? Yes   Do you have difficulty using the toilet? Yes   Do you have difficulty moving around from place to place? Yes   Do you have trouble with steps or getting out of a bed or a chair? Yes   Current Diet Limited Junk Food   Dental Exam Not up to date   Eye Exam Not up to date   Exercise (times per week) 1 times per week   Current Exercises Include Gardening;House Cleaning;Yard Work   Do you need help using the phone?  No   Are you deaf or do you have serious difficulty hearing?  No   Do you need help to go to places out of walking distance? Yes   Do you need help shopping? Yes   Do you need help preparing meals?  Yes   Do you need help with housework?  No   Do you need help with laundry? Yes   Do you need help taking your medications? No   Do you need help managing money? No   Do you ever drive or ride in a car without wearing a seat belt? No   Have you felt unusual stress, anger or loneliness in the last month? Yes   Who do you live with? Spouse   If you  need help, do you have trouble finding someone available to you? No   Have you been bothered in the last four weeks by sexual problems? No   Do you have difficulty concentrating, remembering or making decisions? Yes           Age-appropriate Screening Schedule:  Refer to the list below for future screening recommendations based on patient's age, sex and/or medical conditions. Orders for these recommended tests are listed in the plan section. The patient has been provided with a written plan.    Health Maintenance List  Health Maintenance   Topic Date Due    DIABETIC EYE EXAM  11/03/2022    DIABETIC FOOT EXAM  10/25/2023    ZOSTER VACCINE (2 of 2) 11/15/2024    LUNG CANCER SCREENING  03/15/2025    COLORECTAL CANCER SCREENING  11/22/2025 (Originally 8/31/2006)    HEMOGLOBIN A1C  08/28/2025    LIPID PANEL  02/28/2026    URINE MICROALBUMIN-CREATININE RATIO (uACR)  02/28/2026    ANNUAL WELLNESS VISIT  03/11/2026    BMI FOLLOWUP  03/11/2026    MAMMOGRAM  03/15/2026    PAP SMEAR  08/08/2026    TDAP/TD VACCINES (3 - Td or Tdap) 10/15/2029    HEPATITIS C SCREENING  Completed    COVID-19 Vaccine  Completed    INFLUENZA VACCINE  Completed    Pneumococcal Vaccine 50+  Completed                                                                                                                                                CMS Preventative Services Quick Reference  Risk Factors Identified During Encounter  Depression/Dysphoria: Prescribed new antidepressant medication treatment. Follow up visit planned.  Fall Risk-High or Moderate: Referral Placed for Physical Therapy  Immunizations Discussed/Encouraged: Shingrix  Inactivity/Sedentary: Patient was advised to exercise at least 150 minutes a week per CDC recommendations.  Tobacco Use/Dependance Risk (use dotphrase .tobaccocessation for documentation)  Dental Screening Recommended  Vision Screening Recommended    The above risks/problems have been discussed with the patient.  Pertinent  "information has been shared with the patient in the After Visit Summary.  An After Visit Summary and PPPS were made available to the patient.    Follow Up:   Next Medicare Wellness visit to be scheduled in 1 year.         Additional E&M Note during same encounter follows:  Patient has additional, significant, and separately identifiable condition(s)/problem(s) that require work above and beyond the Medicare Wellness Visit     Chief Complaint  Medicare Wellness-subsequent    Subjective   HPI  Bernice is also being seen today for additional medical problem/s.          Objective   Vital Signs:  /64 (BP Location: Right arm, Patient Position: Sitting, Cuff Size: Adult)   Pulse 58   Temp 97.7 °F (36.5 °C) (Temporal)   Resp 16   Ht 157.5 cm (62.01\")   Wt 117 kg (257 lb 4.8 oz)   SpO2 96%   BMI 47.05 kg/m²   Physical Exam  Vitals reviewed.   HENT:      Head: Normocephalic.   Eyes:      Pupils: Pupils are equal, round, and reactive to light.   Cardiovascular:      Rate and Rhythm: Normal rate and regular rhythm.   Pulmonary:      Effort: Pulmonary effort is normal. No respiratory distress.      Breath sounds: Normal breath sounds. No stridor. No wheezing, rhonchi or rales.   Skin:     General: Skin is warm and dry.      Comments: Right gluteus/right hip: multiple soft tissue cysts noted   Neurological:      Mental Status: She is alert and oriented to person, place, and time.      Cranial Nerves: No dysarthria or facial asymmetry.      Gait: Gait abnormal.   Psychiatric:         Mood and Affect: Mood is anxious and depressed.         Speech: Speech normal.         Behavior: Behavior is cooperative.         The following data was reviewed by: AMANDA Cárdenas on 03/11/2025:  Data reviewed : labs         Assessment and Plan      Medicare annual wellness visit, subsequent         Type 2 diabetes mellitus with other diabetic kidney complication, with long-term current use of insulin  Patient able to state insulin " type and dosage she is taking today. Patient stating she never took Rybelsus. Patient has not followed through with endocrinology referrals previously placed; placing new endocrinology referral today. HgA1c 7.4 at this time, slightly improved from last result of 7.8.  discussed impact of diabetes and blood sugar on kidney function. Patient verb understanding.    Orders:    Ambulatory Referral to Endocrinology    Stage 3a chronic kidney disease  EgFr worsening. Patient to follow up with nephrologist on 4/4/25         Anxiety and depression  Continue venlafaxine  mg 1 tab daily; starting buspar 7.5 mg daily; follow up in 3 months.     Orders:    busPIRone (BUSPAR) 7.5 MG tablet; Take 1 tablet by mouth Daily.    Hyperkalemia  Potassium level on higher end of normal, rechecking lab today.  Orders:    Comprehensive metabolic panel    Chronic pain of left knee  Recommend cold compress/ice pack 10-15 minutes at a time several times a day as needed, warm compress/heating pad 10-15 minutes at at time several times a day as needed, and over the counter biofreeze/lidocaine patches as needed (wash off from skin before using heating pad).    Orders:    XR Knee 3 View Left; Future    Ambulatory Referral to Physical Therapy for Evaluation & Treatment    Balance disorder    Orders:    Ambulatory Referral to Physical Therapy for Evaluation & Treatment    Lipoma of other specified sites  Ordering u/s then plan to refer to general surgeon.    Orders:    US nonvascular extremity limited; Future    Abnormal ear, nose, and throat evaluation    Orders:    Ambulatory Referral to ENT (Otolaryngology)    Tobacco dependency  Patient has low dose CT chest scheduled in one month       At moderate risk for fall    Orders:    Ambulatory Referral to Physical Therapy for Evaluation & Treatment    Tonsillar mass  Has enlarged over the years per Patient; referring to ENT for further evaluation               Follow Up   Return for fasting labs  in 3 months, recheck in 3 months.  Patient was given instructions and counseling regarding her condition or for health maintenance advice. Please see specific information pulled into the AVS if appropriate.

## 2025-03-11 NOTE — ASSESSMENT & PLAN NOTE
Recommend cold compress/ice pack 10-15 minutes at a time several times a day as needed, warm compress/heating pad 10-15 minutes at at time several times a day as needed, and over the counter biofreeze/lidocaine patches as needed (wash off from skin before using heating pad).    Orders:    XR Knee 3 View Left; Future    Ambulatory Referral to Physical Therapy for Evaluation & Treatment

## 2025-03-11 NOTE — ASSESSMENT & PLAN NOTE
Continue venlafaxine  mg 1 tab daily; starting buspar 7.5 mg daily; follow up in 3 months.     Orders:    busPIRone (BUSPAR) 7.5 MG tablet; Take 1 tablet by mouth Daily.

## 2025-03-11 NOTE — ASSESSMENT & PLAN NOTE
Potassium level on higher end of normal, rechecking lab today.  Orders:    Comprehensive metabolic panel

## 2025-03-11 NOTE — ASSESSMENT & PLAN NOTE
Patient able to state insulin type and dosage she is taking today. Patient stating she never took Rybelsus. Patient has not followed through with endocrinology referrals previously placed; placing new endocrinology referral today. HgA1c 7.4 at this time, slightly improved from last result of 7.8.  discussed impact of diabetes and blood sugar on kidney function. Patient verb understanding.    Orders:    Ambulatory Referral to Endocrinology

## 2025-03-12 ENCOUNTER — HOSPITAL ENCOUNTER (EMERGENCY)
Facility: HOSPITAL | Age: 65
Discharge: HOME | End: 2025-03-12
Attending: EMERGENCY MEDICINE
Payer: MEDICARE

## 2025-03-12 ENCOUNTER — RESULTS FOLLOW-UP (OUTPATIENT)
Dept: FAMILY MEDICINE CLINIC | Facility: CLINIC | Age: 65
End: 2025-03-12
Payer: MEDICARE

## 2025-03-12 VITALS
HEIGHT: 62 IN | DIASTOLIC BLOOD PRESSURE: 88 MMHG | RESPIRATION RATE: 16 BRPM | OXYGEN SATURATION: 95 % | WEIGHT: 255 LBS | HEART RATE: 65 BPM | SYSTOLIC BLOOD PRESSURE: 130 MMHG | TEMPERATURE: 97.9 F | BODY MASS INDEX: 46.93 KG/M2

## 2025-03-12 DIAGNOSIS — Z86.39 H/O HYPERKALEMIA: Primary | ICD-10-CM

## 2025-03-12 LAB
ALBUMIN SERPL-MCNC: 3.4 G/DL (ref 3.5–5.2)
ALBUMIN SERPL-MCNC: 3.6 G/DL (ref 3.9–4.9)
ALBUMIN/GLOB SERPL: 1.3 G/DL
ALP SERPL-CCNC: 85 U/L (ref 39–117)
ALP SERPL-CCNC: 88 IU/L (ref 44–121)
ALT SERPL W P-5'-P-CCNC: 8 U/L (ref 1–33)
ALT SERPL-CCNC: 5 IU/L (ref 0–32)
ANION GAP SERPL CALCULATED.3IONS-SCNC: 10 MMOL/L (ref 5–15)
AST SERPL-CCNC: 6 IU/L (ref 0–40)
AST SERPL-CCNC: 9 U/L (ref 1–32)
BASOPHILS # BLD AUTO: 0.04 10*3/MM3 (ref 0–0.2)
BASOPHILS NFR BLD AUTO: 0.4 % (ref 0–1.5)
BILIRUB SERPL-MCNC: <0.2 MG/DL (ref 0–1.2)
BILIRUB SERPL-MCNC: <0.2 MG/DL (ref 0–1.2)
BUN SERPL-MCNC: 30 MG/DL (ref 8–27)
BUN SERPL-MCNC: 36 MG/DL (ref 8–23)
BUN/CREAT SERPL: 21 (ref 12–28)
BUN/CREAT SERPL: 24.7 (ref 7–25)
CALCIUM SERPL-MCNC: 9.5 MG/DL (ref 8.7–10.3)
CALCIUM SPEC-SCNC: 8.9 MG/DL (ref 8.6–10.5)
CHLORIDE SERPL-SCNC: 109 MMOL/L (ref 96–106)
CHLORIDE SERPL-SCNC: 109 MMOL/L (ref 98–107)
CO2 SERPL-SCNC: 18 MMOL/L (ref 22–29)
CO2 SERPL-SCNC: 19 MMOL/L (ref 20–29)
CREAT SERPL-MCNC: 1.44 MG/DL (ref 0.57–1)
CREAT SERPL-MCNC: 1.46 MG/DL (ref 0.57–1)
DEPRECATED RDW RBC AUTO: 44.6 FL (ref 37–54)
EGFRCR SERPLBLD CKD-EPI 2021: 40.3 ML/MIN/1.73
EGFRCR SERPLBLD CKD-EPI 2021: 41 ML/MIN/1.73
EOSINOPHIL # BLD AUTO: 0.18 10*3/MM3 (ref 0–0.4)
EOSINOPHIL NFR BLD AUTO: 1.9 % (ref 0.3–6.2)
ERYTHROCYTE [DISTWIDTH] IN BLOOD BY AUTOMATED COUNT: 12.7 % (ref 12.3–15.4)
GLOBULIN SER CALC-MCNC: 2.2 G/DL (ref 1.5–4.5)
GLOBULIN UR ELPH-MCNC: 2.6 GM/DL
GLUCOSE SERPL-MCNC: 158 MG/DL (ref 70–99)
GLUCOSE SERPL-MCNC: 217 MG/DL (ref 65–99)
HCT VFR BLD AUTO: 39.9 % (ref 34–46.6)
HGB BLD-MCNC: 12.3 G/DL (ref 12–15.9)
IMM GRANULOCYTES # BLD AUTO: 0.01 10*3/MM3 (ref 0–0.05)
IMM GRANULOCYTES NFR BLD AUTO: 0.1 % (ref 0–0.5)
LYMPHOCYTES # BLD AUTO: 2.35 10*3/MM3 (ref 0.7–3.1)
LYMPHOCYTES NFR BLD AUTO: 25 % (ref 19.6–45.3)
MCH RBC QN AUTO: 29.1 PG (ref 26.6–33)
MCHC RBC AUTO-ENTMCNC: 30.8 G/DL (ref 31.5–35.7)
MCV RBC AUTO: 94.5 FL (ref 79–97)
MONOCYTES # BLD AUTO: 0.82 10*3/MM3 (ref 0.1–0.9)
MONOCYTES NFR BLD AUTO: 8.7 % (ref 5–12)
NEUTROPHILS NFR BLD AUTO: 6 10*3/MM3 (ref 1.7–7)
NEUTROPHILS NFR BLD AUTO: 63.9 % (ref 42.7–76)
PLATELET # BLD AUTO: 317 10*3/MM3 (ref 140–450)
PMV BLD AUTO: 9.4 FL (ref 6–12)
POTASSIUM SERPL-SCNC: 5.4 MMOL/L (ref 3.5–5.2)
POTASSIUM SERPL-SCNC: 6.2 MMOL/L (ref 3.5–5.2)
PROT SERPL-MCNC: 5.8 G/DL (ref 6–8.5)
PROT SERPL-MCNC: 6 G/DL (ref 6–8.5)
QT INTERVAL: 401 MS
QTC INTERVAL: 427 MS
RBC # BLD AUTO: 4.22 10*6/MM3 (ref 3.77–5.28)
SODIUM SERPL-SCNC: 137 MMOL/L (ref 136–145)
SODIUM SERPL-SCNC: 140 MMOL/L (ref 134–144)
WBC NRBC COR # BLD AUTO: 9.4 10*3/MM3 (ref 3.4–10.8)

## 2025-03-12 PROCEDURE — 93010 ELECTROCARDIOGRAM REPORT: CPT | Performed by: INTERNAL MEDICINE

## 2025-03-12 PROCEDURE — 99283 EMERGENCY DEPT VISIT LOW MDM: CPT

## 2025-03-12 PROCEDURE — 93005 ELECTROCARDIOGRAM TRACING: CPT | Performed by: EMERGENCY MEDICINE

## 2025-03-12 PROCEDURE — 85025 COMPLETE CBC W/AUTO DIFF WBC: CPT | Performed by: EMERGENCY MEDICINE

## 2025-03-12 PROCEDURE — 80053 COMPREHEN METABOLIC PANEL: CPT | Performed by: EMERGENCY MEDICINE

## 2025-03-12 PROCEDURE — 99282 EMERGENCY DEPT VISIT SF MDM: CPT | Performed by: EMERGENCY MEDICINE

## 2025-03-12 NOTE — FSED PROVIDER NOTE
Subjective   History of Present Illness  65 yo female DM-insulin, obesity, chronic renal disease, sent here due to elevated potassium from yesterday (reviewed labs 6.2) (on 25 6.0 and  5.2), asymptomatic,PMH recurrent vaginal and skin yeast infections, no dizziness or chest pain or shortness of breath.  No increased numbness or tingling in limbs.  No palpitations.  Does not feel worse or better.      Review of Systems    Past Medical History:   Diagnosis Date    Allergic Years ago    Took allergy shots    Anxiety     Stroke    Arthritis     FEET    Asthma     Inhaler    Clotting disorder     Taking asa    COPD (chronic obstructive pulmonary disease)     At times bronchitis    Depression 10/22/01    Stroke    Diabetes mellitus     GERD (gastroesophageal reflux disease)     Gerd    Hyperlipidemia     Hypertension     Obesity     Reflux esophagitis     Renal insufficiency     Stroke     Urinary tract infection     Occasionaly    Visual impairment 10/2001    1/2 vision each eye       Allergies   Allergen Reactions    Jardiance [Empagliflozin] Itching     Recurrent yeast infections       Past Surgical History:   Procedure Laterality Date     SECTION      x 2    OTHER SURGICAL HISTORY      FATTY TUMOR REMOVED RIGHT HIP    UMBILICAL HERNIA REPAIR      Mid abdominal hernia       Family History   Problem Relation Age of Onset    Diabetes Mother     Hypertension Mother     Hyperlipidemia Mother     Heart disease Mother     Depression Mother     Macular degeneration Mother     Arthritis Mother     Thyroid disease Mother     Other Mother         Brain tumors no cancer    Diabetes Father     Hypertension Father     Pancreatic cancer Father     Cancer Father     Other Father         Pancreatic cancer    Cancer Maternal Grandmother     Colon cancer Other     Breast cancer Neg Hx        Social History     Socioeconomic History    Marital status:    Tobacco Use    Smoking status: Every Day      Current packs/day: 1.00     Average packs/day: 1 pack/day for 45.2 years (45.2 ttl pk-yrs)     Types: Cigarettes     Start date: 1980     Passive exposure: Current    Smokeless tobacco: Never   Vaping Use    Vaping status: Never Used   Substance and Sexual Activity    Alcohol use: No    Drug use: Never    Sexual activity: Not Currently     Partners: Male     Birth control/protection: Post-menopausal           Objective   Physical Exam  Vitals and nursing note reviewed.   Constitutional:       General: She is not in acute distress.     Appearance: Normal appearance. She is obese. She is not ill-appearing or toxic-appearing.   Neurological:      Mental Status: She is alert.         Procedures           ED Course  ED Course as of 03/12/25 1702   Wed Mar 12, 2025   1623 CBC shows normal panel except for MCH C which is 30.8 and is only less than 1 below normal. [AR]   1623 EKG sinus rhythm of 68 ME interval is 225 so is 5 points over normal not to extensive QTc is 427 does not appear to have an anterior infarct and has no chest pain whatsoever although diabetics can have a silent MI.  V1 is the only lead that has a mild suggestion the rest of the V leads do not.  1 is slightly depressed by 190 box. [AR]   1659 Potassium is 5.4 which is 0.2 points above normal which is not concerning.  BUN is 36 creatinine is 1.46 she is known history of renal insufficiency and her EGFR is 40.3 [AR]      ED Course User Index  [AR] Gladys Briscoe MD                                           Medical Decision Making  Differential diagnosis to the elevated potassium could be kidney function, hemolyzed tube yesterday when it was tested, changes in medication, diabetes related.      Your potassium today is 5.4 which is 0.2 above normal and not dangerous.  Either the tube yesterday had some mild clotting in it to cause the elevated potassium which is highly common or it actually did come down a little bit.  Either way you are able to go home.   Please follow-up with your primary care doctor by calling on this week.      She understood and agreed    Problems Addressed:  H/O hyperkalemia: complicated acute illness or injury    Amount and/or Complexity of Data Reviewed  Labs: ordered.  ECG/medicine tests: ordered.        Final diagnoses:   H/O hyperkalemia       ED Disposition  ED Disposition       ED Disposition   Discharge    Condition   Stable    Comment   --               Ashley Holt, APRN  [id number] [id number] [phone]      As needed         Medication List      No changes were made to your prescriptions during this visit.

## 2025-03-12 NOTE — TELEPHONE ENCOUNTER
Called and consulted with Dr. Sow, nephrologist regarding Patient's labs; discussed recommendations of sending Patient to ER for quicker correction of hyperkalemia, worsening kidney function results.    Called and spoke with Patient regarding labs, consult with nephrologist and recommendation of going to ER. Discussed concerns of elevating potassium level and possible adverse effects of high potasium level. Patient denies any current cardiac issues. Patient instructed to go to ER for further treatment of hyperkalemia, CKD and diabetes. Patient verb. Understanding.

## 2025-03-12 NOTE — DISCHARGE INSTRUCTIONS
Your potassium today is 5.4 which is 0.2 above normal and not dangerous.  Either the tube yesterday had some mild clotting in it to cause the elevated potassium which is highly common or it actually did come down a little bit.  Either way you are able to go home.  Please follow-up with your primary care doctor by calling on this week.

## 2025-03-21 ENCOUNTER — PATIENT OUTREACH (OUTPATIENT)
Dept: CASE MANAGEMENT | Facility: OTHER | Age: 65
End: 2025-03-21
Payer: MEDICARE

## 2025-03-21 NOTE — OUTREACH NOTE
AMBULATORY CASE MANAGEMENT NOTE    Names and Relationships of Patient/Support Persons: Contact: Bernice Rai; Relationship: Self -     Patient Outreach  RN-ACM unable to reach patient x 3 regarding 3/12/25 ED visit for hyperkalemia. Patient treated; discharged home to follow up with PCP. This note routed per Clinical Pool.           Hedy BELTRE  Ambulatory Case Management    3/21/2025, 13:48 EDT

## 2025-04-04 DIAGNOSIS — B35.4 TINEA CORPORIS: ICD-10-CM

## 2025-04-04 RX ORDER — CICLOPIROX OLAMINE 7.7 MG/G
CREAM TOPICAL
Qty: 30 G | Refills: 0 | Status: SHIPPED | OUTPATIENT
Start: 2025-04-04

## 2025-04-21 ENCOUNTER — TELEPHONE (OUTPATIENT)
Dept: PHYSICAL THERAPY | Facility: CLINIC | Age: 65
End: 2025-04-21

## 2025-04-23 DIAGNOSIS — F32.A ANXIETY AND DEPRESSION: ICD-10-CM

## 2025-04-23 DIAGNOSIS — F41.9 ANXIETY AND DEPRESSION: ICD-10-CM

## 2025-04-23 RX ORDER — VENLAFAXINE HYDROCHLORIDE 150 MG/1
150 CAPSULE, EXTENDED RELEASE ORAL DAILY
Qty: 90 CAPSULE | Refills: 0 | Status: SHIPPED | OUTPATIENT
Start: 2025-04-23

## 2025-05-09 ENCOUNTER — EXTERNAL PBMM DATA (OUTPATIENT)
Dept: PHARMACY | Facility: OTHER | Age: 65
End: 2025-05-09
Payer: MEDICARE

## 2025-05-20 DIAGNOSIS — B35.4 TINEA CORPORIS: ICD-10-CM

## 2025-05-20 DIAGNOSIS — I10 ESSENTIAL HYPERTENSION: ICD-10-CM

## 2025-05-20 DIAGNOSIS — F32.A ANXIETY AND DEPRESSION: ICD-10-CM

## 2025-05-20 DIAGNOSIS — F41.9 ANXIETY AND DEPRESSION: ICD-10-CM

## 2025-05-21 DIAGNOSIS — N18.31 STAGE 3A CHRONIC KIDNEY DISEASE: ICD-10-CM

## 2025-05-21 DIAGNOSIS — E78.2 MIXED HYPERLIPIDEMIA: ICD-10-CM

## 2025-05-21 DIAGNOSIS — I10 ESSENTIAL HYPERTENSION: Primary | ICD-10-CM

## 2025-05-21 DIAGNOSIS — E87.5 HYPERKALEMIA: ICD-10-CM

## 2025-05-21 DIAGNOSIS — E11.29 TYPE 2 DIABETES MELLITUS WITH OTHER DIABETIC KIDNEY COMPLICATION, WITH LONG-TERM CURRENT USE OF INSULIN: ICD-10-CM

## 2025-05-21 DIAGNOSIS — R80.9 MICROALBUMINURIA: ICD-10-CM

## 2025-05-21 DIAGNOSIS — Z79.4 TYPE 2 DIABETES MELLITUS WITH OTHER DIABETIC KIDNEY COMPLICATION, WITH LONG-TERM CURRENT USE OF INSULIN: ICD-10-CM

## 2025-05-21 RX ORDER — CICLOPIROX OLAMINE 7.7 MG/G
CREAM TOPICAL
Qty: 30 G | Refills: 0 | Status: SHIPPED | OUTPATIENT
Start: 2025-05-21

## 2025-05-21 RX ORDER — LISINOPRIL 20 MG/1
20 TABLET ORAL 2 TIMES DAILY
Qty: 180 TABLET | Refills: 0 | Status: SHIPPED | OUTPATIENT
Start: 2025-05-21

## 2025-05-21 RX ORDER — AMLODIPINE BESYLATE 10 MG/1
10 TABLET ORAL DAILY
Qty: 90 TABLET | Refills: 0 | Status: SHIPPED | OUTPATIENT
Start: 2025-05-21

## 2025-05-21 RX ORDER — VENLAFAXINE HYDROCHLORIDE 150 MG/1
150 CAPSULE, EXTENDED RELEASE ORAL DAILY
Qty: 90 CAPSULE | Refills: 0 | Status: SHIPPED | OUTPATIENT
Start: 2025-05-21

## 2025-05-21 RX ORDER — BUSPIRONE HYDROCHLORIDE 7.5 MG/1
7.5 TABLET ORAL DAILY
Qty: 90 TABLET | Refills: 0 | Status: SHIPPED | OUTPATIENT
Start: 2025-05-21

## 2025-06-04 DIAGNOSIS — F32.A ANXIETY AND DEPRESSION: ICD-10-CM

## 2025-06-04 DIAGNOSIS — F41.9 ANXIETY AND DEPRESSION: ICD-10-CM

## 2025-06-04 RX ORDER — BUSPIRONE HYDROCHLORIDE 7.5 MG/1
7.5 TABLET ORAL DAILY
Qty: 90 TABLET | Refills: 0 | Status: SHIPPED | OUTPATIENT
Start: 2025-06-04

## 2025-07-21 ENCOUNTER — OFFICE VISIT (OUTPATIENT)
Dept: FAMILY MEDICINE CLINIC | Facility: CLINIC | Age: 65
End: 2025-07-21
Payer: MEDICARE

## 2025-07-21 VITALS
RESPIRATION RATE: 17 BRPM | WEIGHT: 270.9 LBS | OXYGEN SATURATION: 94 % | SYSTOLIC BLOOD PRESSURE: 162 MMHG | BODY MASS INDEX: 49.85 KG/M2 | TEMPERATURE: 98.4 F | DIASTOLIC BLOOD PRESSURE: 74 MMHG | HEIGHT: 62 IN | HEART RATE: 85 BPM

## 2025-07-21 DIAGNOSIS — H57.8A2 FOREIGN BODY SENSATION, LEFT EYE: Primary | ICD-10-CM

## 2025-07-21 PROCEDURE — 1159F MED LIST DOCD IN RCRD: CPT | Performed by: STUDENT IN AN ORGANIZED HEALTH CARE EDUCATION/TRAINING PROGRAM

## 2025-07-21 PROCEDURE — 1160F RVW MEDS BY RX/DR IN RCRD: CPT | Performed by: STUDENT IN AN ORGANIZED HEALTH CARE EDUCATION/TRAINING PROGRAM

## 2025-07-21 PROCEDURE — 3078F DIAST BP <80 MM HG: CPT | Performed by: STUDENT IN AN ORGANIZED HEALTH CARE EDUCATION/TRAINING PROGRAM

## 2025-07-21 PROCEDURE — 3051F HG A1C>EQUAL 7.0%<8.0%: CPT | Performed by: STUDENT IN AN ORGANIZED HEALTH CARE EDUCATION/TRAINING PROGRAM

## 2025-07-21 PROCEDURE — 3077F SYST BP >= 140 MM HG: CPT | Performed by: STUDENT IN AN ORGANIZED HEALTH CARE EDUCATION/TRAINING PROGRAM

## 2025-07-21 PROCEDURE — 99213 OFFICE O/P EST LOW 20 MIN: CPT | Performed by: STUDENT IN AN ORGANIZED HEALTH CARE EDUCATION/TRAINING PROGRAM

## 2025-07-21 PROCEDURE — 1125F AMNT PAIN NOTED PAIN PRSNT: CPT | Performed by: STUDENT IN AN ORGANIZED HEALTH CARE EDUCATION/TRAINING PROGRAM

## 2025-07-21 RX ORDER — SODIUM BICARBONATE 650 MG/1
650 TABLET ORAL 3 TIMES DAILY
Status: ON HOLD | COMMUNITY
Start: 2025-06-20

## 2025-07-21 NOTE — PROGRESS NOTES
Office Note     Name: Bernice Rai    : 1960     MRN: [id number]     Chief Complaint  Mass (RT hip and LT abdomen area x 3 months ) and Eye Pain (LT eye feels like something in it x 1 week )    Subjective     History of Present Illness:  Bernice Rai is a 64 y.o. female     History of Present Illness  The patient, a 64-year-old female with a medical history significant for essential hypertension, hyperlipidemia, stage IIIa chronic kidney disease (CKD), glomerulonephritis, anxiety, depression, osteoarthritis, and tobacco use disorder, presents with concerns regarding potential masses on her right hip and left abdomen, as well as ocular discomfort.    Ocular Discomfort  - The patient reports experiencing a foreign body sensation in her eye, which commenced approximately one week ago.  - An optometrist at [facility] evaluated her eye five days ago but did not perform fluorescein staining.  - The optometrist conducted an examination by everting her eyelid.  - She has been utilizing lubricating eye drops, which provide transient relief; however, the discomfort recurs once the effect of the drops diminishes.  - She does not use contact lenses and does not recall any specific incident that could have introduced a foreign body into her eye.    Edema  - The patient reports the onset of edema approximately one week ago.  - She was discontinued from Kerendia due to hyperkalemia.  - She is under the care of a nephrologist for follow-up, and her current potassium level is 4.4 mEq/L.    Social History:  Tobacco: The patient is an active cigarette smoker.       Past Medical History:   Past Medical History:   Diagnosis Date    Allergic Years ago    Took allergy shots    Anxiety     Stroke    Arthritis     FEET    Asthma     Inhaler    Clotting disorder     Taking asa    COPD (chronic obstructive pulmonary disease)     At times bronchitis    Depression 10/22/01    Stroke    Diabetes mellitus     GERD  (gastroesophageal reflux disease)     Gerd    Hyperlipidemia     Hypertension     Obesity     Reflux esophagitis     Renal insufficiency     Stroke     Urinary tract infection     Occasionaly    Visual impairment 10/2001    1/2 vision each eye       Past Surgical History:   Past Surgical History:   Procedure Laterality Date     SECTION      x 2    OTHER SURGICAL HISTORY      FATTY TUMOR REMOVED RIGHT HIP    UMBILICAL HERNIA REPAIR      Mid abdominal hernia       Immunizations:   Immunization History   Administered Date(s) Administered    ABRYSVO (RSV, 60+ or pregnant women 32-36 wks) 2024    COVID-19 (MODERNA) BIVALENT 12+YRS 10/25/2022    COVID-19 (PFIZER) 12YRS+ (COMIRNATY) 2024, 2024    COVID-19 (PFIZER) Purple Cap Monovalent 2021, 2021    Flu Vaccine Intradermal Quad 18-64YR 10/25/2018, 10/15/2019    Flu Vaccine Quad PF >36MO 2020, 2021    FluMist 2-49yrs 2020    Fluzone  >6mos 10/02/2016, 10/17/2017    Fluzone (or Fluarix & Flulaval for VFC) >6mos 2020    Fluzone Quad >6mos (Multi-dose) 10/17/2017    Hepatitis A 2018, 2019    Influenza Seasonal Injectable 10/01/2011    Influenza TIV (IM) 10/14/2015    Influenza recombinant 2024    Influenza, Unspecified 10/25/2022, 2024    Pneumococcal Conjugate 20-Valent (PCV20) 2024, 2024    Pneumococcal Polysaccharide (PPSV23) 10/25/2018    Shingrix 2024    Tdap 2008, 10/15/2019    flucelvax quad pfs =>4 YRS 10/25/2018, 10/15/2019        Medications:     Current Outpatient Medications:     albuterol sulfate HFA (Proventil HFA) 108 (90 Base) MCG/ACT inhaler, Inhale 2 puffs Every 6 (Six) Hours As Needed for Wheezing., Disp: 18 g, Rfl: 11    amLODIPine (NORVASC) 10 MG tablet, Take 1 tablet by mouth Daily. Needs appointment with PCP, Disp: 90 tablet, Rfl: 0    aspirin 81 MG chewable tablet, Chew 1 tablet Daily., Disp: , Rfl:     atenolol (TENORMIN) 100 MG  tablet, Take 1 tablet by mouth Daily., Disp: 90 tablet, Rfl: 1    busPIRone (BUSPAR) 7.5 MG tablet, Take 1 tablet by mouth once daily, Disp: 90 tablet, Rfl: 0    ciclopirox (LOPROX) 0.77 % cream, APPLY  CREAM TOPICALLY TO AFFECTED AREA TWICE DAILY AS DIRECTED, Disp: 30 g, Rfl: 0    desoximetasone (TOPICORT) 0.25 % ointment, Apply  topically to the appropriate area as directed Every 12 (Twelve) Hours., Disp: 60 g, Rfl: 3    doxazosin (CARDURA) 2 MG tablet, Take 1 tablet by mouth Every Night., Disp: , Rfl:     Fluticasone Furoate-Vilanterol (Breo Ellipta) 100-25 MCG/INH inhaler, Inhale 1 puff Daily., Disp: 1 each, Rfl: 11    insulin NPH (NovoLIN N) 100 UNIT/ML injection, Patient stating she takes 30-45 units in evening, Disp: , Rfl:     insulin NPH-insulin regular (NovoLIN 70/30) (70-30) 100 UNIT/ML injection, Patient stating she takes 50 units in am, Disp: , Rfl:     lisinopril (PRINIVIL,ZESTRIL) 20 MG tablet, Take 1 tablet by mouth 2 (Two) Times a Day. Needs appointment with PCP, Disp: 180 tablet, Rfl: 0    pioglitazone (ACTOS) 30 MG tablet, Take 1 tablet by mouth Daily., Disp: 90 tablet, Rfl: 1    PROBIOTIC PRODUCT PO, Take  by mouth., Disp: , Rfl:     rosuvastatin (Crestor) 20 MG tablet, Take 1 tablet by mouth Daily., Disp: 90 tablet, Rfl: 3    sodium bicarbonate 650 MG tablet, Take 1 tablet by mouth 3 (Three) Times a Day., Disp: , Rfl:     venlafaxine XR (EFFEXOR-XR) 150 MG 24 hr capsule, Take 1 capsule by mouth once daily, Disp: 90 capsule, Rfl: 0    Kerendia 20 MG tablet, Take 1 tablet by mouth Daily. (Patient not taking: Reported on 7/21/2025), Disp: 90 tablet, Rfl:     oxybutynin (DITROPAN) 5 MG tablet, Take 1 tablet by mouth twice daily (Patient not taking: Reported on 7/21/2025), Disp: 90 tablet, Rfl: 0    Allergies:   Allergies   Allergen Reactions    Jardiance [Empagliflozin] Itching     Recurrent yeast infections       Family History:   Family History   Problem Relation Age of Onset    Diabetes Mother   "   Hypertension Mother     Hyperlipidemia Mother     Heart disease Mother     Depression Mother     Macular degeneration Mother     Arthritis Mother     Thyroid disease Mother     Other Mother         Brain tumors no cancer    Diabetes Father     Hypertension Father     Pancreatic cancer Father     Cancer Father     Other Father         Pancreatic cancer    Cancer Maternal Grandmother     Colon cancer Other     Breast cancer Neg Hx        Social History:   Social History     Socioeconomic History    Marital status:    Tobacco Use    Smoking status: Every Day     Current packs/day: 1.00     Average packs/day: 1 pack/day for 45.6 years (45.6 ttl pk-yrs)     Types: Cigarettes     Start date: 1980     Passive exposure: Current    Smokeless tobacco: Never   Vaping Use    Vaping status: Never Used   Substance and Sexual Activity    Alcohol use: No    Drug use: Never    Sexual activity: Not Currently     Partners: Male     Birth control/protection: Post-menopausal         Objective     Vital Signs  /74 (BP Location: Left arm, Patient Position: Sitting, Cuff Size: Large Adult)   Pulse 85   Temp 98.4 °F (36.9 °C) (Oral)   Resp 17   Ht 157.5 cm (62.01\")   Wt 123 kg (270 lb 14.4 oz)   SpO2 94%   BMI 49.54 kg/m²   Estimated body mass index is 49.54 kg/m² as calculated from the following:    Height as of this encounter: 157.5 cm (62.01\").    Weight as of this encounter: 123 kg (270 lb 14.4 oz).            Physical Exam  Constitutional:       Appearance: She is obese.   Eyes:      Extraocular Movements: Extraocular movements intact.      Right eye: No nystagmus.      Left eye: No nystagmus.      Conjunctiva/sclera:      Right eye: Right conjunctiva is not injected. No chemosis.     Left eye: Left conjunctiva is not injected. No chemosis.  Cardiovascular:      Rate and Rhythm: Normal rate.   Pulmonary:      Effort: Pulmonary effort is normal. No respiratory distress.   Musculoskeletal:      Right lower leg: " Edema present.      Left lower leg: Edema present.          Assessment and Plan     Assessment & Plan  1. Eye pain  - Reports sensation of a foreign body in the eye persisting for over a week  - Optometrist examined the eye 5 days ago but did not perform fluorescein staining  - Using lubricating eye drops for temporary relief  - Referral to an ophthalmologist for further evaluation, including potential fluorescein staining to rule out corneal abrasion    2. Nephrotic syndrome  - Previously on Kerendia for nephrotic syndrome but was taken off due to high potassium levels  - Current potassium level is 4.4  - Advised to discuss alternative medications or treatments for nephrotic syndrome with nephrologist         Follow Up  No follow-ups on file.            Yuniel Thomason MD   [facility]  [id number] [address] [address] [phone]    Patient or patient representative verbalized consent for the use of Ambient Listening during the visit with  Yuniel Thomason MD for chart documentation. 7/21/2025  11:44 EDT

## 2025-07-23 ENCOUNTER — NURSE TRIAGE (OUTPATIENT)
Dept: CALL CENTER | Facility: HOSPITAL | Age: 65
End: 2025-07-23
Payer: MEDICARE

## 2025-07-23 ENCOUNTER — APPOINTMENT (OUTPATIENT)
Dept: GENERAL RADIOLOGY | Facility: HOSPITAL | Age: 65
End: 2025-07-23
Payer: MEDICARE

## 2025-07-23 ENCOUNTER — APPOINTMENT (OUTPATIENT)
Dept: CT IMAGING | Facility: HOSPITAL | Age: 65
End: 2025-07-23
Payer: MEDICARE

## 2025-07-23 ENCOUNTER — HOSPITAL ENCOUNTER (INPATIENT)
Facility: HOSPITAL | Age: 65
LOS: 6 days | Discharge: HOME | End: 2025-07-30
Attending: EMERGENCY MEDICINE | Admitting: HOSPITALIST
Payer: MEDICARE

## 2025-07-23 DIAGNOSIS — R26.89 BALANCE PROBLEM: ICD-10-CM

## 2025-07-23 DIAGNOSIS — L03.311 CELLULITIS OF ABDOMINAL WALL: Primary | ICD-10-CM

## 2025-07-23 LAB
ALBUMIN SERPL-MCNC: 3.4 G/DL (ref 3.5–5.2)
ALBUMIN/GLOB SERPL: 1.5 G/DL
ALP SERPL-CCNC: 99 U/L (ref 39–117)
ALT SERPL W P-5'-P-CCNC: 7 U/L (ref 1–33)
ANION GAP SERPL CALCULATED.3IONS-SCNC: 8 MMOL/L (ref 5–15)
AST SERPL-CCNC: 8 U/L (ref 1–32)
BASOPHILS # BLD AUTO: 0.05 10*3/MM3 (ref 0–0.2)
BASOPHILS NFR BLD AUTO: 0.6 % (ref 0–1.5)
BILIRUB SERPL-MCNC: <0.2 MG/DL (ref 0–1.2)
BUN SERPL-MCNC: 15.6 MG/DL (ref 8–23)
BUN/CREAT SERPL: 13.8 (ref 7–25)
CALCIUM SPEC-SCNC: 9.7 MG/DL (ref 8.6–10.5)
CHLORIDE SERPL-SCNC: 111 MMOL/L (ref 98–107)
CO2 SERPL-SCNC: 23 MMOL/L (ref 22–29)
CREAT SERPL-MCNC: 1.13 MG/DL (ref 0.57–1)
D DIMER PPP FEU-MCNC: 0.6 MCGFEU/ML (ref 0–0.63)
DEPRECATED RDW RBC AUTO: 46.7 FL (ref 37–54)
EGFRCR SERPLBLD CKD-EPI 2021: 54.8 ML/MIN/1.73
EOSINOPHIL # BLD AUTO: 0.2 10*3/MM3 (ref 0–0.4)
EOSINOPHIL NFR BLD AUTO: 2.4 % (ref 0.3–6.2)
ERYTHROCYTE [DISTWIDTH] IN BLOOD BY AUTOMATED COUNT: 13.2 % (ref 12.3–15.4)
GEN 5 1HR TROPONIN T REFLEX: 16 NG/L
GLOBULIN UR ELPH-MCNC: 2.3 GM/DL
GLUCOSE SERPL-MCNC: 104 MG/DL (ref 65–99)
HCT VFR BLD AUTO: 36.3 % (ref 34–46.6)
HGB BLD-MCNC: 11.2 G/DL (ref 12–15.9)
HOLD SPECIMEN: NORMAL
HOLD SPECIMEN: NORMAL
IMM GRANULOCYTES # BLD AUTO: 0.01 10*3/MM3 (ref 0–0.05)
IMM GRANULOCYTES NFR BLD AUTO: 0.1 % (ref 0–0.5)
LYMPHOCYTES # BLD AUTO: 1.99 10*3/MM3 (ref 0.7–3.1)
LYMPHOCYTES NFR BLD AUTO: 24.2 % (ref 19.6–45.3)
MCH RBC QN AUTO: 29.2 PG (ref 26.6–33)
MCHC RBC AUTO-ENTMCNC: 30.9 G/DL (ref 31.5–35.7)
MCV RBC AUTO: 94.5 FL (ref 79–97)
MONOCYTES # BLD AUTO: 0.96 10*3/MM3 (ref 0.1–0.9)
MONOCYTES NFR BLD AUTO: 11.7 % (ref 5–12)
NEUTROPHILS NFR BLD AUTO: 5.01 10*3/MM3 (ref 1.7–7)
NEUTROPHILS NFR BLD AUTO: 61 % (ref 42.7–76)
NT-PROBNP SERPL-MCNC: 883.7 PG/ML (ref 0–900)
PLATELET # BLD AUTO: 289 10*3/MM3 (ref 140–450)
PMV BLD AUTO: 8.8 FL (ref 6–12)
POTASSIUM SERPL-SCNC: 4.1 MMOL/L (ref 3.5–5.2)
PROT SERPL-MCNC: 5.7 G/DL (ref 6–8.5)
RBC # BLD AUTO: 3.84 10*6/MM3 (ref 3.77–5.28)
SODIUM SERPL-SCNC: 142 MMOL/L (ref 136–145)
TROPONIN T % DELTA: -6
TROPONIN T NUMERIC DELTA: -1 NG/L
TROPONIN T SERPL HS-MCNC: 17 NG/L
WBC NRBC COR # BLD AUTO: 8.22 10*3/MM3 (ref 3.4–10.8)
WHOLE BLOOD HOLD COAG: NORMAL
WHOLE BLOOD HOLD SPECIMEN: NORMAL

## 2025-07-23 PROCEDURE — 99285 EMERGENCY DEPT VISIT HI MDM: CPT

## 2025-07-23 PROCEDURE — 25510000001 IOPAMIDOL PER 1 ML: Performed by: STUDENT IN AN ORGANIZED HEALTH CARE EDUCATION/TRAINING PROGRAM

## 2025-07-23 PROCEDURE — 25010000002 DEXAMETHASONE SODIUM PHOSPHATE 10 MG/ML SOLUTION: Performed by: NURSE PRACTITIONER

## 2025-07-23 PROCEDURE — 93005 ELECTROCARDIOGRAM TRACING: CPT | Performed by: EMERGENCY MEDICINE

## 2025-07-23 PROCEDURE — 71260 CT THORAX DX C+: CPT

## 2025-07-23 PROCEDURE — 85025 COMPLETE CBC W/AUTO DIFF WBC: CPT | Performed by: EMERGENCY MEDICINE

## 2025-07-23 PROCEDURE — 83880 ASSAY OF NATRIURETIC PEPTIDE: CPT | Performed by: EMERGENCY MEDICINE

## 2025-07-23 PROCEDURE — 84484 ASSAY OF TROPONIN QUANT: CPT | Performed by: EMERGENCY MEDICINE

## 2025-07-23 PROCEDURE — 93010 ELECTROCARDIOGRAM REPORT: CPT | Performed by: INTERNAL MEDICINE

## 2025-07-23 PROCEDURE — 85379 FIBRIN DEGRADATION QUANT: CPT | Performed by: NURSE PRACTITIONER

## 2025-07-23 PROCEDURE — 80053 COMPREHEN METABOLIC PANEL: CPT | Performed by: EMERGENCY MEDICINE

## 2025-07-23 PROCEDURE — 36415 COLL VENOUS BLD VENIPUNCTURE: CPT

## 2025-07-23 PROCEDURE — 74177 CT ABD & PELVIS W/CONTRAST: CPT

## 2025-07-23 PROCEDURE — 99285 EMERGENCY DEPT VISIT HI MDM: CPT | Performed by: NURSE PRACTITIONER

## 2025-07-23 PROCEDURE — 71045 X-RAY EXAM CHEST 1 VIEW: CPT

## 2025-07-23 RX ORDER — IOPAMIDOL 755 MG/ML
100 INJECTION, SOLUTION INTRAVASCULAR
Status: COMPLETED | OUTPATIENT
Start: 2025-07-23 | End: 2025-07-23

## 2025-07-23 RX ORDER — SODIUM CHLORIDE 0.9 % (FLUSH) 0.9 %
10 SYRINGE (ML) INJECTION AS NEEDED
Status: DISCONTINUED | OUTPATIENT
Start: 2025-07-23 | End: 2025-07-30 | Stop reason: HOSPADM

## 2025-07-23 RX ORDER — IPRATROPIUM BROMIDE AND ALBUTEROL SULFATE 2.5; .5 MG/3ML; MG/3ML
3 SOLUTION RESPIRATORY (INHALATION) ONCE
Status: COMPLETED | OUTPATIENT
Start: 2025-07-23 | End: 2025-07-24

## 2025-07-23 RX ORDER — DEXAMETHASONE SODIUM PHOSPHATE 10 MG/ML
10 INJECTION, SOLUTION INTRAMUSCULAR; INTRAVENOUS ONCE
Status: COMPLETED | OUTPATIENT
Start: 2025-07-23 | End: 2025-07-23

## 2025-07-23 RX ADMIN — DEXAMETHASONE SODIUM PHOSPHATE 10 MG: 10 INJECTION INTRAMUSCULAR; INTRAVENOUS at 20:48

## 2025-07-23 RX ADMIN — IPRATROPIUM BROMIDE AND ALBUTEROL SULFATE 3 ML: .5; 3 SOLUTION RESPIRATORY (INHALATION) at 20:49

## 2025-07-23 RX ADMIN — IOPAMIDOL 85 ML: 755 INJECTION, SOLUTION INTRAVENOUS at 23:18

## 2025-07-23 NOTE — TELEPHONE ENCOUNTER
HUB call - patient with c/o SOA, unable to reach PCP office./Jack.    Spoke with patient, initial assessment completed.    Advised 63 yo caller with PMH IDDM, renal failure to go to ED NOW with  for new onset of increased SOA, pitting edema up to hips and sudden onset of large, hardened area (5-6inches in length) in abdomen to left of umbilicus and up to left breast and to side. States area is tender/painful when walks or changes position. States has been nauseated but no vomiting. States first noted over the weekend. Not currently on any diuretic.    Caller verbalized understanding and agrees will go to ED for further evaluation and treatment.    Reason for Disposition   Patient sounds very sick or weak to the triager    Additional Information   Negative: SEVERE difficulty breathing (e.g., struggling for each breath, speaks in single words)   Negative: [1] Breathing stopped AND [2] hasn't returned   Negative: Choking on something   Negative: Bluish (or gray) lips or face now   Negative: Difficult to awaken or acting confused (e.g., disoriented, slurred speech)   Negative: Passed out (i.e., lost consciousness, collapsed and was not responding)   Negative: Wheezing started suddenly after medicine, an allergic food or bee sting   Negative: Stridor (harsh sound while breathing in)   Negative: Slow, shallow and weak breathing   Negative: Sounds like a life-threatening emergency to the triager   Negative: Chest pain   Negative: [1] Wheezing (high pitched whistling sound) AND [2] previous asthma attacks or use of asthma medicines   Negative: [1] Difficulty breathing AND [2] only present when coughing   Negative: [1] Difficulty breathing AND [2] only from stuffy or runny nose   Negative: [1] Difficulty breathing AND [2] within 14 days of COVID-19 Exposure   Negative: [1] MODERATE difficulty breathing (e.g., speaks in phrases, SOB even at rest, pulse 100-120) AND [2] NEW-onset or WORSE than normal   Negative:  "Oxygen level (e.g., pulse oximetry) 90 percent or lower   Negative: Wheezing can be heard across the room   Negative: Drooling or spitting out saliva (because can't swallow)   Negative: History of prior \"blood clot\" in leg or lungs (i.e., deep vein thrombosis, pulmonary embolism)   Negative: History of inherited increased risk of blood clots (e.g., Factor 5 Leiden, Anti-thrombin 3, Protein C or Protein S deficiency, Prothrombin mutation)   Negative: Major surgery in the past month   Negative: Hip or leg fracture (broken bone) in past month (or had cast on leg or ankle in past month)   Negative: Illness requiring prolonged bedrest in past month (e.g., immobilization, long hospital stay)   Negative: Long-distance travel in past month (e.g., car, bus, train, plane; with trip lasting 6 or more hours)   Negative: Cancer treatment in past six months (or has cancer now)   Negative: Extra heartbeats, irregular heart beating, or heart is beating very fast  (i.e., \"palpitations\")   Negative: Fever > 103 F (39.4 C)   Negative: [1] Fever > 101 F (38.3 C) AND [2] age > 60 years   Negative: [1] Fever > 100.0 F (37.8 C) AND [2] bedridden (e.g., CVA, chronic illness, recovering from surgery)   Negative: [1] Fever > 100.0 F (37.8 C) AND [2] diabetes mellitus or weak immune system (e.g., HIV positive, cancer chemo, splenectomy, organ transplant, chronic steroids)   Negative: [1] Periods where breathing stops and then resumes normally AND [2] bedridden (e.g., CVA)   Negative: Pregnant or postpartum (from 0 to 6 weeks after delivery)    Answer Assessment - Initial Assessment Questions  1. RESPIRATORY STATUS: \"Describe your breathing?\" (e.g., wheezing, shortness of breath, unable to speak, severe coughing)       States is having increased SOA with exertion, SpO2 91-93%  2. ONSET: \"When did this breathing problem begin?\"       This a.m.  3. PATTERN \"Does the difficult breathing come and go, or has it been constant since it started?\"      " " Increases with exertion  4. SEVERITY: \"How bad is your breathing?\" (e.g., mild, moderate, severe)     - MILD: No SOB at rest, mild SOB with walking, speaks normally in sentences, can lie down, no retractions, pulse < 100.     - MODERATE: SOB at rest, SOB with minimal exertion and prefers to sit, cannot lie down flat, speaks in phrases, mild retractions, audible wheezing, pulse 100-120.     - SEVERE: Very SOB at rest, speaks in single words, struggling to breathe, sitting hunched forward, retractions, pulse > 120       Mild  5. RECURRENT SYMPTOM: \"Have you had difficulty breathing before?\" If Yes, ask: \"When was the last time?\" and \"What happened that time?\"       No  6. CARDIAC HISTORY: \"Do you have any history of heart disease?\" (e.g., heart attack, angina, bypass surgery, angioplasty)       No  7. LUNG HISTORY: \"Do you have any history of lung disease?\"  (e.g., pulmonary embolus, asthma, emphysema)      no  8. CAUSE: \"What do you think is causing the breathing problem?\"       Unsure  9. OTHER SYMPTOMS: \"Do you have any other symptoms? (e.g., dizziness, runny nose, cough, chest pain, fever)      Pitting edema mostly in feet and ankles but up to hips as well. Feet and ankles red. Also reports large 5-6 inch in length area to left of umbilicus up to just beneath left breast that is raised and very firm - does not change with positioning but has pain in area when walking or changing position.  10. O2 SATURATION MONITOR:  \"Do you use an oxygen saturation monitor (pulse oximeter) at home?\" If Yes, ask: \"What is your reading (oxygen level) today?\" \"What is your usual oxygen saturation reading?\" (e.g., 95%)        Reports 91%  11. PREGNANCY: \"Is there any chance you are pregnant?\" \"When was your last menstrual period?\"        N/A  12. TRAVEL: \"Have you traveled out of the country in the last month?\" (e.g., travel history, exposures)        No    Protocols used: Breathing Difficulty-ADULT-AH    "

## 2025-07-24 ENCOUNTER — APPOINTMENT (OUTPATIENT)
Dept: CARDIOLOGY | Facility: HOSPITAL | Age: 65
End: 2025-07-24
Payer: MEDICARE

## 2025-07-24 PROBLEM — J44.9 COPD (CHRONIC OBSTRUCTIVE PULMONARY DISEASE): Status: ACTIVE | Noted: 2025-07-24

## 2025-07-24 PROBLEM — L03.90 CELLULITIS: Status: ACTIVE | Noted: 2025-07-24

## 2025-07-24 PROBLEM — E87.70 FLUID OVERLOAD: Status: ACTIVE | Noted: 2025-07-24

## 2025-07-24 PROBLEM — R80.9 PROTEINURIA: Status: ACTIVE | Noted: 2025-07-24

## 2025-07-24 PROBLEM — R91.1 PULMONARY NODULE: Status: ACTIVE | Noted: 2024-11-13

## 2025-07-24 LAB
ANION GAP SERPL CALCULATED.3IONS-SCNC: 10.8 MMOL/L (ref 5–15)
AORTIC ARCH: 3 CM
AORTIC DIMENSIONLESS INDEX: 0.83 (DI)
ASCENDING AORTA: 3 CM
AV MEAN PRESS GRAD SYS DOP V1V2: 5.6 MMHG
AV VMAX SYS DOP: 161 CM/SEC
BACTERIA UR QL AUTO: NORMAL /HPF
BH CV ECHO MEAS - ACS: 1.81 CM
BH CV ECHO MEAS - AO MAX PG: 10.4 MMHG
BH CV ECHO MEAS - AO ROOT DIAM: 2.26 CM
BH CV ECHO MEAS - AO V2 VTI: 35.7 CM
BH CV ECHO MEAS - AVA(I,D): 2.9 CM2
BH CV ECHO MEAS - EDV(CUBED): 112.4 ML
BH CV ECHO MEAS - EDV(MOD-SP2): 137 ML
BH CV ECHO MEAS - EDV(MOD-SP4): 138 ML
BH CV ECHO MEAS - EF(MOD-SP2): 62 %
BH CV ECHO MEAS - EF(MOD-SP4): 67.4 %
BH CV ECHO MEAS - ESV(CUBED): 30.1 ML
BH CV ECHO MEAS - ESV(MOD-SP2): 52 ML
BH CV ECHO MEAS - ESV(MOD-SP4): 45 ML
BH CV ECHO MEAS - FS: 35.5 %
BH CV ECHO MEAS - IVS/LVPW: 1.07 CM
BH CV ECHO MEAS - IVSD: 1.24 CM
BH CV ECHO MEAS - LAT PEAK E' VEL: 6.1 CM/SEC
BH CV ECHO MEAS - LV DIASTOLIC VOL/BSA (35-75): 63.4 CM2
BH CV ECHO MEAS - LV MASS(C)D: 220.2 GRAMS
BH CV ECHO MEAS - LV MAX PG: 6.3 MMHG
BH CV ECHO MEAS - LV MEAN PG: 4.1 MMHG
BH CV ECHO MEAS - LV SYSTOLIC VOL/BSA (12-30): 20.7 CM2
BH CV ECHO MEAS - LV V1 MAX: 125 CM/SEC
BH CV ECHO MEAS - LV V1 VTI: 29.7 CM
BH CV ECHO MEAS - LVIDD: 4.8 CM
BH CV ECHO MEAS - LVIDS: 3.1 CM
BH CV ECHO MEAS - LVOT AREA: 3.5 CM2
BH CV ECHO MEAS - LVOT DIAM: 2.1 CM
BH CV ECHO MEAS - LVPWD: 1.16 CM
BH CV ECHO MEAS - MED PEAK E' VEL: 5.4 CM/SEC
BH CV ECHO MEAS - MV A DUR: 0.12 SEC
BH CV ECHO MEAS - MV A MAX VEL: 118.7 CM/SEC
BH CV ECHO MEAS - MV DEC SLOPE: 697 CM/SEC2
BH CV ECHO MEAS - MV DEC TIME: 0.15 SEC
BH CV ECHO MEAS - MV E MAX VEL: 97.9 CM/SEC
BH CV ECHO MEAS - MV E/A: 0.82
BH CV ECHO MEAS - MV MAX PG: 7.2 MMHG
BH CV ECHO MEAS - MV MEAN PG: 3.1 MMHG
BH CV ECHO MEAS - MV P1/2T: 46.3 MSEC
BH CV ECHO MEAS - MV V2 VTI: 32.8 CM
BH CV ECHO MEAS - MVA(P1/2T): 4.8 CM2
BH CV ECHO MEAS - MVA(VTI): 3.1 CM2
BH CV ECHO MEAS - PA ACC TIME: 0.07 SEC
BH CV ECHO MEAS - PA V2 MAX: 90.8 CM/SEC
BH CV ECHO MEAS - PULM A REVS DUR: 0.13 SEC
BH CV ECHO MEAS - PULM A REVS VEL: 29.2 CM/SEC
BH CV ECHO MEAS - PULM DIAS VEL: 23.1 CM/SEC
BH CV ECHO MEAS - PULM S/D: 1.87
BH CV ECHO MEAS - PULM SYS VEL: 43.1 CM/SEC
BH CV ECHO MEAS - RAP SYSTOLE: 3 MMHG
BH CV ECHO MEAS - RV MAX PG: 1.88 MMHG
BH CV ECHO MEAS - RV V1 MAX: 68.6 CM/SEC
BH CV ECHO MEAS - RV V1 VTI: 14.5 CM
BH CV ECHO MEAS - SUP REN AO DIAM: 2.1 CM
BH CV ECHO MEAS - SV(LVOT): 103.1 ML
BH CV ECHO MEAS - SV(MOD-SP2): 85 ML
BH CV ECHO MEAS - SV(MOD-SP4): 93 ML
BH CV ECHO MEAS - SVI(LVOT): 47.4 ML/M2
BH CV ECHO MEAS - SVI(MOD-SP2): 39.1 ML/M2
BH CV ECHO MEAS - SVI(MOD-SP4): 42.8 ML/M2
BH CV ECHO MEAS - TAPSE (>1.6): 3.1 CM
BH CV ECHO MEASUREMENTS AVERAGE E/E' RATIO: 17.03
BH CV XLRA - RV BASE: 3.6 CM
BH CV XLRA - RV LENGTH: 8.2 CM
BH CV XLRA - RV MID: 3 CM
BH CV XLRA - TDI S': 14.1 CM/SEC
BILIRUB UR QL STRIP: NEGATIVE
BUN SERPL-MCNC: 17 MG/DL (ref 8–23)
BUN/CREAT SERPL: 14.4 (ref 7–25)
CALCIUM SPEC-SCNC: 9.4 MG/DL (ref 8.6–10.5)
CHLORIDE SERPL-SCNC: 111 MMOL/L (ref 98–107)
CLARITY UR: CLEAR
CO2 SERPL-SCNC: 20.2 MMOL/L (ref 22–29)
COLOR UR: YELLOW
CREAT SERPL-MCNC: 1.18 MG/DL (ref 0.57–1)
CREAT UR-MCNC: 30.5 MG/DL
DEPRECATED RDW RBC AUTO: 42.5 FL (ref 37–54)
EGFRCR SERPLBLD CKD-EPI 2021: 52 ML/MIN/1.73
ERYTHROCYTE [DISTWIDTH] IN BLOOD BY AUTOMATED COUNT: 12.7 % (ref 12.3–15.4)
GLUCOSE BLDC GLUCOMTR-MCNC: 152 MG/DL (ref 70–130)
GLUCOSE BLDC GLUCOMTR-MCNC: 179 MG/DL (ref 70–130)
GLUCOSE BLDC GLUCOMTR-MCNC: 203 MG/DL (ref 70–130)
GLUCOSE BLDC GLUCOMTR-MCNC: 233 MG/DL (ref 70–130)
GLUCOSE SERPL-MCNC: 227 MG/DL (ref 65–99)
GLUCOSE UR STRIP-MCNC: NEGATIVE MG/DL
HBA1C MFR BLD: 6.4 % (ref 4.8–5.6)
HCT VFR BLD AUTO: 36.4 % (ref 34–46.6)
HGB BLD-MCNC: 11.6 G/DL (ref 12–15.9)
HGB UR QL STRIP.AUTO: ABNORMAL
HYALINE CASTS UR QL AUTO: NORMAL /LPF
KETONES UR QL STRIP: NEGATIVE
LEFT ATRIUM VOLUME INDEX: 33.1 ML/M2
LEUKOCYTE ESTERASE UR QL STRIP.AUTO: NEGATIVE
LV EF BIPLANE MOD: 66.3 %
MCH RBC QN AUTO: 29.7 PG (ref 26.6–33)
MCHC RBC AUTO-ENTMCNC: 31.9 G/DL (ref 31.5–35.7)
MCV RBC AUTO: 93.1 FL (ref 79–97)
NITRITE UR QL STRIP: NEGATIVE
PH UR STRIP.AUTO: <=5 [PH] (ref 5–8)
PLATELET # BLD AUTO: 299 10*3/MM3 (ref 140–450)
PMV BLD AUTO: 9.5 FL (ref 6–12)
POTASSIUM SERPL-SCNC: 4.4 MMOL/L (ref 3.5–5.2)
PROT ?TM UR-MCNC: 174.6 MG/DL
PROT UR QL STRIP: ABNORMAL
QT INTERVAL: 410 MS
QTC INTERVAL: 420 MS
RBC # BLD AUTO: 3.91 10*6/MM3 (ref 3.77–5.28)
RBC # UR STRIP: NORMAL /HPF
REF LAB TEST METHOD: NORMAL
SINUS: 2.7 CM
SODIUM SERPL-SCNC: 142 MMOL/L (ref 136–145)
SP GR UR STRIP: 1.02 (ref 1–1.03)
SQUAMOUS #/AREA URNS HPF: NORMAL /HPF
STJ: 2.38 CM
TROPONIN T SERPL HS-MCNC: 13 NG/L
UROBILINOGEN UR QL STRIP: ABNORMAL
WBC # UR STRIP: NORMAL /HPF
WBC NRBC COR # BLD AUTO: 9.42 10*3/MM3 (ref 3.4–10.8)

## 2025-07-24 PROCEDURE — 94640 AIRWAY INHALATION TREATMENT: CPT

## 2025-07-24 PROCEDURE — 94799 UNLISTED PULMONARY SVC/PX: CPT

## 2025-07-24 PROCEDURE — 81001 URINALYSIS AUTO W/SCOPE: CPT | Performed by: NURSE PRACTITIONER

## 2025-07-24 PROCEDURE — 94664 DEMO&/EVAL PT USE INHALER: CPT

## 2025-07-24 PROCEDURE — 82948 REAGENT STRIP/BLOOD GLUCOSE: CPT

## 2025-07-24 PROCEDURE — 94761 N-INVAS EAR/PLS OXIMETRY MLT: CPT

## 2025-07-24 PROCEDURE — 85027 COMPLETE CBC AUTOMATED: CPT | Performed by: NURSE PRACTITIONER

## 2025-07-24 PROCEDURE — 93306 TTE W/DOPPLER COMPLETE: CPT | Performed by: INTERNAL MEDICINE

## 2025-07-24 PROCEDURE — 25010000002 FUROSEMIDE PER 20 MG: Performed by: NURSE PRACTITIONER

## 2025-07-24 PROCEDURE — 94760 N-INVAS EAR/PLS OXIMETRY 1: CPT

## 2025-07-24 PROCEDURE — 25010000002 CEFTRIAXONE PER 250 MG: Performed by: NURSE PRACTITIONER

## 2025-07-24 PROCEDURE — 82570 ASSAY OF URINE CREATININE: CPT | Performed by: INTERNAL MEDICINE

## 2025-07-24 PROCEDURE — 80048 BASIC METABOLIC PNL TOTAL CA: CPT | Performed by: NURSE PRACTITIONER

## 2025-07-24 PROCEDURE — 83036 HEMOGLOBIN GLYCOSYLATED A1C: CPT | Performed by: HOSPITALIST

## 2025-07-24 PROCEDURE — 25010000002 FUROSEMIDE PER 20 MG: Performed by: HOSPITALIST

## 2025-07-24 PROCEDURE — 63710000001 INSULIN GLARGINE PER 5 UNITS: Performed by: HOSPITALIST

## 2025-07-24 PROCEDURE — 84156 ASSAY OF PROTEIN URINE: CPT | Performed by: INTERNAL MEDICINE

## 2025-07-24 PROCEDURE — 25510000001 PERFLUTREN 6.52 MG/ML SUSPENSION 2 ML VIAL: Performed by: HOSPITALIST

## 2025-07-24 PROCEDURE — 84484 ASSAY OF TROPONIN QUANT: CPT | Performed by: NURSE PRACTITIONER

## 2025-07-24 PROCEDURE — 63710000001 INSULIN LISPRO (HUMAN) PER 5 UNITS: Performed by: HOSPITALIST

## 2025-07-24 PROCEDURE — 25010000002 FUROSEMIDE PER 20 MG: Performed by: INTERNAL MEDICINE

## 2025-07-24 PROCEDURE — 25010000002 ENOXAPARIN PER 10 MG: Performed by: HOSPITALIST

## 2025-07-24 PROCEDURE — 93306 TTE W/DOPPLER COMPLETE: CPT

## 2025-07-24 PROCEDURE — 97162 PT EVAL MOD COMPLEX 30 MIN: CPT

## 2025-07-24 PROCEDURE — 99222 1ST HOSP IP/OBS MODERATE 55: CPT | Performed by: INTERNAL MEDICINE

## 2025-07-24 PROCEDURE — 25010000002 CEFAZOLIN PER 500 MG: Performed by: HOSPITALIST

## 2025-07-24 RX ORDER — IPRATROPIUM BROMIDE AND ALBUTEROL SULFATE 2.5; .5 MG/3ML; MG/3ML
3 SOLUTION RESPIRATORY (INHALATION)
Status: DISCONTINUED | OUTPATIENT
Start: 2025-07-24 | End: 2025-07-30 | Stop reason: HOSPADM

## 2025-07-24 RX ORDER — SODIUM BICARBONATE 650 MG/1
650 TABLET ORAL 3 TIMES DAILY
Status: DISCONTINUED | OUTPATIENT
Start: 2025-07-24 | End: 2025-07-30 | Stop reason: HOSPADM

## 2025-07-24 RX ORDER — POLYETHYLENE GLYCOL 3350 17 G/17G
17 POWDER, FOR SOLUTION ORAL DAILY PRN
Status: DISCONTINUED | OUTPATIENT
Start: 2025-07-24 | End: 2025-07-30 | Stop reason: HOSPADM

## 2025-07-24 RX ORDER — FUROSEMIDE 10 MG/ML
40 INJECTION INTRAMUSCULAR; INTRAVENOUS EVERY 12 HOURS
Status: COMPLETED | OUTPATIENT
Start: 2025-07-24 | End: 2025-07-26

## 2025-07-24 RX ORDER — VENLAFAXINE HYDROCHLORIDE 37.5 MG/1
150 CAPSULE, EXTENDED RELEASE ORAL DAILY
Status: DISCONTINUED | OUTPATIENT
Start: 2025-07-24 | End: 2025-07-30 | Stop reason: HOSPADM

## 2025-07-24 RX ORDER — LISINOPRIL 20 MG/1
20 TABLET ORAL 2 TIMES DAILY
Status: DISCONTINUED | OUTPATIENT
Start: 2025-07-24 | End: 2025-07-30 | Stop reason: HOSPADM

## 2025-07-24 RX ORDER — NICOTINE 21 MG/24HR
1 PATCH, TRANSDERMAL 24 HOURS TRANSDERMAL
Status: DISCONTINUED | OUTPATIENT
Start: 2025-07-24 | End: 2025-07-30 | Stop reason: HOSPADM

## 2025-07-24 RX ORDER — SODIUM CHLORIDE 9 MG/ML
40 INJECTION, SOLUTION INTRAVENOUS AS NEEDED
Status: DISCONTINUED | OUTPATIENT
Start: 2025-07-24 | End: 2025-07-30 | Stop reason: HOSPADM

## 2025-07-24 RX ORDER — HYDRALAZINE HYDROCHLORIDE 25 MG/1
25 TABLET, FILM COATED ORAL EVERY 8 HOURS SCHEDULED
Status: DISCONTINUED | OUTPATIENT
Start: 2025-07-24 | End: 2025-07-25

## 2025-07-24 RX ORDER — BISACODYL 10 MG
10 SUPPOSITORY, RECTAL RECTAL DAILY PRN
Status: DISCONTINUED | OUTPATIENT
Start: 2025-07-24 | End: 2025-07-30 | Stop reason: HOSPADM

## 2025-07-24 RX ORDER — SODIUM CHLORIDE 0.9 % (FLUSH) 0.9 %
10 SYRINGE (ML) INJECTION AS NEEDED
Status: DISCONTINUED | OUTPATIENT
Start: 2025-07-24 | End: 2025-07-30 | Stop reason: HOSPADM

## 2025-07-24 RX ORDER — INSULIN LISPRO 100 [IU]/ML
2-9 INJECTION, SOLUTION INTRAVENOUS; SUBCUTANEOUS
Status: DISCONTINUED | OUTPATIENT
Start: 2025-07-24 | End: 2025-07-30 | Stop reason: HOSPADM

## 2025-07-24 RX ORDER — ACETAMINOPHEN 325 MG/1
650 TABLET ORAL EVERY 4 HOURS PRN
Status: DISCONTINUED | OUTPATIENT
Start: 2025-07-24 | End: 2025-07-30 | Stop reason: HOSPADM

## 2025-07-24 RX ORDER — IPRATROPIUM BROMIDE AND ALBUTEROL SULFATE 2.5; .5 MG/3ML; MG/3ML
3 SOLUTION RESPIRATORY (INHALATION) EVERY 4 HOURS PRN
Status: DISCONTINUED | OUTPATIENT
Start: 2025-07-24 | End: 2025-07-24

## 2025-07-24 RX ORDER — ACETAMINOPHEN 650 MG/1
650 SUPPOSITORY RECTAL EVERY 4 HOURS PRN
Status: DISCONTINUED | OUTPATIENT
Start: 2025-07-24 | End: 2025-07-30 | Stop reason: HOSPADM

## 2025-07-24 RX ORDER — AMOXICILLIN 250 MG
2 CAPSULE ORAL 2 TIMES DAILY PRN
Status: DISCONTINUED | OUTPATIENT
Start: 2025-07-24 | End: 2025-07-30 | Stop reason: HOSPADM

## 2025-07-24 RX ORDER — FUROSEMIDE 10 MG/ML
20 INJECTION INTRAMUSCULAR; INTRAVENOUS ONCE
Status: COMPLETED | OUTPATIENT
Start: 2025-07-24 | End: 2025-07-24

## 2025-07-24 RX ORDER — ACETAMINOPHEN 160 MG/5ML
650 SOLUTION ORAL EVERY 4 HOURS PRN
Status: DISCONTINUED | OUTPATIENT
Start: 2025-07-24 | End: 2025-07-30 | Stop reason: HOSPADM

## 2025-07-24 RX ORDER — TERAZOSIN 2 MG/1
2 CAPSULE ORAL NIGHTLY
Status: DISCONTINUED | OUTPATIENT
Start: 2025-07-24 | End: 2025-07-30 | Stop reason: HOSPADM

## 2025-07-24 RX ORDER — ARFORMOTEROL TARTRATE 15 UG/2ML
15 SOLUTION RESPIRATORY (INHALATION)
Status: DISCONTINUED | OUTPATIENT
Start: 2025-07-24 | End: 2025-07-30 | Stop reason: HOSPADM

## 2025-07-24 RX ORDER — FUROSEMIDE 10 MG/ML
40 INJECTION INTRAMUSCULAR; INTRAVENOUS ONCE
Status: COMPLETED | OUTPATIENT
Start: 2025-07-24 | End: 2025-07-24

## 2025-07-24 RX ORDER — ATENOLOL 50 MG/1
100 TABLET ORAL DAILY
Status: DISCONTINUED | OUTPATIENT
Start: 2025-07-24 | End: 2025-07-30 | Stop reason: HOSPADM

## 2025-07-24 RX ORDER — NICOTINE POLACRILEX 4 MG
15 LOZENGE BUCCAL
Status: DISCONTINUED | OUTPATIENT
Start: 2025-07-24 | End: 2025-07-30 | Stop reason: HOSPADM

## 2025-07-24 RX ORDER — ENOXAPARIN SODIUM 100 MG/ML
40 INJECTION SUBCUTANEOUS EVERY 12 HOURS
Status: DISCONTINUED | OUTPATIENT
Start: 2025-07-24 | End: 2025-07-30 | Stop reason: HOSPADM

## 2025-07-24 RX ORDER — CALCIUM CARBONATE 500 MG/1
2 TABLET, CHEWABLE ORAL 2 TIMES DAILY PRN
Status: DISCONTINUED | OUTPATIENT
Start: 2025-07-24 | End: 2025-07-30 | Stop reason: HOSPADM

## 2025-07-24 RX ORDER — INSULIN LISPRO 100 [IU]/ML
6 INJECTION, SOLUTION INTRAVENOUS; SUBCUTANEOUS
Status: DISCONTINUED | OUTPATIENT
Start: 2025-07-24 | End: 2025-07-30 | Stop reason: HOSPADM

## 2025-07-24 RX ORDER — BUDESONIDE 0.5 MG/2ML
0.5 INHALANT ORAL
Status: DISCONTINUED | OUTPATIENT
Start: 2025-07-24 | End: 2025-07-30 | Stop reason: HOSPADM

## 2025-07-24 RX ORDER — CLONIDINE HYDROCHLORIDE 0.1 MG/1
0.1 TABLET ORAL ONCE
Status: COMPLETED | OUTPATIENT
Start: 2025-07-24 | End: 2025-07-24

## 2025-07-24 RX ORDER — BISACODYL 5 MG/1
5 TABLET, DELAYED RELEASE ORAL DAILY PRN
Status: DISCONTINUED | OUTPATIENT
Start: 2025-07-24 | End: 2025-07-30 | Stop reason: HOSPADM

## 2025-07-24 RX ORDER — BUSPIRONE HYDROCHLORIDE 15 MG/1
7.5 TABLET ORAL DAILY
Status: DISCONTINUED | OUTPATIENT
Start: 2025-07-24 | End: 2025-07-30 | Stop reason: HOSPADM

## 2025-07-24 RX ORDER — IBUPROFEN 600 MG/1
1 TABLET ORAL
Status: DISCONTINUED | OUTPATIENT
Start: 2025-07-24 | End: 2025-07-30 | Stop reason: HOSPADM

## 2025-07-24 RX ORDER — DEXTROSE MONOHYDRATE 25 G/50ML
25 INJECTION, SOLUTION INTRAVENOUS
Status: DISCONTINUED | OUTPATIENT
Start: 2025-07-24 | End: 2025-07-30 | Stop reason: HOSPADM

## 2025-07-24 RX ORDER — ROSUVASTATIN CALCIUM 20 MG/1
20 TABLET, COATED ORAL DAILY
Status: DISCONTINUED | OUTPATIENT
Start: 2025-07-24 | End: 2025-07-30 | Stop reason: HOSPADM

## 2025-07-24 RX ORDER — SODIUM CHLORIDE 0.9 % (FLUSH) 0.9 %
10 SYRINGE (ML) INJECTION EVERY 12 HOURS SCHEDULED
Status: DISCONTINUED | OUTPATIENT
Start: 2025-07-24 | End: 2025-07-30 | Stop reason: HOSPADM

## 2025-07-24 RX ORDER — ASPIRIN 81 MG/1
81 TABLET, CHEWABLE ORAL DAILY
Status: DISCONTINUED | OUTPATIENT
Start: 2025-07-24 | End: 2025-07-30 | Stop reason: HOSPADM

## 2025-07-24 RX ADMIN — Medication 10 ML: at 08:50

## 2025-07-24 RX ADMIN — ATENOLOL 100 MG: 50 TABLET ORAL at 08:31

## 2025-07-24 RX ADMIN — INSULIN LISPRO 4 UNITS: 100 INJECTION, SOLUTION INTRAVENOUS; SUBCUTANEOUS at 13:01

## 2025-07-24 RX ADMIN — PERFLUTREN 2 ML: 6.52 INJECTION, SUSPENSION INTRAVENOUS at 16:54

## 2025-07-24 RX ADMIN — INSULIN LISPRO 6 UNITS: 100 INJECTION, SOLUTION INTRAVENOUS; SUBCUTANEOUS at 08:34

## 2025-07-24 RX ADMIN — VENLAFAXINE HYDROCHLORIDE 150 MG: 37.5 CAPSULE, EXTENDED RELEASE ORAL at 08:32

## 2025-07-24 RX ADMIN — HYDRALAZINE HYDROCHLORIDE 25 MG: 25 TABLET ORAL at 22:51

## 2025-07-24 RX ADMIN — CEFAZOLIN 2000 MG: 2 INJECTION, POWDER, FOR SOLUTION INTRAMUSCULAR; INTRAVENOUS at 17:37

## 2025-07-24 RX ADMIN — CEFTRIAXONE SODIUM 1000 MG: 1 INJECTION, POWDER, FOR SOLUTION INTRAMUSCULAR; INTRAVENOUS at 00:41

## 2025-07-24 RX ADMIN — ANTI-FUNGAL POWDER MICONAZOLE NITRATE TALC FREE 1 APPLICATION: 1.42 POWDER TOPICAL at 20:25

## 2025-07-24 RX ADMIN — ENOXAPARIN SODIUM 40 MG: 100 INJECTION SUBCUTANEOUS at 11:15

## 2025-07-24 RX ADMIN — CEFAZOLIN 2000 MG: 2 INJECTION, POWDER, FOR SOLUTION INTRAMUSCULAR; INTRAVENOUS at 08:35

## 2025-07-24 RX ADMIN — IPRATROPIUM BROMIDE AND ALBUTEROL SULFATE 3 ML: .5; 3 SOLUTION RESPIRATORY (INHALATION) at 10:28

## 2025-07-24 RX ADMIN — ACETAMINOPHEN 650 MG: 325 TABLET, FILM COATED ORAL at 17:43

## 2025-07-24 RX ADMIN — INSULIN LISPRO 6 UNITS: 100 INJECTION, SOLUTION INTRAVENOUS; SUBCUTANEOUS at 17:36

## 2025-07-24 RX ADMIN — INSULIN LISPRO 2 UNITS: 100 INJECTION, SOLUTION INTRAVENOUS; SUBCUTANEOUS at 20:41

## 2025-07-24 RX ADMIN — BUDESONIDE 0.5 MG: 0.5 INHALANT RESPIRATORY (INHALATION) at 19:30

## 2025-07-24 RX ADMIN — FUROSEMIDE 20 MG: 10 INJECTION, SOLUTION INTRAMUSCULAR; INTRAVENOUS at 03:46

## 2025-07-24 RX ADMIN — ARFORMOTEROL TARTRATE 15 MCG: 15 SOLUTION RESPIRATORY (INHALATION) at 19:28

## 2025-07-24 RX ADMIN — TERAZOSIN 2 MG: 2 CAPSULE ORAL at 11:15

## 2025-07-24 RX ADMIN — BUSPIRONE HYDROCHLORIDE 7.5 MG: 15 TABLET ORAL at 08:32

## 2025-07-24 RX ADMIN — INSULIN GLARGINE 20 UNITS: 100 INJECTION, SOLUTION SUBCUTANEOUS at 08:34

## 2025-07-24 RX ADMIN — INSULIN LISPRO 2 UNITS: 100 INJECTION, SOLUTION INTRAVENOUS; SUBCUTANEOUS at 17:36

## 2025-07-24 RX ADMIN — IPRATROPIUM BROMIDE AND ALBUTEROL SULFATE 3 ML: .5; 3 SOLUTION RESPIRATORY (INHALATION) at 19:27

## 2025-07-24 RX ADMIN — SODIUM BICARBONATE 650 MG: 650 TABLET ORAL at 17:36

## 2025-07-24 RX ADMIN — BUDESONIDE 0.5 MG: 0.5 INHALANT RESPIRATORY (INHALATION) at 10:29

## 2025-07-24 RX ADMIN — LISINOPRIL 20 MG: 20 TABLET ORAL at 20:23

## 2025-07-24 RX ADMIN — Medication 10 ML: at 20:26

## 2025-07-24 RX ADMIN — SODIUM BICARBONATE 650 MG: 650 TABLET ORAL at 08:33

## 2025-07-24 RX ADMIN — ROSUVASTATIN CALCIUM 20 MG: 20 TABLET, FILM COATED ORAL at 08:32

## 2025-07-24 RX ADMIN — IPRATROPIUM BROMIDE AND ALBUTEROL SULFATE 3 ML: .5; 3 SOLUTION RESPIRATORY (INHALATION) at 13:36

## 2025-07-24 RX ADMIN — FUROSEMIDE 40 MG: 10 INJECTION, SOLUTION INTRAMUSCULAR; INTRAVENOUS at 20:25

## 2025-07-24 RX ADMIN — FUROSEMIDE 40 MG: 10 INJECTION, SOLUTION INTRAMUSCULAR; INTRAVENOUS at 00:41

## 2025-07-24 RX ADMIN — NICOTINE 1 PATCH: 21 PATCH, EXTENDED RELEASE TRANSDERMAL at 08:30

## 2025-07-24 RX ADMIN — FUROSEMIDE 40 MG: 10 INJECTION, SOLUTION INTRAMUSCULAR; INTRAVENOUS at 08:33

## 2025-07-24 RX ADMIN — ANTI-FUNGAL POWDER MICONAZOLE NITRATE TALC FREE 1 APPLICATION: 1.42 POWDER TOPICAL at 11:20

## 2025-07-24 RX ADMIN — ASPIRIN 81 MG CHEWABLE TABLET 81 MG: 81 TABLET CHEWABLE at 08:31

## 2025-07-24 RX ADMIN — LISINOPRIL 20 MG: 20 TABLET ORAL at 08:48

## 2025-07-24 RX ADMIN — INSULIN LISPRO 4 UNITS: 100 INJECTION, SOLUTION INTRAVENOUS; SUBCUTANEOUS at 08:34

## 2025-07-24 RX ADMIN — INSULIN LISPRO 6 UNITS: 100 INJECTION, SOLUTION INTRAVENOUS; SUBCUTANEOUS at 13:02

## 2025-07-24 RX ADMIN — ENOXAPARIN SODIUM 40 MG: 100 INJECTION SUBCUTANEOUS at 20:22

## 2025-07-24 RX ADMIN — SODIUM BICARBONATE 650 MG: 650 TABLET ORAL at 20:23

## 2025-07-24 RX ADMIN — CLONIDINE HYDROCHLORIDE 0.1 MG: 0.1 TABLET ORAL at 03:46

## 2025-07-24 RX ADMIN — ARFORMOTEROL TARTRATE 15 MCG: 15 SOLUTION RESPIRATORY (INHALATION) at 10:24

## 2025-07-24 NOTE — CONSULTS
[facility]                                                                                             Nephrology Initial Consult Note    Patient Identification:  Name: Bernice Rai MRN: [id number]  Age: 64 y.o. : 1960  Sex: female  Date:2025    Requesting Physician: As per consult order.  Reason for Consultation: Edema, diuretic management, proteinuria  Information from:patient/ family/ chart      History of Present Illness: This is a 64 y.o. year old female who is normally followed by Dr. Sow in our office.  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  This has been treated with significant improvement in her proteinuria with an ACE inhibitor and Kerendia .  Unfortunately the kerendia had to be discontinued due to hyperkalemia.  Her baseline creatinine is 1.2 and was stable at her most recent office appointment.  She presented to the hospital overnight with complaints of increasing dyspnea, wheezing, lower extremity edema and a 20 pound weight gain.  No prior history of CHF.  Does have a history of stroke, tobacco abuse hypertension diabetes and COPD.  She still has some edema on exam but feels that it is improved since admission and that her dyspnea has improved as well.  Echo is pending.  D-dimer was normal.  She was initially quite hypertensive but blood pressure stabilized.  Creatinine today 1.18, potassium 4.4.  Chest x-ray with vascular congestion.  UA shows heavy proteinuria with proteinuria quantification pending.    The following medical history and medications personally reviewed by me:    Problem List:     Cellulitis    Diabetes mellitus    Hyperlipidemia    Essential hypertension    Obesity due to excess calories    Tobacco dependence syndrome    Fibrillary glomerulonephritis    Pulmonary nodule    Stage 3a chronic kidney disease    Fluid  overload    COPD (chronic obstructive pulmonary disease)    BMI 45.0-49.9, adult      Past Medical History:  Past Medical History:   Diagnosis Date    Allergic Years ago    Took allergy shots    Anxiety     Stroke    Arthritis     FEET    Asthma     Inhaler    Clotting disorder     Taking asa    COPD (chronic obstructive pulmonary disease)     At times bronchitis    Depression 10/22/01    Stroke    Diabetes mellitus     GERD (gastroesophageal reflux disease)     Gerd    Hyperlipidemia     Hypertension     Obesity     Reflux esophagitis     Renal insufficiency     Stroke     Urinary tract infection     Occasionaly    Visual impairment 10/2001    1/2 vision each eye       Past Surgical History:  Past Surgical History:   Procedure Laterality Date     SECTION      x 2    OTHER SURGICAL HISTORY      FATTY TUMOR REMOVED RIGHT HIP    UMBILICAL HERNIA REPAIR      Mid abdominal hernia        Home Meds:   Medications Prior to Admission   Medication Sig Dispense Refill Last Dose/Taking    amLODIPine (NORVASC) 10 MG tablet Take 1 tablet by mouth Daily. Needs appointment with PCP 90 tablet 0 2025 Morning    aspirin 81 MG chewable tablet Chew 1 tablet Daily.   2025 Morning    atenolol (TENORMIN) 100 MG tablet Take 1 tablet by mouth Daily. 90 tablet 1 2025 Morning    busPIRone (BUSPAR) 7.5 MG tablet Take 1 tablet by mouth once daily 90 tablet 0 Past Week    ciclopirox (LOPROX) 0.77 % cream APPLY  CREAM TOPICALLY TO AFFECTED AREA TWICE DAILY AS DIRECTED 30 g 0 2025    desoximetasone (TOPICORT) 0.25 % ointment Apply  topically to the appropriate area as directed Every 12 (Twelve) Hours. 60 g 3 Past Month    doxazosin (CARDURA) 2 MG tablet Take 1 tablet by mouth Every Night.   2025    Fluticasone Furoate-Vilanterol (Breo Ellipta) 100-25 MCG/INH inhaler Inhale 1 puff Daily. 1 each 11 Past Month    insulin NPH (NovoLIN N) 100 UNIT/ML injection Patient stating she takes 30-45 units in evening    7/23/2025    insulin NPH-insulin regular (NovoLIN 70/30) (70-30) 100 UNIT/ML injection Patient stating she takes 50 units in am   7/23/2025    lisinopril (PRINIVIL,ZESTRIL) 20 MG tablet Take 1 tablet by mouth 2 (Two) Times a Day. Needs appointment with  tablet 0 7/23/2025    pioglitazone (ACTOS) 30 MG tablet Take 1 tablet by mouth Daily. 90 tablet 1 7/23/2025 Evening    PROBIOTIC PRODUCT PO Take  by mouth.   7/23/2025    rosuvastatin (Crestor) 20 MG tablet Take 1 tablet by mouth Daily. 90 tablet 3 7/23/2025    sodium bicarbonate 650 MG tablet Take 1 tablet by mouth 3 (Three) Times a Day.   7/23/2025    venlafaxine XR (EFFEXOR-XR) 150 MG 24 hr capsule Take 1 capsule by mouth once daily 90 capsule 0 7/23/2025    albuterol sulfate HFA (Proventil HFA) 108 (90 Base) MCG/ACT inhaler Inhale 2 puffs Every 6 (Six) Hours As Needed for Wheezing. 18 g 11 More than a month    Kerendia 20 MG tablet Take 1 tablet by mouth Daily. (Patient not taking: Reported on 7/24/2025) 90 tablet  Not Taking    oxybutynin (DITROPAN) 5 MG tablet Take 1 tablet by mouth twice daily (Patient not taking: Reported on 7/24/2025) 90 tablet 0 Not Taking       Current Meds:   Current Facility-Administered Medications   Medication Dose Route Frequency Provider Last Rate Last Admin    acetaminophen (TYLENOL) tablet 650 mg  650 mg Oral Q4H PRN Kathia Aguilera APRN        Or    acetaminophen (TYLENOL) 160 MG/5ML oral solution 650 mg  650 mg Oral Q4H PRN Kathia Aguilera APRN        Or    acetaminophen (TYLENOL) suppository 650 mg  650 mg Rectal Q4H PRN Kathia Aguilera APRN        budesonide (PULMICORT) nebulizer solution 0.5 mg  0.5 mg Nebulization BID - RT Andrew Keller MD   0.5 mg at 07/24/25 1029    And    arformoterol (BROVANA) nebulizer solution 15 mcg  15 mcg Nebulization BID - RT Andrew Keller MD   15 mcg at 07/24/25 1024    aspirin chewable tablet 81 mg  81 mg Oral Daily Andrew Keller MD   81 mg at 07/24/25 0805     atenolol (TENORMIN) tablet 100 mg  100 mg Oral Daily Andrew Keller MD   100 mg at 07/24/25 0831    sennosides-docusate (PERICOLACE) 8.6-50 MG per tablet 2 tablet  2 tablet Oral BID PRN Kathia Aguilera APRN        And    polyethylene glycol (MIRALAX) packet 17 g  17 g Oral Daily PRN Kathia Aguilera APRN        And    bisacodyl (DULCOLAX) EC tablet 5 mg  5 mg Oral Daily PRN Kathia Aguilera APRN        And    bisacodyl (DULCOLAX) suppository 10 mg  10 mg Rectal Daily PRN Kathia Aguilera APRN        busPIRone (BUSPAR) tablet 7.5 mg  7.5 mg Oral Daily Andrew Keller MD   7.5 mg at 07/24/25 0832    calcium carbonate (TUMS) chewable tablet 500 mg (200 mg elemental)  2 tablet Oral BID PRN Kathia Aguilera APRN        ceFAZolin 2000 mg IVPB in 100 mL NS (MBP)  2,000 mg Intravenous Q8H Andrew Keller MD   2,000 mg at 07/24/25 0835    dextrose (D50W) (25 g/50 mL) IV injection 25 g  25 g Intravenous Q15 Min PRN Andrew Keller MD        dextrose (GLUTOSE) oral gel 15 g  15 g Oral Q15 Min PRN Andrew Keller MD        enoxaparin sodium (LOVENOX) syringe 40 mg  40 mg Subcutaneous Q12H Andrew Keller MD   40 mg at 07/24/25 1115    furosemide (LASIX) injection 40 mg  40 mg Intravenous Q12H Andrew Keller MD   40 mg at 07/24/25 0833    glucagon (GLUCAGEN) injection 1 mg  1 mg Intramuscular Q15 Min PRN Andrew Keller MD        insulin glargine (LANTUS, SEMGLEE) injection 20 Units  20 Units Subcutaneous Daily Andrew Keller MD   20 Units at 07/24/25 0834    insulin lispro (HUMALOG/ADMELOG) injection 2-9 Units  2-9 Units Subcutaneous 4x Daily AC & at Bedtime Andrew Keller MD   4 Units at 07/24/25 1301    insulin lispro (HUMALOG/ADMELOG) injection 6 Units  6 Units Subcutaneous TID With Meals Andrew Keller MD   6 Units at 07/24/25 1302    ipratropium-albuterol (DUO-NEB) nebulizer solution 3 mL  3 mL Nebulization Q6H While Awake - RT Kathia Aguilera APRN   3 mL at  07/24/25 1336    lisinopril (PRINIVIL,ZESTRIL) tablet 20 mg  20 mg Oral BID Andrew Keller MD   20 mg at 07/24/25 0848    miconazole (MICOTIN) 2 % powder 1 Application  1 Application Topical Q12H Andrew Keller MD   1 Application at 07/24/25 1120    nicotine (NICODERM CQ) 21 MG/24HR patch 1 patch  1 patch Transdermal Q24H Kathia Aguilera APRN   1 patch at 07/24/25 0830    rosuvastatin (CRESTOR) tablet 20 mg  20 mg Oral Daily Andrew Keller MD   20 mg at 07/24/25 0832    sodium bicarbonate tablet 650 mg  650 mg Oral TID Andrew Keller MD   650 mg at 07/24/25 0833    sodium chloride 0.9 % flush 10 mL  10 mL Intravenous PRN Dillon Sarkar MD        sodium chloride 0.9 % flush 10 mL  10 mL Intravenous Q12H Kathia Aguilera APRN   10 mL at 07/24/25 0850    sodium chloride 0.9 % flush 10 mL  10 mL Intravenous PRN Kathia Aguilera APRN        sodium chloride 0.9 % infusion 40 mL  40 mL Intravenous PRN Kathia Aguilera APRN        terazosin (HYTRIN) capsule 2 mg  2 mg Oral Nightly Andrew Keller MD   2 mg at 07/24/25 1115    venlafaxine XR (EFFEXOR-XR) 24 hr capsule 150 mg  150 mg Oral Daily Andrew Keller MD   150 mg at 07/24/25 0832       Allergies:  Allergies   Allergen Reactions    Jardiance [Empagliflozin] Itching     Recurrent yeast infections       Social History:   Social History     Socioeconomic History    Marital status:    Tobacco Use    Smoking status: Every Day     Current packs/day: 1.00     Average packs/day: 1 pack/day for 45.6 years (45.6 ttl pk-yrs)     Types: Cigarettes     Start date: 1980     Passive exposure: Current    Smokeless tobacco: Never   Vaping Use    Vaping status: Never Used   Substance and Sexual Activity    Alcohol use: No    Drug use: Never    Sexual activity: Not Currently     Partners: Male     Birth control/protection: Post-menopausal        Family History:  Family History   Problem Relation Age of Onset    Diabetes Mother      "Hypertension Mother     Hyperlipidemia Mother     Heart disease Mother     Depression Mother     Macular degeneration Mother     Arthritis Mother     Thyroid disease Mother     Other Mother         Brain tumors no cancer    Diabetes Father     Hypertension Father     Pancreatic cancer Father     Cancer Father     Other Father         Pancreatic cancer    Cancer Maternal Grandmother     Colon cancer Other     Breast cancer Neg Hx         Review of Systems: as per HPI, in addition:    General:      + Weakness / fatigue,                       No fevers / chills                       no weight loss  HEENT;       no dysphagia   Neck:           No swelling  Respiratory: no cough / congestion/wheezing                     Improved shortness of air   CV:              No chest pain                       No palpitations  Abdomen/GI: no nausea / vomiting                      No diarrhea, no constipation                      No abdominal pain  :             no dysuria  Endocrine:   No heat or cold intolerance  Skin:           Denies rashes or skin lesions   Vascular:   + Edema  Musculoskeletal: No joint pain or deformities      Physical Exam:  Vitals:   Temp (24hrs), Av.4 °F (36.3 °C), Min:97 °F (36.1 °C), Max:98.4 °F (36.9 °C)    /61 (BP Location: Right arm, Patient Position: Lying)   Pulse 68   Temp 97 °F (36.1 °C) (Oral)   Resp 18   Ht 157.5 cm (62.01\")   Wt 123 kg (271 lb 6.2 oz)   SpO2 95%   BMI 49.62 kg/m²   Intake/Output:     Intake/Output Summary (Last 24 hours) at 2025 1524  Last data filed at 2025 1308  Gross per 24 hour   Intake 880 ml   Output --   Net 880 ml        Wt Readings from Last 1 Encounters:   25 0344 123 kg (271 lb 6.2 oz)   25 1955 125 kg (276 lb)       Exam:    General Appearance:  Awake, alert, no acute distress  Obese, mildly ill-appearing   Head and Face:  Normocephalic, atraumatic   Eyes:  No icterus   ENMT: Moist mucosa   Neck: Supple  no jugular venous " distention   Pulmonary:  Respiratory effort: Normal  Auscultation of lungs: Clear bilaterally  No wheezes or rhonchi  Good air movement, good expansion   Chest wall:  No tenderness or deformity   Cardiovascular:  Auscultation of the heart: Normal rhythm, no murmurs  1-2+ edema of bilateral lower extremities   Abdomen:  Abdomen: soft, nontender, normal bowel sounds    Musculoskeletal: No joint swelling or gross deformities    Skin: Skin inspection and palpation: No rash   Lymphatic: No focal lymphadenopathy   Psychiatric: Judgement and insight: normal  Orientation to person place and time: normal  Mood and affect: normal       DATA:  Radiology and Labs:  The following labs independently reviewed by me, additional AM labs ordered  Old records independently reviewed showing fibrillary, nephritis, proteinuria  The following radiologic studies independently viewed by me, findings CT abdomen pelvis, CT chest and chest x-ray as detailed above.  Left base atelectasis abdominal wall edema dependent edema possible cellulitis pulmonary nodule  Interval notes, chart personally reviewed by me.  I have reviewed and summarized old records as detailed above  Plan of care discussed with patient and family at bedside  New problems include possible new onset CHF, fluid overload      Risk/ complexity of medical care/ medical decision making: High risk, IV diuretic management, CKD, proteinuria, hypertensive management  Chronic illness with severe exacerbation or progression: CKD, new onset CHF      Labs:   Recent Results (from the past 24 hours)   Comprehensive Metabolic Panel    Collection Time: 07/23/25  8:29 PM    Specimen: Blood   Result Value Ref Range    Glucose 104 (H) 65 - 99 mg/dL    BUN 15.6 8.0 - 23.0 mg/dL    Creatinine 1.13 (H) 0.57 - 1.00 mg/dL    Sodium 142 136 - 145 mmol/L    Potassium 4.1 3.5 - 5.2 mmol/L    Chloride 111 (H) 98 - 107 mmol/L    CO2 23.0 22.0 - 29.0 mmol/L    Calcium 9.7 8.6 - 10.5 mg/dL    Total Protein  5.7 (L) 6.0 - 8.5 g/dL    Albumin 3.4 (L) 3.5 - 5.2 g/dL    ALT (SGPT) 7 1 - 33 U/L    AST (SGOT) 8 1 - 32 U/L    Alkaline Phosphatase 99 39 - 117 U/L    Total Bilirubin <0.2 0.0 - 1.2 mg/dL    Globulin 2.3 gm/dL    A/G Ratio 1.5 g/dL    BUN/Creatinine Ratio 13.8 7.0 - 25.0    Anion Gap 8.0 5.0 - 15.0 mmol/L    eGFR 54.8 (L) >60.0 mL/min/1.73   BNP    Collection Time: 07/23/25  8:29 PM    Specimen: Blood   Result Value Ref Range    proBNP 883.7 0.0 - 900.0 pg/mL   High Sensitivity Troponin T    Collection Time: 07/23/25  8:29 PM    Specimen: Blood   Result Value Ref Range    HS Troponin T 17 (H) <14 ng/L   Green Top (Gel)    Collection Time: 07/23/25  8:29 PM   Result Value Ref Range    Extra Tube Hold for add-ons.    Lavender Top    Collection Time: 07/23/25  8:29 PM   Result Value Ref Range    Extra Tube hold for add-on    Gold Top - SST    Collection Time: 07/23/25  8:29 PM   Result Value Ref Range    Extra Tube Hold for add-ons.    Light Blue Top    Collection Time: 07/23/25  8:29 PM   Result Value Ref Range    Extra Tube Hold for add-ons.    CBC Auto Differential    Collection Time: 07/23/25  8:29 PM    Specimen: Blood   Result Value Ref Range    WBC 8.22 3.40 - 10.80 10*3/mm3    RBC 3.84 3.77 - 5.28 10*6/mm3    Hemoglobin 11.2 (L) 12.0 - 15.9 g/dL    Hematocrit 36.3 34.0 - 46.6 %    MCV 94.5 79.0 - 97.0 fL    MCH 29.2 26.6 - 33.0 pg    MCHC 30.9 (L) 31.5 - 35.7 g/dL    RDW 13.2 12.3 - 15.4 %    RDW-SD 46.7 37.0 - 54.0 fl    MPV 8.8 6.0 - 12.0 fL    Platelets 289 140 - 450 10*3/mm3    Neutrophil % 61.0 42.7 - 76.0 %    Lymphocyte % 24.2 19.6 - 45.3 %    Monocyte % 11.7 5.0 - 12.0 %    Eosinophil % 2.4 0.3 - 6.2 %    Basophil % 0.6 0.0 - 1.5 %    Immature Grans % 0.1 0.0 - 0.5 %    Neutrophils, Absolute 5.01 1.70 - 7.00 10*3/mm3    Lymphocytes, Absolute 1.99 0.70 - 3.10 10*3/mm3    Monocytes, Absolute 0.96 (H) 0.10 - 0.90 10*3/mm3    Eosinophils, Absolute 0.20 0.00 - 0.40 10*3/mm3    Basophils, Absolute 0.05  0.00 - 0.20 10*3/mm3    Immature Grans, Absolute 0.01 0.00 - 0.05 10*3/mm3   D-dimer, Quantitative    Collection Time: 07/23/25  8:29 PM    Specimen: Blood   Result Value Ref Range    D-Dimer, Quantitative 0.60 0.00 - 0.63 MCGFEU/mL   ECG 12 Lead Dyspnea    Collection Time: 07/23/25  8:30 PM   Result Value Ref Range    QT Interval 410 ms    QTC Interval 420 ms   High Sensitivity Troponin T 1Hr    Collection Time: 07/23/25  9:35 PM    Specimen: Blood   Result Value Ref Range    HS Troponin T 16 (H) <14 ng/L    Troponin T Numeric Delta -1 ng/L    Troponin T % Delta -6 Abnormal if >/= 20%   Basic Metabolic Panel    Collection Time: 07/24/25  5:22 AM    Specimen: Blood   Result Value Ref Range    Glucose 227 (H) 65 - 99 mg/dL    BUN 17.0 8.0 - 23.0 mg/dL    Creatinine 1.18 (H) 0.57 - 1.00 mg/dL    Sodium 142 136 - 145 mmol/L    Potassium 4.4 3.5 - 5.2 mmol/L    Chloride 111 (H) 98 - 107 mmol/L    CO2 20.2 (L) 22.0 - 29.0 mmol/L    Calcium 9.4 8.6 - 10.5 mg/dL    BUN/Creatinine Ratio 14.4 7.0 - 25.0    Anion Gap 10.8 5.0 - 15.0 mmol/L    eGFR 52.0 (L) >60.0 mL/min/1.73   High Sensitivity Troponin T    Collection Time: 07/24/25  5:22 AM    Specimen: Blood   Result Value Ref Range    HS Troponin T 13 <14 ng/L   CBC (No Diff)    Collection Time: 07/24/25  5:23 AM    Specimen: Blood   Result Value Ref Range    WBC 9.42 3.40 - 10.80 10*3/mm3    RBC 3.91 3.77 - 5.28 10*6/mm3    Hemoglobin 11.6 (L) 12.0 - 15.9 g/dL    Hematocrit 36.4 34.0 - 46.6 %    MCV 93.1 79.0 - 97.0 fL    MCH 29.7 26.6 - 33.0 pg    MCHC 31.9 31.5 - 35.7 g/dL    RDW 12.7 12.3 - 15.4 %    RDW-SD 42.5 37.0 - 54.0 fl    MPV 9.5 6.0 - 12.0 fL    Platelets 299 140 - 450 10*3/mm3   Hemoglobin A1c    Collection Time: 07/24/25  5:23 AM    Specimen: Blood   Result Value Ref Range    Hemoglobin A1C 6.40 (H) 4.80 - 5.60 %   POC Glucose Once    Collection Time: 07/24/25  7:30 AM    Specimen: Blood   Result Value Ref Range    Glucose 233 (H) 70 - 130 mg/dL   Urinalysis  With Culture If Indicated - Urine, Clean Catch    Collection Time: 07/24/25 10:56 AM    Specimen: Urine, Clean Catch   Result Value Ref Range    Color, UA Yellow Yellow, Straw    Appearance, UA Clear Clear    pH, UA <=5.0 5.0 - 8.0    Specific Gravity, UA 1.016 1.005 - 1.030    Glucose, UA Negative Negative    Ketones, UA Negative Negative    Bilirubin, UA Negative Negative    Blood, UA Trace (A) Negative    Protein, UA >=300 mg/dL (3+) (A) Negative    Leuk Esterase, UA Negative Negative    Nitrite, UA Negative Negative    Urobilinogen, UA 0.2 E.U./dL 0.2 - 1.0 E.U./dL   Urinalysis, Microscopic Only - Urine, Clean Catch    Collection Time: 07/24/25 10:56 AM    Specimen: Urine, Clean Catch   Result Value Ref Range    RBC, UA 0-2 None Seen, 0-2 /HPF    WBC, UA 0-2 None Seen, 0-2 /HPF    Bacteria, UA None Seen None Seen /HPF    Squamous Epithelial Cells, UA 0-2 None Seen, 0-2 /HPF    Hyaline Casts, UA 7-12 None Seen /LPF    Methodology Automated Microscopy    POC Glucose Once    Collection Time: 07/24/25 11:35 AM    Specimen: Blood   Result Value Ref Range    Glucose 203 (H) 70 - 130 mg/dL       Radiology:  Imaging Results (Last 24 Hours)       Procedure Component Value Units Date/Time    CT Chest With Contrast Diagnostic [[id number]] Collected: 07/23/25 2323     Updated: 07/23/25 2335    Narrative:      CT OF THE CHEST WITH CONTRAST; CT OF THE ABDOMEN AND PELVIS WITH  CONTRAST     HISTORY: Lower abdominal pain and possible effusion     COMPARISON: March 15, 2024     TECHNIQUE: Axial CT imaging was obtained from the thoracic inlet through  the symphysis pubis. IV contrast was administered.     FINDINGS:  Chest: Examination was not optimized for assessment of the vasculature.  A large central pulmonary thromboembolus is not seen. There is no  dissection. There do appear to be some coronary artery calcifications. 4  mm nodule abuts the minor fissure within the right middle lobe. The  patient has left basilar atelectasis.  The thyroid gland, trachea, and  esophagus appear unremarkable. Metastatically enlarged. No acute osseous  abnormalities are seen.     Abdomen and pelvis: Possible tiny cyst is noted within the left hepatic  lobe. The stomach, duodenum, adrenal glands, spleen, pancreas, and  gallbladder are all normal. The kidney. There is no hydronephrosis.  There is a simple appearing left renal cyst. There are aortoiliac  calcifications. Urinary bladder is relatively contracted. Uterus appears  normal. No adnexal masses are seen. There is no bowel obstruction. There  is colonic diverticulosis. The appendix is normal. The patient has soft  tissue stranding involving the lower anterior abdominal wall,  particularly on the left, as well as some skin thickening. This may be  related to some dependent edema, although correlation with any evidence  of infection is recommended. There is some additional edema which is  seen overlying the right gluteal region. There is mild lumbar scoliosis,  with convexity to the left. No acute osseous abnormalities are seen.       Impression:      1. Left basilar atelectasis.  2. Soft tissue stranding and skin thickening involving the lower  anterior abdominal wall. This may be related to dependent edema,  although correlation with any evidence of cellulitis is recommended. No  soft tissue gas is seen.        3. Indeterminate solid pulmonary nodule measuring 4 mm. In a high-risk  patient with a solid nodule <6 mm, CT at 12 months is optional with  stronger consideration if there is suspicious nodule morphology and/or  upper lobe location.     Radiation dose reduction techniques were utilized, including automated  exposure control and exposure modulation based on body size.        This report was finalized on 7/23/2025 11:32 PM by Dr. Karina Sears M.D on Workstation: [id number]       CT Abdomen Pelvis With Contrast [[id number]] Collected: 07/23/25 2323     Updated: 07/23/25 2335    Narrative:       CT OF THE CHEST WITH CONTRAST; CT OF THE ABDOMEN AND PELVIS WITH  CONTRAST     HISTORY: Lower abdominal pain and possible effusion     COMPARISON: March 15, 2024     TECHNIQUE: Axial CT imaging was obtained from the thoracic inlet through  the symphysis pubis. IV contrast was administered.     FINDINGS:  Chest: Examination was not optimized for assessment of the vasculature.  A large central pulmonary thromboembolus is not seen. There is no  dissection. There do appear to be some coronary artery calcifications. 4  mm nodule abuts the minor fissure within the right middle lobe. The  patient has left basilar atelectasis. The thyroid gland, trachea, and  esophagus appear unremarkable. Metastatically enlarged. No acute osseous  abnormalities are seen.     Abdomen and pelvis: Possible tiny cyst is noted within the left hepatic  lobe. The stomach, duodenum, adrenal glands, spleen, pancreas, and  gallbladder are all normal. The kidney. There is no hydronephrosis.  There is a simple appearing left renal cyst. There are aortoiliac  calcifications. Urinary bladder is relatively contracted. Uterus appears  normal. No adnexal masses are seen. There is no bowel obstruction. There  is colonic diverticulosis. The appendix is normal. The patient has soft  tissue stranding involving the lower anterior abdominal wall,  particularly on the left, as well as some skin thickening. This may be  related to some dependent edema, although correlation with any evidence  of infection is recommended. There is some additional edema which is  seen overlying the right gluteal region. There is mild lumbar scoliosis,  with convexity to the left. No acute osseous abnormalities are seen.       Impression:      1. Left basilar atelectasis.  2. Soft tissue stranding and skin thickening involving the lower  anterior abdominal wall. This may be related to dependent edema,  although correlation with any evidence of cellulitis is recommended. No  soft  tissue gas is seen.        3. Indeterminate solid pulmonary nodule measuring 4 mm. In a high-risk  patient with a solid nodule <6 mm, CT at 12 months is optional with  stronger consideration if there is suspicious nodule morphology and/or  upper lobe location.     Radiation dose reduction techniques were utilized, including automated  exposure control and exposure modulation based on body size.        This report was finalized on 7/23/2025 11:32 PM by Dr. Karina Sears M.D on Workstation: [id number]       XR Chest 1 View [[id number]] Collected: 07/23/25 2131     Updated: 07/23/25 2138    Narrative:      SINGLE VIEW OF THE CHEST     HISTORY: Shortness of air     COMPARISON: March 15, 2024     FINDINGS:  There is cardiomegaly. There is vascular congestion. The patient has  some bandlike atelectasis versus scarring within the left midlung. No  pneumothorax is seen. There is blunting the left costophrenic angle, and  small effusion is not excluded.       Impression:      Cardiomegaly with vascular congestion.     This report was finalized on 7/23/2025 9:35 PM by Dr. Karina Sears M.D on Workstation: [id number]                    ASSESSMENT:   Fibrillary glomerulonephritis, seen on prior renal biopsy.  Recent improvement with combination of ACE inhibitor and Kerendia  Edema/volume overload    Cellulitis    Diabetes mellitus    Hyperlipidemia    Essential hypertension    Obesity due to excess calories    Tobacco dependence syndrome    Fibrillary glomerulonephritis    Pulmonary nodule    Stage 3a chronic kidney disease    Fluid overload    COPD (chronic obstructive pulmonary disease)    BMI 45.0-49.9, adult        DISCUSSION/PLAN:   Proteinuria could be a contributor to her generalized anasarca albumin was only 3.4.  Pulmonary symptoms seem more consistent with CHF  Continue lisinopril at maximum dose  She also had a very good improvement in her proteinuria with Kerendia but that was stopped due to  hyperkalemia.  Since her potassium is now normal we will try resuming this medication  Agree with IV diuretics for presumed CHF, new onset.  Echo pending  Will monitor renal function, volume, electrolytes closely while on diuretic therapy  Monitor daily weights, check strict I's and O's  Placed on sodium restricted diet and fluid restriction  Follow-up labs in a.m.  Await protein creatinine ratio.  I will ask lab to add that onto her urine sample in the lab rather than a new collection    Continue to monitor electrolytes and volume closely, avoid IV contrast and nephrotoxic medications     Addendum:  Looks like Kerendia is nonformulary so that will need to be restarted as an outpatient.    I appreciate the consult request.  Please send me a secure chat message with any nonurgent questions regarding patient care.  For any urgent patient care issues please call my office number below.      Ilya Osborne MD  [facility]  Office phone number: [phone]  Answering service phone number: [phone]      7/24/2025        Dictation via Dragon dictation software

## 2025-07-24 NOTE — DISCHARGE PLACEMENT REQUEST
"Bernice Rai (64 y.o. Female)       Date of Birth   1960    Social Security Number       Address   [id number] [place] [address]    Home Phone   [phone]    MRN   [id number]       [employer]   Sikhism    Marital Status                               Admission Date   7/23/2025    Admission Type   Urgent    Admitting Provider   Andrew Keller MD    Attending Provider   Andrew Keller MD    Department, Room/Bed   [address], P498/1       Discharge Date       Discharge Disposition       Discharge Destination                                 Attending Provider: Andrew Keller MD    Allergies: Jardiance [Empagliflozin]    Isolation: None   Infection: None   Code Status: CPR    Ht: 157.5 cm (62\")   Wt: 123 kg (271 lb)    Admission Cmt: None   Principal Problem: Cellulitis [L03.90]                   Active Insurance as of 7/23/2025       Primary Coverage       Payor Plan Insurance Group Employer/Plan Group    HUMANA MEDICARE REPLACEMENT HU[employer]NA MEDICARE ADVANTAGE PPO [id number]       Payor Plan Address Payor Plan Phone Number Payor Plan Fax Number Effective Dates    [address] [address] [phone]  1/1/2018 - None Entered    [facility] [id number]         Subscriber Name Subscriber Birth Date Member ID       BERNICE RAI 1960 [id number]                     Emergency Contacts        (Rel.) Home Phone Work Phone Mobile Phone    Rei Rai (Spouse) -- -- [phone]                "

## 2025-07-24 NOTE — FSED PROVIDER NOTE
Subjective   History of Present Illness  64 yr old female presents C/O ABD pain on left side, SOA, wheezing, and left leg swelling for 1 week.  Her BP has also been elevated at home. She has a history of HTN, DM, COPD, Asthma, CKD3, Obesity, and she is a daily smoker.  She has an inhaler which she has been using frequently.  She has had issues with the left leg swelling before this past week.  She states her ABD is swollen, red and tender on the left side.  She denies fever or chills.          Review of Systems   Constitutional: Negative.    Eyes: Negative.    Respiratory:  Positive for shortness of breath and wheezing.    Cardiovascular:  Positive for leg swelling. Negative for chest pain and palpitations.   Gastrointestinal:  Positive for abdominal distention, abdominal pain and blood in stool. Negative for constipation, diarrhea, nausea and vomiting.   Genitourinary: Negative.    Musculoskeletal: Negative.    Skin: Negative.    Neurological: Negative.        Past Medical History:   Diagnosis Date    Allergic Years ago    Took allergy shots    Anxiety     Stroke    Arthritis     FEET    Asthma     Inhaler    Clotting disorder     Taking asa    COPD (chronic obstructive pulmonary disease)     At times bronchitis    Depression 10/22/01    Stroke    Diabetes mellitus     GERD (gastroesophageal reflux disease)     Gerd    Hyperlipidemia     Hypertension     Obesity     Reflux esophagitis     Renal insufficiency     Stroke     Urinary tract infection     Occasionaly    Visual impairment 10/2001    1/2 vision each eye       Allergies   Allergen Reactions    Jardiance [Empagliflozin] Itching     Recurrent yeast infections       Past Surgical History:   Procedure Laterality Date     SECTION      x 2    OTHER SURGICAL HISTORY      FATTY TUMOR REMOVED RIGHT HIP    UMBILICAL HERNIA REPAIR      Mid abdominal hernia       Family History   Problem Relation Age of Onset    Diabetes Mother     Hypertension  Mother     Hyperlipidemia Mother     Heart disease Mother     Depression Mother     Macular degeneration Mother     Arthritis Mother     Thyroid disease Mother     Other Mother         Brain tumors no cancer    Diabetes Father     Hypertension Father     Pancreatic cancer Father     Cancer Father     Other Father         Pancreatic cancer    Cancer Maternal Grandmother     Colon cancer Other     Breast cancer Neg Hx        Social History     Socioeconomic History    Marital status:    Tobacco Use    Smoking status: Every Day     Current packs/day: 1.00     Average packs/day: 1 pack/day for 45.6 years (45.6 ttl pk-yrs)     Types: Cigarettes     Start date: 1980     Passive exposure: Current    Smokeless tobacco: Never   Vaping Use    Vaping status: Never Used   Substance and Sexual Activity    Alcohol use: No    Drug use: Never    Sexual activity: Not Currently     Partners: Male     Birth control/protection: Post-menopausal           Objective   Physical Exam  Vitals and nursing note reviewed.   Constitutional:       General: She is in acute distress.      Appearance: She is obese.   Cardiovascular:      Rate and Rhythm: Normal rate and regular rhythm.      Pulses: Normal pulses.   Pulmonary:      Effort: Tachypnea and respiratory distress present.      Breath sounds: Examination of the right-middle field reveals wheezing. Examination of the left-middle field reveals wheezing. Examination of the right-lower field reveals decreased breath sounds, wheezing and rhonchi. Examination of the left-lower field reveals decreased breath sounds, wheezing and rhonchi. Decreased breath sounds, wheezing and rhonchi present.   Abdominal:      General: Abdomen is protuberant. Bowel sounds are normal. There is distension.      Tenderness: There is abdominal tenderness in the left upper quadrant and left lower quadrant.          Comments: Left side of ABD is red and tender to touch.  She has striae distensae on center and left  side of ABD that she says was nt present before.   Musculoskeletal:      Cervical back: Full passive range of motion without pain.      Right lower le+ Edema present.      Left lower le+ Pitting Edema present.   Skin:     General: Skin is warm and dry.      Capillary Refill: Capillary refill takes less than 2 seconds.   Neurological:      General: No focal deficit present.      Mental Status: She is alert and oriented to person, place, and time.      GCS: GCS eye subscore is 4. GCS verbal subscore is 5. GCS motor subscore is 6.         Procedures           ED Course  ED Course as of 25 002 ECG 12 Lead Dyspnea  NSR.  Prolonged IL interval.  HR = 63. [JJ]    Glucose(!): 104 [JJ]    Creatinine(!): 1.13 [JJ]    Chloride(!): 111 [JJ]    Total Protein(!): 5.7 [JJ]    Albumin(!): 3.4 [JJ]    D-Dimer, Quant: 0.60 [JJ]    Hemoglobin(!): 11.2 [JJ]    RDW: 13.2 [JJ]    eGFR(!): 54.8 [JJ]    HS Troponin T(!): 17 [JJ]    proBNP: 883.7 [JJ]    XR Chest 1 View  Cardiomegaly with vascular congestion.  Cannot exclude effusion per radiology. [JJ]    HS Troponin T(!): 16 [JJ]   8 CT Chest With Contrast Diagnostic  Left basilar atelectasis. [JJ]   2338 CT Abdomen Pelvis With Contrast  Soft tissue stranding and skin thickening involving the lower anterior abdominal wall. Page to [facility] to admit for cellulitis of ABD.   [JJ]   Thu 2025   0008 CT Chest With Contrast Diagnostic [JJ]   0009 CT Abdomen Pelvis With Contrast [JJ]   0020 Pt transferred to [facility] in stable condition at time of transfer. [JJ]      ED Course User Index  [JJ] Kathleen Rai, AMANDA                                           Medical Decision Making  Amount and/or Complexity of Data Reviewed  Labs:  Decision-making details documented in ED Course.  Radiology: ordered. Decision-making details documented in ED Course.  ECG/medicine tests: ordered. Decision-making  details documented in ED Course.    Risk  Prescription drug management.        Final diagnoses:   Cellulitis of abdominal wall       ED Disposition  ED Disposition       ED Disposition   Decision to Admit    Condition   --    Comment   Level of Care: Med/Surg [1]   Diagnosis: Cellulitis [[id number]]   Admitting Physician: RAHEEL WATTERS [[id number]]   Attending Physician: RAHEEL WATTERS [[id number]]   Isolate for COVID?: No [0]   Certification: I Certify That Inpatient Hospital Services Are Medically Necessary For Greater Than 2 Midnights                 No follow-up provider specified.       Medication List      No changes were made to your prescriptions during this visit.

## 2025-07-24 NOTE — ED NOTES
Nursing report ED to floor  Bernice Rai  64 y.o.  female    HPI :  HPI  Stated Reason for Visit: SOA  History Obtained From: patient    Chief Complaint  Chief Complaint   Patient presents with    Shortness of Breath     Patient reports SOA, abd pain and swelling and leg swelling x 1 week        Admitting doctor:   Jamie Sheprad MD    Admitting diagnosis:   The encounter diagnosis was Cellulitis of abdominal wall.    Code status:   Current Code Status       Date Active Code Status Order ID Comments User Context       Not on file            Allergies:   Jardiance [empagliflozin]    Isolation:   No active isolations    Intake and Output  No intake or output data in the 24 hours ending 07/24/25 0033    Weight:       07/23/25 1955   Weight: 125 kg (276 lb)       Most recent vitals:   Vitals:    07/23/25 2130 07/23/25 2200 07/23/25 2230 07/23/25 2330   BP: (!) 182/79 (!) 184/79 174/80 127/72   Pulse: 58 60 66    Resp:       Temp:       TempSrc:       SpO2: 95% 95% 93%    Weight:       Height:           Active LDAs/IV Access:   Lines, Drains & Airways       Active LDAs       Name Placement date Placement time Site Days    Peripheral IV 07/23/25 2029 20 G Right Antecubital 07/23/25 2029  Antecubital  less than 1                    Labs (abnormal labs have a star):   Labs Reviewed   COMPREHENSIVE METABOLIC PANEL - Abnormal; Notable for the following components:       Result Value    Glucose 104 (*)     Creatinine 1.13 (*)     Chloride 111 (*)     Total Protein 5.7 (*)     Albumin 3.4 (*)     eGFR 54.8 (*)     All other components within normal limits    Narrative:     GFR Categories in Chronic Kidney Disease (CKD)              GFR Category          GFR (mL/min/1.73)    Interpretation  G1                    90 or greater        Normal or high (1)  G2                    60-89                Mild decrease (1)  G3a                   45-59                Mild to moderate decrease  G3b                   30-44                 Moderate to severe decrease  G4                    15-29                Severe decrease  G5                    14 or less           Kidney failure    (1)In the absence of evidence of kidney disease, neither GFR category G1 or G2 fulfill the criteria for CKD.    eGFR calculation 2021 CKD-EPI creatinine equation, which does not include race as a factor   TROPONIN - Abnormal; Notable for the following components:    HS Troponin T 17 (*)     All other components within normal limits    Narrative:     High Sensitive Troponin T Reference Range:  <14.0 ng/L- Negative Female for AMI  <22.0 ng/L- Negative Male for AMI  >=14 - Abnormal Female indicating possible myocardial injury.  >=22 - Abnormal Male indicating possible myocardial injury.   Clinicians would have to utilize clinical acumen, EKG, Troponin, and serial changes to determine if it is an Acute Myocardial Infarction or myocardial injury due to an underlying chronic condition.        CBC WITH AUTO DIFFERENTIAL - Abnormal; Notable for the following components:    Hemoglobin 11.2 (*)     MCHC 30.9 (*)     Monocytes, Absolute 0.96 (*)     All other components within normal limits   HIGH SENSITIVITIY TROPONIN T 1HR - Abnormal; Notable for the following components:    HS Troponin T 16 (*)     All other components within normal limits    Narrative:     High Sensitive Troponin T Reference Range:  <14.0 ng/L- Negative Female for AMI  <22.0 ng/L- Negative Male for AMI  >=14 - Abnormal Female indicating possible myocardial injury.  >=22 - Abnormal Male indicating possible myocardial injury.   Clinicians would have to utilize clinical acumen, EKG, Troponin, and serial changes to determine if it is an Acute Myocardial Infarction or myocardial injury due to an underlying chronic condition.        BNP (IN-HOUSE) - Normal    Narrative:     This assay is used as an aid in the diagnosis of individuals suspected of having heart failure. It can be used as an aid in the diagnosis  "of acute decompensated heart failure (ADHF) in patients presenting with signs and symptoms of ADHF to the emergency department (ED). In addition, NT-proBNP of <300 pg/mL indicates ADHF is not likely.    Age Range Result Interpretation  NT-proBNP Concentration (pg/mL:      <50             Positive            >450                   Gray                 300-450                    Negative             <300    50-75           Positive            >900                  Gray                300-900                  Negative            <300      >75             Positive            >1800                  Gray                300-1800                  Negative            <300   D-DIMER, QUANTITATIVE - Normal    Narrative:     According to the assay 's published package insert, a normal (<0.50 MCGFEU/mL) D-dimer result in conjunction with a non-high clinical probability assessment, excludes deep vein thrombosis (DVT) and pulmonary embolism (PE) with high sensitivity.    D-dimer values increase with age and this can make VTE exclusion of an older population difficult. To address this, the American College of [employer], based on best available evidence and recent guidelines, recommends that clinicians use age-adjusted D-dimer thresholds in patients greater than 50 years of age with: a) a low probability of PE who do not meet all Pulmonary Embolism Rule Out Criteria, or b) in those with intermediate probability of PE.   The formula for an age-adjusted D-dimer cut-off is \"age/100\".  For example, a 60 year old patient would have an age-adjusted cut-off of 0.60 MCGFEU/mL and an 80 year old 0.80 MCGFEU/mL.   RAINBOW DRAW    Narrative:     The following orders were created for panel order [place] Draw.  Procedure                               Abnormality         Status                     ---------                               -----------         ------                     Green Top (Gel)[[id number]]                          "         Final result               Lavender Top[[id number]]                                     Final result               Gold Top - SST[[id number]]                                   Final result               Light Blue Top[[id number]]                                   Final result               Green Top (Gel)[[id number]]                                                               Please view results for these tests on the individual orders.   CBC AND DIFFERENTIAL    Narrative:     The following orders were created for panel order CBC & Differential.  Procedure                               Abnormality         Status                     ---------                               -----------         ------                     CBC Auto Differential[[id number]]        Abnormal            Final result                 Please view results for these tests on the individual orders.   GREEN TOP   LAVENDER TOP   GOLD TOP - SST   LIGHT BLUE TOP       EKG:   ECG 12 Lead Dyspnea   Preliminary Result   HEART RATE=63  bpm   RR Hoqmplxn=430  ms   AR Xlzprkmi=105  ms   P Horizontal Axis=-21  deg   P Front Axis=46  deg   QRSD Interval=97  ms   QT Vevbvjjw=071  ms   AEnD=909  ms   QRS Axis=114  deg   T Wave Axis=11  deg   - ABNORMAL ECG -   Sinus rhythm   Prolonged AR interval   Anterolateral infarct, age indeterminate   Date and Time of Study:2025-07-23 20:30:08          Meds given in ED:   Medications   sodium chloride 0.9 % flush 10 mL (has no administration in time range)   cefTRIAXone (ROCEPHIN) 1,000 mg in sodium chloride 0.9 % 100 mL MBP (has no administration in time range)   furosemide (LASIX) injection 40 mg (has no administration in time range)   ipratropium-albuterol (DUO-NEB) nebulizer solution 3 mL (3 mL Nebulization Given 7/23/25 2049)   dexAMETHasone sodium phosphate injection 10 mg (10 mg Intravenous Given 7/23/25 2048)   iopamidol (ISOVUE-370) 76 % injection 100 mL (85 mL Intravenous Given 7/23/25 2318)       Imaging  results:  CT Chest With Contrast Diagnostic  Result Date: 7/23/2025  1. Left basilar atelectasis. 2. Soft tissue stranding and skin thickening involving the lower anterior abdominal wall. This may be related to dependent edema, although correlation with any evidence of cellulitis is recommended. No soft tissue gas is seen.   3. Indeterminate solid pulmonary nodule measuring 4 mm. In a high-risk patient with a solid nodule <6 mm, CT at 12 months is optional with stronger consideration if there is suspicious nodule morphology and/or upper lobe location.  Radiation dose reduction techniques were utilized, including automated exposure control and exposure modulation based on body size.   This report was finalized on 7/23/2025 11:32 PM by Dr. Karina Sears M.D on Workstation: [id number]      CT Abdomen Pelvis With Contrast  Result Date: 7/23/2025  1. Left basilar atelectasis. 2. Soft tissue stranding and skin thickening involving the lower anterior abdominal wall. This may be related to dependent edema, although correlation with any evidence of cellulitis is recommended. No soft tissue gas is seen.   3. Indeterminate solid pulmonary nodule measuring 4 mm. In a high-risk patient with a solid nodule <6 mm, CT at 12 months is optional with stronger consideration if there is suspicious nodule morphology and/or upper lobe location.  Radiation dose reduction techniques were utilized, including automated exposure control and exposure modulation based on body size.   This report was finalized on 7/23/2025 11:32 PM by Dr. Karina Sears M.D on Workstation: [id number]      XR Chest 1 View  Result Date: 7/23/2025  Cardiomegaly with vascular congestion.  This report was finalized on 7/23/2025 9:35 PM by Dr. Karina Sears M.D on Workstation: [id number]        Ambulatory status:   -ad son    Social issues:   Social History     Socioeconomic History    Marital status:    Tobacco Use    Smoking status: Every Day      Current packs/day: 1.00     Average packs/day: 1 pack/day for 45.6 years (45.6 ttl pk-yrs)     Types: Cigarettes     Start date: 1980     Passive exposure: Current    Smokeless tobacco: Never   Vaping Use    Vaping status: Never Used   Substance and Sexual Activity    Alcohol use: No    Drug use: Never    Sexual activity: Not Currently     Partners: Male     Birth control/protection: Post-menopausal       Peripheral Neurovascular  Peripheral Neurovascular (Adult)  Peripheral Neurovascular WDL: .WDL except (Denies hx of CHF.)  Additional Documentation: Edema (Group)  Edema  Edema: foot, right, foot, left, ankle, right, ankle, left, knee, right, leg, right, leg, left  Leg, Left Edema: 2+ (Mild)  Leg, Right Edema: 2+ (Mild)  Knee, Right Edema: 2+ (Mild)  Ankle, Left Edema: 2+ (Mild)  Ankle, Right Edema: 2+ (Mild)  Foot, Left Edema: 2+ (Mild)  Foot, Right Edema: 2+ (Mild)    Neuro Cognitive  Neuro Cognitive (Adult)  Cognitive/Neuro/Behavioral WDL: WDL    Learning  Learning Assessment  Learning Readiness and Ability: no barriers identified    Respiratory  Respiratory  Airway WDL: WDL  Additional Documentation: Breath Sounds (Group)  Respiratory WDL  Respiratory WDL: .WDL except, all  Rhythm/Pattern, Respiratory: tachypneic, shortness of breath  Cough Frequency: no cough  Breath Sounds  All Lung Fields Breath Sounds: All Fields  All Lung Fields Breath Sounds: diminished, wheezes, expiratory    Abdominal Pain       Pain Assessments  Pain (Adult)  (0-10) Pain Rating: Rest: 0  (0-10) Pain Rating: Activity: 0    NIH Stroke Scale       Erlinda Morin RN  07/24/25 00:33 EDT

## 2025-07-24 NOTE — H&P
Patient Identification:  Name: Bernice Rai  Age: 64 y.o.  Sex: female  :  1960  MRN: [id number]         Primary Care Physician: Ashley Holt APRN    Chief Complaint   Patient presents with    Shortness of Breath     Patient reports SOA, abd pain and swelling and leg swelling x 1 week      Subjective   Patient is a 64 y.o. female presented with 1 week shortness of breath, wheezing, leg swelling, dyspnea on exertion, weight gain (20 pounds). She has a history of stroke, smoking, hypertension, diabetes, COPD/asthma, CKD 3. FSED noted that she complained of abdomen swollen red and tender on the left side however she tells me she did not have any abdominal pain. She was given ceftriaxone, steroids, IV Lasix, DuoNebs. She states she has urinated a lot and her breathing is improved.    Past Medical History:   Diagnosis Date    Allergic Years ago    Took allergy shots    Anxiety     Stroke    Arthritis     FEET    Asthma     Inhaler    Clotting disorder     Taking asa    COPD (chronic obstructive pulmonary disease)     At times bronchitis    Depression 10/22/01    Stroke    Diabetes mellitus     GERD (gastroesophageal reflux disease)     Gerd    Hyperlipidemia     Hypertension     Obesity     Reflux esophagitis     Renal insufficiency     Stroke     Urinary tract infection     Occasionaly    Visual impairment 10/2001    1/2 vision each eye     Past Surgical History:   Procedure Laterality Date     SECTION      x 2    OTHER SURGICAL HISTORY      FATTY TUMOR REMOVED RIGHT HIP    UMBILICAL HERNIA REPAIR      Mid abdominal hernia     Documented medications reviewed    Family History   Problem Relation Age of Onset    Diabetes Mother     Hypertension Mother     Hyperlipidemia Mother     Heart disease Mother     Depression Mother     Macular degeneration Mother     Arthritis Mother     Thyroid disease Mother     Other Mother         Brain tumors no cancer    Diabetes Father     Hypertension  Father     Pancreatic cancer Father     Cancer Father     Other Father         Pancreatic cancer    Cancer Maternal Grandmother     Colon cancer Other     Breast cancer Neg Hx      Social History     Tobacco Use    Smoking status: Every Day     Current packs/day: 1.00     Average packs/day: 1 pack/day for 45.6 years (45.6 ttl pk-yrs)     Types: Cigarettes     Start date: 1980     Passive exposure: Current    Smokeless tobacco: Never   Vaping Use    Vaping status: Never Used   Substance Use Topics    Alcohol use: No    Drug use: Never     Objective      Vital Signs  Temp:  [97 °F (36.1 °C)-98.4 °F (36.9 °C)] 97.3 °F (36.3 °C)  Heart Rate:  [58-82] 82  Resp:  [16-18] 18  BP: (127-203)/() 180/78  Body mass index is 49.62 kg/m².    Physical Exam  Constitutional:       General: She is not in acute distress.     Appearance: She is obese. She is not toxic-appearing.   HENT:      Head: Normocephalic and atraumatic.   Cardiovascular:      Rate and Rhythm: Normal rate and regular rhythm.   Pulmonary:      Effort: Pulmonary effort is normal. No respiratory distress.      Breath sounds: Decreased breath sounds present.      Comments: cough with wheeze  Abdominal:      General: Bowel sounds are normal.      Palpations: Abdomen is soft.      Tenderness: There is no abdominal tenderness. There is no guarding or rebound.      Comments: I do not see much erythema but there is significant abdominal wall swelling   Musculoskeletal:      Right lower leg: Edema present.      Left lower leg: Edema present.   Skin:     General: Skin is warm and dry.   Neurological:      Mental Status: She is alert and oriented to person, place, and time.   Psychiatric:         Mood and Affect: Mood normal.         Behavior: Behavior normal.         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     64 y.o. female shortness of breath swelling weight gain    Fluid overload/possible CHF  Hypertension  Minimal troponin elevation  Some improvement with diuresis will continue. Monitor weights and output. Diuresis should help BP some  Check echocardiogram  D-dimer not elevated.  Hold Norvasc due to swelling  Hold Actos also  Minimal troponin elevation she has some CKD. No chest pain    Pulmonary nodule 4 mm  COPD  Smoker she knows she needs to quit  Not wheezing much now hold off on any more steroids (she has diabetes)  Continue respiratory medications  Radiology recommends follow-up CT 12 months    CKD 2-3/glomerulonephritis  History of nephrotic range proteinuria   Check urinalysis  Quantify urine protein  Contributing to swelling  Consult her nephrologist    Left abdominal wall cellulitis  Not that impressive currently but she has received antibiotics. FSED documented erythema and tenderness left side of abdomen  Continue antibiotics but change to Kefzol    DM2 A1c 7.4 percent 4 months ago. Attenuated insulin dosing- switch to basal/mealtime and moderate correctional insulin.  Hyperlipidemia statin  Obesity Body mass index is 49.62 kg/m².  History of stroke on aspirin  Anxiety/depression SSRI    Lovenox for DVT prophylaxis  Full code confirmed.    [x] Reconciled medications.  [] Awaiting completion of PTA med list.    Discussed with patient, family, and nursing staff.    Andrew Keller MD  [facility]  07/24/25 07:34 EDT

## 2025-07-24 NOTE — PLAN OF CARE
Goal Outcome Evaluation:   Admit from [facility] overnight for abdominal cellulitis. No c/o of pain or SOA. Hypertensive. Clonidine and lasix given. Up ad son. PT consult for generalized weakness. Urine sample pending. Cardiology consult needed. Care continuing.

## 2025-07-24 NOTE — PROGRESS NOTES
Discharge Planning Assessment  [facility]     Patient Name: Bernice Rai  MRN: [id number]  Today's Date: 7/24/2025    Admit Date: 7/23/2025    Plan: pending   Discharge Needs Assessment    No documentation.                  Discharge Plan       Row Name 07/24/25 1805       Plan    Plan pending    Plan Comments The patient transferred to [facility] from [facility] on 7/24/25 @ 02:30. CCP will continue to follow for any discharge needs. KAMI Rausch RN, CCP.                  Continued Care and Services - Admitted Since 7/23/2025       Durable Medical Equipment       Service Provider Request Status Services Address Phone Fax Patient Preferred    [facility] Pending - Request Sent -- [address] [place] [address] [phone] --                  Expected Discharge Date and Time       Expected Discharge Date Expected Discharge Time    Jul 26, 2025            Demographic Summary    No documentation.                  Functional Status    No documentation.                  Psychosocial    No documentation.                  Abuse/Neglect    No documentation.                  Legal    No documentation.                  Substance Abuse    No documentation.                  Patient Forms    No documentation.                     Enedina Rausch RN

## 2025-07-24 NOTE — PLAN OF CARE
Goal Outcome Evaluation:  Plan of Care Reviewed With: patient           Outcome Evaluation: 63 yo seen for generalized weakness.  Pt with decreased safety and balance with gait.  She demonstrated decreased gait pattern with limp noticed due to hip pain per patient.  Pt reported multiple falls in the past.  PT recommends a rolling walker for DC to home as well as OPPT for increased strength and balance.  No acute PT indicated at this time.    Anticipated Discharge Disposition (PT): home, home with outpatient therapy services

## 2025-07-24 NOTE — THERAPY EVALUATION
Patient Name: Bernice Rai  : 1960    MRN: [id number]                              Today's Date: 2025       Admit Date: 2025    Visit Dx:     ICD-10-CM ICD-9-CM   1. Cellulitis of abdominal wall  L03.311 682.2   2. Balance problem  R26.89 781.99     Patient Active Problem List   Diagnosis    Diabetes mellitus    Gastroesophageal reflux disease    Generalized osteoarthritis    Hyperlipidemia    Essential hypertension    Cerebral infarction    Dermatitis    Obesity due to excess calories    Monilial vaginitis    Tobacco dependency    Albuminuria    Persistent proteinuria    Osteoarthritis    Tobacco dependence syndrome    Anxiety and depression    Balance problem    Tinea corporis    Microalbuminuria    Fibrillary glomerulonephritis    Pulmonary nodule    OAB (overactive bladder)    Stage 3a chronic kidney disease    Hyperkalemia    Tonsillar mass    Abnormal ear, nose, and throat evaluation    Chronic pain of left knee    Lipoma    At moderate risk for fall    Cellulitis    Fluid overload    COPD (chronic obstructive pulmonary disease)    BMI 45.0-49.9, adult     Past Medical History:   Diagnosis Date    Allergic Years ago    Took allergy shots    Anxiety     Stroke    Arthritis     FEET    Asthma     Inhaler    Clotting disorder     Taking asa    COPD (chronic obstructive pulmonary disease)     At times bronchitis    Depression 10/22/01    Stroke    Diabetes mellitus     GERD (gastroesophageal reflux disease)     Gerd    Hyperlipidemia     Hypertension     Obesity 1988    Reflux esophagitis     Renal insufficiency     Stroke     Urinary tract infection     Occasionaly    Visual impairment 10/2001    1/2 vision each eye     Past Surgical History:   Procedure Laterality Date     SECTION      x 2    OTHER SURGICAL HISTORY      FATTY TUMOR REMOVED RIGHT HIP    UMBILICAL HERNIA REPAIR      Mid abdominal hernia      General Information       Row Name 25 1237          Physical  Therapy Time and Intention    Document Type evaluation  -RD     Mode of Treatment physical therapy;individual therapy  -RD       Row Name 07/24/25 1237          General Information    Patient Profile Reviewed yes  -RD     Prior Level of Function independent:;community mobility  -RD     Existing Precautions/Restrictions fall  -RD     Barriers to Rehab none identified  -RD       Row Name 07/24/25 1237          Cognition    Orientation Status (Cognition) oriented x 4  -RD       Row Name 07/24/25 1237          Safety Issues/Impairments Affecting Functional Mobility    Impairments Affecting Function (Mobility) balance;strength;pain  -RD               User Key  (r) = Recorded By, (t) = Taken By, (c) = Cosigned By      Initials Name Provider Type    Ivette Chilel, PT Physical Therapist                   Mobility       Row Name 07/24/25 1238          Gait/Stairs (Locomotion)    [place] Level (Gait) contact guard  -RD     Patient was able to Ambulate yes  -RD     Distance in Feet (Gait) 100  -RD     Deviations/Abnormal Patterns (Gait) stride length decreased;weight shifting decreased;antalgic  -RD     Right Sided Gait Deviations Trendelenburg sign  -RD               User Key  (r) = Recorded By, (t) = Taken By, (c) = Cosigned By      Initials Name Provider Type    Ivette Chilel, PT Physical Therapist                   Obj/Interventions       Row Name 07/24/25 1239          Range of Motion Comprehensive    General Range of Motion bilateral lower extremity ROM WFL  -RD       Row Name 07/24/25 1239          Strength Comprehensive (MMT)    General Manual Muscle Testing (MMT) Assessment lower extremity strength deficits identified  -RD     Comment, General Manual Muscle Testing (MMT) Assessment ble 3-4/5  -RD       Row Name 07/24/25 1239          Balance    Balance Assessment sitting dynamic balance;standing dynamic balance  -RD     Dynamic Sitting Balance modified independence  -RD     Position, Sitting  Balance unsupported;sitting edge of bed  -RD     Dynamic Standing Balance supervision  -RD     Position/Device Used, Standing Balance supported  -RD       Row Name 07/24/25 1239          Sensory Assessment (Somatosensory)    Sensory Assessment (Somatosensory) sensation intact  -RD               User Key  (r) = Recorded By, (t) = Taken By, (c) = Cosigned By      Initials Name Provider Type    Ivette Chilel, PT Physical Therapist                   Goals/Plan    No documentation.                  Clinical Impression       Row Name 07/24/25 1240          Pain    Pretreatment Pain Rating 3/10  -RD     Posttreatment Pain Rating 3/10  -RD     Pain Side/Orientation generalized  -RD       Row Name 07/24/25 1240          Plan of Care Review    Plan of Care Reviewed With patient  -RD     Outcome Evaluation 63 yo seen for generalized weakness.  Pt with decreased safety and balance with gait.  She demonstrated decreased gait pattern with limp noticed due to hip pain per patient.  Pt reported multiple falls in the past.  PT recommends a rolling walker for DC to home as well as OPPT for increased strength and balance.  No acute PT indicated at this time.  -RD       Row Name 07/24/25 1240          Therapy Assessment/Plan (PT)    Patient/Family Therapy Goals Statement (PT) go home  -RD     Criteria for Skilled Interventions Met (PT) does not meet criteria for skilled intervention;no problems identified which require skilled intervention  -RD     Therapy Frequency (PT) evaluation only  -RD       Row Name 07/24/25 1240          Positioning and Restraints    Pre-Treatment Position in bed  -RD     Post Treatment Position bed  -RD     In Bed sitting EOB;call light within reach  -RD               User Key  (r) = Recorded By, (t) = Taken By, (c) = Cosigned By      Initials Name Provider Type    Ivette Chilel, PT Physical Therapist                   Outcome Measures       Row Name 07/24/25 1243 07/24/25 0305       How much  help from another person do you currently need...    Turning from your back to your side while in flat bed without using bedrails? 4  -RD 4  -AD    Moving from lying on back to sitting on the side of a flat bed without bedrails? 4  -RD 4  -AD    Moving to and from a bed to a chair (including a wheelchair)? 4  -RD 4  -AD    Standing up from a chair using your arms (e.g., wheelchair, bedside chair)? 4  -RD 4  -AD    Climbing 3-5 steps with a railing? 3  -RD 3  -AD    To walk in hospital room? 3  -RD 4  -AD    AM-PAC 6 Clicks Score (PT) 22 -RD 23  -AD              User Key  (r) = Recorded By, (t) = Taken By, (c) = Cosigned By      Initials Name Provider Type    Ivette Chilel, PT Physical Therapist    Judy Huffman, RN Registered Nurse                                 Physical Therapy Education       Title: PT OT SLP Therapies (Done)       Topic: Physical Therapy (Done)       Point: Mobility training (Done)       Learning Progress Summary            Patient Acceptance, E,TB, VU by JACKY at 7/24/2025 1243                      Point: Home exercise program (Done)       Learning Progress Summary            Patient Acceptance, E,TB, VU by JACKY at 7/24/2025 1243                      Point: Body mechanics (Done)       Learning Progress Summary            Patient Acceptance, E,TB, VU by JACKY at 7/24/2025 1243                      Point: Precautions (Done)       Learning Progress Summary            Patient Acceptance, E,TB, VU by JACKY at 7/24/2025 1243                                      User Key       Initials Effective Dates Name Provider Type Discipline     06/16/21 -  Ivette Bang, PT Physical Therapist PT                  PT Recommendation and Plan     Outcome Evaluation: 63 yo seen for generalized weakness.  Pt with decreased safety and balance with gait.  She demonstrated decreased gait pattern with limp noticed due to hip pain per patient.  Pt reported multiple falls in the past.  PT recommends a rolling  walker for DC to home as well as OPPT for increased strength and balance.  No acute PT indicated at this time.     Time Calculation:         PT Charges       Row Name 07/24/25 1245             Time Calculation    Start Time 1225  -RD      Stop Time 1240  -RD      Time Calculation (min) 15 min  -RD      PT Received On 07/24/25  -RD                User Key  (r) = Recorded By, (t) = Taken By, (c) = Cosigned By      Initials Name Provider Type    RD Ivette Bang, PT Physical Therapist                  Therapy Charges for Today       Code Description Service Date Service Provider Modifiers Qty    [id number] HC PT EVAL MOD COMPLEXITY 2 7/24/2025 Ivette Bang, PT GP 1            PT G-Codes  AM-PAC 6 Clicks Score (PT): 22  PT Discharge Summary  Anticipated Discharge Disposition (PT): home, home with outpatient therapy services    Ivette Bang, PT  7/24/2025

## 2025-07-24 NOTE — CONSULTS
Date of Consultation: 25    Referral Provider: Dr. Keller.    Reason for Consultation: CHF exacerbation.    Encounter Provider: Hardy Obrien MD    Group of Service: [facility]     Patient Name: Bernice Rai    :1960    Chief complaint: Shortness of breath, edema.    History of Present Illness:      This is a very pleasant 64 year-old female without prior cardiac history.  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    She was admitted on 2025 with worsening edema and shortness of breath.  She had a recent 20 pound weight gain unintentionally.  She has not had any chest pain.  She is also being treated for left abdominal wall cellulitis.  She has been started on IV diuretics with some improvement.  Her echocardiogram was performed after I actually saw her.  This showed an ejection fraction of 60 to 65% with grade 1A diastolic dysfunction, and moderate left atrial enlargement.  Nephrology is also following.      Past Medical History:   Diagnosis Date    Allergic Years ago    Took allergy shots    Anxiety     Stroke    Arthritis     FEET    Asthma     Inhaler    Clotting disorder     Taking asa    COPD (chronic obstructive pulmonary disease)     At times bronchitis    Depression 10/22/01    Stroke    Diabetes mellitus     GERD (gastroesophageal reflux disease)     Gerd    Hyperlipidemia     Hypertension     Obesity     Reflux esophagitis     Renal insufficiency     Stroke     Urinary tract infection     Occasionaly    Visual impairment 10/2001    1/2 vision each eye         Past Surgical History:   Procedure Laterality Date     SECTION      x 2    OTHER SURGICAL HISTORY      FATTY TUMOR REMOVED RIGHT HIP    UMBILICAL HERNIA REPAIR      Mid abdominal hernia         Allergies   Allergen Reactions    Jardiance [Empagliflozin] Itching      Recurrent yeast infections         No current facility-administered medications on file prior to encounter.     Current Outpatient Medications on File Prior to Encounter   Medication Sig Dispense Refill    amLODIPine (NORVASC) 10 MG tablet Take 1 tablet by mouth Daily. Needs appointment with PCP 90 tablet 0    aspirin 81 MG chewable tablet Chew 1 tablet Daily.      atenolol (TENORMIN) 100 MG tablet Take 1 tablet by mouth Daily. 90 tablet 1    busPIRone (BUSPAR) 7.5 MG tablet Take 1 tablet by mouth once daily 90 tablet 0    ciclopirox (LOPROX) 0.77 % cream APPLY  CREAM TOPICALLY TO AFFECTED AREA TWICE DAILY AS DIRECTED 30 g 0    desoximetasone (TOPICORT) 0.25 % ointment Apply  topically to the appropriate area as directed Every 12 (Twelve) Hours. 60 g 3    doxazosin (CARDURA) 2 MG tablet Take 1 tablet by mouth Every Night.      Fluticasone Furoate-Vilanterol (Breo Ellipta) 100-25 MCG/INH inhaler Inhale 1 puff Daily. 1 each 11    insulin NPH (NovoLIN N) 100 UNIT/ML injection Patient stating she takes 30-45 units in evening      insulin NPH-insulin regular (NovoLIN 70/30) (70-30) 100 UNIT/ML injection Patient stating she takes 50 units in am      lisinopril (PRINIVIL,ZESTRIL) 20 MG tablet Take 1 tablet by mouth 2 (Two) Times a Day. Needs appointment with  tablet 0    pioglitazone (ACTOS) 30 MG tablet Take 1 tablet by mouth Daily. 90 tablet 1    PROBIOTIC PRODUCT PO Take  by mouth.      rosuvastatin (Crestor) 20 MG tablet Take 1 tablet by mouth Daily. 90 tablet 3    sodium bicarbonate 650 MG tablet Take 1 tablet by mouth 3 (Three) Times a Day.      venlafaxine XR (EFFEXOR-XR) 150 MG 24 hr capsule Take 1 capsule by mouth once daily 90 capsule 0    albuterol sulfate HFA (Proventil HFA) 108 (90 Base) MCG/ACT inhaler Inhale 2 puffs Every 6 (Six) Hours As Needed for Wheezing. 18 g 11    Kerendia 20 MG tablet Take 1 tablet by mouth Daily. (Patient not taking: Reported on 7/24/2025) 90 tablet     oxybutynin (DITROPAN) 5  "MG tablet Take 1 tablet by mouth twice daily (Patient not taking: Reported on 7/24/2025) 90 tablet 0         Social History     Socioeconomic History    Marital status:    Tobacco Use    Smoking status: Every Day     Current packs/day: 1.00     Average packs/day: 1 pack/day for 45.6 years (45.6 ttl pk-yrs)     Types: Cigarettes     Start date: 1980     Passive exposure: Current    Smokeless tobacco: Never   Vaping Use    Vaping status: Never Used   Substance and Sexual Activity    Alcohol use: No    Drug use: Never    Sexual activity: Not Currently     Partners: Male     Birth control/protection: Post-menopausal         Family History   Problem Relation Age of Onset    Diabetes Mother     Hypertension Mother     Hyperlipidemia Mother     Heart disease Mother     Depression Mother     Macular degeneration Mother     Arthritis Mother     Thyroid disease Mother     Other Mother         Brain tumors no cancer    Diabetes Father     Hypertension Father     Pancreatic cancer Father     Cancer Father     Other Father         Pancreatic cancer    Cancer Maternal Grandmother     Colon cancer Other     Breast cancer Neg Hx        REVIEW OF SYSTEMS:   Pertinent positives are noted in the HPI above.  Otherwise, all other systems were reviewed, and are negative.     Objective:     Vitals:    07/24/25 1653 07/24/25 1927 07/24/25 1928 07/24/25 1930   BP: 165/65      BP Location:       Patient Position:       Pulse: 65 65 65 65   Resp:  18 18 18   Temp:       TempSrc:       SpO2:  95% 95% 95%   Weight: 123 kg (271 lb)      Height: 157.5 cm (62\")        Body mass index is 49.57 kg/m².  Flowsheet Rows      Flowsheet Row First Filed Value   Admission Height 157.5 cm (62.01\") Documented at 07/23/2025 1955   Admission Weight 125 kg (276 lb) Documented at 07/23/2025 1955             General:    No acute distress, alert and oriented x4, pleasant, chronically ill-appearing.                   Head:    Normocephalic, atraumatic. "   Eyes:          Conjunctivae and sclerae normal, no icterus.   Throat:   No oral lesions, no thrush, oral mucosa moist.    Neck:   Supple, trachea midline.   Lungs:     Decreased breath sounds but otherwise clear.    Heart:    Regular rhythm and normal rate.  Distant.  No murmurs, gallops, or rubs noted.   Abdomen:     Soft, non-tender, mildly distended, obese, positive bowel sounds.    Extremities:   2+ edema of the lower extremities   Pulses:   Pulses palpable and equal bilaterally.    Skin:   No bleeding or rash.   Neuro:   Non-focal.  Moves all extremities well.    Psychiatric:   Normal mood and affect.         Lab Review:                Results from last 7 days   Lab Units 07/24/25  0522   SODIUM mmol/L 142   POTASSIUM mmol/L 4.4   CHLORIDE mmol/L 111*   CO2 mmol/L 20.2*   BUN mg/dL 17.0   CREATININE mg/dL 1.18*   GLUCOSE mg/dL 227*   CALCIUM mg/dL 9.4     Results from last 7 days   Lab Units 07/24/25  0522 07/23/25  2135 07/23/25 2029   HSTROP T ng/L 13 16* 17*     Results from last 7 days   Lab Units 07/24/25  0523   WBC 10*3/mm3 9.42   HEMOGLOBIN g/dL 11.6*   HEMATOCRIT % 36.4   PLATELETS 10*3/mm3 299                       EKG (reviewed by me personally):                Assessment:   1.  Multifactorial volume overload  2.  Fibrillary glomerulonephritis with nephrotic range proteinuria  3.  Morbid obesity, complicating all aspects of care  4.  Acute diastolic CHF secondary to #3 and #5 (EF 60 to 65% with grade 1a diastolic dysfunction by echo on 7/24/2025)  5.  Hypertension, largely uncontrolled  6.  Moderate left atrial enlargement by echo on 7/24/2025  7.  Stage IIIa chronic kidney disease  8.  Ongoing tobacco use  9.  COPD without acute exacerbation  10.  Left abdominal wall cellulitis  11.  History of prior stroke, on aspirin  12.  Hyperlipidemia  13.  Type 2 diabetes    Plan:       I suspect that her volume overload is multifactorial.  There is certainly a component of diastolic congestive heart  failure here, especially given the pulmonary edema that was noted.  She has grade 1a diastolic dysfunction with a moderately enlarged left atrium (and an ejection fraction of 60 to 65%).  I also feel that the primary culprits leading to the diastolic heart failure are uncontrolled hypertension and morbid obesity. I do feel that the nephrotic range proteinuria and obesity itself contribute to the volume overload as well.  Her albumin was 3.4.    Dr. Osborne is now following from nephrology as well.  She is currently on Lasix 40 mg IV twice daily.  I will check an TSH tomorrow to ensure that thyroid dysfunction is not contributing here as well.    She is currently on atenolol 100 mg/day, Hytrin 2 mg at night, and lisinopril 20 mg twice daily.  I would avoid calcium channel blockers given the edema.  I am going to start her on hydralazine 25 mg 3 times daily for better blood pressure control in general.    Cardiology will continue to follow.  Thank you very much for this consult.    Andrea Obrien MD

## 2025-07-25 LAB
ANION GAP SERPL CALCULATED.3IONS-SCNC: 13.3 MMOL/L (ref 5–15)
BUN SERPL-MCNC: 24 MG/DL (ref 8–23)
BUN/CREAT SERPL: 16 (ref 7–25)
CALCIUM SPEC-SCNC: 9.7 MG/DL (ref 8.6–10.5)
CHLORIDE SERPL-SCNC: 105 MMOL/L (ref 98–107)
CO2 SERPL-SCNC: 20.7 MMOL/L (ref 22–29)
CREAT SERPL-MCNC: 1.5 MG/DL (ref 0.57–1)
DEPRECATED RDW RBC AUTO: 42.9 FL (ref 37–54)
EGFRCR SERPLBLD CKD-EPI 2021: 39 ML/MIN/1.73
ERYTHROCYTE [DISTWIDTH] IN BLOOD BY AUTOMATED COUNT: 12.8 % (ref 12.3–15.4)
GLUCOSE BLDC GLUCOMTR-MCNC: 177 MG/DL (ref 70–130)
GLUCOSE BLDC GLUCOMTR-MCNC: 177 MG/DL (ref 70–130)
GLUCOSE BLDC GLUCOMTR-MCNC: 66 MG/DL (ref 70–130)
GLUCOSE BLDC GLUCOMTR-MCNC: 79 MG/DL (ref 70–130)
GLUCOSE BLDC GLUCOMTR-MCNC: 93 MG/DL (ref 70–130)
GLUCOSE SERPL-MCNC: 168 MG/DL (ref 65–99)
HCT VFR BLD AUTO: 35.1 % (ref 34–46.6)
HGB BLD-MCNC: 11.6 G/DL (ref 12–15.9)
MAGNESIUM SERPL-MCNC: 1.9 MG/DL (ref 1.6–2.4)
MCH RBC QN AUTO: 30.3 PG (ref 26.6–33)
MCHC RBC AUTO-ENTMCNC: 33 G/DL (ref 31.5–35.7)
MCV RBC AUTO: 91.6 FL (ref 79–97)
PHOSPHATE SERPL-MCNC: 4.5 MG/DL (ref 2.5–4.5)
PLATELET # BLD AUTO: 307 10*3/MM3 (ref 140–450)
PMV BLD AUTO: 9.4 FL (ref 6–12)
POTASSIUM SERPL-SCNC: 4.1 MMOL/L (ref 3.5–5.2)
RBC # BLD AUTO: 3.83 10*6/MM3 (ref 3.77–5.28)
SODIUM SERPL-SCNC: 139 MMOL/L (ref 136–145)
TSH SERPL DL<=0.05 MIU/L-ACNC: 0.86 UIU/ML (ref 0.27–4.2)
WBC NRBC COR # BLD AUTO: 9.86 10*3/MM3 (ref 3.4–10.8)

## 2025-07-25 PROCEDURE — 82948 REAGENT STRIP/BLOOD GLUCOSE: CPT

## 2025-07-25 PROCEDURE — 99232 SBSQ HOSP IP/OBS MODERATE 35: CPT

## 2025-07-25 PROCEDURE — 94799 UNLISTED PULMONARY SVC/PX: CPT

## 2025-07-25 PROCEDURE — 84443 ASSAY THYROID STIM HORMONE: CPT | Performed by: INTERNAL MEDICINE

## 2025-07-25 PROCEDURE — 94664 DEMO&/EVAL PT USE INHALER: CPT

## 2025-07-25 PROCEDURE — 85027 COMPLETE CBC AUTOMATED: CPT | Performed by: HOSPITALIST

## 2025-07-25 PROCEDURE — 80048 BASIC METABOLIC PNL TOTAL CA: CPT | Performed by: HOSPITALIST

## 2025-07-25 PROCEDURE — 83735 ASSAY OF MAGNESIUM: CPT | Performed by: HOSPITALIST

## 2025-07-25 PROCEDURE — 63710000001 INSULIN LISPRO (HUMAN) PER 5 UNITS: Performed by: HOSPITALIST

## 2025-07-25 PROCEDURE — 94761 N-INVAS EAR/PLS OXIMETRY MLT: CPT

## 2025-07-25 PROCEDURE — 25010000002 CEFAZOLIN PER 500 MG: Performed by: HOSPITALIST

## 2025-07-25 PROCEDURE — 63710000001 INSULIN GLARGINE PER 5 UNITS: Performed by: HOSPITALIST

## 2025-07-25 PROCEDURE — 25010000002 ENOXAPARIN PER 10 MG: Performed by: HOSPITALIST

## 2025-07-25 PROCEDURE — 25010000002 FUROSEMIDE PER 20 MG: Performed by: INTERNAL MEDICINE

## 2025-07-25 PROCEDURE — 84100 ASSAY OF PHOSPHORUS: CPT | Performed by: HOSPITALIST

## 2025-07-25 PROCEDURE — 94760 N-INVAS EAR/PLS OXIMETRY 1: CPT

## 2025-07-25 RX ORDER — HYDRALAZINE HYDROCHLORIDE 25 MG/1
50 TABLET, FILM COATED ORAL EVERY 8 HOURS SCHEDULED
Status: DISCONTINUED | OUTPATIENT
Start: 2025-07-25 | End: 2025-07-29

## 2025-07-25 RX ORDER — MICONAZOLE NITRATE 2 G/100G
1 CREAM TOPICAL 2 TIMES DAILY PRN
Status: DISCONTINUED | OUTPATIENT
Start: 2025-07-25 | End: 2025-07-30 | Stop reason: HOSPADM

## 2025-07-25 RX ADMIN — ROSUVASTATIN CALCIUM 20 MG: 20 TABLET, FILM COATED ORAL at 08:50

## 2025-07-25 RX ADMIN — ARFORMOTEROL TARTRATE 15 MCG: 15 SOLUTION RESPIRATORY (INHALATION) at 08:01

## 2025-07-25 RX ADMIN — INSULIN LISPRO 6 UNITS: 100 INJECTION, SOLUTION INTRAVENOUS; SUBCUTANEOUS at 17:54

## 2025-07-25 RX ADMIN — INSULIN LISPRO 6 UNITS: 100 INJECTION, SOLUTION INTRAVENOUS; SUBCUTANEOUS at 12:25

## 2025-07-25 RX ADMIN — VENLAFAXINE HYDROCHLORIDE 150 MG: 37.5 CAPSULE, EXTENDED RELEASE ORAL at 08:50

## 2025-07-25 RX ADMIN — HYDRALAZINE HYDROCHLORIDE 50 MG: 25 TABLET ORAL at 21:42

## 2025-07-25 RX ADMIN — Medication 10 ML: at 08:52

## 2025-07-25 RX ADMIN — HYDRALAZINE HYDROCHLORIDE 25 MG: 25 TABLET ORAL at 06:13

## 2025-07-25 RX ADMIN — CEFAZOLIN 2000 MG: 2 INJECTION, POWDER, FOR SOLUTION INTRAMUSCULAR; INTRAVENOUS at 02:00

## 2025-07-25 RX ADMIN — FUROSEMIDE 40 MG: 10 INJECTION, SOLUTION INTRAMUSCULAR; INTRAVENOUS at 08:52

## 2025-07-25 RX ADMIN — ARFORMOTEROL TARTRATE 15 MCG: 15 SOLUTION RESPIRATORY (INHALATION) at 19:35

## 2025-07-25 RX ADMIN — TERAZOSIN 2 MG: 2 CAPSULE ORAL at 21:42

## 2025-07-25 RX ADMIN — ENOXAPARIN SODIUM 40 MG: 100 INJECTION SUBCUTANEOUS at 21:42

## 2025-07-25 RX ADMIN — ACETAMINOPHEN 650 MG: 325 TABLET, FILM COATED ORAL at 19:47

## 2025-07-25 RX ADMIN — BUDESONIDE 0.5 MG: 0.5 INHALANT RESPIRATORY (INHALATION) at 19:35

## 2025-07-25 RX ADMIN — CEFAZOLIN 2000 MG: 2 INJECTION, POWDER, FOR SOLUTION INTRAMUSCULAR; INTRAVENOUS at 08:52

## 2025-07-25 RX ADMIN — SODIUM BICARBONATE 650 MG: 650 TABLET ORAL at 21:42

## 2025-07-25 RX ADMIN — NICOTINE 1 PATCH: 21 PATCH, EXTENDED RELEASE TRANSDERMAL at 08:52

## 2025-07-25 RX ADMIN — INSULIN LISPRO 2 UNITS: 100 INJECTION, SOLUTION INTRAVENOUS; SUBCUTANEOUS at 08:51

## 2025-07-25 RX ADMIN — SODIUM BICARBONATE 650 MG: 650 TABLET ORAL at 08:50

## 2025-07-25 RX ADMIN — INSULIN GLARGINE 20 UNITS: 100 INJECTION, SOLUTION SUBCUTANEOUS at 08:51

## 2025-07-25 RX ADMIN — IPRATROPIUM BROMIDE AND ALBUTEROL SULFATE 3 ML: .5; 3 SOLUTION RESPIRATORY (INHALATION) at 19:35

## 2025-07-25 RX ADMIN — INSULIN LISPRO 6 UNITS: 100 INJECTION, SOLUTION INTRAVENOUS; SUBCUTANEOUS at 08:51

## 2025-07-25 RX ADMIN — CEFAZOLIN 2000 MG: 2 INJECTION, POWDER, FOR SOLUTION INTRAMUSCULAR; INTRAVENOUS at 17:55

## 2025-07-25 RX ADMIN — SODIUM BICARBONATE 650 MG: 650 TABLET ORAL at 15:25

## 2025-07-25 RX ADMIN — ENOXAPARIN SODIUM 40 MG: 100 INJECTION SUBCUTANEOUS at 08:52

## 2025-07-25 RX ADMIN — IPRATROPIUM BROMIDE AND ALBUTEROL SULFATE 3 ML: .5; 3 SOLUTION RESPIRATORY (INHALATION) at 07:59

## 2025-07-25 RX ADMIN — INSULIN LISPRO 2 UNITS: 100 INJECTION, SOLUTION INTRAVENOUS; SUBCUTANEOUS at 12:25

## 2025-07-25 RX ADMIN — ASPIRIN 81 MG CHEWABLE TABLET 81 MG: 81 TABLET CHEWABLE at 08:49

## 2025-07-25 RX ADMIN — ATENOLOL 100 MG: 50 TABLET ORAL at 08:50

## 2025-07-25 RX ADMIN — LISINOPRIL 20 MG: 20 TABLET ORAL at 08:50

## 2025-07-25 RX ADMIN — BUDESONIDE 0.5 MG: 0.5 INHALANT RESPIRATORY (INHALATION) at 08:00

## 2025-07-25 RX ADMIN — Medication 10 ML: at 21:43

## 2025-07-25 RX ADMIN — BUSPIRONE HYDROCHLORIDE 7.5 MG: 15 TABLET ORAL at 08:50

## 2025-07-25 RX ADMIN — HYDRALAZINE HYDROCHLORIDE 50 MG: 25 TABLET ORAL at 15:25

## 2025-07-25 RX ADMIN — ANTI-FUNGAL POWDER MICONAZOLE NITRATE TALC FREE 1 APPLICATION: 1.42 POWDER TOPICAL at 08:52

## 2025-07-25 RX ADMIN — FUROSEMIDE 40 MG: 10 INJECTION, SOLUTION INTRAMUSCULAR; INTRAVENOUS at 21:42

## 2025-07-25 RX ADMIN — LISINOPRIL 20 MG: 20 TABLET ORAL at 21:42

## 2025-07-25 RX ADMIN — ANTI-FUNGAL POWDER MICONAZOLE NITRATE TALC FREE 1 APPLICATION: 1.42 POWDER TOPICAL at 21:43

## 2025-07-25 NOTE — PLAN OF CARE
Goal Outcome Evaluation:  Plan of Care Reviewed With: patient        Progress: improving  Outcome Evaluation: axox4. room air. iv lasix given. 1500 cc fluid restriction. hydralazine started. has intermittent wheezing, duonebs given.

## 2025-07-25 NOTE — PLAN OF CARE
Goal Outcome Evaluation:  Plan of Care Reviewed With: patient, family        Progress: no change  Outcome Evaluation: VSS. Pt has not had any pain or discomfort throughout the shift. Pt up adlib. Hydralazine increased due to high BP. Lasix given. Strict I's and O's. No needs at this time.

## 2025-07-25 NOTE — PROGRESS NOTES
[facility] Progress Note       Encounter Date:25  Patient:Bernice Rai  :1960  MRN:[id number]      Chief Complaint: Shortness of breath, edema      Subjective:      Patient doing okay this afternoon.  She still reporting some shortness of breath but is improving.  Additionally notes that peripheral edema is improving.  Unfortunately patient still remains fairly hypertensive.      Review of Systems:  Review of Systems   Cardiovascular:  Positive for dyspnea on exertion and leg swelling. Negative for chest pain and palpitations.   Respiratory:  Positive for shortness of breath. Negative for cough.        Medications:  Scheduled Meds:  budesonide, 0.5 mg, Nebulization, BID - RT   And  arformoterol, 15 mcg, Nebulization, BID - RT  aspirin, 81 mg, Oral, Daily  atenolol, 100 mg, Oral, Daily  busPIRone, 7.5 mg, Oral, Daily  ceFAZolin, 2,000 mg, Intravenous, Q8H  enoxaparin sodium, 40 mg, Subcutaneous, Q12H  furosemide, 40 mg, Intravenous, Q12H  hydrALAZINE, 25 mg, Oral, Q8H  insulin glargine, 20 Units, Subcutaneous, Daily  insulin lispro, 2-9 Units, Subcutaneous, 4x Daily AC & at Bedtime  insulin lispro, 6 Units, Subcutaneous, TID With Meals  ipratropium-albuterol, 3 mL, Nebulization, Q6H While Awake - RT  lisinopril, 20 mg, Oral, BID  miconazole, 1 Application, Topical, Q12H  nicotine, 1 patch, Transdermal, Q24H  rosuvastatin, 20 mg, Oral, Daily  sodium bicarbonate, 650 mg, Oral, TID  sodium chloride, 10 mL, Intravenous, Q12H  terazosin, 2 mg, Oral, Nightly  venlafaxine XR, 150 mg, Oral, Daily    Continuous Infusions:   PRN Meds:    acetaminophen **OR** acetaminophen **OR** acetaminophen    senna-docusate sodium **AND** polyethylene glycol **AND** bisacodyl **AND** bisacodyl    calcium carbonate    dextrose    dextrose    glucagon (human recombinant)    sodium chloride    sodium chloride    sodium chloride         Objective:       Vitals:    25 0759 25 0804 25 0845  "07/25/25 1145   BP:   (!) 185/64 158/73   BP Location:   Right arm Left arm   Patient Position:   Lying Lying   Pulse: 77 71 77 64   Resp: 18 18 16 18   Temp:   97.7 °F (36.5 °C) 97.5 °F (36.4 °C)   TempSrc:   Oral Oral   SpO2: 96%  93% 94%   Weight:       Height:               Physical Exam:  Constitutional: Well appearing, well developed, no acute distress   HENT: Oropharynx clear and membrane moist  Eyes: Normal conjunctiva, no sclera icterus.  Neck: Supple, no carotid bruit bilaterally.  Cardiovascular: Regular rate and rhythm, No Murmur, No bilateral lower extremity edema.  Pulmonary: Normal respiratory effort, normal lung sounds, no wheezing.  Neurological: Alert and orient x 3.   Skin: Warm, dry, no ecchymosis, no rash.  Psych: Appropriate mood and affect. Normal judgment and insight.           Lab Review:   Results from last 7 days   Lab Units 07/25/25  0538 07/24/25  0522 07/23/25 2029   SODIUM mmol/L 139 142 142   POTASSIUM mmol/L 4.1 4.4 4.1   CHLORIDE mmol/L 105 111* 111*   CO2 mmol/L 20.7* 20.2* 23.0   BUN mg/dL 24.0* 17.0 15.6   CREATININE mg/dL 1.50* 1.18* 1.13*   GLUCOSE mg/dL 168* 227* 104*   CALCIUM mg/dL 9.7 9.4 9.7   AST (SGOT) U/L  --   --  8   ALT (SGPT) U/L  --   --  7     Results from last 7 days   Lab Units 07/24/25  0522 07/23/25 2135 07/23/25 2029   HSTROP T ng/L 13 16* 17*     Results from last 7 days   Lab Units 07/25/25  0538 07/24/25  0523 07/23/25 2029   WBC 10*3/mm3 9.86 9.42 8.22   HEMOGLOBIN g/dL 11.6* 11.6* 11.2*   HEMATOCRIT % 35.1 36.4 36.3   PLATELETS 10*3/mm3 307 299 289         Results from last 7 days   Lab Units 07/25/25  0538   MAGNESIUM mg/dL 1.9           Invalid input(s): \"LDLCALC\"  Results from last 7 days   Lab Units 07/23/25  2029   PROBNP pg/mL 883.7     Results from last 7 days   Lab Units 07/25/25  0538   TSH uIU/mL 0.865            Assessment:          Diagnosis Plan   1. Cellulitis of abdominal wall        2. Balance problem  Walker  Walker Folding with " Wheels             Plan:       1.  Multifactorial volume overload  2.  Fibrillary glomerulonephritis with nephrotic range proteinuria  3.  Morbid obesity, complicating all aspects of care  4.  Acute diastolic CHF secondary to #3 and #5 (EF 60 to 65% with grade 1a diastolic dysfunction by echo on 7/24/2025)  5.  Hypertension, largely uncontrolled-hydralazine 25 mg 3 times daily started yesterday.  TSH normal.  Will increase hydralazine to 50 mg 3 times daily.  6.  Moderate left atrial enlargement by echo on 7/24/2025  7.  Stage IIIa chronic kidney disease-nephrology following and assisting with diuresis.  8.  Ongoing tobacco use  9.  COPD without acute exacerbation  10.  Left abdominal wall cellulitis  11.  History of prior stroke, on aspirin  12.  Hyperlipidemia  13.  Type 2 diabetes    Patient found to have grade 1 diastolic dysfunction with moderately enlarged left atrium and an ejection fraction of 66 5% on admission.  Thought to be related to uncontrolled hypertension and morbid obesity contributing to diastolic heart failure.  Patient significantly fluid volume overloaded.  Unfortunately creatinine did increase overnight. Nephrology following and assisting with diuresis.   Patient started on hydralazine 25 mg 3 times daily yesterday in addition to atenolol 100 mg daily, Hytrin 2 mg nightly, and lisinopril 20 mg twice daily.  Recommended to avoid calcium channel blockers given peripheral edema.  Unfortunately blood pressure remains elevated.  Will increase hydralazine to 50 mg 3 times daily.           AMANDA De Jesus  [facility]  07/25/25  12:54 EDT

## 2025-07-25 NOTE — PROGRESS NOTES
Discharge Planning Assessment  [facility]     Patient Name: Bernice Rai  MRN: [id number]  Today's Date: 7/25/2025    Admit Date: 7/23/2025    Plan: home with spouse and [employer] to deliver the rolling walker.   Discharge Needs Assessment       Row Name 07/25/25 1510       Living Environment    People in Home spouse    Name(s) of People in Home lives with spouse and has no pets    Current Living Arrangements home    Potentially Unsafe Housing Conditions none    In the past 12 months has the electric, gas, oil, or water company threatened to shut off services in your home? No    Primary Care Provided by spouse/significant other    Provides Primary Care For no one, unable/limited ability to care for self    Family Caregiver if Needed spouse    Family Caregiver Names spouse    Quality of Family Relationships helpful;involved;supportive    Able to Return to Prior Arrangements yes    Living Arrangement Comments lives with spouse       Resource/Environmental Concerns    Resource/Environmental Concerns none    Transportation Concerns none       Transportation Needs    In the past 12 months, has lack of transportation kept you from medical appointments or from getting medications? no    In the past 12 months, has lack of transportation kept you from meetings, work, or from getting things needed for daily living? No       Food Insecurity    Within the past 12 months, you worried that your food would run out before you got the money to buy more. Never true    Within the past 12 months, the food you bought just didn't last and you didn't have money to get more. Never true       Transition Planning    Patient/Family Anticipates Transition to home with family    Patient/Family Anticipated Services at Transition none    Transportation Anticipated family or friend will provide       Discharge Needs Assessment    Equipment Currently Used at Home shower chair;wheelchair;bp cuff;pulse ox    Concerns to be Addressed no discharge  needs identified    Do you want help finding or keeping work or a job? I do not need or want help    Do you want help with school or training? For example, starting or completing job training or getting a high school diploma, GED or equivalent No    Anticipated Changes Related to Illness none    Equipment Needed After Discharge walker, rolling    Provided Post Acute Provider List? Yes    Post Acute Provider List DME Supplier    Provided Post Acute Provider Quality & Resource List? Yes    Delivered To Patient    Method of Delivery In person    Offered/Gave Vendor List yes    Patient's Choice of Community Agency(s) had no preference on DME company    Current Discharge Risk chronically ill                   Discharge Plan       Row Name 07/25/25 1527       Plan    Plan Comments Wilfrido with [employer] delivered the rolling walker to the patient's room. KAMI Rausch RN, CCP      Row Name 07/25/25 3583       Plan    Plan home with spouse and [employer] to deliver the rolling walker.    Plan Comments Spoke with the patient at bedside and her spouse and verified the face sheet. She states they live in a home that is ranch and has no steps. She states they order all their food and have it delivered. She states she can not stand and cook. She states that her spouse helps her with the laundry and the house work. The patient states she had a stroke 20 years ago and her perpherial vision was affected. The patient states her pharmacy is [facility] [place].Spoke with the patient at bedside and explained Meds to Bed and she states understanding. Changed in EPIC. The patient has no animals in her home. The discharge plan is home with spouse and he will provide the transportation and [employer] will deliver the rolling walker to the patient's room.  JOSÉ LUIS Stevens, RN, CCP, CDP                  Continued Care and Services - Admitted Since 7/23/2025       Durable Medical Equipment       Service Provider Request Status Services Address Phone  Fax Patient Preferred    [employer] Pending - Request Sent -- [address] [place] [address] [phone] --                  Expected Discharge Date and Time       Expected Discharge Date Expected Discharge Time    Jul 26, 2025            Demographic Summary       Row Name 07/25/25 1509       General Information    Admission Type inpatient    Arrived From emergency department    Referral Source admission list    Reason for Consult discharge planning    Preferred Language English       Contact Information    Permission Granted to Share Info With     Contact Information Obtained for                    Functional Status       Row Name 07/25/25 1516       Functional Status    Usual Activity Tolerance fair    Current Activity Tolerance fair       Physical Activity    On average, how many days per week do you engage in moderate to strenuous exercise (like a brisk walk)? Pt Declined    On average, how many minutes do you engage in exercise at this level? Pt Declined       Assessment of Health Literacy    How often do you have someone help you read hospital materials? Sometimes    How often do you have problems learning about your medical condition because of difficulty understanding written information? Sometimes    How often do you have a problem understanding what is told to you about your medical condition? Sometimes    How confident are you filling out medical forms by yourself? Somewhat    Health Literacy Moderate       Functional Status, IADL    Medications independent    Meal Preparation completely dependent    Housekeeping completely dependent    Laundry assistive equipment and person    Shopping completely dependent  They order all their food and have it delivered. The patient can not stand and cook    If for any reason you need help with day-to-day activities such as bathing, preparing meals, shopping, managing finances, etc., do you get the help you need? I  could use a little more help       Mental Status    General Appearance WDL WDL       Mental Status Summary    Recent Changes in Mental Status/Cognitive Functioning no changes       Employment/    Employment Status disabled    Employment/ Comments none                   Psychosocial    No documentation.                  Abuse/Neglect    No documentation.                  Legal    No documentation.                  Substance Abuse    No documentation.                  Patient Forms    No documentation.                     Enedina Rausch RN

## 2025-07-25 NOTE — PROGRESS NOTES
Name: Bernice Rai ADMIT: 2025   : 1960  PCP: Ashley Holt APRN    MRN: [id number] LOS: 1 days   AGE/SEX: 64 y.o. female  ROOM: [facility]     Subjective   Subjective   Patient complaining of muscular spasms.  Decreased lower extremity edema.  Decreased shortness of breath.  Positive wheezing and dry cough.  No chest pain.  No palpitation.  Decreased redness of the abdominal wall    Review of Systems  GI.  No abdominal pain.  No nausea or vomiting.  .  No dysuria or hematuria.     Objective   Objective   Vital Signs  Temp:  [97.3 °F (36.3 °C)-97.9 °F (36.6 °C)] 97.3 °F (36.3 °C)  Heart Rate:  [62-77] 62  Resp:  [16-18] 18  BP: (158-189)/(64-81) 158/79  SpO2:  [91 %-96 %] 93 %  on   ;   Device (Oxygen Therapy): room air    Intake/Output Summary (Last 24 hours) at 2025 1829  Last data filed at 2025 1736  Gross per 24 hour   Intake 1820 ml   Output 1600 ml   Net 220 ml     Body mass index is 49.53 kg/m².      25  0344 25  1653 25  0617   Weight: 123 kg (271 lb 6.2 oz) 123 kg (271 lb) 123 kg (270 lb 12.8 oz)     Physical Exam  General.  Middle-aged female.  Obese.  Alert and oriented x 4.  In no apparent pain/distress/diaphoresis.  Normal mood and affect  Eyes.  Pupils equal round and reactive.  Intact extraocular musculature.  No pallor or jaundice  Oral cavity.  Moist mucous membrane  Neck.  Supple.  No JVD.  No lymphadenopathy or thyromegaly.  Cardiovascular.  Regular rate and rhythm with no gallops or murmurs.  Chest.  Poor bilateral air entry with bilateral expiratory wheeze.  No crackles  Abdomen.  Positive abdominal wall edema with mild redness on the left side of the abdominal wall.  No localized tenderness.  No guarding or rebound.  No organomegaly.  Remedies.  +1 to +2 bilateral lower extremity edema.  No clubbing or cyanosis.  CNS.  No acute focal neurological deficits.  Results Review:      Results from last 7 days   Lab Units 25  0538 25  0522  "07/23/25 2029   SODIUM mmol/L 139 142 142   POTASSIUM mmol/L 4.1 4.4 4.1   CHLORIDE mmol/L 105 111* 111*   CO2 mmol/L 20.7* 20.2* 23.0   BUN mg/dL 24.0* 17.0 15.6   CREATININE mg/dL 1.50* 1.18* 1.13*   GLUCOSE mg/dL 168* 227* 104*   CALCIUM mg/dL 9.7 9.4 9.7   AST (SGOT) U/L  --   --  8   ALT (SGPT) U/L  --   --  7     Estimated Creatinine Clearance: 48.1 mL/min (A) (by C-G formula based on SCr of 1.5 mg/dL (H)).  Results from last 7 days   Lab Units 07/24/25  0523   HEMOGLOBIN A1C % 6.40*     Results from last 7 days   Lab Units 07/25/25  1659 07/25/25  1143 07/25/25  0738 07/24/25  2023 07/24/25  1725 07/24/25  1135 07/24/25  0730   GLUCOSE mg/dL 79 177* 177* 179* 152* 203* 233*     Results from last 7 days   Lab Units 07/24/25  0522 07/23/25  2135 07/23/25 2029   HSTROP T ng/L 13 16* 17*     Results from last 7 days   Lab Units 07/23/25 2029   PROBNP pg/mL 883.7     Results from last 7 days   Lab Units 07/25/25  0538   TSH uIU/mL 0.865     Results from last 7 days   Lab Units 07/25/25  0538   MAGNESIUM mg/dL 1.9   PHOSPHORUS mg/dL 4.5           Invalid input(s): \"LDLCALC\"  Results from last 7 days   Lab Units 07/25/25  0538 07/24/25  0523 07/23/25 2029   WBC 10*3/mm3 9.86 9.42 8.22   HEMOGLOBIN g/dL 11.6* 11.6* 11.2*   HEMATOCRIT % 35.1 36.4 36.3   PLATELETS 10*3/mm3 307 299 289   MCV fL 91.6 93.1 94.5   MCH pg 30.3 29.7 29.2   MCHC g/dL 33.0 31.9 30.9*   RDW % 12.8 12.7 13.2   RDW-SD fl 42.9 42.5 46.7   MPV fL 9.4 9.5 8.8   NEUTROPHIL % %  --   --  61.0   LYMPHOCYTE % %  --   --  24.2   MONOCYTES % %  --   --  11.7   EOSINOPHIL % %  --   --  2.4   BASOPHIL % %  --   --  0.6   IMM GRAN % %  --   --  0.1   NEUTROS ABS 10*3/mm3  --   --  5.01   LYMPHS ABS 10*3/mm3  --   --  1.99   MONOS ABS 10*3/mm3  --   --  0.96*   EOS ABS 10*3/mm3  --   --  0.20   BASOS ABS 10*3/mm3  --   --  0.05   IMMATURE GRANS (ABS) 10*3/mm3  --   --  0.01                                 Results from last 7 days   Lab Units " 07/24/25  1056   NITRITE UA  Negative   WBC UA /HPF 0-2   BACTERIA UA /HPF None Seen   SQUAM EPITHEL UA /HPF 0-2     Results from last 7 days   Lab Units 07/24/25  1056   CREATININE UR mg/dL 30.5   PROTEIN TOTAL URINE mg/dL 174.6       Imaging:  Imaging Results (Last 24 Hours)       ** No results found for the last 24 hours. 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           Volume overload with acute on chronic diastolic congestive heart failure exacerbation in a patient with a history of hypertension/mildly elevated troponin echo with a normal ejection fraction and and left ventricular hypertrophy with grade 1 diastolic dysfunction and left atrial enlargement but no pericardial effusion.  Still volume overloaded.  No evidence of angina.  Currently on IV Lasix.  Good blood pressure control.  Continue aspirin/atenolol/hydralazine/lisinopril/Hytrin.  Pulmonary nodule in a patient with a history of COPD and tobacco abuse.  Counseled against smoking.  Continues with wheezing.  Will continue Pulmicort/DuoNeb nebulizer.  Follow-up CT as an outpatient  Hyperlipidemia.  Continue Crestor.  Mood disorder.  Continue Effexor.  Stage  glomerulonephritis and a history of nephrotic proteinuria/stage IIIa chronic renal  failure.  Patient continues to be volume overloaded.  Electrolytes are stable.  Creatinine worse today.  Continue lisinopril.  Continue with sodium bicarbonate.  Nephrology is following..  Left abdominal wall cellulitis  Type 2 diabetes.  A1c is 6.4..  Continue current insulin regimen.  Accu-Cheks acceptable  Hyperlipidemia.  Continue Crestor.  History of CVA.  Negative CNS examination.  Continue aspirin and Crestor.  Mood disorder.  Stable on Effexor/BuSpar.  Continue same.        Discussed my findings and plan of treatment with the patient/family  Disposition.  To be determined based on clinical course.  Eventually home.      Sai Allen MD  [facility]  07/25/25  18:29 EDT

## 2025-07-25 NOTE — PLAN OF CARE
Goal Outcome Evaluation:  Plan of Care Reviewed With: patient, spouse, child        Progress: no change  Outcome Evaluation: Pt A+Ox4. IV antibiotics given as ordered. Pt on 1500 mL fluid restriction. Echocardiogram completed today. Pt ambulates well w/standby assistance. Pt voices no needs at this time.

## 2025-07-25 NOTE — PROGRESS NOTES
Discharge Planning Assessment  [facility]     Patient Name: Bernice Rai  MRN: [id number]  Today's Date: 7/25/2025    Admit Date: 7/23/2025    Plan: home with spouse and [employer] to deliver the rolling walker.   Discharge Needs Assessment       Row Name 07/25/25 1510       Living Environment    People in Home spouse    Name(s) of People in Home lives with spouse and has no pets    Current Living Arrangements home    Potentially Unsafe Housing Conditions none    In the past 12 months has the electric, gas, oil, or water company threatened to shut off services in your home? No    Primary Care Provided by spouse/significant other    Provides Primary Care For no one, unable/limited ability to care for self    Family Caregiver if Needed spouse    Family Caregiver Names spouse    Quality of Family Relationships helpful;involved;supportive    Able to Return to Prior Arrangements yes    Living Arrangement Comments lives with spouse       Resource/Environmental Concerns    Resource/Environmental Concerns none    Transportation Concerns none       Transportation Needs    In the past 12 months, has lack of transportation kept you from medical appointments or from getting medications? no    In the past 12 months, has lack of transportation kept you from meetings, work, or from getting things needed for daily living? No       Food Insecurity    Within the past 12 months, you worried that your food would run out before you got the money to buy more. Never true    Within the past 12 months, the food you bought just didn't last and you didn't have money to get more. Never true       Transition Planning    Patient/Family Anticipates Transition to home with family    Patient/Family Anticipated Services at Transition none    Transportation Anticipated family or friend will provide       Discharge Needs Assessment    Equipment Currently Used at Home shower chair;wheelchair;bp cuff;pulse ox    Concerns to be Addressed no discharge  needs identified    Do you want help finding or keeping work or a job? I do not need or want help    Do you want help with school or training? For example, starting or completing job training or getting a high school diploma, GED or equivalent No    Anticipated Changes Related to Illness none    Equipment Needed After Discharge walker, rolling    Provided Post Acute Provider List? Yes    Post Acute Provider List DME Supplier    Provided Post Acute Provider Quality & Resource List? Yes    Delivered To Patient    Method of Delivery In person    Offered/Gave Vendor List yes    Patient's Choice of Community Agency(s) had no preference on DME company    Current Discharge Risk chronically ill                   Discharge Plan       Row Name 07/25/25 2059       Plan    Plan home with spouse and [employer] to deliver the rolling walker.    Plan Comments Spoke with the patient at bedside and her spouse and verified the face sheet. She states they live in a home that is [place] and has no steps. She states they order all their food and have it delivered. She states she can not stand and cook. She states that her spouse helps her with the laundry and the house work. The patient states she had a stroke 20 years ago and her perpherial vision was affected. The patient states her pharmacy is [facility] at [place].Spoke with the patient at bedside and explained Meds to Bed and she states understanding. Changed in EPIC. The patient has no animals in her home. The discharge plan is home with spouse and he will provide the transportation and [employer] will deliver the rolling walker to the patient's room.  ANGELIQUE StevensN, RN, CCP, CDP                  Continued Care and Services - Admitted Since 7/23/2025       Durable Medical Equipment       Service Provider Request Status Services Address Phone Fax Patient Preferred    [facility] Pending - Request Sent -- [address] [address] [phone] --                   Expected Discharge Date and Time       Expected Discharge Date Expected Discharge Time    Jul 26, 2025            Demographic Summary       Row Name 07/25/25 1509       General Information    Admission Type inpatient    Arrived From emergency department    Referral Source admission list    Reason for Consult discharge planning    Preferred Language English       Contact Information    Permission Granted to Share Info With     Contact Information Obtained for                    Functional Status       Row Name 07/25/25 1516       Functional Status    Usual Activity Tolerance fair    Current Activity Tolerance fair       Physical Activity    On average, how many days per week do you engage in moderate to strenuous exercise (like a brisk walk)? Pt Declined    On average, how many minutes do you engage in exercise at this level? Pt Declined       Assessment of Health Literacy    How often do you have someone help you read hospital materials? Sometimes    How often do you have problems learning about your medical condition because of difficulty understanding written information? Sometimes    How often do you have a problem understanding what is told to you about your medical condition? Sometimes    How confident are you filling out medical forms by yourself? Somewhat    Health Literacy Moderate       Functional Status, IADL    Medications independent    Meal Preparation completely dependent    Housekeeping completely dependent    Laundry assistive equipment and person    Shopping completely dependent  They order all their food and have it delivered. The patient can not stand and cook    If for any reason you need help with day-to-day activities such as bathing, preparing meals, shopping, managing finances, etc., do you get the help you need? I could use a little more help       Mental Status    General Appearance WDL WDL       Mental Status Summary    Recent Changes in Mental  Status/Cognitive Functioning no changes       Employment/    Employment Status disabled    Employment/ Comments none                   Psychosocial    No documentation.                  Abuse/Neglect    No documentation.                  Legal    No documentation.                  Substance Abuse    No documentation.                  Patient Forms    No documentation.                     Enedina Rausch RN

## 2025-07-25 NOTE — PROGRESS NOTES
Discharge Planning Assessment  [facility]     Patient Name: Bernice Rai  MRN: [id number]  Today's Date: 7/25/2025    Admit Date: 7/23/2025    Plan: home with spouse and [employer] to deliver the rolling walker.   Discharge Needs Assessment       Row Name 07/25/25 1510       Living Environment    People in Home spouse    Name(s) of People in Home lives with spouse and has no pets    Current Living Arrangements home    Potentially Unsafe Housing Conditions none    In the past 12 months has the electric, gas, oil, or water company threatened to shut off services in your home? No    Primary Care Provided by spouse/significant other    Provides Primary Care For no one, unable/limited ability to care for self    Family Caregiver if Needed spouse    Family Caregiver Names spouse    Quality of Family Relationships helpful;involved;supportive    Able to Return to Prior Arrangements yes    Living Arrangement Comments lives with spouse       Resource/Environmental Concerns    Resource/Environmental Concerns none    Transportation Concerns none       Transportation Needs    In the past 12 months, has lack of transportation kept you from medical appointments or from getting medications? no    In the past 12 months, has lack of transportation kept you from meetings, work, or from getting things needed for daily living? No       Food Insecurity    Within the past 12 months, you worried that your food would run out before you got the money to buy more. Never true    Within the past 12 months, the food you bought just didn't last and you didn't have money to get more. Never true       Transition Planning    Patient/Family Anticipates Transition to home with family    Patient/Family Anticipated Services at Transition none    Transportation Anticipated family or friend will provide       Discharge Needs Assessment    Equipment Currently Used at Home shower chair;wheelchair;bp cuff;pulse ox    Concerns to be Addressed no discharge  needs identified    Do you want help finding or keeping work or a job? I do not need or want help    Do you want help with school or training? For example, starting or completing job training or getting a high school diploma, GED or equivalent No    Anticipated Changes Related to Illness none    Equipment Needed After Discharge walker, rolling    Provided Post Acute Provider List? Yes    Post Acute Provider List DME Supplier    Provided Post Acute Provider Quality & Resource List? Yes    Delivered To Patient    Method of Delivery In person    Offered/Gave Vendor List yes    Patient's Choice of Community Agency(s) had no preference on DME company    Current Discharge Risk chronically ill                   Discharge Plan       Row Name 07/25/25 3071       Plan    Plan home with spouse and [employer] to deliver the rolling walker.    Plan Comments Spoke with the patient at bedside and her spouse and verified the face sheet. She states they live in a home that is ranch and has no steps. She states they order all their food and have it delivered. She states she can not stand and cook. She states that her spouse helps her with the laundry and the house work. The patient states she had a stroke 20 years ago and her perpherial vision was affected. The patient states her pharmacy is [facility] at [place].Spoke with the patient at bedside and explained Meds to Bed and she states understanding. Changed in EPIC. The patient has no animals in her home. The discharge plan is home with spouse and he will provide the transportation and [employer] will deliver the rolling walker to the patient's room.  ANGELIQUE StevensN, RN, CCP, CDP                  Continued Care and Services - Admitted Since 7/23/2025       Durable Medical Equipment       Service Provider Request Status Services Address Phone Fax Patient Preferred    [facility] Pending - Request Sent -- [address] [address] [phone] --                   Expected Discharge Date and Time       Expected Discharge Date Expected Discharge Time    Jul 26, 2025            Demographic Summary       Row Name 07/25/25 1509       General Information    Admission Type inpatient    Arrived From emergency department    Referral Source admission list    Reason for Consult discharge planning    Preferred Language English       Contact Information    Permission Granted to Share Info With     Contact Information Obtained for                    Functional Status       Row Name 07/25/25 1516       Functional Status    Usual Activity Tolerance fair    Current Activity Tolerance fair       Physical Activity    On average, how many days per week do you engage in moderate to strenuous exercise (like a brisk walk)? Pt Declined    On average, how many minutes do you engage in exercise at this level? Pt Declined       Assessment of Health Literacy    How often do you have someone help you read hospital materials? Sometimes    How often do you have problems learning about your medical condition because of difficulty understanding written information? Sometimes    How often do you have a problem understanding what is told to you about your medical condition? Sometimes    How confident are you filling out medical forms by yourself? Somewhat    Health Literacy Moderate       Functional Status, IADL    Medications independent    Meal Preparation completely dependent    Housekeeping completely dependent    Laundry assistive equipment and person    Shopping completely dependent  They order all their food and have it delivered. The patient can not stand and cook    If for any reason you need help with day-to-day activities such as bathing, preparing meals, shopping, managing finances, etc., do you get the help you need? I could use a little more help       Mental Status    General Appearance WDL WDL       Mental Status Summary    Recent Changes in Mental  Status/Cognitive Functioning no changes       Employment/    Employment Status disabled    Employment/ Comments none                   Psychosocial    No documentation.                  Abuse/Neglect    No documentation.                  Legal    No documentation.                  Substance Abuse    No documentation.                  Patient Forms    No documentation.                     Enedina Rausch RN

## 2025-07-25 NOTE — PROGRESS NOTES
"                                                                                                            [facility]/ [facility]                                                                     Nephrology Progress Note                                                                                LOS: 1 day     Chief Complaint/ Reason for encounter: Chronic kidney disease    Subjective   07/25/25 :       Medical history reviewed:  History of Present Illness    Subjective    History taken from: Patient and chart    Vital Signs  Temp:  [97.4 °F (36.3 °C)-97.9 °F (36.6 °C)] 97.5 °F (36.4 °C)  Heart Rate:  [64-77] 64  Resp:  [16-18] 18  BP: (158-189)/(64-81) 158/73       Wt Readings from Last 1 Encounters:   07/25/25 0617 123 kg (270 lb 12.8 oz)   07/24/25 1653 123 kg (271 lb)   07/24/25 0344 123 kg (271 lb 6.2 oz)   07/23/25 1955 125 kg (276 lb)       Objective:  Vital signs: (most recent): Blood pressure 158/79, pulse 62, temperature 97.3 °F (36.3 °C), temperature source Oral, resp. rate 18, height 157.5 cm (62\"), weight 123 kg (270 lb 12.8 oz), SpO2 93%, not currently breastfeeding.                Objective:  General Appearance:  Comfortable, -appearing, in no acute distress and not in pain.  Awake, alert  HEENT: Mucous membranes moist, no injury, oropharynx clear  Lungs:  Normal effort and normal respiratory rate.  Breath sounds clear to auscultation.  No  respiratory distress.  No rales, decreased breath sounds or rhonchi.    Heart: Normal rate.  Regular rhythm.  S1, S2 normal.  No murmur.   Abdomen: Abdomen is soft.  Bowel sounds are normal, no abdominal tenderness.  There is no rebound or guarding  Extremities:  postive edema of bilateral lower extremities  Skin:  Warm and dry with no rashes      Results Review:    Intake/Output:     Intake/Output Summary (Last 24 hours) at 7/25/2025 1201  Last data filed at 7/25/2025 1145  Gross per 24 hour   Intake 1790 ml   Output 900 ml   Net 890 ml "         DATA:  Radiology and Labs:  The following labs independently reviewed by me. Additional labs ordered for tomorrow a.m.  Interval notes, chart personally reviewed by me.   Old records independently reviewed showing   The following radiologic studies independently viewed by me, findings   New problems include  Discussed with     Risk/ complexity of medical care/ medical decision making     Labs:   Recent Results (from the past 24 hours)   Adult Transthoracic Echo Complete W/ Cont if Necessary Per Protocol    Collection Time: 07/24/25  4:47 PM   Result Value Ref Range    EF(MOD-bp) 66.3 %    LVIDd 4.8 cm    LVIDs 3.1 cm    IVSd 1.24 cm    LVPWd 1.16 cm    FS 35.5 %    IVS/LVPW 1.07 cm    ESV(cubed) 30.1 ml    LV Sys Vol (BSA corrected) 20.7 cm2    EDV(cubed) 112.4 ml    LV Kennedy Vol (BSA corrected) 63.4 cm2    LV mass(C)d 220.2 grams    LVOT area 3.5 cm2    LVOT diam 2.10 cm    EDV(MOD-sp2) 137.0 ml    EDV(MOD-sp4) 138.0 ml    ESV(MOD-sp2) 52.0 ml    ESV(MOD-sp4) 45.0 ml    SV(MOD-sp2) 85.0 ml    SV(MOD-sp4) 93.0 ml    SVi(MOD-SP2) 39.1 ml/m2    SVi(MOD-SP4) 42.8 ml/m2    SVi (LVOT) 47.4 ml/m2    EF(MOD-sp2) 62.0 %    EF(MOD-sp4) 67.4 %    MV E max jaime 97.9 cm/sec    MV A max jaime 118.7 cm/sec    MV dec time 0.15 sec    MV E/A 0.82     Pulm A Revs Dur 0.13 sec    MV A dur 0.12 sec    LA ESV Index (BP) 33.1 ml/m2    Med Peak E' Jaime 5.4 cm/sec    Lat Peak E' Jaime 6.1 cm/sec    Avg E/e' ratio 17.03     SV(LVOT) 103.1 ml    RV Base 3.6 cm    RV Mid 3.0 cm    RV Length 8.2 cm    TAPSE (>1.6) 3.1 cm    RV S' 14.1 cm/sec    Pulm Sys Jaime 43.1 cm/sec    Pulm Kennedy Jaime 23.1 cm/sec    Pulm S/D 1.87     Pulm A Revs Jaime 29.2 cm/sec    LV V1 max 125.0 cm/sec    LV V1 max PG 6.3 mmHg    LV V1 mean PG 4.1 mmHg    LV V1 VTI 29.7 cm    Ao pk jaime 161.0 cm/sec    Ao max PG 10.4 mmHg    Ao mean PG 5.6 mmHg    Ao V2 VTI 35.7 cm    ALLY(I,D) 2.9 cm2    Dimensionless Index 0.83 (DI)    MV max PG 7.2 mmHg    MV mean PG 3.1 mmHg    MV V2 VTI  32.8 cm    MV P1/2t 46.3 msec    MVA(P1/2t) 4.8 cm2    MVA(VTI) 3.1 cm2    MV dec slope 697.0 cm/sec2    RV V1 max PG 1.88 mmHg    RV V1 max 68.6 cm/sec    RV V1 VTI 14.5 cm    PA V2 max 90.8 cm/sec    PA acc time 0.07 sec    Ao root diam 2.26 cm    ACS 1.81 cm    Sinus 2.7 cm    STJ 2.38 cm    RAP systole 3 mmHg    Ascending aorta 3.0 cm    Aortic arch 3.0 cm    Abdo Ao Diam 2.1 cm   POC Glucose Once    Collection Time: 07/24/25  5:25 PM    Specimen: Blood   Result Value Ref Range    Glucose 152 (H) 70 - 130 mg/dL   POC Glucose Once    Collection Time: 07/24/25  8:23 PM    Specimen: Blood   Result Value Ref Range    Glucose 179 (H) 70 - 130 mg/dL   Basic Metabolic Panel    Collection Time: 07/25/25  5:38 AM    Specimen: Blood   Result Value Ref Range    Glucose 168 (H) 65 - 99 mg/dL    BUN 24.0 (H) 8.0 - 23.0 mg/dL    Creatinine 1.50 (H) 0.57 - 1.00 mg/dL    Sodium 139 136 - 145 mmol/L    Potassium 4.1 3.5 - 5.2 mmol/L    Chloride 105 98 - 107 mmol/L    CO2 20.7 (L) 22.0 - 29.0 mmol/L    Calcium 9.7 8.6 - 10.5 mg/dL    BUN/Creatinine Ratio 16.0 7.0 - 25.0    Anion Gap 13.3 5.0 - 15.0 mmol/L    eGFR 39.0 (L) >60.0 mL/min/1.73   CBC (No Diff)    Collection Time: 07/25/25  5:38 AM    Specimen: Blood   Result Value Ref Range    WBC 9.86 3.40 - 10.80 10*3/mm3    RBC 3.83 3.77 - 5.28 10*6/mm3    Hemoglobin 11.6 (L) 12.0 - 15.9 g/dL    Hematocrit 35.1 34.0 - 46.6 %    MCV 91.6 79.0 - 97.0 fL    MCH 30.3 26.6 - 33.0 pg    MCHC 33.0 31.5 - 35.7 g/dL    RDW 12.8 12.3 - 15.4 %    RDW-SD 42.9 37.0 - 54.0 fl    MPV 9.4 6.0 - 12.0 fL    Platelets 307 140 - 450 10*3/mm3   Magnesium    Collection Time: 07/25/25  5:38 AM    Specimen: Blood   Result Value Ref Range    Magnesium 1.9 1.6 - 2.4 mg/dL   Phosphorus    Collection Time: 07/25/25  5:38 AM    Specimen: Blood   Result Value Ref Range    Phosphorus 4.5 2.5 - 4.5 mg/dL   TSH Rfx On Abnormal To Free T4    Collection Time: 07/25/25  5:38 AM    Specimen: Blood   Result Value Ref  Range    TSH 0.865 0.270 - 4.200 uIU/mL   POC Glucose Once    Collection Time: 07/25/25  7:38 AM    Specimen: Blood   Result Value Ref Range    Glucose 177 (H) 70 - 130 mg/dL   POC Glucose Once    Collection Time: 07/25/25 11:43 AM    Specimen: Blood   Result Value Ref Range    Glucose 177 (H) 70 - 130 mg/dL       Radiology:  Pertinent radiology studies were reviewed as described above      Medications have been reviewed separately in chart overview      ASSESSMENT / PLAN  ASSESSMENT:   Fibrillary glomerulonephritis, seen on prior renal biopsy.  Recent improvement with combination of ACE inhibitor and Kerendia  Edema/volume overload    Cellulitis    Diabetes mellitus    Hyperlipidemia    Essential hypertension    Obesity due to excess calories    Tobacco dependence syndrome    Fibrillary glomerulonephritis    Pulmonary nodule    Stage 3a chronic kidney disease    Fluid overload    COPD (chronic obstructive pulmonary disease)    BMI 45.0-49.9, adult           DISCUSSION/PLAN:     Patient of  Kerendia, now hyperkalemia improved, patient will be rechallenged outpatient  Electrolytes stable  Creatinine got worse around 1.50 with concern for possible contrast-induced nephropathy, patient has the exposure to contrast on 7/23  Avoid NSAID  On lisinopril   Noted off diuretic therapy now.   Dose medication for decrease GFR.  2 d echo revealed Diastolic Dysfunction.    Will follow the patient with you.       Continue to monitor electrolytes and volume closely, avoid IV contrast and nephrotoxic medications      Addendum:  Looks like Kerendia is nonformulary so that will need to be restarted as an outpatient.     I appreciate the consult request.  Please send me a secure chat message with any nonurgent questions regarding patient care.  For any urgent patient care issues please call my office number below.    Марина Flanagan MD  [facility]  Office phone number: [phone]  Answering service phone number:  [phone]    07/25/25  12:01 EDT    Dictation performed using Dragon dictation [email or web address]

## 2025-07-26 ENCOUNTER — APPOINTMENT (OUTPATIENT)
Dept: CARDIOLOGY | Facility: HOSPITAL | Age: 65
End: 2025-07-26
Payer: MEDICARE

## 2025-07-26 LAB
ANION GAP SERPL CALCULATED.3IONS-SCNC: 11.4 MMOL/L (ref 5–15)
BH CV LOWER VASCULAR LEFT COMMON FEMORAL AUGMENT: NORMAL
BH CV LOWER VASCULAR LEFT COMMON FEMORAL COMPETENT: NORMAL
BH CV LOWER VASCULAR LEFT COMMON FEMORAL COMPRESS: NORMAL
BH CV LOWER VASCULAR LEFT COMMON FEMORAL PHASIC: NORMAL
BH CV LOWER VASCULAR LEFT COMMON FEMORAL SPONT: NORMAL
BH CV LOWER VASCULAR LEFT DISTAL FEMORAL COMPRESS: NORMAL
BH CV LOWER VASCULAR LEFT GASTRONEMIUS COMPRESS: NORMAL
BH CV LOWER VASCULAR LEFT GREATER SAPH AK COMPRESS: NORMAL
BH CV LOWER VASCULAR LEFT GREATER SAPH BK COMPRESS: NORMAL
BH CV LOWER VASCULAR LEFT LESSER SAPH COMPRESS: NORMAL
BH CV LOWER VASCULAR LEFT MID FEMORAL AUGMENT: NORMAL
BH CV LOWER VASCULAR LEFT MID FEMORAL COMPETENT: NORMAL
BH CV LOWER VASCULAR LEFT MID FEMORAL COMPRESS: NORMAL
BH CV LOWER VASCULAR LEFT MID FEMORAL PHASIC: NORMAL
BH CV LOWER VASCULAR LEFT MID FEMORAL SPONT: NORMAL
BH CV LOWER VASCULAR LEFT PERONEAL COMPRESS: NORMAL
BH CV LOWER VASCULAR LEFT POPLITEAL AUGMENT: NORMAL
BH CV LOWER VASCULAR LEFT POPLITEAL COMPETENT: NORMAL
BH CV LOWER VASCULAR LEFT POPLITEAL COMPRESS: NORMAL
BH CV LOWER VASCULAR LEFT POPLITEAL PHASIC: NORMAL
BH CV LOWER VASCULAR LEFT POPLITEAL SPONT: NORMAL
BH CV LOWER VASCULAR LEFT POSTERIOR TIBIAL COMPRESS: NORMAL
BH CV LOWER VASCULAR LEFT PROFUNDA FEMORAL COMPRESS: NORMAL
BH CV LOWER VASCULAR LEFT PROXIMAL FEMORAL COMPRESS: NORMAL
BH CV LOWER VASCULAR LEFT SAPHENOFEMORAL JUNCTION COMPRESS: NORMAL
BH CV LOWER VASCULAR RIGHT COMMON FEMORAL AUGMENT: NORMAL
BH CV LOWER VASCULAR RIGHT COMMON FEMORAL COMPETENT: NORMAL
BH CV LOWER VASCULAR RIGHT COMMON FEMORAL COMPRESS: NORMAL
BH CV LOWER VASCULAR RIGHT COMMON FEMORAL PHASIC: NORMAL
BH CV LOWER VASCULAR RIGHT COMMON FEMORAL SPONT: NORMAL
BUN SERPL-MCNC: 26 MG/DL (ref 8–23)
BUN/CREAT SERPL: 15.2 (ref 7–25)
CALCIUM SPEC-SCNC: 9.1 MG/DL (ref 8.6–10.5)
CHLORIDE SERPL-SCNC: 107 MMOL/L (ref 98–107)
CO2 SERPL-SCNC: 24.6 MMOL/L (ref 22–29)
CREAT SERPL-MCNC: 1.71 MG/DL (ref 0.57–1)
DEPRECATED RDW RBC AUTO: 43.4 FL (ref 37–54)
EGFRCR SERPLBLD CKD-EPI 2021: 33.3 ML/MIN/1.73
ERYTHROCYTE [DISTWIDTH] IN BLOOD BY AUTOMATED COUNT: 12.9 % (ref 12.3–15.4)
GLUCOSE BLDC GLUCOMTR-MCNC: 104 MG/DL (ref 70–130)
GLUCOSE BLDC GLUCOMTR-MCNC: 121 MG/DL (ref 70–130)
GLUCOSE BLDC GLUCOMTR-MCNC: 54 MG/DL (ref 70–130)
GLUCOSE BLDC GLUCOMTR-MCNC: 80 MG/DL (ref 70–130)
GLUCOSE BLDC GLUCOMTR-MCNC: 96 MG/DL (ref 70–130)
GLUCOSE SERPL-MCNC: 103 MG/DL (ref 65–99)
HCT VFR BLD AUTO: 33.7 % (ref 34–46.6)
HGB BLD-MCNC: 10.9 G/DL (ref 12–15.9)
MAGNESIUM SERPL-MCNC: 1.7 MG/DL (ref 1.6–2.4)
MCH RBC QN AUTO: 29.8 PG (ref 26.6–33)
MCHC RBC AUTO-ENTMCNC: 32.3 G/DL (ref 31.5–35.7)
MCV RBC AUTO: 92.1 FL (ref 79–97)
PHOSPHATE SERPL-MCNC: 3.3 MG/DL (ref 2.5–4.5)
PLATELET # BLD AUTO: 264 10*3/MM3 (ref 140–450)
PMV BLD AUTO: 9.2 FL (ref 6–12)
POTASSIUM SERPL-SCNC: 3.7 MMOL/L (ref 3.5–5.2)
RBC # BLD AUTO: 3.66 10*6/MM3 (ref 3.77–5.28)
SODIUM SERPL-SCNC: 143 MMOL/L (ref 136–145)
WBC NRBC COR # BLD AUTO: 7.49 10*3/MM3 (ref 3.4–10.8)

## 2025-07-26 PROCEDURE — 25010000002 ENOXAPARIN PER 10 MG: Performed by: HOSPITALIST

## 2025-07-26 PROCEDURE — 94761 N-INVAS EAR/PLS OXIMETRY MLT: CPT

## 2025-07-26 PROCEDURE — 84100 ASSAY OF PHOSPHORUS: CPT | Performed by: HOSPITALIST

## 2025-07-26 PROCEDURE — 80048 BASIC METABOLIC PNL TOTAL CA: CPT | Performed by: HOSPITALIST

## 2025-07-26 PROCEDURE — 93971 EXTREMITY STUDY: CPT | Performed by: STUDENT IN AN ORGANIZED HEALTH CARE EDUCATION/TRAINING PROGRAM

## 2025-07-26 PROCEDURE — 85027 COMPLETE CBC AUTOMATED: CPT | Performed by: HOSPITALIST

## 2025-07-26 PROCEDURE — 94799 UNLISTED PULMONARY SVC/PX: CPT

## 2025-07-26 PROCEDURE — 25010000002 FUROSEMIDE PER 20 MG: Performed by: INTERNAL MEDICINE

## 2025-07-26 PROCEDURE — 83735 ASSAY OF MAGNESIUM: CPT | Performed by: HOSPITALIST

## 2025-07-26 PROCEDURE — 94664 DEMO&/EVAL PT USE INHALER: CPT

## 2025-07-26 PROCEDURE — 94760 N-INVAS EAR/PLS OXIMETRY 1: CPT

## 2025-07-26 PROCEDURE — 99232 SBSQ HOSP IP/OBS MODERATE 35: CPT | Performed by: PHYSICIAN ASSISTANT

## 2025-07-26 PROCEDURE — 63710000001 INSULIN LISPRO (HUMAN) PER 5 UNITS: Performed by: HOSPITALIST

## 2025-07-26 PROCEDURE — 25010000002 CEFAZOLIN PER 500 MG: Performed by: HOSPITALIST

## 2025-07-26 PROCEDURE — 82948 REAGENT STRIP/BLOOD GLUCOSE: CPT

## 2025-07-26 PROCEDURE — 93971 EXTREMITY STUDY: CPT

## 2025-07-26 PROCEDURE — 63710000001 INSULIN GLARGINE PER 5 UNITS: Performed by: HOSPITALIST

## 2025-07-26 RX ORDER — NIFEDIPINE 30 MG/1
30 TABLET, EXTENDED RELEASE ORAL
Status: DISCONTINUED | OUTPATIENT
Start: 2025-07-26 | End: 2025-07-28

## 2025-07-26 RX ADMIN — CEFAZOLIN 2000 MG: 2 INJECTION, POWDER, FOR SOLUTION INTRAMUSCULAR; INTRAVENOUS at 16:35

## 2025-07-26 RX ADMIN — VENLAFAXINE HYDROCHLORIDE 150 MG: 37.5 CAPSULE, EXTENDED RELEASE ORAL at 09:16

## 2025-07-26 RX ADMIN — ANTI-FUNGAL POWDER MICONAZOLE NITRATE TALC FREE 1 APPLICATION: 1.42 POWDER TOPICAL at 09:17

## 2025-07-26 RX ADMIN — LISINOPRIL 20 MG: 20 TABLET ORAL at 22:10

## 2025-07-26 RX ADMIN — ACETAMINOPHEN 650 MG: 325 TABLET, FILM COATED ORAL at 09:34

## 2025-07-26 RX ADMIN — FUROSEMIDE 40 MG: 10 INJECTION, SOLUTION INTRAMUSCULAR; INTRAVENOUS at 09:15

## 2025-07-26 RX ADMIN — INSULIN LISPRO 6 UNITS: 100 INJECTION, SOLUTION INTRAVENOUS; SUBCUTANEOUS at 12:03

## 2025-07-26 RX ADMIN — CEFAZOLIN 2000 MG: 2 INJECTION, POWDER, FOR SOLUTION INTRAMUSCULAR; INTRAVENOUS at 09:19

## 2025-07-26 RX ADMIN — IPRATROPIUM BROMIDE AND ALBUTEROL SULFATE 3 ML: .5; 3 SOLUTION RESPIRATORY (INHALATION) at 07:56

## 2025-07-26 RX ADMIN — NICOTINE 1 PATCH: 21 PATCH, EXTENDED RELEASE TRANSDERMAL at 09:18

## 2025-07-26 RX ADMIN — HYDRALAZINE HYDROCHLORIDE 50 MG: 25 TABLET ORAL at 07:00

## 2025-07-26 RX ADMIN — ROSUVASTATIN CALCIUM 20 MG: 20 TABLET, FILM COATED ORAL at 09:17

## 2025-07-26 RX ADMIN — SODIUM BICARBONATE 650 MG: 650 TABLET ORAL at 22:10

## 2025-07-26 RX ADMIN — IPRATROPIUM BROMIDE AND ALBUTEROL SULFATE 3 ML: .5; 3 SOLUTION RESPIRATORY (INHALATION) at 19:31

## 2025-07-26 RX ADMIN — HYDRALAZINE HYDROCHLORIDE 50 MG: 25 TABLET ORAL at 22:10

## 2025-07-26 RX ADMIN — ENOXAPARIN SODIUM 40 MG: 100 INJECTION SUBCUTANEOUS at 09:18

## 2025-07-26 RX ADMIN — SODIUM BICARBONATE 650 MG: 650 TABLET ORAL at 09:17

## 2025-07-26 RX ADMIN — INSULIN GLARGINE 20 UNITS: 100 INJECTION, SOLUTION SUBCUTANEOUS at 09:16

## 2025-07-26 RX ADMIN — ENOXAPARIN SODIUM 40 MG: 100 INJECTION SUBCUTANEOUS at 22:10

## 2025-07-26 RX ADMIN — ANTI-FUNGAL POWDER MICONAZOLE NITRATE TALC FREE 1 APPLICATION: 1.42 POWDER TOPICAL at 22:12

## 2025-07-26 RX ADMIN — IPRATROPIUM BROMIDE AND ALBUTEROL SULFATE 3 ML: .5; 3 SOLUTION RESPIRATORY (INHALATION) at 11:54

## 2025-07-26 RX ADMIN — CEFAZOLIN 2000 MG: 2 INJECTION, POWDER, FOR SOLUTION INTRAMUSCULAR; INTRAVENOUS at 00:50

## 2025-07-26 RX ADMIN — ARFORMOTEROL TARTRATE 15 MCG: 15 SOLUTION RESPIRATORY (INHALATION) at 19:31

## 2025-07-26 RX ADMIN — INSULIN LISPRO 6 UNITS: 100 INJECTION, SOLUTION INTRAVENOUS; SUBCUTANEOUS at 09:15

## 2025-07-26 RX ADMIN — SODIUM BICARBONATE 650 MG: 650 TABLET ORAL at 16:35

## 2025-07-26 RX ADMIN — Medication 10 ML: at 09:18

## 2025-07-26 RX ADMIN — LISINOPRIL 20 MG: 20 TABLET ORAL at 09:17

## 2025-07-26 RX ADMIN — ATENOLOL 100 MG: 50 TABLET ORAL at 09:16

## 2025-07-26 RX ADMIN — BUDESONIDE 0.5 MG: 0.5 INHALANT RESPIRATORY (INHALATION) at 07:56

## 2025-07-26 RX ADMIN — BUSPIRONE HYDROCHLORIDE 7.5 MG: 15 TABLET ORAL at 09:17

## 2025-07-26 RX ADMIN — TERAZOSIN 2 MG: 2 CAPSULE ORAL at 22:10

## 2025-07-26 RX ADMIN — Medication 10 ML: at 22:11

## 2025-07-26 RX ADMIN — NIFEDIPINE 30 MG: 30 TABLET, EXTENDED RELEASE ORAL at 13:45

## 2025-07-26 RX ADMIN — ASPIRIN 81 MG CHEWABLE TABLET 81 MG: 81 TABLET CHEWABLE at 09:18

## 2025-07-26 RX ADMIN — BUDESONIDE 0.5 MG: 0.5 INHALANT RESPIRATORY (INHALATION) at 19:31

## 2025-07-26 RX ADMIN — ARFORMOTEROL TARTRATE 15 MCG: 15 SOLUTION RESPIRATORY (INHALATION) at 07:56

## 2025-07-26 RX ADMIN — HYDRALAZINE HYDROCHLORIDE 50 MG: 25 TABLET ORAL at 13:45

## 2025-07-26 RX ADMIN — ACETAMINOPHEN 650 MG: 325 TABLET, FILM COATED ORAL at 22:10

## 2025-07-26 NOTE — PROGRESS NOTES
Name: Bernice Rai ADMIT: 2025   : 1960  PCP: Ashley Holt APRN    MRN: [id number] LOS: 2 days   AGE/SEX: 64 y.o. female  ROOM: [facility]     Subjective   Subjective   Resolved right lower extremity edema with persistent on the left with the lower extremity pain.  Decreased shortness of breath.  Positive wheezing and dry cough without improvement..  No chest pain.  No palpitation.  Redness of the abdominal wall continues to improve.    Review of Systems  GI.  No abdominal pain.  No nausea or vomiting.  .  No dysuria or hematuria.     Objective   Objective   Vital Signs  Temp:  [97.1 °F (36.2 °C)-97.9 °F (36.6 °C)] 97.1 °F (36.2 °C)  Heart Rate:  [58-73] 62  Resp:  [18-20] 18  BP: (148-164)/(61-79) 151/69  SpO2:  [92 %-94 %] 94 %  on   ;   Device (Oxygen Therapy): room air    Intake/Output Summary (Last 24 hours) at 2025 1508  Last data filed at 2025 1343  Gross per 24 hour   Intake 930 ml   Output 1650 ml   Net -720 ml     Body mass index is 49.52 kg/m².      25  1653 25  0617 25  0600   Weight: 123 kg (271 lb) 123 kg (270 lb 12.8 oz) 123 kg (270 lb 11.6 oz)     Physical Exam  General.  Middle-aged female.  Obese.  Alert and oriented x 4.  In no apparent pain/distress/diaphoresis.  Normal mood and affect  Eyes.  Pupils equal round and reactive.  Intact extraocular musculature.  No pallor or jaundice  Oral cavity.  Moist mucous membrane  Neck.  Supple.  No JVD.  No lymphadenopathy or thyromegaly.  Cardiovascular.  Regular rate and rhythm with no gallops or murmurs.  Chest.  Poor bilateral air entry with bilateral expiratory wheeze.  No crackles  Abdomen.  Positive abdominal wall edema with mild redness on the left side of the abdominal wall (improved).  No localized tenderness.  No guarding or rebound.  No organomegaly.  Remedies.  +1 left lower extremity edema and resolved right lower extremity edema.  No clubbing or cyanosis   CNS.  No acute focal neurological  "deficits.      Results Review:      Results from last 7 days   Lab Units 07/26/25  0507 07/25/25  0538 07/24/25  0522 07/23/25 2029   SODIUM mmol/L 143 139 142 142   POTASSIUM mmol/L 3.7 4.1 4.4 4.1   CHLORIDE mmol/L 107 105 111* 111*   CO2 mmol/L 24.6 20.7* 20.2* 23.0   BUN mg/dL 26.0* 24.0* 17.0 15.6   CREATININE mg/dL 1.71* 1.50* 1.18* 1.13*   GLUCOSE mg/dL 103* 168* 227* 104*   CALCIUM mg/dL 9.1 9.7 9.4 9.7   AST (SGOT) U/L  --   --   --  8   ALT (SGPT) U/L  --   --   --  7     Estimated Creatinine Clearance: 42.2 mL/min (A) (by C-G formula based on SCr of 1.71 mg/dL (H)).  Results from last 7 days   Lab Units 07/24/25  0523   HEMOGLOBIN A1C % 6.40*     Results from last 7 days   Lab Units 07/26/25  1132 07/26/25  0815 07/25/25  2153 07/25/25  2116 07/25/25  1659 07/25/25  1143 07/25/25  0738 07/24/25 2023   GLUCOSE mg/dL 80 104 93 66* 79 177* 177* 179*     Results from last 7 days   Lab Units 07/24/25  0522 07/23/25  2135 07/23/25 2029   HSTROP T ng/L 13 16* 17*     Results from last 7 days   Lab Units 07/23/25 2029   PROBNP pg/mL 883.7     Results from last 7 days   Lab Units 07/25/25  0538   TSH uIU/mL 0.865     Results from last 7 days   Lab Units 07/26/25  0507 07/25/25  0538   MAGNESIUM mg/dL 1.7 1.9   PHOSPHORUS mg/dL 3.3 4.5           Invalid input(s): \"LDLCALC\"  Results from last 7 days   Lab Units 07/26/25  0507 07/25/25  0538 07/24/25  0523 07/23/25 2029   WBC 10*3/mm3 7.49 9.86 9.42 8.22   HEMOGLOBIN g/dL 10.9* 11.6* 11.6* 11.2*   HEMATOCRIT % 33.7* 35.1 36.4 36.3   PLATELETS 10*3/mm3 264 307 299 289   MCV fL 92.1 91.6 93.1 94.5   MCH pg 29.8 30.3 29.7 29.2   MCHC g/dL 32.3 33.0 31.9 30.9*   RDW % 12.9 12.8 12.7 13.2   RDW-SD fl 43.4 42.9 42.5 46.7   MPV fL 9.2 9.4 9.5 8.8   NEUTROPHIL % %  --   --   --  61.0   LYMPHOCYTE % %  --   --   --  24.2   MONOCYTES % %  --   --   --  11.7   EOSINOPHIL % %  --   --   --  2.4   BASOPHIL % %  --   --   --  0.6   IMM GRAN % %  --   --   --  0.1   NEUTROS " ABS 10*3/mm3  --   --   --  5.01   LYMPHS ABS 10*3/mm3  --   --   --  1.99   MONOS ABS 10*3/mm3  --   --   --  0.96*   EOS ABS 10*3/mm3  --   --   --  0.20   BASOS ABS 10*3/mm3  --   --   --  0.05   IMMATURE GRANS (ABS) 10*3/mm3  --   --   --  0.01                                 Results from last 7 days   Lab Units 07/24/25  1056   NITRITE UA  Negative   WBC UA /HPF 0-2   BACTERIA UA /HPF None Seen   SQUAM EPITHEL UA /HPF 0-2     Results from last 7 days   Lab Units 07/24/25  1056   CREATININE UR mg/dL 30.5   PROTEIN TOTAL URINE mg/dL 174.6       Imaging:  Imaging Results (Last 24 Hours)       ** No results found for the last 24 hours. 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           Volume overload with acute on chronic diastolic congestive heart failure exacerbation in a patient with a history of hypertension mildly elevated troponin.  Echo with a normal ejection fraction and and left ventricular hypertrophy with grade 1 diastolic dysfunction and left atrial enlargement but no pericardial effusion.  Still volume overloaded.  No evidence of angina.  Status post IV Lasix that has been DC'd by nephrology because of worsening renal function.  Good blood pressure control.  Continue  aspirin/atenolol/hydralazine/lisinopril/Hytrin/Procardia.  Pulmonary nodule in a patient with a history of COPD and tobacco abuse.  Counseled against smoking.  Continues with wheezing.  Will continue Pulmicort/DuoNeb nebulizer.  Follow-up CT as an outpatient  Hyperlipidemia.  Continue Crestor.  Mood disorder.  Continue Effexor/BuSpar.  Appears stable.   glomerulonephritis and a history of nephrotic proteinuria/stage IIIa chronic renal failure.  Patient continues to be volume overloaded.  Electrolytes are stable.  Creatinine worse today.  Continue lisinopril.  Continue with sodium bicarbonate.  Nephrology is following and stopped IV diuresis...  Left abdominal wall cellulitis.  Improving on cefazolin.  Type 2 diabetes.  A1c is 6.4..  Continue current insulin regimen.  Accu-Cheks acceptable  History of CVA.  Negative CNS examination.  Continue aspirin and Crestor.          Discussed my findings and plan of treatment with the patient/family  Disposition.  To be determined based on clinical course.  Eventually home.      Sai Allen MD  [facility]  07/26/25  15:08 EDT

## 2025-07-26 NOTE — PROGRESS NOTES
Patient Name: Bernice Rai  Age/Sex: 64 y.o. female  : 1960  MRN: [id number]    Date of Admission: 2025  Date of Encounter Visit: 25  Encounter Provider: Elliot Cha PA-C  Place of Service: [facility] CARDIOLOGY      Subjective:       Chief Complaint: Shortness of breath       Presented to the hospital with 1 week of shortness of breath and weight gain of reported 20 pounds.  He has history of previous stroke, hypertension, diabetes, COPD/asthma, CKD stage III.  She was diagnosed with abdominal cellulitis and started on ceftriaxone/steroids.    Follow up:   25 she is on room air this morning.  1.6 L of urine reported out overnight.  Creatinine is elevated at 1.71 BUN of 26.  Nephrology thought this could be due to  MIRANDA versus diuresis.  She remains on IV antibiotics. Her family was at bedside and case was discussed. Her breathing is back to normal.She still has some mild edema in her left leg.         Home Medications:   Medications Prior to Admission   Medication Sig Dispense Refill Last Dose/Taking    amLODIPine (NORVASC) 10 MG tablet Take 1 tablet by mouth Daily. Needs appointment with PCP 90 tablet 0 2025 Morning    aspirin 81 MG chewable tablet Chew 1 tablet Daily.   2025 Morning    atenolol (TENORMIN) 100 MG tablet Take 1 tablet by mouth Daily. 90 tablet 1 2025 Morning    busPIRone (BUSPAR) 7.5 MG tablet Take 1 tablet by mouth once daily 90 tablet 0 Past Week    ciclopirox (LOPROX) 0.77 % cream APPLY  CREAM TOPICALLY TO AFFECTED AREA TWICE DAILY AS DIRECTED 30 g 0 2025    desoximetasone (TOPICORT) 0.25 % ointment Apply  topically to the appropriate area as directed Every 12 (Twelve) Hours. 60 g 3 Past Month    doxazosin (CARDURA) 2 MG tablet Take 1 tablet by mouth Every Night.   2025    Fluticasone Furoate-Vilanterol (Breo Ellipta) 100-25 MCG/INH inhaler Inhale 1 puff Daily. 1 each 11 Past Month    insulin NPH  (NovoLIN N) 100 UNIT/ML injection Patient stating she takes 30-45 units in evening   7/23/2025    insulin NPH-insulin regular (NovoLIN 70/30) (70-30) 100 UNIT/ML injection Patient stating she takes 50 units in am   7/23/2025    lisinopril (PRINIVIL,ZESTRIL) 20 MG tablet Take 1 tablet by mouth 2 (Two) Times a Day. Needs appointment with  tablet 0 7/23/2025    pioglitazone (ACTOS) 30 MG tablet Take 1 tablet by mouth Daily. 90 tablet 1 7/23/2025 Evening    PROBIOTIC PRODUCT PO Take  by mouth.   7/23/2025    rosuvastatin (Crestor) 20 MG tablet Take 1 tablet by mouth Daily. 90 tablet 3 7/23/2025    sodium bicarbonate 650 MG tablet Take 1 tablet by mouth 3 (Three) Times a Day.   7/23/2025    venlafaxine XR (EFFEXOR-XR) 150 MG 24 hr capsule Take 1 capsule by mouth once daily 90 capsule 0 7/23/2025    albuterol sulfate HFA (Proventil HFA) 108 (90 Base) MCG/ACT inhaler Inhale 2 puffs Every 6 (Six) Hours As Needed for Wheezing. 18 g 11 More than a month    Kerendia 20 MG tablet Take 1 tablet by mouth Daily. (Patient not taking: Reported on 7/24/2025) 90 tablet  Not Taking    oxybutynin (DITROPAN) 5 MG tablet Take 1 tablet by mouth twice daily (Patient not taking: Reported on 7/24/2025) 90 tablet 0 Not Taking       Allergies:  Allergies   Allergen Reactions    Jardiance [Empagliflozin] Itching     Recurrent yeast infections         Review of Systems:  All systems reviewed. Pertinent positives identified in HPI. All other systems are negative.       Objective:     Objective:  Temp:  [97.3 °F (36.3 °C)-97.9 °F (36.6 °C)] 97.9 °F (36.6 °C)  Heart Rate:  [62-73] 73  Resp:  [18-20] 18  BP: (148-164)/(61-79) 164/67    Intake/Output Summary (Last 24 hours) at 7/26/2025 1044  Last data filed at 7/26/2025 0448  Gross per 24 hour   Intake 870 ml   Output 1450 ml   Net -580 ml     Body mass index is 49.52 kg/m².      07/24/25  1653 07/25/25  0617 07/26/25  0600   Weight: 123 kg (271 lb) 123 kg (270 lb 12.8 oz) 123 kg (270 lb 11.6  oz)         Physical Exam:   General: 64 y.o. female No acute distress, laying in bed. Obese Alert and Oriented.   Neck: No JVD or carotid bruit  Lungs: Clear to ausculation bilaterally, symmetric  Heart: Regular rate and rhythm with no overt murmurs, rubs or gallops. Normal S1 and S2.   Abdomen: soft, non-tender  Extremities: +1 LE edema in left leg   Neuo: no lateralizing defects.     Lab Review:     Results from last 7 days   Lab Units 25  0507 25  0538 25  0522   SODIUM mmol/L 143 139 142   POTASSIUM mmol/L 3.7 4.1 4.4   CHLORIDE mmol/L 107 105 111*   CO2 mmol/L 24.6 20.7* 20.2*   BUN mg/dL 26.0* 24.0* 17.0   CREATININE mg/dL 1.71* 1.50* 1.18*   GLUCOSE mg/dL 103* 168* 227*   CALCIUM mg/dL 9.1 9.7 9.4       Results from last 7 days   Lab Units 25  0522 25  2135 25  2029   HSTROP T ng/L 13 16* 17*     Results from last 7 days   Lab Units 25  0507   WBC 10*3/mm3 7.49   HEMOGLOBIN g/dL 10.9*   HEMATOCRIT % 33.7*   PLATELETS 10*3/mm3 264             Results from last 7 days   Lab Units 25  0507   MAGNESIUM mg/dL 1.7         Results from last 7 days   Lab Units 25  2029   PROBNP pg/mL 883.7         Results from last 7 days   Lab Units 25  0538   TSH uIU/mL 0.865       Imaging:    chest X-ray    Results for orders placed during the hospital encounter of 25    Adult Transthoracic Echo Complete W/ Cont if Necessary Per Protocol    Interpretation Summary    Left ventricular systolic function is normal. Left ventricular ejection fraction appears to be 61 - 65%.    Left ventricular wall thickness is consistent with mild concentric hypertrophy.    Left ventricular diastolic function is consistent with (grade Ia w/high LAP) impaired relaxation.    The left atrial cavity is moderately dilated.    Insufficient TR velocity profile to estimate the right ventricular systolic pressure.    There is no evidence of pericardial effusion.      Telemetry/EK25:          I personally viewed and interpreted the patient's EKG/Telemetry data.    Assessment/ Plan:       1.  Acute on Chronic  Heart failure with preserved ejection fraction  Echo completed showing ejection action 66 5% with grade 1 diastolic dysfunction  She has been given IV diuresis and breathing has improved  Initial chest x-ray did show volume overload.  Still BNP was 883 but likely underestimated in the setting of severe obesity  Thought to be due to uncontrolled hypertension and morbid obesity  She received IV Lasix 40 mg this morning-creatinine is elevated.  Nephrology is going to be following.  I would defer to them about ongoing diuretic management->> she appears more euvolemic on examination.  She is going to likely require long-term diuretic with OP oral diuresis with lasix 40 mg daily with potassium.     2.  Resistant hypertension-pressures averaging in the 150s to 160s this morning.  Is been started on a regimen of atenolol 100 mg in the morning and hydralazine 50 mg 3 times daily  She is also still taking lisinopril 20 mg twice daily  Add procardia 30 mg at lunch time     3.  Left abdominal wall cellulitis-Improving.  She continues to remain on IV antibiotics.  Medicine is following    4.  COPD with current tobacco use-she has had discussion on benefit of quitting.  On room air today.    5.  Diabetes mellitus type 2 most recent A1c 7.4% in the setting of obesity    6.  Prior CVA on aspirin    7.  Fibrillary glomerulonephritis-CKD stage II/III-nephrology following  I reviewed nephrology note.  She will continue Kerendia as an OP    Thank you for allowing me to participate in the care of Bernice Rai. Feel free to contact me directly with any further questions or concerns.    Elliot Cha PA-C  [facility]  07/26/25  10:44 EDT

## 2025-07-26 NOTE — PLAN OF CARE
Goal Outcome Evaluation:  Plan of Care Reviewed With: patient, spouse, child        Progress: improving  Outcome Evaluation: Pt A+Ox4. VSS. Blood pressure improved today. IV antibiotics given as ordered. Pt ambulating w/o difficulty. Tolerating diet. Pt voices no needs at this time.

## 2025-07-26 NOTE — PROGRESS NOTES
"                                                                                                            [facility]/ [facility]                                                                     Nephrology Progress Note                                                                                LOS: 2 days     Chief Complaint/ Reason for encounter: Chronic kidney disease    Subjective   07/26/25 :       Medical history reviewed:  History of Present Illness    Subjective    History taken from: Patient and chart    7/26  Denies for any new complaint today.     Vital Signs  Temp:  [97.3 °F (36.3 °C)-97.9 °F (36.6 °C)] 97.9 °F (36.6 °C)  Heart Rate:  [58-73] 58  Resp:  [18-20] 20  BP: (148-164)/(61-79) 164/67       Wt Readings from Last 1 Encounters:   07/26/25 0600 123 kg (270 lb 11.6 oz)   07/25/25 0617 123 kg (270 lb 12.8 oz)   07/24/25 1653 123 kg (271 lb)   07/24/25 0344 123 kg (271 lb 6.2 oz)   07/23/25 1955 125 kg (276 lb)       Objective:  Vital signs: (most recent): Blood pressure 164/67, pulse 58, temperature 97.9 °F (36.6 °C), temperature source Oral, resp. rate 20, height 157.5 cm (62\"), weight 123 kg (270 lb 11.6 oz), SpO2 93%, not currently breastfeeding.                Objective:  General Appearance:  Comfortable, -appearing, in no acute distress and not in pain.  Awake, alert  HEENT: Mucous membranes moist, no injury, oropharynx clear  Lungs:  Normal effort and normal respiratory rate.  Breath sounds clear to auscultation.  No  respiratory distress.  No rales, decreased breath sounds or rhonchi.    Heart: Normal rate.  Regular rhythm.  S1, S2 normal.  No murmur.   Abdomen: Abdomen is soft.  Bowel sounds are normal, no abdominal tenderness.  There is no rebound or guarding  Extremities:  postive edema of bilateral lower extremities  Skin:  Warm and dry with no rashes      Results Review:    Intake/Output:     Intake/Output Summary (Last 24 hours) at 7/26/2025 1228  Last data filed at " 7/26/2025 1208  Gross per 24 hour   Intake 1170 ml   Output 1850 ml   Net -680 ml         DATA:  Radiology and Labs:  The following labs independently reviewed by me. Additional labs ordered for tomorrow a.m.  Interval notes, chart personally reviewed by me.   Old records independently reviewed showing   The following radiologic studies independently viewed by me, findings   New problems include  Discussed with     Risk/ complexity of medical care/ medical decision making     Labs:   Recent Results (from the past 24 hours)   POC Glucose Once    Collection Time: 07/25/25  4:59 PM    Specimen: Blood   Result Value Ref Range    Glucose 79 70 - 130 mg/dL   POC Glucose Once    Collection Time: 07/25/25  9:16 PM    Specimen: Blood   Result Value Ref Range    Glucose 66 (L) 70 - 130 mg/dL   POC Glucose Once    Collection Time: 07/25/25  9:53 PM    Specimen: Blood   Result Value Ref Range    Glucose 93 70 - 130 mg/dL   Basic Metabolic Panel    Collection Time: 07/26/25  5:07 AM    Specimen: Blood   Result Value Ref Range    Glucose 103 (H) 65 - 99 mg/dL    BUN 26.0 (H) 8.0 - 23.0 mg/dL    Creatinine 1.71 (H) 0.57 - 1.00 mg/dL    Sodium 143 136 - 145 mmol/L    Potassium 3.7 3.5 - 5.2 mmol/L    Chloride 107 98 - 107 mmol/L    CO2 24.6 22.0 - 29.0 mmol/L    Calcium 9.1 8.6 - 10.5 mg/dL    BUN/Creatinine Ratio 15.2 7.0 - 25.0    Anion Gap 11.4 5.0 - 15.0 mmol/L    eGFR 33.3 (L) >60.0 mL/min/1.73   CBC (No Diff)    Collection Time: 07/26/25  5:07 AM    Specimen: Blood   Result Value Ref Range    WBC 7.49 3.40 - 10.80 10*3/mm3    RBC 3.66 (L) 3.77 - 5.28 10*6/mm3    Hemoglobin 10.9 (L) 12.0 - 15.9 g/dL    Hematocrit 33.7 (L) 34.0 - 46.6 %    MCV 92.1 79.0 - 97.0 fL    MCH 29.8 26.6 - 33.0 pg    MCHC 32.3 31.5 - 35.7 g/dL    RDW 12.9 12.3 - 15.4 %    RDW-SD 43.4 37.0 - 54.0 fl    MPV 9.2 6.0 - 12.0 fL    Platelets 264 140 - 450 10*3/mm3   Magnesium    Collection Time: 07/26/25  5:07 AM    Specimen: Blood   Result Value Ref Range     Magnesium 1.7 1.6 - 2.4 mg/dL   Phosphorus    Collection Time: 07/26/25  5:07 AM    Specimen: Blood   Result Value Ref Range    Phosphorus 3.3 2.5 - 4.5 mg/dL   POC Glucose Once    Collection Time: 07/26/25  8:15 AM    Specimen: Blood   Result Value Ref Range    Glucose 104 70 - 130 mg/dL   POC Glucose Once    Collection Time: 07/26/25 11:32 AM    Specimen: Blood   Result Value Ref Range    Glucose 80 70 - 130 mg/dL       Radiology:  Pertinent radiology studies were reviewed as described above      Medications have been reviewed separately in chart overview      ASSESSMENT / PLAN  ASSESSMENT:   Fibrillary glomerulonephritis, seen on prior renal biopsy.  Recent improvement with combination of ACE inhibitor and Kerendia  Edema/volume overload    Cellulitis    Diabetes mellitus    Hyperlipidemia    Essential hypertension    Obesity due to excess calories    Tobacco dependence syndrome    Fibrillary glomerulonephritis    Pulmonary nodule    Stage 3a chronic kidney disease    Fluid overload    COPD (chronic obstructive pulmonary disease)    BMI 45.0-49.9, adult           DISCUSSION/PLAN:     Patient off Kerendia, now hyperkalemia improved, patient will be rechallenged outpatient  Electrolytes stable  Creatinine got worse around 1.70 with concern for likely contrast-induced nephropathy, patient has the exposure to contrast on 7/23 with component of diuretic therapy.  Volume status euvolemic  Avoid NSAID  On lisinopril   Dose medication for decrease GFR.  2 d echo revealed Diastolic Dysfunction.  Received lasix 40 mg five dose since 7/24, now off  Will follow the patient with you.       Continue to monitor electrolytes and volume closely, avoid IV contrast and nephrotoxic medications      Addendum:  Looks like Kerendia is nonformulary so that will need to be restarted as an outpatient.     I appreciate the consult request.  Please send me a secure chat message with any nonurgent questions regarding patient care.  For  any urgent patient care issues please call my office number below.    Марина Flanagan MD  [facility]  Office phone number: [phone]  Answering service phone number: [phone]    07/26/25  12:28 EDT    Dictation performed using Dragon dictation software

## 2025-07-27 LAB
ANION GAP SERPL CALCULATED.3IONS-SCNC: 10 MMOL/L (ref 5–15)
BUN SERPL-MCNC: 20 MG/DL (ref 8–23)
BUN/CREAT SERPL: 16.7 (ref 7–25)
CALCIUM SPEC-SCNC: 8.9 MG/DL (ref 8.6–10.5)
CHLORIDE SERPL-SCNC: 104 MMOL/L (ref 98–107)
CO2 SERPL-SCNC: 25 MMOL/L (ref 22–29)
CREAT SERPL-MCNC: 1.2 MG/DL (ref 0.57–1)
DEPRECATED RDW RBC AUTO: 44.1 FL (ref 37–54)
EGFRCR SERPLBLD CKD-EPI 2021: 51 ML/MIN/1.73
ERYTHROCYTE [DISTWIDTH] IN BLOOD BY AUTOMATED COUNT: 13.2 % (ref 12.3–15.4)
GLUCOSE BLDC GLUCOMTR-MCNC: 108 MG/DL (ref 70–130)
GLUCOSE BLDC GLUCOMTR-MCNC: 142 MG/DL (ref 70–130)
GLUCOSE BLDC GLUCOMTR-MCNC: 197 MG/DL (ref 70–130)
GLUCOSE BLDC GLUCOMTR-MCNC: 59 MG/DL (ref 70–130)
GLUCOSE BLDC GLUCOMTR-MCNC: 91 MG/DL (ref 70–130)
GLUCOSE SERPL-MCNC: 139 MG/DL (ref 65–99)
HCT VFR BLD AUTO: 35.3 % (ref 34–46.6)
HGB BLD-MCNC: 11.6 G/DL (ref 12–15.9)
MAGNESIUM SERPL-MCNC: 1.8 MG/DL (ref 1.6–2.4)
MCH RBC QN AUTO: 30 PG (ref 26.6–33)
MCHC RBC AUTO-ENTMCNC: 32.9 G/DL (ref 31.5–35.7)
MCV RBC AUTO: 91.2 FL (ref 79–97)
PHOSPHATE SERPL-MCNC: 3 MG/DL (ref 2.5–4.5)
PLATELET # BLD AUTO: 277 10*3/MM3 (ref 140–450)
PMV BLD AUTO: 9.3 FL (ref 6–12)
POTASSIUM SERPL-SCNC: 3.8 MMOL/L (ref 3.5–5.2)
RBC # BLD AUTO: 3.87 10*6/MM3 (ref 3.77–5.28)
SODIUM SERPL-SCNC: 139 MMOL/L (ref 136–145)
WBC NRBC COR # BLD AUTO: 6.12 10*3/MM3 (ref 3.4–10.8)

## 2025-07-27 PROCEDURE — 94761 N-INVAS EAR/PLS OXIMETRY MLT: CPT

## 2025-07-27 PROCEDURE — 83735 ASSAY OF MAGNESIUM: CPT | Performed by: HOSPITALIST

## 2025-07-27 PROCEDURE — 25010000002 CEFAZOLIN PER 500 MG: Performed by: HOSPITALIST

## 2025-07-27 PROCEDURE — 63710000001 INSULIN LISPRO (HUMAN) PER 5 UNITS: Performed by: HOSPITALIST

## 2025-07-27 PROCEDURE — 80048 BASIC METABOLIC PNL TOTAL CA: CPT | Performed by: HOSPITALIST

## 2025-07-27 PROCEDURE — 94799 UNLISTED PULMONARY SVC/PX: CPT

## 2025-07-27 PROCEDURE — 25010000002 CEFAZOLIN PER 500 MG: Performed by: INTERNAL MEDICINE

## 2025-07-27 PROCEDURE — 84100 ASSAY OF PHOSPHORUS: CPT | Performed by: HOSPITALIST

## 2025-07-27 PROCEDURE — 82948 REAGENT STRIP/BLOOD GLUCOSE: CPT

## 2025-07-27 PROCEDURE — 94760 N-INVAS EAR/PLS OXIMETRY 1: CPT

## 2025-07-27 PROCEDURE — 25010000002 ENOXAPARIN PER 10 MG: Performed by: HOSPITALIST

## 2025-07-27 PROCEDURE — 94664 DEMO&/EVAL PT USE INHALER: CPT

## 2025-07-27 PROCEDURE — 99232 SBSQ HOSP IP/OBS MODERATE 35: CPT | Performed by: PHYSICIAN ASSISTANT

## 2025-07-27 PROCEDURE — 85027 COMPLETE CBC AUTOMATED: CPT | Performed by: HOSPITALIST

## 2025-07-27 PROCEDURE — 63710000001 INSULIN GLARGINE PER 5 UNITS: Performed by: INTERNAL MEDICINE

## 2025-07-27 RX ORDER — LIDOCAINE 4 G/G
1 PATCH TOPICAL
Status: DISCONTINUED | OUTPATIENT
Start: 2025-07-27 | End: 2025-07-30 | Stop reason: HOSPADM

## 2025-07-27 RX ORDER — DIPHENHYDRAMINE HCL 25 MG
25 CAPSULE ORAL 4 TIMES DAILY PRN
Status: DISCONTINUED | OUTPATIENT
Start: 2025-07-27 | End: 2025-07-30 | Stop reason: HOSPADM

## 2025-07-27 RX ADMIN — BUDESONIDE 0.5 MG: 0.5 INHALANT RESPIRATORY (INHALATION) at 19:31

## 2025-07-27 RX ADMIN — ARFORMOTEROL TARTRATE 15 MCG: 15 SOLUTION RESPIRATORY (INHALATION) at 19:31

## 2025-07-27 RX ADMIN — HYDRALAZINE HYDROCHLORIDE 50 MG: 25 TABLET ORAL at 06:40

## 2025-07-27 RX ADMIN — INSULIN GLARGINE 15 UNITS: 100 INJECTION, SOLUTION SUBCUTANEOUS at 12:50

## 2025-07-27 RX ADMIN — Medication 10 ML: at 09:43

## 2025-07-27 RX ADMIN — IPRATROPIUM BROMIDE AND ALBUTEROL SULFATE 3 ML: .5; 3 SOLUTION RESPIRATORY (INHALATION) at 19:26

## 2025-07-27 RX ADMIN — VENLAFAXINE HYDROCHLORIDE 150 MG: 37.5 CAPSULE, EXTENDED RELEASE ORAL at 09:38

## 2025-07-27 RX ADMIN — IPRATROPIUM BROMIDE AND ALBUTEROL SULFATE 3 ML: .5; 3 SOLUTION RESPIRATORY (INHALATION) at 14:43

## 2025-07-27 RX ADMIN — LISINOPRIL 20 MG: 20 TABLET ORAL at 21:51

## 2025-07-27 RX ADMIN — ROSUVASTATIN CALCIUM 20 MG: 20 TABLET, FILM COATED ORAL at 09:39

## 2025-07-27 RX ADMIN — IPRATROPIUM BROMIDE AND ALBUTEROL SULFATE 3 ML: .5; 3 SOLUTION RESPIRATORY (INHALATION) at 08:13

## 2025-07-27 RX ADMIN — ENOXAPARIN SODIUM 40 MG: 100 INJECTION SUBCUTANEOUS at 09:34

## 2025-07-27 RX ADMIN — LISINOPRIL 20 MG: 20 TABLET ORAL at 09:34

## 2025-07-27 RX ADMIN — Medication 5 MG: at 22:35

## 2025-07-27 RX ADMIN — ANTI-FUNGAL POWDER MICONAZOLE NITRATE TALC FREE 1 APPLICATION: 1.42 POWDER TOPICAL at 14:08

## 2025-07-27 RX ADMIN — ACETAMINOPHEN 650 MG: 325 TABLET, FILM COATED ORAL at 19:03

## 2025-07-27 RX ADMIN — NICOTINE 1 PATCH: 21 PATCH, EXTENDED RELEASE TRANSDERMAL at 09:33

## 2025-07-27 RX ADMIN — ANTI-FUNGAL POWDER MICONAZOLE NITRATE TALC FREE 1 APPLICATION: 1.42 POWDER TOPICAL at 21:53

## 2025-07-27 RX ADMIN — HYDRALAZINE HYDROCHLORIDE 50 MG: 25 TABLET ORAL at 14:06

## 2025-07-27 RX ADMIN — BUDESONIDE 0.5 MG: 0.5 INHALANT RESPIRATORY (INHALATION) at 08:13

## 2025-07-27 RX ADMIN — SODIUM BICARBONATE 650 MG: 650 TABLET ORAL at 09:34

## 2025-07-27 RX ADMIN — HYDRALAZINE HYDROCHLORIDE 50 MG: 25 TABLET ORAL at 21:51

## 2025-07-27 RX ADMIN — LIDOCAINE 1 PATCH: 4 PATCH TOPICAL at 02:40

## 2025-07-27 RX ADMIN — NIFEDIPINE 30 MG: 30 TABLET, EXTENDED RELEASE ORAL at 14:06

## 2025-07-27 RX ADMIN — SODIUM BICARBONATE 650 MG: 650 TABLET ORAL at 19:03

## 2025-07-27 RX ADMIN — ENOXAPARIN SODIUM 40 MG: 100 INJECTION SUBCUTANEOUS at 21:51

## 2025-07-27 RX ADMIN — Medication 10 ML: at 21:52

## 2025-07-27 RX ADMIN — CEFAZOLIN 2000 MG: 2 INJECTION, POWDER, FOR SOLUTION INTRAMUSCULAR; INTRAVENOUS at 01:38

## 2025-07-27 RX ADMIN — INSULIN LISPRO 2 UNITS: 100 INJECTION, SOLUTION INTRAVENOUS; SUBCUTANEOUS at 12:50

## 2025-07-27 RX ADMIN — BUSPIRONE HYDROCHLORIDE 7.5 MG: 15 TABLET ORAL at 09:38

## 2025-07-27 RX ADMIN — ARFORMOTEROL TARTRATE 15 MCG: 15 SOLUTION RESPIRATORY (INHALATION) at 08:13

## 2025-07-27 RX ADMIN — CEFAZOLIN 2000 MG: 2 INJECTION, POWDER, FOR SOLUTION INTRAMUSCULAR; INTRAVENOUS at 19:03

## 2025-07-27 RX ADMIN — TERAZOSIN 2 MG: 2 CAPSULE ORAL at 21:50

## 2025-07-27 RX ADMIN — INSULIN LISPRO 6 UNITS: 100 INJECTION, SOLUTION INTRAVENOUS; SUBCUTANEOUS at 12:50

## 2025-07-27 RX ADMIN — ACETAMINOPHEN 650 MG: 325 TABLET, FILM COATED ORAL at 09:34

## 2025-07-27 RX ADMIN — ASPIRIN 81 MG CHEWABLE TABLET 81 MG: 81 TABLET CHEWABLE at 09:34

## 2025-07-27 RX ADMIN — ATENOLOL 100 MG: 50 TABLET ORAL at 09:34

## 2025-07-27 RX ADMIN — CEFAZOLIN 2000 MG: 2 INJECTION, POWDER, FOR SOLUTION INTRAMUSCULAR; INTRAVENOUS at 09:43

## 2025-07-27 NOTE — PROGRESS NOTES
Name: Bernice Rai ADMIT: 2025   : 1960  PCP: Ashley Holt APRN    MRN: [id number] LOS: 3 days   AGE/SEX: 64 y.o. female  ROOM: [facility]     Subjective   Subjective   Patient feels better with decreased shortness of breath/cough/wheezing.  Improved bilateral lower extremity edema   No chest pain.  No palpitation.  Redness of the abdominal wall continues to improve.    Review of Systems  GI.  No abdominal pain.  No nausea or vomiting.  .  No dysuria or hematuria.     Objective   Objective   Vital Signs  Temp:  [97.1 °F (36.2 °C)-98.7 °F (37.1 °C)] 98.7 °F (37.1 °C)  Heart Rate:  [65-77] 68  Resp:  [18-22] 20  BP: (136-168)/(55-71) 159/71  SpO2:  [87 %-98 %] 94 %  on  Flow (L/min) (Oxygen Therapy):  [2] 2;   Device (Oxygen Therapy): room air    Intake/Output Summary (Last 24 hours) at 2025 1429  Last data filed at 2025 1330  Gross per 24 hour   Intake 840 ml   Output 1800 ml   Net -960 ml     Body mass index is 48.67 kg/m².      25  0617 25  0600 25  0645   Weight: 123 kg (270 lb 12.8 oz) 123 kg (270 lb 11.6 oz) 121 kg (266 lb 1.6 oz)     Physical Exam  General.  Middle-aged female.  Obese.  Alert and oriented x 4.  In no apparent pain/distress/diaphoresis.  Normal mood and affect  Eyes.  Pupils equal round and reactive.  Intact extraocular musculature.  No pallor or jaundice  Oral cavity.  Moist mucous membrane  Neck.  Supple.  No JVD.  No lymphadenopathy or thyromegaly.  Cardiovascular.  Regular rate and rhythm with no gallops or murmurs.  Chest.  Poor bilateral air entry with great improvement in the bilateral expiratory wheezes.  No rhonchi or rails.    Abdomen.  Positive abdominal wall edema with mild redness on the left side of the abdominal wall (improved).  No localized tenderness.  No guarding or rebound.  No organomegaly.  Remedies.  Trace left lower extremity edema.  No clubbing or cyanosis   CNS.  No acute focal neurological deficits.      Results Review:  "     Results from last 7 days   Lab Units 07/27/25  0804 07/26/25  0507 07/25/25  0538 07/24/25  0522 07/23/25 2029   SODIUM mmol/L 139 143 139 142 142   POTASSIUM mmol/L 3.8 3.7 4.1 4.4 4.1   CHLORIDE mmol/L 104 107 105 111* 111*   CO2 mmol/L 25.0 24.6 20.7* 20.2* 23.0   BUN mg/dL 20.0 26.0* 24.0* 17.0 15.6   CREATININE mg/dL 1.20* 1.71* 1.50* 1.18* 1.13*   GLUCOSE mg/dL 139* 103* 168* 227* 104*   CALCIUM mg/dL 8.9 9.1 9.7 9.4 9.7   AST (SGOT) U/L  --   --   --   --  8   ALT (SGPT) U/L  --   --   --   --  7     Estimated Creatinine Clearance: 59.5 mL/min (A) (by C-G formula based on SCr of 1.2 mg/dL (H)).  Results from last 7 days   Lab Units 07/24/25  0523   HEMOGLOBIN A1C % 6.40*     Results from last 7 days   Lab Units 07/27/25  1111 07/27/25  0803 07/26/25  2045 07/26/25  1546 07/26/25  1514 07/26/25  1132 07/26/25  0815 07/25/25  2153   GLUCOSE mg/dL 197* 142* 121 96 54* 80 104 93     Results from last 7 days   Lab Units 07/24/25  0522 07/23/25  2135 07/23/25 2029   HSTROP T ng/L 13 16* 17*     Results from last 7 days   Lab Units 07/23/25 2029   PROBNP pg/mL 883.7     Results from last 7 days   Lab Units 07/25/25  0538   TSH uIU/mL 0.865     Results from last 7 days   Lab Units 07/27/25  0804 07/26/25  0507 07/25/25  0538   MAGNESIUM mg/dL 1.8 1.7 1.9   PHOSPHORUS mg/dL 3.0 3.3 4.5           Invalid input(s): \"LDLCALC\"  Results from last 7 days   Lab Units 07/27/25  0804 07/26/25  0507 07/25/25  0538 07/24/25  0523 07/23/25 2029   WBC 10*3/mm3 6.12 7.49 9.86 9.42 8.22   HEMOGLOBIN g/dL 11.6* 10.9* 11.6* 11.6* 11.2*   HEMATOCRIT % 35.3 33.7* 35.1 36.4 36.3   PLATELETS 10*3/mm3 277 264 307 299 289   MCV fL 91.2 92.1 91.6 93.1 94.5   MCH pg 30.0 29.8 30.3 29.7 29.2   MCHC g/dL 32.9 32.3 33.0 31.9 30.9*   RDW % 13.2 12.9 12.8 12.7 13.2   RDW-SD fl 44.1 43.4 42.9 42.5 46.7   MPV fL 9.3 9.2 9.4 9.5 8.8   NEUTROPHIL % %  --   --   --   --  61.0   LYMPHOCYTE % %  --   --   --   --  24.2   MONOCYTES % %  --   --   " --   --  11.7   EOSINOPHIL % %  --   --   --   --  2.4   BASOPHIL % %  --   --   --   --  0.6   IMM GRAN % %  --   --   --   --  0.1   NEUTROS ABS 10*3/mm3  --   --   --   --  5.01   LYMPHS ABS 10*3/mm3  --   --   --   --  1.99   MONOS ABS 10*3/mm3  --   --   --   --  0.96*   EOS ABS 10*3/mm3  --   --   --   --  0.20   BASOS ABS 10*3/mm3  --   --   --   --  0.05   IMMATURE GRANS (ABS) 10*3/mm3  --   --   --   --  0.01                                 Results from last 7 days   Lab Units 07/24/25  1056   NITRITE UA  Negative   WBC UA /HPF 0-2   BACTERIA UA /HPF None Seen   SQUAM EPITHEL UA /HPF 0-2     Results from last 7 days   Lab Units 07/24/25  1056   CREATININE UR mg/dL 30.5   PROTEIN TOTAL URINE mg/dL 174.6       Imaging:  Imaging Results (Last 24 Hours)       ** No results found for the last 24 hours. 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           Volume overload with acute on chronic diastolic congestive heart failure exacerbation in a patient with a history of hypertension mildly elevated troponin.  Echo with a normal ejection fraction and and left ventricular hypertrophy with grade 1 diastolic dysfunction and left atrial enlargement but no pericardial effusion.  Still volume overloaded but greatly  improved.  No evidence of angina.  Status post IV Lasix that has been DC'd by nephrology because of worsening renal function.  Good blood pressure control.  Continue aspirin/atenolol/hydralazine/lisinopril/Hytrin/Procardia.  Pulmonary nodule in a patient with a history of COPD and tobacco abuse.  Counseled against smoking.  Improved wheezing.  Will continue Pulmicort/DuoNeb nebulizer.  Follow-up CT as an outpatient  Hyperlipidemia.  Continue Crestor.  Mood disorder.  Continue Effexor/BuSpar.  Appears stable.   glomerulonephritis and a history of nephrotic proteinuria/stage IIIa chronic renal failure.  Patient continues to be volume overloaded but has improved.  Electrolytes are stable.  Baseline creatinine is between 1.05 and 1.5.  Improved creatinine today back to baseline.  Continue lisinopril.  Continue with sodium bicarbonate.  Nephrology is following and stopped IV diuresis... Will need p.o. diuretics but will await for nephrology feedback.  Left abdominal wall cellulitis.  Improving on cefazolin.  Switch to p.o. Keflex 7 Discharge to complete a total of 7 days course.  Type 2 diabetes.  A1c is 6.4..  Positive episodes of hypoglycemia.  Will hold Lantus (on low-dose).  Continue sliding scale.   follow hypoglycemia protocol.    History of CVA.  Negative CNS examination.  Continue aspirin and Crestor.          Discussed my findings and plan of treatment with the patient/family  Disposition.  To be determined based on clinical course.  Anticipate discharge in 1 to 2 days after release from nephrology and cardiology    Sia Allen MD  [facility]  07/27/25  14:29 EDT

## 2025-07-27 NOTE — PROGRESS NOTES
"                                                                                                            [facility]/ [facility]                                                                     Nephrology Progress Note                                                                                LOS: 3 days     Chief Complaint/ Reason for encounter: Chronic kidney disease    Subjective   07/27/25 :       Medical history reviewed:  History of Present Illness    Subjective    History taken from: Patient and chart    7/26  Denies for any new complaint today.     7/29  No new complaint today. Volume status euvolemic.    Vital Signs  Temp:  [97.1 °F (36.2 °C)-98.6 °F (37 °C)] 98.6 °F (37 °C)  Heart Rate:  [58-77] 77  Resp:  [18-22] 22  BP: (136-168)/(55-69) 143/67       Wt Readings from Last 1 Encounters:   07/27/25 0645 121 kg (266 lb 1.6 oz)   07/26/25 0600 123 kg (270 lb 11.6 oz)   07/25/25 0617 123 kg (270 lb 12.8 oz)   07/24/25 1653 123 kg (271 lb)   07/24/25 0344 123 kg (271 lb 6.2 oz)   07/23/25 1955 125 kg (276 lb)       Objective:  Vital signs: (most recent): Blood pressure 159/71, pulse 74, temperature 98.7 °F (37.1 °C), temperature source Oral, resp. rate 20, height 157.5 cm (62\"), weight 121 kg (266 lb 1.6 oz), SpO2 95%, not currently breastfeeding.                Objective:  General Appearance:  Comfortable, -appearing, in no acute distress and not in pain.  Awake, alert  HEENT: Mucous membranes moist, no injury, oropharynx clear  Lungs:  Normal effort and normal respiratory rate.  Breath sounds clear to auscultation.  No  respiratory distress.  No rales, decreased breath sounds or rhonchi.    Heart: Normal rate.  Regular rhythm.  S1, S2 normal.  No murmur.   Abdomen: Abdomen is soft.  Bowel sounds are normal, no abdominal tenderness.  There is no rebound or guarding  Extremities:  postive edema of bilateral lower extremities  Skin:  Warm and dry with no rashes      Results Review:  "   Intake/Output:     Intake/Output Summary (Last 24 hours) at 7/27/2025 1131  Last data filed at 7/27/2025 0410  Gross per 24 hour   Intake 720 ml   Output 1750 ml   Net -1030 ml         DATA:  Radiology and Labs:  The following labs independently reviewed by me. Additional labs ordered for tomorrow a.m.  Interval notes, chart personally reviewed by me.   Old records independently reviewed showing   The following radiologic studies independently viewed by me, findings   New problems include  Discussed with     Risk/ complexity of medical care/ medical decision making     Labs:   Recent Results (from the past 24 hours)   POC Glucose Once    Collection Time: 07/26/25 11:32 AM    Specimen: Blood   Result Value Ref Range    Glucose 80 70 - 130 mg/dL   POC Glucose Once    Collection Time: 07/26/25  3:14 PM    Specimen: Blood   Result Value Ref Range    Glucose 54 (L) 70 - 130 mg/dL   POC Glucose Once    Collection Time: 07/26/25  3:46 PM    Specimen: Blood   Result Value Ref Range    Glucose 96 70 - 130 mg/dL   Duplex Venous Lower Extremity - Left CAR    Collection Time: 07/26/25  5:40 PM   Result Value Ref Range    Right Common Femoral Spont Y     Right Common Femoral Competent Y     Right Common Femoral Phasic Y     Right Common Femoral Compress C     Right Common Femoral Augment Y     Left Common Femoral Spont Y     Left Common Femoral Competent Y     Left Common Femoral Phasic Y     Left Common Femoral Compress C     Left Common Femoral Augment Y     Left Saphenofemoral Junction Compress C     Left Profunda Femoral Compress C     Left Proximal Femoral Compress C     Left Mid Femoral Spont Y     Left Mid Femoral Competent Y     Left Mid Femoral Phasic Y     Left Mid Femoral Compress C     Left Mid Femoral Augment Y     Left Distal Femoral Compress C     Left Popliteal Spont Y     Left Popliteal Competent Y     Left Popliteal Phasic Y     Left Popliteal Compress C     Left Popliteal Augment Y     Left Posterior Tibial  Compress C     Left Peroneal Compress C     Left Gastronemius Compress C     Left Greater Saph AK Compress C     Left Greater Saph BK Compress C     Left Lesser Saph Compress C    POC Glucose Once    Collection Time: 07/26/25  8:45 PM    Specimen: Blood   Result Value Ref Range    Glucose 121 70 - 130 mg/dL   POC Glucose Once    Collection Time: 07/27/25  8:03 AM    Specimen: Blood   Result Value Ref Range    Glucose 142 (H) 70 - 130 mg/dL   Basic Metabolic Panel    Collection Time: 07/27/25  8:04 AM    Specimen: Blood   Result Value Ref Range    Glucose 139 (H) 65 - 99 mg/dL    BUN 20.0 8.0 - 23.0 mg/dL    Creatinine 1.20 (H) 0.57 - 1.00 mg/dL    Sodium 139 136 - 145 mmol/L    Potassium 3.8 3.5 - 5.2 mmol/L    Chloride 104 98 - 107 mmol/L    CO2 25.0 22.0 - 29.0 mmol/L    Calcium 8.9 8.6 - 10.5 mg/dL    BUN/Creatinine Ratio 16.7 7.0 - 25.0    Anion Gap 10.0 5.0 - 15.0 mmol/L    eGFR 51.0 (L) >60.0 mL/min/1.73   CBC (No Diff)    Collection Time: 07/27/25  8:04 AM    Specimen: Blood   Result Value Ref Range    WBC 6.12 3.40 - 10.80 10*3/mm3    RBC 3.87 3.77 - 5.28 10*6/mm3    Hemoglobin 11.6 (L) 12.0 - 15.9 g/dL    Hematocrit 35.3 34.0 - 46.6 %    MCV 91.2 79.0 - 97.0 fL    MCH 30.0 26.6 - 33.0 pg    MCHC 32.9 31.5 - 35.7 g/dL    RDW 13.2 12.3 - 15.4 %    RDW-SD 44.1 37.0 - 54.0 fl    MPV 9.3 6.0 - 12.0 fL    Platelets 277 140 - 450 10*3/mm3   Magnesium    Collection Time: 07/27/25  8:04 AM    Specimen: Blood   Result Value Ref Range    Magnesium 1.8 1.6 - 2.4 mg/dL   Phosphorus    Collection Time: 07/27/25  8:04 AM    Specimen: Blood   Result Value Ref Range    Phosphorus 3.0 2.5 - 4.5 mg/dL   POC Glucose Once    Collection Time: 07/27/25 11:11 AM    Specimen: Blood   Result Value Ref Range    Glucose 197 (H) 70 - 130 mg/dL       Radiology:  Pertinent radiology studies were reviewed as described above      Medications have been reviewed separately in chart overview      ASSESSMENT / PLAN  ASSESSMENT:   Fibrillary  glomerulonephritis, seen on prior renal biopsy.  Recent improvement with combination of ACE inhibitor and Kerendia  Edema/volume overload    Cellulitis    Diabetes mellitus    Hyperlipidemia    Essential hypertension    Obesity due to excess calories    Tobacco dependence syndrome    Fibrillary glomerulonephritis    Pulmonary nodule    Stage 3a chronic kidney disease    Fluid overload    COPD (chronic obstructive pulmonary disease)    BMI 45.0-49.9, adult           DISCUSSION/PLAN:     Patient off Kerendia, now hyperkalemia improved, patient will be rechallenged outpatient. K today 3.8  Electrolytes stable  Creatinine now improved to 1.2, off diuretic therapy, exposure to contrast 7/23  Volume status euvolemic  Avoid NSAID  On lisinopril   Dose medication for decrease GFR.  2 d echo revealed Diastolic Dysfunction.  Received lasix 40 mg five dose since 7/24, now off  Will follow the patient with you.       Continue to monitor electrolytes and volume closely, avoid IV contrast and nephrotoxic medications      Addendum:  Looks like Kerendia is nonformulary so that will need to be restarted as an outpatient.     I appreciate the consult request.  Please send me a secure chat message with any nonurgent questions regarding patient care.  For any urgent patient care issues please call my office number below.    Марина Flanagan MD  [facility]  Office phone number: [phone]  Answering service phone number: [phone]    07/27/25  11:31 EDT    Dictation performed using Dragon dictation software

## 2025-07-27 NOTE — PLAN OF CARE
Goal Outcome Evaluation:  Plan of Care Reviewed With: patient        Progress: improving  Outcome Evaluation: pt is axox4. room air but had to be placed on 2L nc late in the AM for desatting while sleeping. iv abx given. pt c/o L flank/hip pain, which is a chronic issue per pt, unrelieved by tylenol. lidocaine patch ordered by AMANDA Posada.

## 2025-07-27 NOTE — NURSING NOTE
Pt A&Ox4, VSS. Pt blood sugar dropped today and MD notified of bs levels. Long acting reduced to 15 units and scheduled short acting currently being  held now. Tylenol given x2 doses. No current needs. Plan of care ongoing.

## 2025-07-28 LAB
ANION GAP SERPL CALCULATED.3IONS-SCNC: 9.5 MMOL/L (ref 5–15)
BUN SERPL-MCNC: 19 MG/DL (ref 8–23)
BUN/CREAT SERPL: 13.3 (ref 7–25)
CALCIUM SPEC-SCNC: 9.2 MG/DL (ref 8.6–10.5)
CHLORIDE SERPL-SCNC: 107 MMOL/L (ref 98–107)
CO2 SERPL-SCNC: 25.5 MMOL/L (ref 22–29)
CREAT SERPL-MCNC: 1.43 MG/DL (ref 0.57–1)
DEPRECATED RDW RBC AUTO: 43.7 FL (ref 37–54)
EGFRCR SERPLBLD CKD-EPI 2021: 41.3 ML/MIN/1.73
ERYTHROCYTE [DISTWIDTH] IN BLOOD BY AUTOMATED COUNT: 12.8 % (ref 12.3–15.4)
GLUCOSE BLDC GLUCOMTR-MCNC: 100 MG/DL (ref 70–130)
GLUCOSE BLDC GLUCOMTR-MCNC: 116 MG/DL (ref 70–130)
GLUCOSE BLDC GLUCOMTR-MCNC: 148 MG/DL (ref 70–130)
GLUCOSE BLDC GLUCOMTR-MCNC: 159 MG/DL (ref 70–130)
GLUCOSE SERPL-MCNC: 143 MG/DL (ref 65–99)
HCT VFR BLD AUTO: 36.2 % (ref 34–46.6)
HGB BLD-MCNC: 11.4 G/DL (ref 12–15.9)
MAGNESIUM SERPL-MCNC: 1.7 MG/DL (ref 1.6–2.4)
MCH RBC QN AUTO: 29.7 PG (ref 26.6–33)
MCHC RBC AUTO-ENTMCNC: 31.5 G/DL (ref 31.5–35.7)
MCV RBC AUTO: 94.3 FL (ref 79–97)
PHOSPHATE SERPL-MCNC: 2.7 MG/DL (ref 2.5–4.5)
PLATELET # BLD AUTO: 242 10*3/MM3 (ref 140–450)
PMV BLD AUTO: 9.5 FL (ref 6–12)
POTASSIUM SERPL-SCNC: 3.8 MMOL/L (ref 3.5–5.2)
RBC # BLD AUTO: 3.84 10*6/MM3 (ref 3.77–5.28)
SODIUM SERPL-SCNC: 142 MMOL/L (ref 136–145)
WBC NRBC COR # BLD AUTO: 6.23 10*3/MM3 (ref 3.4–10.8)

## 2025-07-28 PROCEDURE — 84100 ASSAY OF PHOSPHORUS: CPT | Performed by: HOSPITALIST

## 2025-07-28 PROCEDURE — 63710000001 INSULIN GLARGINE PER 5 UNITS: Performed by: INTERNAL MEDICINE

## 2025-07-28 PROCEDURE — 94799 UNLISTED PULMONARY SVC/PX: CPT

## 2025-07-28 PROCEDURE — 99232 SBSQ HOSP IP/OBS MODERATE 35: CPT | Performed by: INTERNAL MEDICINE

## 2025-07-28 PROCEDURE — 82948 REAGENT STRIP/BLOOD GLUCOSE: CPT

## 2025-07-28 PROCEDURE — 94664 DEMO&/EVAL PT USE INHALER: CPT

## 2025-07-28 PROCEDURE — 63710000001 INSULIN LISPRO (HUMAN) PER 5 UNITS: Performed by: HOSPITALIST

## 2025-07-28 PROCEDURE — 25010000002 ENOXAPARIN PER 10 MG: Performed by: HOSPITALIST

## 2025-07-28 PROCEDURE — 94760 N-INVAS EAR/PLS OXIMETRY 1: CPT

## 2025-07-28 PROCEDURE — 80048 BASIC METABOLIC PNL TOTAL CA: CPT | Performed by: HOSPITALIST

## 2025-07-28 PROCEDURE — 94761 N-INVAS EAR/PLS OXIMETRY MLT: CPT

## 2025-07-28 PROCEDURE — 25010000002 CEFAZOLIN PER 500 MG: Performed by: INTERNAL MEDICINE

## 2025-07-28 PROCEDURE — 85027 COMPLETE CBC AUTOMATED: CPT | Performed by: HOSPITALIST

## 2025-07-28 PROCEDURE — 83735 ASSAY OF MAGNESIUM: CPT | Performed by: HOSPITALIST

## 2025-07-28 RX ORDER — NIFEDIPINE 60 MG/1
60 TABLET, EXTENDED RELEASE ORAL 2 TIMES DAILY
Status: DISCONTINUED | OUTPATIENT
Start: 2025-07-28 | End: 2025-07-30 | Stop reason: HOSPADM

## 2025-07-28 RX ORDER — HYDROCODONE BITARTRATE AND ACETAMINOPHEN 5; 325 MG/1; MG/1
1 TABLET ORAL EVERY 4 HOURS PRN
Refills: 0 | Status: DISCONTINUED | OUTPATIENT
Start: 2025-07-28 | End: 2025-07-30 | Stop reason: HOSPADM

## 2025-07-28 RX ADMIN — HYDRALAZINE HYDROCHLORIDE 50 MG: 25 TABLET ORAL at 15:42

## 2025-07-28 RX ADMIN — SODIUM BICARBONATE 650 MG: 650 TABLET ORAL at 15:42

## 2025-07-28 RX ADMIN — HYDRALAZINE HYDROCHLORIDE 50 MG: 25 TABLET ORAL at 06:28

## 2025-07-28 RX ADMIN — BUDESONIDE 0.5 MG: 0.5 INHALANT RESPIRATORY (INHALATION) at 19:58

## 2025-07-28 RX ADMIN — NIFEDIPINE 30 MG: 30 TABLET, EXTENDED RELEASE ORAL at 12:42

## 2025-07-28 RX ADMIN — BUDESONIDE 0.5 MG: 0.5 INHALANT RESPIRATORY (INHALATION) at 07:05

## 2025-07-28 RX ADMIN — IPRATROPIUM BROMIDE AND ALBUTEROL SULFATE 3 ML: .5; 3 SOLUTION RESPIRATORY (INHALATION) at 19:58

## 2025-07-28 RX ADMIN — VENLAFAXINE HYDROCHLORIDE 150 MG: 37.5 CAPSULE, EXTENDED RELEASE ORAL at 10:42

## 2025-07-28 RX ADMIN — ROSUVASTATIN CALCIUM 20 MG: 20 TABLET, FILM COATED ORAL at 10:42

## 2025-07-28 RX ADMIN — SODIUM BICARBONATE 650 MG: 650 TABLET ORAL at 10:42

## 2025-07-28 RX ADMIN — LIDOCAINE 1 PATCH: 4 PATCH TOPICAL at 10:45

## 2025-07-28 RX ADMIN — INSULIN LISPRO 2 UNITS: 100 INJECTION, SOLUTION INTRAVENOUS; SUBCUTANEOUS at 12:42

## 2025-07-28 RX ADMIN — ASPIRIN 81 MG CHEWABLE TABLET 81 MG: 81 TABLET CHEWABLE at 10:43

## 2025-07-28 RX ADMIN — ARFORMOTEROL TARTRATE 15 MCG: 15 SOLUTION RESPIRATORY (INHALATION) at 19:58

## 2025-07-28 RX ADMIN — BUSPIRONE HYDROCHLORIDE 7.5 MG: 15 TABLET ORAL at 10:41

## 2025-07-28 RX ADMIN — CEFAZOLIN 2000 MG: 2 INJECTION, POWDER, FOR SOLUTION INTRAMUSCULAR; INTRAVENOUS at 00:49

## 2025-07-28 RX ADMIN — CEFAZOLIN 2000 MG: 2 INJECTION, POWDER, FOR SOLUTION INTRAMUSCULAR; INTRAVENOUS at 23:48

## 2025-07-28 RX ADMIN — NICOTINE 1 PATCH: 21 PATCH, EXTENDED RELEASE TRANSDERMAL at 10:44

## 2025-07-28 RX ADMIN — Medication 10 ML: at 10:50

## 2025-07-28 RX ADMIN — LISINOPRIL 20 MG: 20 TABLET ORAL at 10:41

## 2025-07-28 RX ADMIN — CEFAZOLIN 2000 MG: 2 INJECTION, POWDER, FOR SOLUTION INTRAMUSCULAR; INTRAVENOUS at 18:17

## 2025-07-28 RX ADMIN — HYDROCODONE BITARTRATE AND ACETAMINOPHEN 1 TABLET: 5; 325 TABLET ORAL at 15:38

## 2025-07-28 RX ADMIN — ENOXAPARIN SODIUM 40 MG: 100 INJECTION SUBCUTANEOUS at 10:45

## 2025-07-28 RX ADMIN — IPRATROPIUM BROMIDE AND ALBUTEROL SULFATE 3 ML: .5; 3 SOLUTION RESPIRATORY (INHALATION) at 07:01

## 2025-07-28 RX ADMIN — Medication 10 ML: at 21:31

## 2025-07-28 RX ADMIN — ATENOLOL 100 MG: 50 TABLET ORAL at 10:43

## 2025-07-28 RX ADMIN — ANTI-FUNGAL POWDER MICONAZOLE NITRATE TALC FREE 1 APPLICATION: 1.42 POWDER TOPICAL at 21:31

## 2025-07-28 RX ADMIN — NIFEDIPINE 60 MG: 60 TABLET, EXTENDED RELEASE ORAL at 21:31

## 2025-07-28 RX ADMIN — HYDROCODONE BITARTRATE AND ACETAMINOPHEN 1 TABLET: 5; 325 TABLET ORAL at 09:56

## 2025-07-28 RX ADMIN — ARFORMOTEROL TARTRATE 15 MCG: 15 SOLUTION RESPIRATORY (INHALATION) at 07:03

## 2025-07-28 RX ADMIN — HYDRALAZINE HYDROCHLORIDE 50 MG: 25 TABLET ORAL at 23:48

## 2025-07-28 RX ADMIN — IPRATROPIUM BROMIDE AND ALBUTEROL SULFATE 3 ML: .5; 3 SOLUTION RESPIRATORY (INHALATION) at 12:30

## 2025-07-28 RX ADMIN — ENOXAPARIN SODIUM 40 MG: 100 INJECTION SUBCUTANEOUS at 21:31

## 2025-07-28 RX ADMIN — ANTI-FUNGAL POWDER MICONAZOLE NITRATE TALC FREE 1 APPLICATION: 1.42 POWDER TOPICAL at 09:00

## 2025-07-28 RX ADMIN — INSULIN GLARGINE 15 UNITS: 100 INJECTION, SOLUTION SUBCUTANEOUS at 10:44

## 2025-07-28 RX ADMIN — SODIUM BICARBONATE 650 MG: 650 TABLET ORAL at 21:30

## 2025-07-28 RX ADMIN — CEFAZOLIN 2000 MG: 2 INJECTION, POWDER, FOR SOLUTION INTRAMUSCULAR; INTRAVENOUS at 10:45

## 2025-07-28 RX ADMIN — TERAZOSIN 2 MG: 2 CAPSULE ORAL at 21:30

## 2025-07-28 RX ADMIN — LISINOPRIL 20 MG: 20 TABLET ORAL at 21:30

## 2025-07-28 NOTE — PROGRESS NOTES
"DAILY PROGRESS NOTE  [facility]    Patient Identification:  Name: Bernice Rai  Age: 64 y.o.  Sex: female  :  1960  MRN: [id number]         Primary Care Physician: Ashley Holt APRN    Subjective:  Interval History: She is feeling better today.    Objective:    Scheduled Meds:budesonide, 0.5 mg, Nebulization, BID - RT   And  arformoterol, 15 mcg, Nebulization, BID - RT  aspirin, 81 mg, Oral, Daily  atenolol, 100 mg, Oral, Daily  busPIRone, 7.5 mg, Oral, Daily  ceFAZolin, 2,000 mg, Intravenous, Q8H  enoxaparin sodium, 40 mg, Subcutaneous, Q12H  hydrALAZINE, 50 mg, Oral, Q8H  insulin glargine, 15 Units, Subcutaneous, Daily  insulin lispro, 2-9 Units, Subcutaneous, 4x Daily AC & at Bedtime  [Held by provider] insulin lispro, 6 Units, Subcutaneous, TID With Meals  ipratropium-albuterol, 3 mL, Nebulization, Q6H While Awake - RT  Lidocaine, 1 patch, Transdermal, Q24H  lisinopril, 20 mg, Oral, BID  miconazole, 1 Application, Topical, Q12H  nicotine, 1 patch, Transdermal, Q24H  NIFEdipine XL, 30 mg, Oral, Q24H  rosuvastatin, 20 mg, Oral, Daily  sodium bicarbonate, 650 mg, Oral, TID  sodium chloride, 10 mL, Intravenous, Q12H  terazosin, 2 mg, Oral, Nightly  venlafaxine XR, 150 mg, Oral, Daily      Continuous Infusions:     Vital signs in last 24 hours:  Temp:  [97.4 °F (36.3 °C)-98.7 °F (37.1 °C)] 97.4 °F (36.3 °C)  Heart Rate:  [68-78] 78  Resp:  [16-20] 16  BP: (137-170)/(57-75) 168/75    Intake/Output:    Intake/Output Summary (Last 24 hours) at 2025 1150  Last data filed at 2025 1039  Gross per 24 hour   Intake 800 ml   Output 1470 ml   Net -670 ml       Exam:  /75 (BP Location: Left arm, Patient Position: Lying)   Pulse 78   Temp 97.4 °F (36.3 °C) (Oral)   Resp 16   Ht 157.5 cm (62\")   Wt 120 kg (265 lb 4.8 oz)   SpO2 94%   BMI 48.52 kg/m²     General Appearance:    Alert, cooperative, no distress   Head:    Normocephalic, without obvious abnormality, atraumatic "   Eyes:       Throat:   Lips, tongue, gums normal   Neck:   Supple, symmetrical, trachea midline, no JVD   Lungs:     Clear to auscultation bilaterally, respirations unlabored   Chest Wall:    No tenderness or deformity    Heart:    Regular rate and rhythm, S1 and S2 normal, no murmur,no  rub or gallop   Abdomen:     Soft, nontender, bowel sounds active, no masses, no organomegaly    Extremities:   Extremities normal, atraumatic, no cyanosis some leg and pedal edema   Pulses:      Skin:   Skin is warm and dry, cellulitis over abdominal wall improving   Neurologic:   no focal deficits noted      Lab Results (last 72 hours)       Procedure Component Value Units Date/Time    POC Glucose Once [[id number]]  (Abnormal) Collected: 07/28/25 1124    Specimen: Blood Updated: 07/28/25 1127     Glucose 159 mg/dL     Basic Metabolic Panel [[id number]]  (Abnormal) Collected: 07/28/25 0754    Specimen: Blood Updated: 07/28/25 0859     Glucose 143 mg/dL      BUN 19.0 mg/dL      Creatinine 1.43 mg/dL      Sodium 142 mmol/L      Potassium 3.8 mmol/L      Chloride 107 mmol/L      CO2 25.5 mmol/L      Calcium 9.2 mg/dL      BUN/Creatinine Ratio 13.3     Anion Gap 9.5 mmol/L      eGFR 41.3 mL/min/1.73     Narrative:      GFR Categories in Chronic Kidney Disease (CKD)              GFR Category          GFR (mL/min/1.73)    Interpretation  G1                    90 or greater        Normal or high (1)  G2                    60-89                Mild decrease (1)  G3a                   45-59                Mild to moderate decrease  G3b                   30-44                Moderate to severe decrease  G4                    15-29                Severe decrease  G5                    14 or less           Kidney failure    (1)In the absence of evidence of kidney disease, neither GFR category G1 or G2 fulfill the criteria for CKD.    eGFR calculation 2021 CKD-EPI creatinine equation, which does not include race as a factor    Magnesium  [[id number]]  (Normal) Collected: 07/28/25 0754    Specimen: Blood Updated: 07/28/25 0859     Magnesium 1.7 mg/dL     Phosphorus [[id number]]  (Normal) Collected: 07/28/25 0754    Specimen: Blood Updated: 07/28/25 0859     Phosphorus 2.7 mg/dL     CBC (No Diff) [[id number]]  (Abnormal) Collected: 07/28/25 0754    Specimen: Blood Updated: 07/28/25 0832     WBC 6.23 10*3/mm3      RBC 3.84 10*6/mm3      Hemoglobin 11.4 g/dL      Hematocrit 36.2 %      MCV 94.3 fL      MCH 29.7 pg      MCHC 31.5 g/dL      RDW 12.8 %      RDW-SD 43.7 fl      MPV 9.5 fL      Platelets 242 10*3/mm3     POC Glucose Once [[id number]]  (Abnormal) Collected: 07/28/25 0746    Specimen: Blood Updated: 07/28/25 0748     Glucose 148 mg/dL     POC Glucose Once [[id number]]  (Normal) Collected: 07/27/25 2102    Specimen: Blood Updated: 07/27/25 2103     Glucose 108 mg/dL     POC Glucose Once [[id number]]  (Normal) Collected: 07/27/25 1650    Specimen: Blood Updated: 07/27/25 1653     Glucose 91 mg/dL     POC Glucose Once [[id number]]  (Abnormal) Collected: 07/27/25 1627    Specimen: Blood Updated: 07/27/25 1629     Glucose 59 mg/dL     POC Glucose Once [[id number]]  (Abnormal) Collected: 07/27/25 1111    Specimen: Blood Updated: 07/27/25 1114     Glucose 197 mg/dL     Basic Metabolic Panel [[id number]]  (Abnormal) Collected: 07/27/25 0804    Specimen: Blood Updated: 07/27/25 0843     Glucose 139 mg/dL      BUN 20.0 mg/dL      Creatinine 1.20 mg/dL      Sodium 139 mmol/L      Potassium 3.8 mmol/L      Chloride 104 mmol/L      CO2 25.0 mmol/L      Calcium 8.9 mg/dL      BUN/Creatinine Ratio 16.7     Anion Gap 10.0 mmol/L      eGFR 51.0 mL/min/1.73     Narrative:      GFR Categories in Chronic Kidney Disease (CKD)              GFR Category          GFR (mL/min/1.73)    Interpretation  G1                    90 or greater        Normal or high (1)  G2                    60-89                Mild decrease (1)  G3a                   45-59                Mild to  moderate decrease  G3b                   30-44                Moderate to severe decrease  G4                    15-29                Severe decrease  G5                    14 or less           Kidney failure    (1)In the absence of evidence of kidney disease, neither GFR category G1 or G2 fulfill the criteria for CKD.    eGFR calculation 2021 CKD-EPI creatinine equation, which does not include race as a factor    Magnesium [[id number]]  (Normal) Collected: 07/27/25 0804    Specimen: Blood Updated: 07/27/25 0843     Magnesium 1.8 mg/dL     Phosphorus [[id number]]  (Normal) Collected: 07/27/25 0804    Specimen: Blood Updated: 07/27/25 0843     Phosphorus 3.0 mg/dL     CBC (No Diff) [[id number]]  (Abnormal) Collected: 07/27/25 0804    Specimen: Blood Updated: 07/27/25 0824     WBC 6.12 10*3/mm3      RBC 3.87 10*6/mm3      Hemoglobin 11.6 g/dL      Hematocrit 35.3 %      MCV 91.2 fL      MCH 30.0 pg      MCHC 32.9 g/dL      RDW 13.2 %      RDW-SD 44.1 fl      MPV 9.3 fL      Platelets 277 10*3/mm3     POC Glucose Once [[id number]]  (Abnormal) Collected: 07/27/25 0803    Specimen: Blood Updated: 07/27/25 0804     Glucose 142 mg/dL     POC Glucose Once [[id number]]  (Normal) Collected: 07/26/25 2045    Specimen: Blood Updated: 07/26/25 2049     Glucose 121 mg/dL     POC Glucose Once [[id number]]  (Normal) Collected: 07/26/25 1546    Specimen: Blood Updated: 07/26/25 1547     Glucose 96 mg/dL     POC Glucose Once [[id number]]  (Abnormal) Collected: 07/26/25 1514    Specimen: Blood Updated: 07/26/25 1515     Glucose 54 mg/dL     POC Glucose Once [[id number]]  (Normal) Collected: 07/26/25 1132    Specimen: Blood Updated: 07/26/25 1133     Glucose 80 mg/dL     POC Glucose Once [[id number]]  (Normal) Collected: 07/26/25 0815    Specimen: Blood Updated: 07/26/25 0816     Glucose 104 mg/dL     Phosphorus [[id number]]  (Normal) Collected: 07/26/25 0507    Specimen: Blood Updated: 07/26/25 0547     Phosphorus 3.3 mg/dL     Basic Metabolic  Panel [[id number]]  (Abnormal) Collected: 07/26/25 0507    Specimen: Blood Updated: 07/26/25 0544     Glucose 103 mg/dL      BUN 26.0 mg/dL      Creatinine 1.71 mg/dL      Sodium 143 mmol/L      Potassium 3.7 mmol/L      Chloride 107 mmol/L      CO2 24.6 mmol/L      Calcium 9.1 mg/dL      BUN/Creatinine Ratio 15.2     Anion Gap 11.4 mmol/L      eGFR 33.3 mL/min/1.73     Narrative:      GFR Categories in Chronic Kidney Disease (CKD)              GFR Category          GFR (mL/min/1.73)    Interpretation  G1                    90 or greater        Normal or high (1)  G2                    60-89                Mild decrease (1)  G3a                   45-59                Mild to moderate decrease  G3b                   30-44                Moderate to severe decrease  G4                    15-29                Severe decrease  G5                    14 or less           Kidney failure    (1)In the absence of evidence of kidney disease, neither GFR category G1 or G2 fulfill the criteria for CKD.    eGFR calculation 2021 CKD-EPI creatinine equation, which does not include race as a factor    Magnesium [[id number]]  (Normal) Collected: 07/26/25 0507    Specimen: Blood Updated: 07/26/25 0544     Magnesium 1.7 mg/dL     CBC (No Diff) [[id number]]  (Abnormal) Collected: 07/26/25 0507    Specimen: Blood Updated: 07/26/25 0525     WBC 7.49 10*3/mm3      RBC 3.66 10*6/mm3      Hemoglobin 10.9 g/dL      Hematocrit 33.7 %      MCV 92.1 fL      MCH 29.8 pg      MCHC 32.3 g/dL      RDW 12.9 %      RDW-SD 43.4 fl      MPV 9.2 fL      Platelets 264 10*3/mm3     POC Glucose Once [[id number]]  (Normal) Collected: 07/25/25 2153    Specimen: Blood Updated: 07/25/25 2154     Glucose 93 mg/dL     POC Glucose Once [[id number]]  (Abnormal) Collected: 07/25/25 2116    Specimen: Blood Updated: 07/25/25 2118     Glucose 66 mg/dL     POC Glucose Once [[id number]]  (Normal) Collected: 07/25/25 1659    Specimen: Blood Updated: 07/25/25 1700     Glucose 79  mg/dL           Data Review:  Results from last 7 days   Lab Units 07/28/25  0754 07/27/25  0804 07/26/25  0507   SODIUM mmol/L 142 139 143   POTASSIUM mmol/L 3.8 3.8 3.7   CHLORIDE mmol/L 107 104 107   CO2 mmol/L 25.5 25.0 24.6   BUN mg/dL 19.0 20.0 26.0*   CREATININE mg/dL 1.43* 1.20* 1.71*   GLUCOSE mg/dL 143* 139* 103*   CALCIUM mg/dL 9.2 8.9 9.1     Results from last 7 days   Lab Units 07/28/25  0754 07/27/25  0804 07/26/25  0507   WBC 10*3/mm3 6.23 6.12 7.49   HEMOGLOBIN g/dL 11.4* 11.6* 10.9*   HEMATOCRIT % 36.2 35.3 33.7*   PLATELETS 10*3/mm3 242 277 264     Results from last 7 days   Lab Units 07/25/25  0538   TSH uIU/mL 0.865     Results from last 7 days   Lab Units 07/24/25  0523   HEMOGLOBIN A1C % 6.40*     Lab Results   Lab Value Date/Time    TROPONINT 13 07/24/2025 0522    TROPONINT 16 (H) 07/23/2025 2135    TROPONINT 17 (H) 07/23/2025 2029    TROPONINT <0.010 01/10/2023 1136         Results from last 7 days   Lab Units 07/23/25 2029   ALK PHOS U/L 99   BILIRUBIN mg/dL <0.2   ALT (SGPT) U/L 7   AST (SGOT) U/L 8     Results from last 7 days   Lab Units 07/25/25  0538   TSH uIU/mL 0.865     Results from last 7 days   Lab Units 07/24/25  0523   HEMOGLOBIN A1C % 6.40*     Glucose   Date/Time Value Ref Range Status   07/28/2025 1124 159 (H) 70 - 130 mg/dL Final   07/28/2025 0746 148 (H) 70 - 130 mg/dL Final   07/27/2025 2102 108 70 - 130 mg/dL Final   07/27/2025 1650 91 70 - 130 mg/dL Final   07/27/2025 1627 59 (L) 70 - 130 mg/dL Final   07/27/2025 1111 197 (H) 70 - 130 mg/dL Final   07/27/2025 0803 142 (H) 70 - 130 mg/dL Final   07/26/2025 2045 121 70 - 130 mg/dL Final           Past Medical History:   Diagnosis Date    Allergic Years ago    Took allergy shots    Anxiety     Stroke    Arthritis     FEET    Asthma     Inhaler    Clotting disorder     Taking asa    COPD (chronic obstructive pulmonary disease)     At times bronchitis    Depression 10/22/01    Stroke    Diabetes mellitus     GERD  (gastroesophageal reflux disease) 1987    Gerd    Hyperlipidemia     Hypertension     Obesity 1988    Reflux esophagitis     Renal insufficiency 2021    Stroke     Urinary tract infection     Occasionaly    Visual impairment 10/2001    1/2 vision each eye       Assessment:  Active Hospital Problems    Diagnosis  POA    **Cellulitis [L03.90]  Yes    Fluid overload [E87.70]  Unknown    COPD (chronic obstructive pulmonary disease) [J44.9]  Unknown    BMI 45.0-49.9, adult [Z68.42]  Not Applicable    Proteinuria [R80.9]  Unknown    Stage 3a chronic kidney disease [N18.31]  Yes    Pulmonary nodule [R91.1]  Yes    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       Plan:  Continue with antibiotics for cellulitis.  Cardiology and nephrology consults noted.  Still making some adjustments in medication and creatinine is gone up a bit today.  May need little more diuresis.  DC planning.  May be home in 1 or 2 days if doing a little better and stable.    Ash Sparrow MD  7/28/2025  11:50 EDT

## 2025-07-28 NOTE — PROGRESS NOTES
"                                                                                                            [facility]                                                                     Nephrology Progress Note                                                                                LOS: 4 days     Chief Complaint/ Reason for encounter: Chronic kidney disease    Subjective   07/28/25 :       Medical history reviewed:  History of Present Illness    Subjective    History taken from: Patient and chart    7/26  Denies for any new complaint today.     7/28  No new complaint today. Overall feeling better. Denies sob or chest pain    Vital Signs  Temp:  [97.4 °F (36.3 °C)-98.7 °F (37.1 °C)] 97.7 °F (36.5 °C)  Heart Rate:  [68-77] 76  Resp:  [16-20] 16  BP: (137-170)/(57-71) 158/60       Wt Readings from Last 1 Encounters:   07/28/25 0631 120 kg (265 lb 4.8 oz)   07/27/25 0645 121 kg (266 lb 1.6 oz)   07/26/25 0600 123 kg (270 lb 11.6 oz)   07/25/25 0617 123 kg (270 lb 12.8 oz)   07/24/25 1653 123 kg (271 lb)   07/24/25 0344 123 kg (271 lb 6.2 oz)   07/23/25 1955 125 kg (276 lb)       Objective:  Vital signs: (most recent): Blood pressure 168/75, pulse 67, temperature 97.4 °F (36.3 °C), temperature source Oral, resp. rate 20, height 157.5 cm (62\"), weight 120 kg (265 lb 4.8 oz), SpO2 96%, not currently breastfeeding.                Objective:  General Appearance:  Comfortable, -appearing, in no acute distress and not in pain.  Awake, alert  HEENT: Mucous membranes moist, no injury, oropharynx clear  Lungs:  Normal effort and normal respiratory rate.  Breath sounds clear to auscultation.  No  respiratory distress.  No rales, decreased breath sounds or rhonchi.    Heart: Normal rate.  Regular rhythm.  S1, S2 normal.  No murmur.   Abdomen: Abdomen is soft.  Bowel sounds are normal, no abdominal tenderness.  There is no rebound or guarding  Extremities:  postive edema of bilateral lower " extremities  Skin:  Warm and dry with no rashes      Results Review:    Intake/Output:     Intake/Output Summary (Last 24 hours) at 7/28/2025 0924  Last data filed at 7/28/2025 0632  Gross per 24 hour   Intake 800 ml   Output 720 ml   Net 80 ml         DATA:  Radiology and Labs:  The following labs independently reviewed by me. Additional labs ordered for tomorrow a.m.  Interval notes, chart personally reviewed by me.   Old records independently reviewed showing   The following radiologic studies independently viewed by me, findings   New problems include  Discussed with     Risk/ complexity of medical care/ medical decision making     Labs:   Recent Results (from the past 24 hours)   POC Glucose Once    Collection Time: 07/27/25 11:11 AM    Specimen: Blood   Result Value Ref Range    Glucose 197 (H) 70 - 130 mg/dL   POC Glucose Once    Collection Time: 07/27/25  4:27 PM    Specimen: Blood   Result Value Ref Range    Glucose 59 (L) 70 - 130 mg/dL   POC Glucose Once    Collection Time: 07/27/25  4:50 PM    Specimen: Blood   Result Value Ref Range    Glucose 91 70 - 130 mg/dL   POC Glucose Once    Collection Time: 07/27/25  9:02 PM    Specimen: Blood   Result Value Ref Range    Glucose 108 70 - 130 mg/dL   POC Glucose Once    Collection Time: 07/28/25  7:46 AM    Specimen: Blood   Result Value Ref Range    Glucose 148 (H) 70 - 130 mg/dL   Basic Metabolic Panel    Collection Time: 07/28/25  7:54 AM    Specimen: Blood   Result Value Ref Range    Glucose 143 (H) 65 - 99 mg/dL    BUN 19.0 8.0 - 23.0 mg/dL    Creatinine 1.43 (H) 0.57 - 1.00 mg/dL    Sodium 142 136 - 145 mmol/L    Potassium 3.8 3.5 - 5.2 mmol/L    Chloride 107 98 - 107 mmol/L    CO2 25.5 22.0 - 29.0 mmol/L    Calcium 9.2 8.6 - 10.5 mg/dL    BUN/Creatinine Ratio 13.3 7.0 - 25.0    Anion Gap 9.5 5.0 - 15.0 mmol/L    eGFR 41.3 (L) >60.0 mL/min/1.73   CBC (No Diff)    Collection Time: 07/28/25  7:54 AM    Specimen: Blood   Result Value Ref Range    WBC 6.23  3.40 - 10.80 10*3/mm3    RBC 3.84 3.77 - 5.28 10*6/mm3    Hemoglobin 11.4 (L) 12.0 - 15.9 g/dL    Hematocrit 36.2 34.0 - 46.6 %    MCV 94.3 79.0 - 97.0 fL    MCH 29.7 26.6 - 33.0 pg    MCHC 31.5 31.5 - 35.7 g/dL    RDW 12.8 12.3 - 15.4 %    RDW-SD 43.7 37.0 - 54.0 fl    MPV 9.5 6.0 - 12.0 fL    Platelets 242 140 - 450 10*3/mm3   Magnesium    Collection Time: 07/28/25  7:54 AM    Specimen: Blood   Result Value Ref Range    Magnesium 1.7 1.6 - 2.4 mg/dL   Phosphorus    Collection Time: 07/28/25  7:54 AM    Specimen: Blood   Result Value Ref Range    Phosphorus 2.7 2.5 - 4.5 mg/dL       Radiology:  Pertinent radiology studies were reviewed as described above      Medications have been reviewed separately in chart overview      ASSESSMENT / PLAN  ASSESSMENT:   Fibrillary glomerulonephritis, seen on prior renal biopsy.  Recent improvement with combination of ACE inhibitor and Kerendia  Edema/volume overload    Cellulitis    Diabetes mellitus    Hyperlipidemia    Essential hypertension    Obesity due to excess calories    Tobacco dependence syndrome    Fibrillary glomerulonephritis    Pulmonary nodule    Stage 3a chronic kidney disease    Fluid overload    COPD (chronic obstructive pulmonary disease)    BMI 45.0-49.9, adult           DISCUSSION/PLAN:     Renal function stable at her new baseline   Continue with lisinopril for proteinuria.   I had stopped kerendia due to hyperkalemia. With plans to restart at lower dose as outpatient (she also had DM2, so she qualifies for it).   She looks euvolemic today.   Electrolytes at goal   Check labs in am   Will follow        Jeanette Sow MD  [facility]  Office phone number: [phone]  Answering service phone number: [phone]    07/28/25  09:24 EDT

## 2025-07-28 NOTE — PROGRESS NOTES
"CC: Essential hypertension/CHF    Interval History: No acute events overnight      Vital Signs  Temp:  [97.4 °F (36.3 °C)-98.3 °F (36.8 °C)] 97.4 °F (36.3 °C)  Heart Rate:  [67-78] 67  Resp:  [16-20] 20  BP: (144-170)/(57-75) 168/75    Intake/Output Summary (Last 24 hours) at 7/28/2025 1457  Last data filed at 7/28/2025 1039  Gross per 24 hour   Intake 560 ml   Output 1170 ml   Net -610 ml     Flowsheet Rows      Flowsheet Row First Filed Value   Admission Height 157.5 cm (62.01\") Documented at 07/23/2025 1955   Admission Weight 125 kg (276 lb) Documented at 07/23/2025 1955            PHYSICAL EXAM:  General: No acute distress  Resp:NL Rate, symmetric chest expansion,unlabored, clear  CV:NL rate and rhythm, NL PMI, NL S1 and S2, no Murmur, no gallop, no rub, No JVD.   ABD:Nl sounds, no masses or tenderness, nondistended, no guarding or rebound  Neuro: alert,cooperative and oriented  Extr:Normal pedal pulses, No edema or cyanosis, moves all extremities      Results Review:    Results from last 7 days   Lab Units 07/28/25  0754   SODIUM mmol/L 142   POTASSIUM mmol/L 3.8   CHLORIDE mmol/L 107   CO2 mmol/L 25.5   BUN mg/dL 19.0   CREATININE mg/dL 1.43*   GLUCOSE mg/dL 143*   CALCIUM mg/dL 9.2     Results from last 7 days   Lab Units 07/24/25  0522 07/23/25  2135 07/23/25 2029   HSTROP T ng/L 13 16* 17*     Results from last 7 days   Lab Units 07/28/25  0754   WBC 10*3/mm3 6.23   HEMOGLOBIN g/dL 11.4*   HEMATOCRIT % 36.2   PLATELETS 10*3/mm3 242             Results from last 7 days   Lab Units 07/28/25  0754   MAGNESIUM mg/dL 1.7         I reviewed the patient's new clinical results.  I personally viewed and interpreted the patient's EKG/Telemetry data        Medication Review:   Meds reviewed         Assessment/Plan    1.  Acute on Chronic  Heart failure with preserved ejection fraction- NYHA Class II-preserved left ventricular ejection fraction.  Likely precipitated by hypertension with obesity contributing.     2.  " Resistant hypertension-improved.        3.  Left abdominal wall cellulitis-Improving.  She continues to remain on IV antibiotics.  Medicine is following     4.  COPD with current tobacco use-she has had discussion on benefit of quitting.  On room air today.     5.  Diabetes mellitus type 2 most recent A1c 7.4% in the setting of obesity     6.  Prior CVA on aspirin     7.  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 daily, hydralazine 50 mg p.o. 3 times daily, lisinopril 20 mg p.o. twice daily, nifedipine 30 mg p.o. daily.    Increase nifedipine to 60 mg p.o. twice daily  She needs outpatient sleep study  Nephrology managing diuretics      Duane Davenport MD  07/28/25  14:57 EDT

## 2025-07-28 NOTE — PLAN OF CARE
Goal Outcome Evaluation:   VSS. A&Ox4. No c/o of pain overnight. PRN melatonin for sleep. ACHS.  IV ABX given as ordered. Remains on fluid restriction.  Possible discharge in 1 to 2 days. Care continuing.

## 2025-07-29 LAB
ANION GAP SERPL CALCULATED.3IONS-SCNC: 9.1 MMOL/L (ref 5–15)
BUN SERPL-MCNC: 18 MG/DL (ref 8–23)
BUN/CREAT SERPL: 17.5 (ref 7–25)
CALCIUM SPEC-SCNC: 9.2 MG/DL (ref 8.6–10.5)
CHLORIDE SERPL-SCNC: 107 MMOL/L (ref 98–107)
CO2 SERPL-SCNC: 24.9 MMOL/L (ref 22–29)
CREAT SERPL-MCNC: 1.03 MG/DL (ref 0.57–1)
DEPRECATED RDW RBC AUTO: 43.5 FL (ref 37–54)
EGFRCR SERPLBLD CKD-EPI 2021: 61.2 ML/MIN/1.73
ERYTHROCYTE [DISTWIDTH] IN BLOOD BY AUTOMATED COUNT: 12.6 % (ref 12.3–15.4)
GLUCOSE BLDC GLUCOMTR-MCNC: 109 MG/DL (ref 70–130)
GLUCOSE BLDC GLUCOMTR-MCNC: 119 MG/DL (ref 70–130)
GLUCOSE BLDC GLUCOMTR-MCNC: 125 MG/DL (ref 65–99)
GLUCOSE BLDC GLUCOMTR-MCNC: 157 MG/DL (ref 70–130)
GLUCOSE SERPL-MCNC: 122 MG/DL (ref 65–99)
HCT VFR BLD AUTO: 36.4 % (ref 34–46.6)
HGB BLD-MCNC: 11.4 G/DL (ref 12–15.9)
MAGNESIUM SERPL-MCNC: 1.8 MG/DL (ref 1.6–2.4)
MCH RBC QN AUTO: 29.6 PG (ref 26.6–33)
MCHC RBC AUTO-ENTMCNC: 31.3 G/DL (ref 31.5–35.7)
MCV RBC AUTO: 94.5 FL (ref 79–97)
PHOSPHATE SERPL-MCNC: 2.7 MG/DL (ref 2.5–4.5)
PLATELET # BLD AUTO: 243 10*3/MM3 (ref 140–450)
PMV BLD AUTO: 9.5 FL (ref 6–12)
POTASSIUM SERPL-SCNC: 3.9 MMOL/L (ref 3.5–5.2)
RBC # BLD AUTO: 3.85 10*6/MM3 (ref 3.77–5.28)
SODIUM SERPL-SCNC: 141 MMOL/L (ref 136–145)
WBC NRBC COR # BLD AUTO: 6.2 10*3/MM3 (ref 3.4–10.8)

## 2025-07-29 PROCEDURE — 85027 COMPLETE CBC AUTOMATED: CPT | Performed by: HOSPITALIST

## 2025-07-29 PROCEDURE — 99232 SBSQ HOSP IP/OBS MODERATE 35: CPT | Performed by: NURSE PRACTITIONER

## 2025-07-29 PROCEDURE — 94664 DEMO&/EVAL PT USE INHALER: CPT

## 2025-07-29 PROCEDURE — 63710000001 INSULIN GLARGINE PER 5 UNITS: Performed by: INTERNAL MEDICINE

## 2025-07-29 PROCEDURE — 94761 N-INVAS EAR/PLS OXIMETRY MLT: CPT

## 2025-07-29 PROCEDURE — 25010000002 ENOXAPARIN PER 10 MG: Performed by: HOSPITALIST

## 2025-07-29 PROCEDURE — 82948 REAGENT STRIP/BLOOD GLUCOSE: CPT

## 2025-07-29 PROCEDURE — 94799 UNLISTED PULMONARY SVC/PX: CPT

## 2025-07-29 PROCEDURE — 83735 ASSAY OF MAGNESIUM: CPT | Performed by: HOSPITALIST

## 2025-07-29 PROCEDURE — 80048 BASIC METABOLIC PNL TOTAL CA: CPT | Performed by: HOSPITALIST

## 2025-07-29 PROCEDURE — 63710000001 INSULIN LISPRO (HUMAN) PER 5 UNITS: Performed by: HOSPITALIST

## 2025-07-29 PROCEDURE — 25010000002 CEFAZOLIN PER 500 MG: Performed by: INTERNAL MEDICINE

## 2025-07-29 PROCEDURE — 84100 ASSAY OF PHOSPHORUS: CPT | Performed by: HOSPITALIST

## 2025-07-29 RX ORDER — HYDRALAZINE HYDROCHLORIDE 25 MG/1
75 TABLET, FILM COATED ORAL EVERY 8 HOURS SCHEDULED
Status: DISCONTINUED | OUTPATIENT
Start: 2025-07-29 | End: 2025-07-30 | Stop reason: HOSPADM

## 2025-07-29 RX ADMIN — ENOXAPARIN SODIUM 40 MG: 100 INJECTION SUBCUTANEOUS at 21:00

## 2025-07-29 RX ADMIN — NIFEDIPINE 60 MG: 60 TABLET, EXTENDED RELEASE ORAL at 08:30

## 2025-07-29 RX ADMIN — LISINOPRIL 20 MG: 20 TABLET ORAL at 20:59

## 2025-07-29 RX ADMIN — TERAZOSIN 2 MG: 2 CAPSULE ORAL at 20:59

## 2025-07-29 RX ADMIN — HYDRALAZINE HYDROCHLORIDE 50 MG: 25 TABLET ORAL at 05:12

## 2025-07-29 RX ADMIN — INSULIN LISPRO 2 UNITS: 100 INJECTION, SOLUTION INTRAVENOUS; SUBCUTANEOUS at 08:31

## 2025-07-29 RX ADMIN — INSULIN GLARGINE 15 UNITS: 100 INJECTION, SOLUTION SUBCUTANEOUS at 08:32

## 2025-07-29 RX ADMIN — ARFORMOTEROL TARTRATE 15 MCG: 15 SOLUTION RESPIRATORY (INHALATION) at 19:52

## 2025-07-29 RX ADMIN — CEFAZOLIN 2000 MG: 2 INJECTION, POWDER, FOR SOLUTION INTRAMUSCULAR; INTRAVENOUS at 16:15

## 2025-07-29 RX ADMIN — HYDROCODONE BITARTRATE AND ACETAMINOPHEN 1 TABLET: 5; 325 TABLET ORAL at 21:05

## 2025-07-29 RX ADMIN — ARFORMOTEROL TARTRATE 15 MCG: 15 SOLUTION RESPIRATORY (INHALATION) at 07:48

## 2025-07-29 RX ADMIN — Medication 10 ML: at 21:00

## 2025-07-29 RX ADMIN — LISINOPRIL 20 MG: 20 TABLET ORAL at 08:31

## 2025-07-29 RX ADMIN — BUSPIRONE HYDROCHLORIDE 7.5 MG: 15 TABLET ORAL at 08:30

## 2025-07-29 RX ADMIN — CEFAZOLIN 2000 MG: 2 INJECTION, POWDER, FOR SOLUTION INTRAMUSCULAR; INTRAVENOUS at 08:29

## 2025-07-29 RX ADMIN — VENLAFAXINE HYDROCHLORIDE 150 MG: 37.5 CAPSULE, EXTENDED RELEASE ORAL at 08:30

## 2025-07-29 RX ADMIN — HYDROCODONE BITARTRATE AND ACETAMINOPHEN 1 TABLET: 5; 325 TABLET ORAL at 08:31

## 2025-07-29 RX ADMIN — LIDOCAINE 1 PATCH: 4 PATCH TOPICAL at 08:29

## 2025-07-29 RX ADMIN — ANTI-FUNGAL POWDER MICONAZOLE NITRATE TALC FREE 1 APPLICATION: 1.42 POWDER TOPICAL at 08:32

## 2025-07-29 RX ADMIN — SODIUM BICARBONATE 650 MG: 650 TABLET ORAL at 20:59

## 2025-07-29 RX ADMIN — ANTI-FUNGAL POWDER MICONAZOLE NITRATE TALC FREE 1 APPLICATION: 1.42 POWDER TOPICAL at 21:00

## 2025-07-29 RX ADMIN — BUDESONIDE 0.5 MG: 0.5 INHALANT RESPIRATORY (INHALATION) at 07:50

## 2025-07-29 RX ADMIN — IPRATROPIUM BROMIDE AND ALBUTEROL SULFATE 3 ML: .5; 3 SOLUTION RESPIRATORY (INHALATION) at 07:49

## 2025-07-29 RX ADMIN — BUDESONIDE 0.5 MG: 0.5 INHALANT RESPIRATORY (INHALATION) at 19:52

## 2025-07-29 RX ADMIN — ASPIRIN 81 MG CHEWABLE TABLET 81 MG: 81 TABLET CHEWABLE at 08:31

## 2025-07-29 RX ADMIN — ROSUVASTATIN CALCIUM 20 MG: 20 TABLET, FILM COATED ORAL at 08:31

## 2025-07-29 RX ADMIN — NICOTINE 1 PATCH: 21 PATCH, EXTENDED RELEASE TRANSDERMAL at 08:30

## 2025-07-29 RX ADMIN — SODIUM BICARBONATE 650 MG: 650 TABLET ORAL at 08:31

## 2025-07-29 RX ADMIN — Medication 10 ML: at 08:32

## 2025-07-29 RX ADMIN — HYDROCODONE BITARTRATE AND ACETAMINOPHEN 1 TABLET: 5; 325 TABLET ORAL at 14:23

## 2025-07-29 RX ADMIN — HYDRALAZINE HYDROCHLORIDE 75 MG: 25 TABLET ORAL at 23:13

## 2025-07-29 RX ADMIN — NIFEDIPINE 60 MG: 60 TABLET, EXTENDED RELEASE ORAL at 20:59

## 2025-07-29 RX ADMIN — ENOXAPARIN SODIUM 40 MG: 100 INJECTION SUBCUTANEOUS at 08:30

## 2025-07-29 RX ADMIN — IPRATROPIUM BROMIDE AND ALBUTEROL SULFATE 3 ML: .5; 3 SOLUTION RESPIRATORY (INHALATION) at 11:04

## 2025-07-29 RX ADMIN — HYDRALAZINE HYDROCHLORIDE 75 MG: 25 TABLET ORAL at 14:23

## 2025-07-29 RX ADMIN — ATENOLOL 100 MG: 50 TABLET ORAL at 08:31

## 2025-07-29 RX ADMIN — SODIUM BICARBONATE 650 MG: 650 TABLET ORAL at 16:15

## 2025-07-29 NOTE — PROGRESS NOTES
"DAILY PROGRESS NOTE  [facility]    Patient Identification:  Name: Bernice Rai  Age: 64 y.o.  Sex: female  :  1960  MRN: [id number]         Primary Care Physician: Ashley Holt APRN    Subjective:  Interval History: She is feeling better today.    Objective:    Scheduled Meds:budesonide, 0.5 mg, Nebulization, BID - RT   And  arformoterol, 15 mcg, Nebulization, BID - RT  aspirin, 81 mg, Oral, Daily  atenolol, 100 mg, Oral, Daily  busPIRone, 7.5 mg, Oral, Daily  ceFAZolin, 2,000 mg, Intravenous, Q8H  enoxaparin sodium, 40 mg, Subcutaneous, Q12H  hydrALAZINE, 75 mg, Oral, Q8H  insulin glargine, 15 Units, Subcutaneous, Daily  insulin lispro, 2-9 Units, Subcutaneous, 4x Daily AC & at Bedtime  [Held by provider] insulin lispro, 6 Units, Subcutaneous, TID With Meals  ipratropium-albuterol, 3 mL, Nebulization, Q6H While Awake - RT  Lidocaine, 1 patch, Transdermal, Q24H  lisinopril, 20 mg, Oral, BID  miconazole, 1 Application, Topical, Q12H  nicotine, 1 patch, Transdermal, Q24H  NIFEdipine XL, 60 mg, Oral, BID  rosuvastatin, 20 mg, Oral, Daily  sodium bicarbonate, 650 mg, Oral, TID  sodium chloride, 10 mL, Intravenous, Q12H  terazosin, 2 mg, Oral, Nightly  venlafaxine XR, 150 mg, Oral, Daily      Continuous Infusions:     Vital signs in last 24 hours:  Temp:  [96.8 °F (36 °C)-97.4 °F (36.3 °C)] 96.8 °F (36 °C)  Heart Rate:  [64-77] 68  Resp:  [16-18] 16  BP: (137-180)/(57-74) 137/57    Intake/Output:    Intake/Output Summary (Last 24 hours) at 2025 1344  Last data filed at 2025 0914  Gross per 24 hour   Intake 630 ml   Output 700 ml   Net -70 ml       Exam:  /57 (BP Location: Right arm, Patient Position: Lying)   Pulse 68   Temp 96.8 °F (36 °C) (Oral)   Resp 16   Ht 157.5 cm (62\")   Wt 120 kg (265 lb 4.8 oz)   SpO2 92%   BMI 48.52 kg/m²     General Appearance:    Alert, cooperative, no distress   Head:    Normocephalic, without obvious abnormality, atraumatic   Eyes:     "   Throat:   Lips, tongue, gums normal   Neck:   Supple, symmetrical, trachea midline, no JVD   Lungs:     Clear to auscultation bilaterally, respirations unlabored   Chest Wall:    No tenderness or deformity    Heart:    Regular rate and rhythm, S1 and S2 normal, no murmur,no  rub or gallop   Abdomen:     Soft, nontender, bowel sounds active, no masses, no organomegaly    Extremities:   Extremities normal, atraumatic, no cyanosis some leg and pedal edema   Pulses:      Skin:   Skin is warm and dry, cellulitis over abdominal wall improving   Neurologic:   no focal deficits noted      Lab Results (last 72 hours)       Procedure Component Value Units Date/Time    POC Glucose Once [[id number]]  (Abnormal) Collected: 07/28/25 1124    Specimen: Blood Updated: 07/28/25 1127     Glucose 159 mg/dL     Basic Metabolic Panel [[id number]]  (Abnormal) Collected: 07/28/25 0754    Specimen: Blood Updated: 07/28/25 0859     Glucose 143 mg/dL      BUN 19.0 mg/dL      Creatinine 1.43 mg/dL      Sodium 142 mmol/L      Potassium 3.8 mmol/L      Chloride 107 mmol/L      CO2 25.5 mmol/L      Calcium 9.2 mg/dL      BUN/Creatinine Ratio 13.3     Anion Gap 9.5 mmol/L      eGFR 41.3 mL/min/1.73     Narrative:      GFR Categories in Chronic Kidney Disease (CKD)              GFR Category          GFR (mL/min/1.73)    Interpretation  G1                    90 or greater        Normal or high (1)  G2                    60-89                Mild decrease (1)  G3a                   45-59                Mild to moderate decrease  G3b                   30-44                Moderate to severe decrease  G4                    15-29                Severe decrease  G5                    14 or less           Kidney failure    (1)In the absence of evidence of kidney disease, neither GFR category G1 or G2 fulfill the criteria for CKD.    eGFR calculation 2021 CKD-EPI creatinine equation, which does not include race as a factor    Magnesium [[id number]]   (Normal) Collected: 07/28/25 0754    Specimen: Blood Updated: 07/28/25 0859     Magnesium 1.7 mg/dL     Phosphorus [[id number]]  (Normal) Collected: 07/28/25 0754    Specimen: Blood Updated: 07/28/25 0859     Phosphorus 2.7 mg/dL     CBC (No Diff) [[id number]]  (Abnormal) Collected: 07/28/25 0754    Specimen: Blood Updated: 07/28/25 0832     WBC 6.23 10*3/mm3      RBC 3.84 10*6/mm3      Hemoglobin 11.4 g/dL      Hematocrit 36.2 %      MCV 94.3 fL      MCH 29.7 pg      MCHC 31.5 g/dL      RDW 12.8 %      RDW-SD 43.7 fl      MPV 9.5 fL      Platelets 242 10*3/mm3     POC Glucose Once [[id number]]  (Abnormal) Collected: 07/28/25 0746    Specimen: Blood Updated: 07/28/25 0748     Glucose 148 mg/dL     POC Glucose Once [[id number]]  (Normal) Collected: 07/27/25 2102    Specimen: Blood Updated: 07/27/25 2103     Glucose 108 mg/dL     POC Glucose Once [[id number]]  (Normal) Collected: 07/27/25 1650    Specimen: Blood Updated: 07/27/25 1653     Glucose 91 mg/dL     POC Glucose Once [[id number]]  (Abnormal) Collected: 07/27/25 1627    Specimen: Blood Updated: 07/27/25 1629     Glucose 59 mg/dL     POC Glucose Once [[id number]]  (Abnormal) Collected: 07/27/25 1111    Specimen: Blood Updated: 07/27/25 1114     Glucose 197 mg/dL     Basic Metabolic Panel [[id number]]  (Abnormal) Collected: 07/27/25 0804    Specimen: Blood Updated: 07/27/25 0843     Glucose 139 mg/dL      BUN 20.0 mg/dL      Creatinine 1.20 mg/dL      Sodium 139 mmol/L      Potassium 3.8 mmol/L      Chloride 104 mmol/L      CO2 25.0 mmol/L      Calcium 8.9 mg/dL      BUN/Creatinine Ratio 16.7     Anion Gap 10.0 mmol/L      eGFR 51.0 mL/min/1.73     Narrative:      GFR Categories in Chronic Kidney Disease (CKD)              GFR Category          GFR (mL/min/1.73)    Interpretation  G1                    90 or greater        Normal or high (1)  G2                    60-89                Mild decrease (1)  G3a                   45-59                Mild to moderate  decrease  G3b                   30-44                Moderate to severe decrease  G4                    15-29                Severe decrease  G5                    14 or less           Kidney failure    (1)In the absence of evidence of kidney disease, neither GFR category G1 or G2 fulfill the criteria for CKD.    eGFR calculation 2021 CKD-EPI creatinine equation, which does not include race as a factor    Magnesium [[id number]]  (Normal) Collected: 07/27/25 0804    Specimen: Blood Updated: 07/27/25 0843     Magnesium 1.8 mg/dL     Phosphorus [[id number]]  (Normal) Collected: 07/27/25 0804    Specimen: Blood Updated: 07/27/25 0843     Phosphorus 3.0 mg/dL     CBC (No Diff) [[id number]]  (Abnormal) Collected: 07/27/25 0804    Specimen: Blood Updated: 07/27/25 0824     WBC 6.12 10*3/mm3      RBC 3.87 10*6/mm3      Hemoglobin 11.6 g/dL      Hematocrit 35.3 %      MCV 91.2 fL      MCH 30.0 pg      MCHC 32.9 g/dL      RDW 13.2 %      RDW-SD 44.1 fl      MPV 9.3 fL      Platelets 277 10*3/mm3     POC Glucose Once [[id number]]  (Abnormal) Collected: 07/27/25 0803    Specimen: Blood Updated: 07/27/25 0804     Glucose 142 mg/dL     POC Glucose Once [[id number]]  (Normal) Collected: 07/26/25 2045    Specimen: Blood Updated: 07/26/25 2049     Glucose 121 mg/dL     POC Glucose Once [[id number]]  (Normal) Collected: 07/26/25 1546    Specimen: Blood Updated: 07/26/25 1547     Glucose 96 mg/dL     POC Glucose Once [[id number]]  (Abnormal) Collected: 07/26/25 1514    Specimen: Blood Updated: 07/26/25 1515     Glucose 54 mg/dL     POC Glucose Once [[id number]]  (Normal) Collected: 07/26/25 1132    Specimen: Blood Updated: 07/26/25 1133     Glucose 80 mg/dL     POC Glucose Once [[id number]]  (Normal) Collected: 07/26/25 0815    Specimen: Blood Updated: 07/26/25 0816     Glucose 104 mg/dL     Phosphorus [[id number]]  (Normal) Collected: 07/26/25 0507    Specimen: Blood Updated: 07/26/25 0547     Phosphorus 3.3 mg/dL     Basic Metabolic Panel  [[id number]]  (Abnormal) Collected: 07/26/25 0507    Specimen: Blood Updated: 07/26/25 0544     Glucose 103 mg/dL      BUN 26.0 mg/dL      Creatinine 1.71 mg/dL      Sodium 143 mmol/L      Potassium 3.7 mmol/L      Chloride 107 mmol/L      CO2 24.6 mmol/L      Calcium 9.1 mg/dL      BUN/Creatinine Ratio 15.2     Anion Gap 11.4 mmol/L      eGFR 33.3 mL/min/1.73     Narrative:      GFR Categories in Chronic Kidney Disease (CKD)              GFR Category          GFR (mL/min/1.73)    Interpretation  G1                    90 or greater        Normal or high (1)  G2                    60-89                Mild decrease (1)  G3a                   45-59                Mild to moderate decrease  G3b                   30-44                Moderate to severe decrease  G4                    15-29                Severe decrease  G5                    14 or less           Kidney failure    (1)In the absence of evidence of kidney disease, neither GFR category G1 or G2 fulfill the criteria for CKD.    eGFR calculation 2021 CKD-EPI creatinine equation, which does not include race as a factor    Magnesium [[id number]]  (Normal) Collected: 07/26/25 0507    Specimen: Blood Updated: 07/26/25 0544     Magnesium 1.7 mg/dL     CBC (No Diff) [[id number]]  (Abnormal) Collected: 07/26/25 0507    Specimen: Blood Updated: 07/26/25 0525     WBC 7.49 10*3/mm3      RBC 3.66 10*6/mm3      Hemoglobin 10.9 g/dL      Hematocrit 33.7 %      MCV 92.1 fL      MCH 29.8 pg      MCHC 32.3 g/dL      RDW 12.9 %      RDW-SD 43.4 fl      MPV 9.2 fL      Platelets 264 10*3/mm3     POC Glucose Once [[id number]]  (Normal) Collected: 07/25/25 2153    Specimen: Blood Updated: 07/25/25 2154     Glucose 93 mg/dL     POC Glucose Once [[id number]]  (Abnormal) Collected: 07/25/25 2116    Specimen: Blood Updated: 07/25/25 2118     Glucose 66 mg/dL     POC Glucose Once [[id number]]  (Normal) Collected: 07/25/25 1659    Specimen: Blood Updated: 07/25/25 1700     Glucose 79 mg/dL            Data Review:  Results from last 7 days   Lab Units 07/29/25  0536 07/28/25  0754 07/27/25  0804   SODIUM mmol/L 141 142 139   POTASSIUM mmol/L 3.9 3.8 3.8   CHLORIDE mmol/L 107 107 104   CO2 mmol/L 24.9 25.5 25.0   BUN mg/dL 18.0 19.0 20.0   CREATININE mg/dL 1.03* 1.43* 1.20*   GLUCOSE mg/dL 122* 143* 139*   CALCIUM mg/dL 9.2 9.2 8.9     Results from last 7 days   Lab Units 07/29/25  0536 07/28/25  0754 07/27/25  0804   WBC 10*3/mm3 6.20 6.23 6.12   HEMOGLOBIN g/dL 11.4* 11.4* 11.6*   HEMATOCRIT % 36.4 36.2 35.3   PLATELETS 10*3/mm3 243 242 277     Results from last 7 days   Lab Units 07/25/25  0538   TSH uIU/mL 0.865     Results from last 7 days   Lab Units 07/24/25  0523   HEMOGLOBIN A1C % 6.40*     Lab Results   Lab Value Date/Time    TROPONINT 13 07/24/2025 0522    TROPONINT 16 (H) 07/23/2025 2135    TROPONINT 17 (H) 07/23/2025 2029    TROPONINT <0.010 01/10/2023 1136         Results from last 7 days   Lab Units 07/23/25 2029   ALK PHOS U/L 99   BILIRUBIN mg/dL <0.2   ALT (SGPT) U/L 7   AST (SGOT) U/L 8     Results from last 7 days   Lab Units 07/25/25  0538   TSH uIU/mL 0.865     Results from last 7 days   Lab Units 07/24/25  0523   HEMOGLOBIN A1C % 6.40*     Glucose   Date/Time Value Ref Range Status   07/29/2025 1135 119 70 - 130 mg/dL Final   07/29/2025 0735 157 (H) 70 - 130 mg/dL Final   07/28/2025 2052 116 70 - 130 mg/dL Final   07/28/2025 1707 100 70 - 130 mg/dL Final   07/28/2025 1124 159 (H) 70 - 130 mg/dL Final   07/28/2025 0746 148 (H) 70 - 130 mg/dL Final   07/27/2025 2102 108 70 - 130 mg/dL Final   07/27/2025 1650 91 70 - 130 mg/dL Final           Past Medical History:   Diagnosis Date    Allergic Years ago    Took allergy shots    Anxiety     Stroke    Arthritis     FEET    Asthma     Inhaler    Clotting disorder     Taking asa    COPD (chronic obstructive pulmonary disease)     At times bronchitis    Depression 10/22/01    Stroke    Diabetes mellitus     GERD (gastroesophageal reflux  disease) 1987    Gerd    Hyperlipidemia     Hypertension     Obesity 1988    Reflux esophagitis     Renal insufficiency 2021    Stroke     Urinary tract infection     Occasionaly    Visual impairment 10/2001    1/2 vision each eye       Assessment:  Active Hospital Problems    Diagnosis  POA    **Cellulitis [L03.90]  Yes    Fluid overload [E87.70]  Unknown    COPD (chronic obstructive pulmonary disease) [J44.9]  Unknown    BMI 45.0-49.9, adult [Z68.42]  Not Applicable    Proteinuria [R80.9]  Unknown    Stage 3a chronic kidney disease [N18.31]  Yes    Pulmonary nodule [R91.1]  Yes    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       Plan:  Continue with antibiotics for cellulitis.  Cardiology and nephrology consults noted.  Still making some adjustments in medication and creatinine is better today.  May need little more diuresis.  DC planning.  May be home in 1 or 2 days if doing a little better and stable.    Ash Sparrow MD  7/29/2025  13:44 EDT

## 2025-07-29 NOTE — PROGRESS NOTES
"[facility]    Patient Name: Bernice Rai  :1960  64 y.o.  LOS: 5  Encounter Provider: AMANDA Weinstein      Patient Care Team:  Ashley Holt APRN as PCP - General (Family Medicine)    Chief Complaint: Follow-up HTN, CHF    Interval History: Patient currently asymptomatic       Objective   Vital Signs  Temp:  [96.8 °F (36 °C)-97.4 °F (36.3 °C)] 97.1 °F (36.2 °C)  Heart Rate:  [64-78] 76  Resp:  [16-20] 16  BP: (154-180)/(60-75) 180/74    Intake/Output Summary (Last 24 hours) at 2025 0921  Last data filed at 2025 0427  Gross per 24 hour   Intake 720 ml   Output 1450 ml   Net -730 ml     Flowsheet Rows      Flowsheet Row First Filed Value   Admission Height 157.5 cm (62.01\") Documented at 2025   Admission Weight 125 kg (276 lb) Documented at 2025              Vitals and nursing note reviewed.   Constitutional:       Appearance: Normal appearance. Well-developed. Morbidly obese.   Eyes:      Conjunctiva/sclera: Conjunctivae normal.   Neck:      Vascular: No carotid bruit.   Pulmonary:      Breath sounds: Normal breath sounds.   Cardiovascular:      Normal rate. Regular rhythm. Normal S1 with normal intensity. Normal S2 with normal intensity.       Murmurs: There is no murmur.      No gallop.  No click. No rub.   Edema:     Peripheral edema absent.   Musculoskeletal: Normal range of motion. Skin:     General: Skin is warm and dry.   Neurological:      Mental Status: Alert and oriented to person, place, and time.      GCS: GCS eye subscore is 4. GCS verbal subscore is 5. GCS motor subscore is 6.   Psychiatric:         Speech: Speech normal.         Behavior: Behavior normal.         Thought Content: Thought content normal.         Judgment: Judgment normal.           Pertinent Test Results:  Results from last 7 days   Lab Units 25  0536 25  0754 25  0804 25  0507 25  0538 25  0522 25   SODIUM mmol/L " "141 142 139 143 139 142 142   POTASSIUM mmol/L 3.9 3.8 3.8 3.7 4.1 4.4 4.1   CHLORIDE mmol/L 107 107 104 107 105 111* 111*   CO2 mmol/L 24.9 25.5 25.0 24.6 20.7* 20.2* 23.0   BUN mg/dL 18.0 19.0 20.0 26.0* 24.0* 17.0 15.6   CREATININE mg/dL 1.03* 1.43* 1.20* 1.71* 1.50* 1.18* 1.13*   GLUCOSE mg/dL 122* 143* 139* 103* 168* 227* 104*   CALCIUM mg/dL 9.2 9.2 8.9 9.1 9.7 9.4 9.7   AST (SGOT) U/L  --   --   --   --   --   --  8   ALT (SGPT) U/L  --   --   --   --   --   --  7     Results from last 7 days   Lab Units 07/24/25  0522 07/23/25 2135 07/23/25 2029   HSTROP T ng/L 13 16* 17*     Results from last 7 days   Lab Units 07/29/25  0536 07/28/25  0754 07/27/25  0804 07/26/25  0507 07/25/25  0538 07/24/25 0523 07/23/25 2029   WBC 10*3/mm3 6.20 6.23 6.12 7.49 9.86 9.42 8.22   HEMOGLOBIN g/dL 11.4* 11.4* 11.6* 10.9* 11.6* 11.6* 11.2*   HEMATOCRIT % 36.4 36.2 35.3 33.7* 35.1 36.4 36.3   PLATELETS 10*3/mm3 243 242 277 264 307 299 289         Results from last 7 days   Lab Units 07/29/25  0536 07/28/25  0754 07/27/25  0804 07/26/25  0507 07/25/25  0538   MAGNESIUM mg/dL 1.8 1.7 1.8 1.7 1.9           Invalid input(s): \"LDLCALC\"  Results from last 7 days   Lab Units 07/23/25 2029   PROBNP pg/mL 883.7     Results from last 7 days   Lab Units 07/25/25  0538   TSH uIU/mL 0.865           Medication Review:   budesonide, 0.5 mg, Nebulization, BID - RT   And  arformoterol, 15 mcg, Nebulization, BID - RT  aspirin, 81 mg, Oral, Daily  atenolol, 100 mg, Oral, Daily  busPIRone, 7.5 mg, Oral, Daily  ceFAZolin, 2,000 mg, Intravenous, Q8H  enoxaparin sodium, 40 mg, Subcutaneous, Q12H  hydrALAZINE, 50 mg, Oral, Q8H  insulin glargine, 15 Units, Subcutaneous, Daily  insulin lispro, 2-9 Units, Subcutaneous, 4x Daily AC & at Bedtime  [Held by provider] insulin lispro, 6 Units, Subcutaneous, TID With Meals  ipratropium-albuterol, 3 mL, Nebulization, Q6H While Awake - RT  Lidocaine, 1 patch, Transdermal, Q24H  lisinopril, 20 mg, Oral, " BID  miconazole, 1 Application, Topical, Q12H  nicotine, 1 patch, Transdermal, Q24H  NIFEdipine XL, 60 mg, Oral, BID  rosuvastatin, 20 mg, Oral, Daily  sodium bicarbonate, 650 mg, Oral, TID  sodium chloride, 10 mL, Intravenous, Q12H  terazosin, 2 mg, Oral, Nightly  venlafaxine XR, 150 mg, Oral, Daily              Assessment & Plan     Active Hospital Problems    Diagnosis  POA    **Cellulitis [L03.90]  Yes    Fluid overload [E87.70]  Unknown    COPD (chronic obstructive pulmonary disease) [J44.9]  Unknown    BMI 45.0-49.9, adult [Z68.42]  Not Applicable    Proteinuria [R80.9]  Unknown    Stage 3a chronic kidney disease [N18.31]  Yes    Pulmonary nodule [R91.1]  Yes    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        Acute on chronic HFpEF, NYHA class II likely secondary to hypertension and obesity  Resistant hypertension, improved  BP improved but continues to be running on the higher side  Continue the following regimen:  Atenolol 100 mg/day  Hydralazine 50 mg 3 times daily  Lisinopril 20 mg twice daily  Nifedipine 60 mg twice daily  Left abdominal wall cellulitis, improved  Defer to internal medicine, on IV antibiotics  COPD with continued tobacco abuse  DM2  Prior CVA  On aspirin  Fibrillary glomerulonephritis, stage II-III CKD  Nephrology following and managing diuretics    Plan: Patient will need outpatient sleep study.  Continue to trend BP.  No adjustments made today, I do not want patient's blood pressure to bottom out once drugs reach steady state             AMANDA Weinstein  [facility]   [facility]  [address] [place] [id number]  Office: [phone]    07/29/25  09:21  EDT

## 2025-07-29 NOTE — PLAN OF CARE
Goal Outcome Evaluation:  Plan of Care Reviewed With: patient, spouse        Progress: improving  Outcome Evaluation: Pt A+Ox4. VSS, BP improved. PRN Norco ordered for pain in L back area below ribs. Voiding adequately. 1500mL fluid restriction in effect. No hypoglycemic episodes this shift. IV antibiotics continued as ordered. Pt voices no needs at this time.

## 2025-07-29 NOTE — PROGRESS NOTES
"                                                                                                            [facility]/ [facility]                                                                     Nephrology Progress Note                                                                                LOS: 5 days     Chief Complaint/ Reason for encounter: Chronic kidney disease    Subjective   07/29/25 :   Chart reviewed  Feels well  No new complaints    Vital Signs  Temp:  [96.8 °F (36 °C)-97.4 °F (36.3 °C)] 97.1 °F (36.2 °C)  Heart Rate:  [64-78] 77  Resp:  [16-20] 16  BP: (154-180)/(60-75) 180/74       Wt Readings from Last 1 Encounters:   07/28/25 0631 120 kg (265 lb 4.8 oz)   07/27/25 0645 121 kg (266 lb 1.6 oz)   07/26/25 0600 123 kg (270 lb 11.6 oz)   07/25/25 0617 123 kg (270 lb 12.8 oz)   07/24/25 1653 123 kg (271 lb)   07/24/25 0344 123 kg (271 lb 6.2 oz)   07/23/25 1955 125 kg (276 lb)       Objective:  Vital signs: (most recent): Blood pressure 180/74, pulse 76, temperature 97.1 °F (36.2 °C), temperature source Oral, resp. rate 16, height 157.5 cm (62\"), weight 120 kg (265 lb 4.8 oz), SpO2 93%, not currently breastfeeding.                Objective:  General Appearance:  Comfortable, -appearing, in no acute distress and not in pain.  Awake, alert  HEENT: Mucous membranes moist, no injury, oropharynx clear  Lungs:  Normal effort and normal respiratory rate.  Breath sounds clear to auscultation.  No  respiratory distress.  No rales, decreased breath sounds or rhonchi.    Heart: Normal rate.  Regular rhythm.  S1, S2 normal.  No murmur.   Abdomen: Abdomen is soft.  Bowel sounds are normal, no abdominal tenderness.  There is no rebound or guarding  Extremities:  postive edema of bilateral lower extremities  Skin:  Warm and dry with no rashes      Results Review:    Intake/Output:     Intake/Output Summary (Last 24 hours) at 7/29/2025 0612  Last data filed at 7/29/2025 0427  Gross per 24 hour   Intake 770 " ml   Output 1450 ml   Net -680 ml       Labs:   Recent Results (from the past 24 hours)   POC Glucose Once    Collection Time: 07/28/25  7:46 AM    Specimen: Blood   Result Value Ref Range    Glucose 148 (H) 70 - 130 mg/dL   Basic Metabolic Panel    Collection Time: 07/28/25  7:54 AM    Specimen: Blood   Result Value Ref Range    Glucose 143 (H) 65 - 99 mg/dL    BUN 19.0 8.0 - 23.0 mg/dL    Creatinine 1.43 (H) 0.57 - 1.00 mg/dL    Sodium 142 136 - 145 mmol/L    Potassium 3.8 3.5 - 5.2 mmol/L    Chloride 107 98 - 107 mmol/L    CO2 25.5 22.0 - 29.0 mmol/L    Calcium 9.2 8.6 - 10.5 mg/dL    BUN/Creatinine Ratio 13.3 7.0 - 25.0    Anion Gap 9.5 5.0 - 15.0 mmol/L    eGFR 41.3 (L) >60.0 mL/min/1.73   CBC (No Diff)    Collection Time: 07/28/25  7:54 AM    Specimen: Blood   Result Value Ref Range    WBC 6.23 3.40 - 10.80 10*3/mm3    RBC 3.84 3.77 - 5.28 10*6/mm3    Hemoglobin 11.4 (L) 12.0 - 15.9 g/dL    Hematocrit 36.2 34.0 - 46.6 %    MCV 94.3 79.0 - 97.0 fL    MCH 29.7 26.6 - 33.0 pg    MCHC 31.5 31.5 - 35.7 g/dL    RDW 12.8 12.3 - 15.4 %    RDW-SD 43.7 37.0 - 54.0 fl    MPV 9.5 6.0 - 12.0 fL    Platelets 242 140 - 450 10*3/mm3   Magnesium    Collection Time: 07/28/25  7:54 AM    Specimen: Blood   Result Value Ref Range    Magnesium 1.7 1.6 - 2.4 mg/dL   Phosphorus    Collection Time: 07/28/25  7:54 AM    Specimen: Blood   Result Value Ref Range    Phosphorus 2.7 2.5 - 4.5 mg/dL   POC Glucose Once    Collection Time: 07/28/25 11:24 AM    Specimen: Blood   Result Value Ref Range    Glucose 159 (H) 70 - 130 mg/dL   POC Glucose Once    Collection Time: 07/28/25  5:07 PM    Specimen: Blood   Result Value Ref Range    Glucose 100 70 - 130 mg/dL   POC Glucose Once    Collection Time: 07/28/25  8:52 PM    Specimen: Blood   Result Value Ref Range    Glucose 116 70 - 130 mg/dL       Radiology:  Pertinent radiology studies were reviewed as described above      Medications have been reviewed separately in chart  overview      ASSESSMENT / PLAN  ASSESSMENT:   Fibrillary glomerulonephritis, seen on prior renal biopsy.  Recent improvement with combination of ACE inhibitor and Kerendia  Edema/volume overload    Cellulitis    Diabetes mellitus    Hyperlipidemia    Essential hypertension    Obesity due to excess calories    Tobacco dependence syndrome    Fibrillary glomerulonephritis    Pulmonary nodule    Stage 3a chronic kidney disease    Fluid overload    COPD (chronic obstructive pulmonary disease)    BMI 45.0-49.9, adult           DISCUSSION/PLAN:     Renal function improved - at or better than her baseline   Continue with lisinopril for proteinuria.   Increase Hydralazine  Kerendia stopped due to hyperkalemia. With plans to restart at lower dose as outpatient (she also had DM2, so she qualifies for it).   She looks euvolemic today.   Electrolytes at goal   Check labs in am   Will follow        José Miguel Abrams MD  [facility]  Office phone number: [phone]  Answering service phone number: [phone]  07/29/25  06:12 EDT

## 2025-07-29 NOTE — PLAN OF CARE
Goal Outcome Evaluation:  Plan of Care Reviewed With: patient        Progress: improving  Outcome Evaluation: A&O x4, VSS, Bp's better control with medications, no c/o pain or nausea, on RA, SB assist to BR with wlaker, IV ABX continued, no c/o pain or nausea,  at bedside, possible D/C in am 7/29, plan of care ongoing, call light with in reach

## 2025-07-30 ENCOUNTER — READMISSION MANAGEMENT (OUTPATIENT)
Dept: CALL CENTER | Facility: HOSPITAL | Age: 65
End: 2025-07-30
Payer: MEDICARE

## 2025-07-30 VITALS
RESPIRATION RATE: 18 BRPM | BODY MASS INDEX: 49.28 KG/M2 | HEIGHT: 62 IN | SYSTOLIC BLOOD PRESSURE: 141 MMHG | WEIGHT: 267.8 LBS | TEMPERATURE: 97.3 F | OXYGEN SATURATION: 95 % | DIASTOLIC BLOOD PRESSURE: 61 MMHG | HEART RATE: 74 BPM

## 2025-07-30 LAB
ANION GAP SERPL CALCULATED.3IONS-SCNC: 11 MMOL/L (ref 5–15)
BUN SERPL-MCNC: 22 MG/DL (ref 8–23)
BUN/CREAT SERPL: 18.6 (ref 7–25)
CALCIUM SPEC-SCNC: 8.8 MG/DL (ref 8.6–10.5)
CHLORIDE SERPL-SCNC: 106 MMOL/L (ref 98–107)
CO2 SERPL-SCNC: 24 MMOL/L (ref 22–29)
CREAT SERPL-MCNC: 1.18 MG/DL (ref 0.57–1)
DEPRECATED RDW RBC AUTO: 41 FL (ref 37–54)
EGFRCR SERPLBLD CKD-EPI 2021: 52 ML/MIN/1.73
ERYTHROCYTE [DISTWIDTH] IN BLOOD BY AUTOMATED COUNT: 12.3 % (ref 12.3–15.4)
GLUCOSE BLDC GLUCOMTR-MCNC: 130 MG/DL (ref 65–99)
GLUCOSE SERPL-MCNC: 162 MG/DL (ref 65–99)
HCT VFR BLD AUTO: 35 % (ref 34–46.6)
HGB BLD-MCNC: 11.3 G/DL (ref 12–15.9)
MAGNESIUM SERPL-MCNC: 1.7 MG/DL (ref 1.6–2.4)
MCH RBC QN AUTO: 29.7 PG (ref 26.6–33)
MCHC RBC AUTO-ENTMCNC: 32.3 G/DL (ref 31.5–35.7)
MCV RBC AUTO: 91.9 FL (ref 79–97)
PHOSPHATE SERPL-MCNC: 2.8 MG/DL (ref 2.5–4.5)
PLATELET # BLD AUTO: 235 10*3/MM3 (ref 140–450)
PMV BLD AUTO: 9.7 FL (ref 6–12)
POTASSIUM SERPL-SCNC: 3.8 MMOL/L (ref 3.5–5.2)
RBC # BLD AUTO: 3.81 10*6/MM3 (ref 3.77–5.28)
SODIUM SERPL-SCNC: 141 MMOL/L (ref 136–145)
WBC NRBC COR # BLD AUTO: 6.25 10*3/MM3 (ref 3.4–10.8)

## 2025-07-30 PROCEDURE — 94799 UNLISTED PULMONARY SVC/PX: CPT

## 2025-07-30 PROCEDURE — 63710000001 INSULIN GLARGINE PER 5 UNITS: Performed by: INTERNAL MEDICINE

## 2025-07-30 PROCEDURE — 85027 COMPLETE CBC AUTOMATED: CPT | Performed by: HOSPITALIST

## 2025-07-30 PROCEDURE — 84100 ASSAY OF PHOSPHORUS: CPT | Performed by: HOSPITALIST

## 2025-07-30 PROCEDURE — 99232 SBSQ HOSP IP/OBS MODERATE 35: CPT | Performed by: NURSE PRACTITIONER

## 2025-07-30 PROCEDURE — 25010000002 CEFAZOLIN PER 500 MG: Performed by: INTERNAL MEDICINE

## 2025-07-30 PROCEDURE — 25010000002 ENOXAPARIN PER 10 MG: Performed by: HOSPITALIST

## 2025-07-30 PROCEDURE — 83735 ASSAY OF MAGNESIUM: CPT | Performed by: HOSPITALIST

## 2025-07-30 PROCEDURE — 94664 DEMO&/EVAL PT USE INHALER: CPT

## 2025-07-30 PROCEDURE — 94761 N-INVAS EAR/PLS OXIMETRY MLT: CPT

## 2025-07-30 PROCEDURE — 80048 BASIC METABOLIC PNL TOTAL CA: CPT | Performed by: HOSPITALIST

## 2025-07-30 PROCEDURE — 82948 REAGENT STRIP/BLOOD GLUCOSE: CPT

## 2025-07-30 RX ORDER — HYDROCODONE BITARTRATE AND ACETAMINOPHEN 5; 325 MG/1; MG/1
1 TABLET ORAL EVERY 4 HOURS PRN
Qty: 20 TABLET | Refills: 0 | Status: SHIPPED | OUTPATIENT
Start: 2025-07-30 | End: 2025-08-03

## 2025-07-30 RX ORDER — CEPHALEXIN 500 MG/1
500 CAPSULE ORAL 3 TIMES DAILY
Qty: 21 CAPSULE | Refills: 0 | Status: SHIPPED | OUTPATIENT
Start: 2025-07-30 | End: 2025-08-06

## 2025-07-30 RX ORDER — MICONAZOLE NITRATE 2 G/100G
1 CREAM TOPICAL 2 TIMES DAILY PRN
Qty: 56.8 G | Refills: 0 | Status: SHIPPED | OUTPATIENT
Start: 2025-07-30

## 2025-07-30 RX ORDER — HYDRALAZINE HYDROCHLORIDE 25 MG/1
75 TABLET, FILM COATED ORAL EVERY 8 HOURS SCHEDULED
Qty: 270 TABLET | Refills: 0 | Status: SHIPPED | OUTPATIENT
Start: 2025-07-30 | End: 2025-08-29

## 2025-07-30 RX ADMIN — ENOXAPARIN SODIUM 40 MG: 100 INJECTION SUBCUTANEOUS at 08:16

## 2025-07-30 RX ADMIN — NICOTINE 1 PATCH: 21 PATCH, EXTENDED RELEASE TRANSDERMAL at 08:17

## 2025-07-30 RX ADMIN — VENLAFAXINE HYDROCHLORIDE 150 MG: 37.5 CAPSULE, EXTENDED RELEASE ORAL at 08:16

## 2025-07-30 RX ADMIN — ARFORMOTEROL TARTRATE 15 MCG: 15 SOLUTION RESPIRATORY (INHALATION) at 08:20

## 2025-07-30 RX ADMIN — ASPIRIN 81 MG CHEWABLE TABLET 81 MG: 81 TABLET CHEWABLE at 08:17

## 2025-07-30 RX ADMIN — Medication 10 ML: at 08:17

## 2025-07-30 RX ADMIN — BUDESONIDE 0.5 MG: 0.5 INHALANT RESPIRATORY (INHALATION) at 08:20

## 2025-07-30 RX ADMIN — IPRATROPIUM BROMIDE AND ALBUTEROL SULFATE 3 ML: .5; 3 SOLUTION RESPIRATORY (INHALATION) at 08:20

## 2025-07-30 RX ADMIN — CEFAZOLIN 2000 MG: 2 INJECTION, POWDER, FOR SOLUTION INTRAMUSCULAR; INTRAVENOUS at 08:15

## 2025-07-30 RX ADMIN — NIFEDIPINE 60 MG: 60 TABLET, EXTENDED RELEASE ORAL at 08:16

## 2025-07-30 RX ADMIN — HYDROCODONE BITARTRATE AND ACETAMINOPHEN 1 TABLET: 5; 325 TABLET ORAL at 07:10

## 2025-07-30 RX ADMIN — ANTI-FUNGAL POWDER MICONAZOLE NITRATE TALC FREE 1 APPLICATION: 1.42 POWDER TOPICAL at 08:17

## 2025-07-30 RX ADMIN — LISINOPRIL 20 MG: 20 TABLET ORAL at 08:17

## 2025-07-30 RX ADMIN — SODIUM BICARBONATE 650 MG: 650 TABLET ORAL at 08:15

## 2025-07-30 RX ADMIN — CEFAZOLIN 2000 MG: 2 INJECTION, POWDER, FOR SOLUTION INTRAMUSCULAR; INTRAVENOUS at 00:05

## 2025-07-30 RX ADMIN — ATENOLOL 100 MG: 50 TABLET ORAL at 08:16

## 2025-07-30 RX ADMIN — HYDROCODONE BITARTRATE AND ACETAMINOPHEN 1 TABLET: 5; 325 TABLET ORAL at 11:53

## 2025-07-30 RX ADMIN — HYDRALAZINE HYDROCHLORIDE 75 MG: 25 TABLET ORAL at 06:24

## 2025-07-30 RX ADMIN — BUSPIRONE HYDROCHLORIDE 7.5 MG: 15 TABLET ORAL at 08:17

## 2025-07-30 RX ADMIN — INSULIN GLARGINE 15 UNITS: 100 INJECTION, SOLUTION SUBCUTANEOUS at 08:15

## 2025-07-30 RX ADMIN — ROSUVASTATIN CALCIUM 20 MG: 20 TABLET, FILM COATED ORAL at 08:16

## 2025-07-30 NOTE — PROGRESS NOTES
"                                                                                                         [facility]/ [facility]                                                                     Nephrology Progress Note                                                                                LOS: 6 days     Chief Complaint/ Reason for encounter: Chronic kidney disease    Subjective   Chart reviewed  Feels well  No new complaints    Vital Signs  Temp:  [96.8 °F (36 °C)-97.6 °F (36.4 °C)] 97.6 °F (36.4 °C)  Heart Rate:  [65-76] 71  Resp:  [16-18] 16  BP: (137-161)/(57-70) 139/58       Wt Readings from Last 1 Encounters:   07/28/25 0631 120 kg (265 lb 4.8 oz)   07/27/25 0645 121 kg (266 lb 1.6 oz)   07/26/25 0600 123 kg (270 lb 11.6 oz)   07/25/25 0617 123 kg (270 lb 12.8 oz)   07/24/25 1653 123 kg (271 lb)   07/24/25 0344 123 kg (271 lb 6.2 oz)   07/23/25 1955 125 kg (276 lb)       Objective:  Vital signs: (most recent): Blood pressure 141/61, pulse 74, temperature 97.3 °F (36.3 °C), temperature source Oral, resp. rate 18, height 157.5 cm (62\"), weight 121 kg (267 lb 12.8 oz), SpO2 95%, not currently breastfeeding.                Objective:  General Appearance:  Comfortable, -appearing, in no acute distress and not in pain.  Awake, alert  HEENT: Mucous membranes moist, no injury, oropharynx clear  Lungs:  Normal effort and normal respiratory rate.  Breath sounds clear to auscultation.  No  respiratory distress.  No rales, decreased breath sounds or rhonchi.    Heart: Normal rate.  Regular rhythm.  S1, S2 normal.  No murmur.   Abdomen: Abdomen is soft.  Bowel sounds are normal, no abdominal tenderness.  There is no rebound or guarding  Extremities:  postive edema of bilateral lower extremities  Skin:  Warm and dry with no rashes      Results Review:    Intake/Output:     Intake/Output Summary (Last 24 hours) at 7/30/2025 0637  Last data filed at 7/30/2025 0442  Gross per 24 hour   Intake 1050 ml   Output " 1600 ml   Net -550 ml       Labs:   Recent Results (from the past 24 hours)   POC Glucose Once    Collection Time: 07/29/25  7:35 AM    Specimen: Blood   Result Value Ref Range    Glucose 157 (H) 70 - 130 mg/dL   POC Glucose Once    Collection Time: 07/29/25 11:35 AM    Specimen: Blood   Result Value Ref Range    Glucose 119 70 - 130 mg/dL   POC Glucose Once    Collection Time: 07/29/25  4:45 PM    Specimen: Blood   Result Value Ref Range    Glucose 109 70 - 130 mg/dL   POC Glucose Once    Collection Time: 07/29/25  8:05 PM    Specimen: Blood   Result Value Ref Range    Glucose 125 (H) 65 - 99 mg/dL   POC Glucose Once    Collection Time: 07/30/25  6:23 AM    Specimen: Blood   Result Value Ref Range    Glucose 130 (H) 65 - 99 mg/dL       Radiology:  Pertinent radiology studies were reviewed as described above      Medications have been reviewed separately in chart overview      ASSESSMENT / PLAN  ASSESSMENT:   Fibrillary glomerulonephritis, seen on prior renal biopsy.  Recent improvement with combination of ACE inhibitor and Kerendia  Edema/volume overload    Cellulitis    Diabetes mellitus    Hyperlipidemia    Essential hypertension    Obesity due to excess calories    Tobacco dependence syndrome    Fibrillary glomerulonephritis    Pulmonary nodule    Stage 3a chronic kidney disease    Fluid overload    COPD (chronic obstructive pulmonary disease)    BMI 45.0-49.9, adult           DISCUSSION/PLAN:     Renal function stable - at baseline   Continue with Lisinopril for proteinuria.   Continue Hydralazine  Kerendia stopped due to hyperkalemia - reassess outpatient   She looks euvolemic today.   Electrolytes at goal   Check labs in am   Dispo per primary  Will follow        José Miguel Abrams MD  [facility]  Office phone number: [phone]  Answering service phone number: [phone]  07/30/25  06:37 EDT

## 2025-07-30 NOTE — PLAN OF CARE
Goal Outcome Evaluation:  Plan of Care Reviewed With: patient        Progress: improving  Outcome Evaluation: A&o x4, standby assist to BR, VSS BP's controlled, PRN painmeds given x1, family at bedside, IV ABX continued, possible D/C 7/30, plan of care ongoig, call light with in reach

## 2025-07-30 NOTE — PROGRESS NOTES
"[facility]    Patient Name: Bernice Rai  :1960  64 y.o.  LOS: 6  Encounter Provider: AMANDA Weinstein      Patient Care Team:  Ashley Holt APRN as PCP - General (Family Medicine)    Chief Complaint: Follow-up HTN, CHF    Interval History: Patient currently asymptomatic.  BP more controlled this morning       Objective   Vital Signs  Temp:  [96.8 °F (36 °C)-97.6 °F (36.4 °C)] 97.3 °F (36.3 °C)  Heart Rate:  [65-76] 74  Resp:  [16-18] 18  BP: (137-161)/(57-70) 141/61    Intake/Output Summary (Last 24 hours) at 2025 0932  Last data filed at 2025 0442  Gross per 24 hour   Intake 840 ml   Output 1600 ml   Net -760 ml     Flowsheet Rows      Flowsheet Row First Filed Value   Admission Height 157.5 cm (62.01\") Documented at 2025   Admission Weight 125 kg (276 lb) Documented at 2025              Vitals and nursing note reviewed.   Constitutional:       Appearance: Normal appearance. Well-developed. Morbidly obese.   Eyes:      Conjunctiva/sclera: Conjunctivae normal.   Neck:      Vascular: No carotid bruit.   Pulmonary:      Breath sounds: Normal breath sounds.   Cardiovascular:      Normal rate. Regular rhythm. Normal S1 with normal intensity. Normal S2 with normal intensity.       Murmurs: There is no murmur.      No gallop.  No click. No rub.   Edema:     Peripheral edema absent.   Musculoskeletal: Normal range of motion. Skin:     General: Skin is warm and dry.   Neurological:      Mental Status: Alert and oriented to person, place, and time.      GCS: GCS eye subscore is 4. GCS verbal subscore is 5. GCS motor subscore is 6.   Psychiatric:         Speech: Speech normal.         Behavior: Behavior normal.         Thought Content: Thought content normal.         Judgment: Judgment normal.           Pertinent Test Results:  Results from last 7 days   Lab Units 25  0726 25  0536 25  0754 25  0804 25  0507 " "07/25/25  0538 07/24/25  0522 07/23/25 2029   SODIUM mmol/L 141 141 142 139 143 139 142 142   POTASSIUM mmol/L 3.8 3.9 3.8 3.8 3.7 4.1 4.4 4.1   CHLORIDE mmol/L 106 107 107 104 107 105 111* 111*   CO2 mmol/L 24.0 24.9 25.5 25.0 24.6 20.7* 20.2* 23.0   BUN mg/dL 22.0 18.0 19.0 20.0 26.0* 24.0* 17.0 15.6   CREATININE mg/dL 1.18* 1.03* 1.43* 1.20* 1.71* 1.50* 1.18* 1.13*   GLUCOSE mg/dL 162* 122* 143* 139* 103* 168* 227* 104*   CALCIUM mg/dL 8.8 9.2 9.2 8.9 9.1 9.7 9.4 9.7   AST (SGOT) U/L  --   --   --   --   --   --   --  8   ALT (SGPT) U/L  --   --   --   --   --   --   --  7     Results from last 7 days   Lab Units 07/24/25  0522 07/23/25 2135 07/23/25 2029   HSTROP T ng/L 13 16* 17*     Results from last 7 days   Lab Units 07/30/25  0726 07/29/25  0536 07/28/25  0754 07/27/25  0804 07/26/25  0507 07/25/25  0538 07/24/25  0523   WBC 10*3/mm3 6.25 6.20 6.23 6.12 7.49 9.86 9.42   HEMOGLOBIN g/dL 11.3* 11.4* 11.4* 11.6* 10.9* 11.6* 11.6*   HEMATOCRIT % 35.0 36.4 36.2 35.3 33.7* 35.1 36.4   PLATELETS 10*3/mm3 235 243 242 277 264 307 299         Results from last 7 days   Lab Units 07/30/25  0726 07/29/25  0536 07/28/25  0754 07/27/25  0804 07/26/25  0507 07/25/25  0538   MAGNESIUM mg/dL 1.7 1.8 1.7 1.8 1.7 1.9           Invalid input(s): \"LDLCALC\"  Results from last 7 days   Lab Units 07/23/25 2029   PROBNP pg/mL 883.7     Results from last 7 days   Lab Units 07/25/25  0538   TSH uIU/mL 0.865           Medication Review:   budesonide, 0.5 mg, Nebulization, BID - RT   And  arformoterol, 15 mcg, Nebulization, BID - RT  aspirin, 81 mg, Oral, Daily  atenolol, 100 mg, Oral, Daily  busPIRone, 7.5 mg, Oral, Daily  ceFAZolin, 2,000 mg, Intravenous, Q8H  enoxaparin sodium, 40 mg, Subcutaneous, Q12H  hydrALAZINE, 75 mg, Oral, Q8H  insulin glargine, 15 Units, Subcutaneous, Daily  insulin lispro, 2-9 Units, Subcutaneous, 4x Daily AC & at Bedtime  [Held by provider] insulin lispro, 6 Units, Subcutaneous, TID With " Meals  ipratropium-albuterol, 3 mL, Nebulization, Q6H While Awake - RT  Lidocaine, 1 patch, Transdermal, Q24H  lisinopril, 20 mg, Oral, BID  miconazole, 1 Application, Topical, Q12H  nicotine, 1 patch, Transdermal, Q24H  NIFEdipine XL, 60 mg, Oral, BID  rosuvastatin, 20 mg, Oral, Daily  sodium bicarbonate, 650 mg, Oral, TID  sodium chloride, 10 mL, Intravenous, Q12H  terazosin, 2 mg, Oral, Nightly  venlafaxine XR, 150 mg, Oral, Daily              Assessment & Plan     Active Hospital Problems    Diagnosis  POA    **Cellulitis [L03.90]  Yes    Fluid overload [E87.70]  Unknown    COPD (chronic obstructive pulmonary disease) [J44.9]  Unknown    BMI 45.0-49.9, adult [Z68.42]  Not Applicable    Proteinuria [R80.9]  Unknown    Stage 3a chronic kidney disease [N18.31]  Yes    Pulmonary nodule [R91.1]  Yes    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        Acute on chronic HFpEF, NYHA class II likely secondary to hypertension and obesity  Resistant hypertension, improved  BP improved but continues to be running on the higher side  Continue the following regimen:  Atenolol 100 mg/day  Hydralazine 50 mg 3 times daily  Lisinopril 20 mg twice daily  Nifedipine 60 mg twice daily  Left abdominal wall cellulitis, improved  Defer to internal medicine, on IV antibiotics  COPD with continued tobacco abuse  DM2  Prior CVA  On aspirin  Fibrillary glomerulonephritis, stage II-III CKD  Nephrology following and managing diuretics    Plan: Patient will need outpatient sleep study.  Continue current regimen.  Patient stable from cardiovascular standpoint.  Okay for discharge.  She will need a 2-week hospital follow-up visit, my clinic will arrange.  We will sign off.  Please  notify us as needed.             AMANDA Weinstein  [facility]   [facility]  [address]  [place] [address]  Office: [phone]    07/30/25  09:32 EDT

## 2025-07-30 NOTE — OUTREACH NOTE
Prep Survey      Flowsheet Row Responses   [facility] facility patient discharged from? [facility]   Is LACE score < 7 ? No   Eligibility [facility]   Date of Admission 07/23/25   Date of Discharge 07/30/25   Discharge Disposition Home or Self Care   Discharge diagnosis Cellulitis   Does the patient have one of the following disease processes/diagnoses(primary or secondary)? Other   Does the patient have Home health ordered? No   Is there a DME ordered? Yes   What DME was ordered? [employer] for rolling walker   Prep survey completed? Yes            DEV A - Registered Nurse

## 2025-07-30 NOTE — DISCHARGE SUMMARY
PHYSICIAN DISCHARGE SUMMARY                                                                        [facility]    Patient Identification:  Name: Bernice Rai  Age: 64 y.o.  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          PMHX:   Past Medical History:   Diagnosis Date    Allergic Years ago    Took allergy shots    Anxiety     Stroke    Arthritis     FEET    Asthma     Inhaler    Clotting disorder     Taking asa    COPD (chronic obstructive pulmonary disease)     At times bronchitis    Depression 10/22/01    Stroke    Diabetes mellitus     GERD (gastroesophageal reflux disease)     Gerd    Hyperlipidemia     Hypertension     Obesity     Reflux esophagitis     Renal insufficiency     Stroke     Urinary tract infection     Occasionaly    Visual impairment 10/2001    1/2 vision each eye     PSHX:   Past Surgical History:   Procedure Laterality Date     SECTION      x 2    OTHER SURGICAL HISTORY      FATTY TUMOR REMOVED RIGHT HIP    UMBILICAL HERNIA REPAIR      Mid abdominal hernia       Hospital Course: Berniceeloise Rai    is a 64 y.o. female presented with 1 week shortness of breath, wheezing, leg swelling, dyspnea on exertion, weight gain (20 pounds). She has a history of stroke, smoking, hypertension, diabetes, COPD/asthma, CKD 3. [facility] noted that she complained of abdomen swollen red and tender on the left side however she tells me she did not have any abdominal pain. She was given ceftriaxone, steroids, IV Lasix, DuoNebs. She states she has urinated a lot and her breathing is improved.  The patient was admitted in the hospital and treated with IV antibiotics for abdominal wall cellulitis.  The patient was seen by nephrology and cardiology and had adjustments of her medicines.  She was feeling better after being in the hospital for few days and looked well enough to go home.  She will follow-up with her primary care in 1 week for ongoing care.  She will finish a few more days of oral antibiotics for abdominal wall cellulitis.    Consults:     Consults       Date and Time Order Name Status Description    7/24/2025  7:54 AM Inpatient Nephrology Consult Completed     7/24/2025  3:14 AM Inpatient Cardiology Consult Completed           Results from last 7 days   Lab Units 07/30/25  0726   WBC 10*3/mm3 6.25   HEMOGLOBIN g/dL 11.3*   HEMATOCRIT % 35.0   PLATELETS 10*3/mm3 235     Results from last 7 days   Lab Units 07/30/25  0726   SODIUM mmol/L 141   POTASSIUM mmol/L 3.8   CHLORIDE mmol/L 106   CO2 mmol/L 24.0   BUN mg/dL 22.0   CREATININE mg/dL 1.18*   GLUCOSE mg/dL 162*   CALCIUM mg/dL 8.8     Significant Diagnostic Studies:   WBC   Date Value Ref Range Status   07/30/2025 6.25 3.40 - 10.80 10*3/mm3 Final     Hemoglobin   Date Value Ref Range Status   07/30/2025 11.3 (L) 12.0 - 15.9 g/dL Final     Hematocrit   Date Value Ref Range Status   07/30/2025 35.0 34.0 - 46.6 % Final     Platelets   Date Value Ref Range Status   07/30/2025 235 140 - 450 10*3/mm3 Final     Sodium   Date Value Ref Range Status   07/30/2025 141 136 - 145 mmol/L  "Final     Potassium   Date Value Ref Range Status   07/30/2025 3.8 3.5 - 5.2 mmol/L Final     Chloride   Date Value Ref Range Status   07/30/2025 106 98 - 107 mmol/L Final     CO2   Date Value Ref Range Status   07/30/2025 24.0 22.0 - 29.0 mmol/L Final     BUN   Date Value Ref Range Status   07/30/2025 22.0 8.0 - 23.0 mg/dL Final     Creatinine   Date Value Ref Range Status   07/30/2025 1.18 (H) 0.57 - 1.00 mg/dL Final     Glucose   Date Value Ref Range Status   07/30/2025 162 (H) 65 - 99 mg/dL Final     Calcium   Date Value Ref Range Status   07/30/2025 8.8 8.6 - 10.5 mg/dL Final     Magnesium   Date Value Ref Range Status   07/30/2025 1.7 1.6 - 2.4 mg/dL Final     Phosphorus   Date Value Ref Range Status   07/30/2025 2.8 2.5 - 4.5 mg/dL Final     No results found for: \"AST\", \"ALT\", \"ALKPHOS\"  No results found for: \"APTT\", \"INR\"  No results found for: \"COLORU\", \"CLARITYU\", \"SPECGRAV\", \"PHUR\", \"PROTEINUR\", \"GLUCOSEU\", \"KETONESU\", \"BLOODU\", \"NITRITE\", \"LEUKOCYTESUR\", \"BILIRUBINUR\", \"UROBILINOGEN\", \"RBCUA\", \"WBCUA\", \"BACTERIA\", \"UACOMMENT\"  No results found for: \"TROPONINT\", \"TROPONINI\", \"BNP\"  No components found for: \"HGBA1C;2\"  No components found for: \"TSH;2\"  Imaging Results (All)       Procedure Component Value Units Date/Time    CT Chest With Contrast Diagnostic [[id number]] Collected: 07/23/25 2323     Updated: 07/23/25 2205    Narrative:      CT OF THE CHEST WITH CONTRAST; CT OF THE ABDOMEN AND PELVIS WITH  CONTRAST     HISTORY: Lower abdominal pain and possible effusion     COMPARISON: March 15, 2024     TECHNIQUE: Axial CT imaging was obtained from the thoracic inlet through  the symphysis pubis. IV contrast was administered.     FINDINGS:  Chest: Examination was not optimized for assessment of the vasculature.  A large central pulmonary thromboembolus is not seen. There is no  dissection. There do appear to be some coronary artery calcifications. 4  mm nodule abuts the minor fissure within the right middle " lobe. The  patient has left basilar atelectasis. The thyroid gland, trachea, and  esophagus appear unremarkable. Metastatically enlarged. No acute osseous  abnormalities are seen.     Abdomen and pelvis: Possible tiny cyst is noted within the left hepatic  lobe. The stomach, duodenum, adrenal glands, spleen, pancreas, and  gallbladder are all normal. The kidney. There is no hydronephrosis.  There is a simple appearing left renal cyst. There are aortoiliac  calcifications. Urinary bladder is relatively contracted. Uterus appears  normal. No adnexal masses are seen. There is no bowel obstruction. There  is colonic diverticulosis. The appendix is normal. The patient has soft  tissue stranding involving the lower anterior abdominal wall,  particularly on the left, as well as some skin thickening. This may be  related to some dependent edema, although correlation with any evidence  of infection is recommended. There is some additional edema which is  seen overlying the right gluteal region. There is mild lumbar scoliosis,  with convexity to the left. No acute osseous abnormalities are seen.       Impression:      1. Left basilar atelectasis.  2. Soft tissue stranding and skin thickening involving the lower  anterior abdominal wall. This may be related to dependent edema,  although correlation with any evidence of cellulitis is recommended. No  soft tissue gas is seen.        3. Indeterminate solid pulmonary nodule measuring 4 mm. In a high-risk  patient with a solid nodule <6 mm, CT at 12 months is optional with  stronger consideration if there is suspicious nodule morphology and/or  upper lobe location.     Radiation dose reduction techniques were utilized, including automated  exposure control and exposure modulation based on body size.        This report was finalized on 7/23/2025 11:32 PM by Dr. Karina Sears M.D on Workstation: [id number]       CT Abdomen Pelvis With Contrast [[id number]] Collected: 07/23/25  2323     Updated: 07/23/25 2335    Narrative:      CT OF THE CHEST WITH CONTRAST; CT OF THE ABDOMEN AND PELVIS WITH  CONTRAST     HISTORY: Lower abdominal pain and possible effusion     COMPARISON: March 15, 2024     TECHNIQUE: Axial CT imaging was obtained from the thoracic inlet through  the symphysis pubis. IV contrast was administered.     FINDINGS:  Chest: Examination was not optimized for assessment of the vasculature.  A large central pulmonary thromboembolus is not seen. There is no  dissection. There do appear to be some coronary artery calcifications. 4  mm nodule abuts the minor fissure within the right middle lobe. The  patient has left basilar atelectasis. The thyroid gland, trachea, and  esophagus appear unremarkable. Metastatically enlarged. No acute osseous  abnormalities are seen.     Abdomen and pelvis: Possible tiny cyst is noted within the left hepatic  lobe. The stomach, duodenum, adrenal glands, spleen, pancreas, and  gallbladder are all normal. The kidney. There is no hydronephrosis.  There is a simple appearing left renal cyst. There are aortoiliac  calcifications. Urinary bladder is relatively contracted. Uterus appears  normal. No adnexal masses are seen. There is no bowel obstruction. There  is colonic diverticulosis. The appendix is normal. The patient has soft  tissue stranding involving the lower anterior abdominal wall,  particularly on the left, as well as some skin thickening. This may be  related to some dependent edema, although correlation with any evidence  of infection is recommended. There is some additional edema which is  seen overlying the right gluteal region. There is mild lumbar scoliosis,  with convexity to the left. No acute osseous abnormalities are seen.       Impression:      1. Left basilar atelectasis.  2. Soft tissue stranding and skin thickening involving the lower  anterior abdominal wall. This may be related to dependent edema,  although correlation with any  evidence of cellulitis is recommended. No  soft tissue gas is seen.        3. Indeterminate solid pulmonary nodule measuring 4 mm. In a high-risk  patient with a solid nodule <6 mm, CT at 12 months is optional with  stronger consideration if there is suspicious nodule morphology and/or  upper lobe location.     Radiation dose reduction techniques were utilized, including automated  exposure control and exposure modulation based on body size.        This report was finalized on 7/23/2025 11:32 PM by Dr. Karina Sears M.D on Workstation: [id number]       XR Chest 1 View [[id number]] Collected: 07/23/25 2131     Updated: 07/23/25 2138    Narrative:      SINGLE VIEW OF THE CHEST     HISTORY: Shortness of air     COMPARISON: March 15, 2024     FINDINGS:  There is cardiomegaly. There is vascular congestion. The patient has  some bandlike atelectasis versus scarring within the left midlung. No  pneumothorax is seen. There is blunting the left costophrenic angle, and  small effusion is not excluded.       Impression:      Cardiomegaly with vascular congestion.     This report was finalized on 7/23/2025 9:35 PM by Dr. Karina Sears M.D on Workstation: [id number]             Lab Results (last 7 days)       Procedure Component Value Units Date/Time    Basic Metabolic Panel [[id number]]  (Abnormal) Collected: 07/30/25 0726    Specimen: Blood Updated: 07/30/25 0810     Glucose 162 mg/dL      BUN 22.0 mg/dL      Creatinine 1.18 mg/dL      Sodium 141 mmol/L      Potassium 3.8 mmol/L      Chloride 106 mmol/L      CO2 24.0 mmol/L      Calcium 8.8 mg/dL      BUN/Creatinine Ratio 18.6     Anion Gap 11.0 mmol/L      eGFR 52.0 mL/min/1.73     Narrative:      GFR Categories in Chronic Kidney Disease (CKD)              GFR Category          GFR (mL/min/1.73)    Interpretation  G1                    90 or greater        Normal or high (1)  G2                    60-89                Mild decrease (1)  G3a                   45-59                 Mild to moderate decrease  G3b                   30-44                Moderate to severe decrease  G4                    15-29                Severe decrease  G5                    14 or less           Kidney failure    (1)In the absence of evidence of kidney disease, neither GFR category G1 or G2 fulfill the criteria for CKD.    eGFR calculation 2021 CKD-EPI creatinine equation, which does not include race as a factor    Magnesium [[id number]]  (Normal) Collected: 07/30/25 0726    Specimen: Blood Updated: 07/30/25 0810     Magnesium 1.7 mg/dL     Phosphorus [[id number]]  (Normal) Collected: 07/30/25 0726    Specimen: Blood Updated: 07/30/25 0810     Phosphorus 2.8 mg/dL     CBC (No Diff) [[id number]]  (Abnormal) Collected: 07/30/25 0726    Specimen: Blood Updated: 07/30/25 0749     WBC 6.25 10*3/mm3      RBC 3.81 10*6/mm3      Hemoglobin 11.3 g/dL      Hematocrit 35.0 %      MCV 91.9 fL      MCH 29.7 pg      MCHC 32.3 g/dL      RDW 12.3 %      RDW-SD 41.0 fl      MPV 9.7 fL      Platelets 235 10*3/mm3     POC Glucose Once [[id number]]  (Abnormal) Collected: 07/30/25 0623    Specimen: Blood Updated: 07/30/25 0627     Glucose 130 mg/dL      Comment: Serial Number: [id number]:  [id number]       POC Glucose Once [[id number]]  (Abnormal) Collected: 07/29/25 2005    Specimen: Blood Updated: 07/29/25 2008     Glucose 125 mg/dL      Comment: Serial Number: [id number]:  [id number]       POC Glucose Once [[id number]]  (Normal) Collected: 07/29/25 1645    Specimen: Blood Updated: 07/29/25 1646     Glucose 109 mg/dL     POC Glucose Once [[id number]]  (Normal) Collected: 07/29/25 1135    Specimen: Blood Updated: 07/29/25 1137     Glucose 119 mg/dL     POC Glucose Once [[id number]]  (Abnormal) Collected: 07/29/25 0735    Specimen: Blood Updated: 07/29/25 0737     Glucose 157 mg/dL     Basic Metabolic Panel [[id number]]  (Abnormal) Collected: 07/29/25 0536    Specimen: Blood Updated: 07/29/25 0707     Glucose  122 mg/dL      BUN 18.0 mg/dL      Creatinine 1.03 mg/dL      Sodium 141 mmol/L      Potassium 3.9 mmol/L      Chloride 107 mmol/L      CO2 24.9 mmol/L      Calcium 9.2 mg/dL      BUN/Creatinine Ratio 17.5     Anion Gap 9.1 mmol/L      eGFR 61.2 mL/min/1.73     Narrative:      GFR Categories in Chronic Kidney Disease (CKD)              GFR Category          GFR (mL/min/1.73)    Interpretation  G1                    90 or greater        Normal or high (1)  G2                    60-89                Mild decrease (1)  G3a                   45-59                Mild to moderate decrease  G3b                   30-44                Moderate to severe decrease  G4                    15-29                Severe decrease  G5                    14 or less           Kidney failure    (1)In the absence of evidence of kidney disease, neither GFR category G1 or G2 fulfill the criteria for CKD.    eGFR calculation 2021 CKD-EPI creatinine equation, which does not include race as a factor    Magnesium [[id number]]  (Normal) Collected: 07/29/25 0536    Specimen: Blood Updated: 07/29/25 0707     Magnesium 1.8 mg/dL     Phosphorus [[id number]]  (Normal) Collected: 07/29/25 0536    Specimen: Blood Updated: 07/29/25 0707     Phosphorus 2.7 mg/dL     CBC (No Diff) [[id number]]  (Abnormal) Collected: 07/29/25 0536    Specimen: Blood Updated: 07/29/25 0649     WBC 6.20 10*3/mm3      RBC 3.85 10*6/mm3      Hemoglobin 11.4 g/dL      Hematocrit 36.4 %      MCV 94.5 fL      MCH 29.6 pg      MCHC 31.3 g/dL      RDW 12.6 %      RDW-SD 43.5 fl      MPV 9.5 fL      Platelets 243 10*3/mm3     POC Glucose Once [[id number]]  (Normal) Collected: 07/28/25 2052    Specimen: Blood Updated: 07/28/25 2053     Glucose 116 mg/dL     POC Glucose Once [[id number]]  (Normal) Collected: 07/28/25 1707    Specimen: Blood Updated: 07/28/25 1709     Glucose 100 mg/dL     POC Glucose Once [[id number]]  (Abnormal) Collected: 07/28/25 1124    Specimen: Blood Updated:  07/28/25 1127     Glucose 159 mg/dL     Basic Metabolic Panel [[id number]]  (Abnormal) Collected: 07/28/25 0754    Specimen: Blood Updated: 07/28/25 0859     Glucose 143 mg/dL      BUN 19.0 mg/dL      Creatinine 1.43 mg/dL      Sodium 142 mmol/L      Potassium 3.8 mmol/L      Chloride 107 mmol/L      CO2 25.5 mmol/L      Calcium 9.2 mg/dL      BUN/Creatinine Ratio 13.3     Anion Gap 9.5 mmol/L      eGFR 41.3 mL/min/1.73     Narrative:      GFR Categories in Chronic Kidney Disease (CKD)              GFR Category          GFR (mL/min/1.73)    Interpretation  G1                    90 or greater        Normal or high (1)  G2                    60-89                Mild decrease (1)  G3a                   45-59                Mild to moderate decrease  G3b                   30-44                Moderate to severe decrease  G4                    15-29                Severe decrease  G5                    14 or less           Kidney failure    (1)In the absence of evidence of kidney disease, neither GFR category G1 or G2 fulfill the criteria for CKD.    eGFR calculation 2021 CKD-EPI creatinine equation, which does not include race as a factor    Magnesium [[id number]]  (Normal) Collected: 07/28/25 0754    Specimen: Blood Updated: 07/28/25 0859     Magnesium 1.7 mg/dL     Phosphorus [[id number]]  (Normal) Collected: 07/28/25 0754    Specimen: Blood Updated: 07/28/25 0859     Phosphorus 2.7 mg/dL     CBC (No Diff) [[id number]]  (Abnormal) Collected: 07/28/25 0754    Specimen: Blood Updated: 07/28/25 0832     WBC 6.23 10*3/mm3      RBC 3.84 10*6/mm3      Hemoglobin 11.4 g/dL      Hematocrit 36.2 %      MCV 94.3 fL      MCH 29.7 pg      MCHC 31.5 g/dL      RDW 12.8 %      RDW-SD 43.7 fl      MPV 9.5 fL      Platelets 242 10*3/mm3     POC Glucose Once [[id number]]  (Abnormal) Collected: 07/28/25 0746    Specimen: Blood Updated: 07/28/25 0748     Glucose 148 mg/dL     POC Glucose Once [[id number]]  (Normal) Collected: 07/27/25 2102     Specimen: Blood Updated: 07/27/25 2103     Glucose 108 mg/dL     POC Glucose Once [[id number]]  (Normal) Collected: 07/27/25 1650    Specimen: Blood Updated: 07/27/25 1653     Glucose 91 mg/dL     POC Glucose Once [[id number]]  (Abnormal) Collected: 07/27/25 1627    Specimen: Blood Updated: 07/27/25 1629     Glucose 59 mg/dL     POC Glucose Once [[id number]]  (Abnormal) Collected: 07/27/25 1111    Specimen: Blood Updated: 07/27/25 1114     Glucose 197 mg/dL     Basic Metabolic Panel [[id number]]  (Abnormal) Collected: 07/27/25 0804    Specimen: Blood Updated: 07/27/25 0843     Glucose 139 mg/dL      BUN 20.0 mg/dL      Creatinine 1.20 mg/dL      Sodium 139 mmol/L      Potassium 3.8 mmol/L      Chloride 104 mmol/L      CO2 25.0 mmol/L      Calcium 8.9 mg/dL      BUN/Creatinine Ratio 16.7     Anion Gap 10.0 mmol/L      eGFR 51.0 mL/min/1.73     Narrative:      GFR Categories in Chronic Kidney Disease (CKD)              GFR Category          GFR (mL/min/1.73)    Interpretation  G1                    90 or greater        Normal or high (1)  G2                    60-89                Mild decrease (1)  G3a                   45-59                Mild to moderate decrease  G3b                   30-44                Moderate to severe decrease  G4                    15-29                Severe decrease  G5                    14 or less           Kidney failure    (1)In the absence of evidence of kidney disease, neither GFR category G1 or G2 fulfill the criteria for CKD.    eGFR calculation 2021 CKD-EPI creatinine equation, which does not include race as a factor    Magnesium [[id number]]  (Normal) Collected: 07/27/25 0804    Specimen: Blood Updated: 07/27/25 0843     Magnesium 1.8 mg/dL     Phosphorus [[id number]]  (Normal) Collected: 07/27/25 0804    Specimen: Blood Updated: 07/27/25 0843     Phosphorus 3.0 mg/dL     CBC (No Diff) [[id number]]  (Abnormal) Collected: 07/27/25 0804    Specimen: Blood Updated: 07/27/25 0824     WBC  6.12 10*3/mm3      RBC 3.87 10*6/mm3      Hemoglobin 11.6 g/dL      Hematocrit 35.3 %      MCV 91.2 fL      MCH 30.0 pg      MCHC 32.9 g/dL      RDW 13.2 %      RDW-SD 44.1 fl      MPV 9.3 fL      Platelets 277 10*3/mm3     POC Glucose Once [[id number]]  (Abnormal) Collected: 07/27/25 0803    Specimen: Blood Updated: 07/27/25 0804     Glucose 142 mg/dL     POC Glucose Once [[id number]]  (Normal) Collected: 07/26/25 2045    Specimen: Blood Updated: 07/26/25 2049     Glucose 121 mg/dL     POC Glucose Once [[id number]]  (Normal) Collected: 07/26/25 1546    Specimen: Blood Updated: 07/26/25 1547     Glucose 96 mg/dL     POC Glucose Once [[id number]]  (Abnormal) Collected: 07/26/25 1514    Specimen: Blood Updated: 07/26/25 1515     Glucose 54 mg/dL     POC Glucose Once [[id number]]  (Normal) Collected: 07/26/25 1132    Specimen: Blood Updated: 07/26/25 1133     Glucose 80 mg/dL     POC Glucose Once [[id number]]  (Normal) Collected: 07/26/25 0815    Specimen: Blood Updated: 07/26/25 0816     Glucose 104 mg/dL     Phosphorus [[id number]]  (Normal) Collected: 07/26/25 0507    Specimen: Blood Updated: 07/26/25 0547     Phosphorus 3.3 mg/dL     Basic Metabolic Panel [[id number]]  (Abnormal) Collected: 07/26/25 0507    Specimen: Blood Updated: 07/26/25 0544     Glucose 103 mg/dL      BUN 26.0 mg/dL      Creatinine 1.71 mg/dL      Sodium 143 mmol/L      Potassium 3.7 mmol/L      Chloride 107 mmol/L      CO2 24.6 mmol/L      Calcium 9.1 mg/dL      BUN/Creatinine Ratio 15.2     Anion Gap 11.4 mmol/L      eGFR 33.3 mL/min/1.73     Narrative:      GFR Categories in Chronic Kidney Disease (CKD)              GFR Category          GFR (mL/min/1.73)    Interpretation  G1                    90 or greater        Normal or high (1)  G2                    60-89                Mild decrease (1)  G3a                   45-59                Mild to moderate decrease  G3b                   30-44                Moderate to severe decrease  G4                     15-29                Severe decrease  G5                    14 or less           Kidney failure    (1)In the absence of evidence of kidney disease, neither GFR category G1 or G2 fulfill the criteria for CKD.    eGFR calculation 2021 CKD-EPI creatinine equation, which does not include race as a factor    Magnesium [[id number]]  (Normal) Collected: 07/26/25 0507    Specimen: Blood Updated: 07/26/25 0544     Magnesium 1.7 mg/dL     CBC (No Diff) [[id number]]  (Abnormal) Collected: 07/26/25 0507    Specimen: Blood Updated: 07/26/25 0525     WBC 7.49 10*3/mm3      RBC 3.66 10*6/mm3      Hemoglobin 10.9 g/dL      Hematocrit 33.7 %      MCV 92.1 fL      MCH 29.8 pg      MCHC 32.3 g/dL      RDW 12.9 %      RDW-SD 43.4 fl      MPV 9.2 fL      Platelets 264 10*3/mm3     POC Glucose Once [[id number]]  (Normal) Collected: 07/25/25 2153    Specimen: Blood Updated: 07/25/25 2154     Glucose 93 mg/dL     POC Glucose Once [[id number]]  (Abnormal) Collected: 07/25/25 2116    Specimen: Blood Updated: 07/25/25 2118     Glucose 66 mg/dL     POC Glucose Once [[id number]]  (Normal) Collected: 07/25/25 1659    Specimen: Blood Updated: 07/25/25 1700     Glucose 79 mg/dL     POC Glucose Once [[id number]]  (Abnormal) Collected: 07/25/25 1143    Specimen: Blood Updated: 07/25/25 1144     Glucose 177 mg/dL     Basic Metabolic Panel [[id number]]  (Abnormal) Collected: 07/25/25 0538    Specimen: Blood Updated: 07/25/25 0747     Glucose 168 mg/dL      BUN 24.0 mg/dL      Creatinine 1.50 mg/dL      Sodium 139 mmol/L      Potassium 4.1 mmol/L      Chloride 105 mmol/L      CO2 20.7 mmol/L      Calcium 9.7 mg/dL      BUN/Creatinine Ratio 16.0     Anion Gap 13.3 mmol/L      eGFR 39.0 mL/min/1.73     Narrative:      GFR Categories in Chronic Kidney Disease (CKD)              GFR Category          GFR (mL/min/1.73)    Interpretation  G1                    90 or greater        Normal or high (1)  G2                    60-89                Mild decrease  (1)  G3a                   45-59                Mild to moderate decrease  G3b                   30-44                Moderate to severe decrease  G4                    15-29                Severe decrease  G5                    14 or less           Kidney failure    (1)In the absence of evidence of kidney disease, neither GFR category G1 or G2 fulfill the criteria for CKD.    eGFR calculation 2021 CKD-EPI creatinine equation, which does not include race as a factor    Magnesium [[id number]]  (Normal) Collected: 07/25/25 0538    Specimen: Blood Updated: 07/25/25 0747     Magnesium 1.9 mg/dL     Phosphorus [[id number]]  (Normal) Collected: 07/25/25 0538    Specimen: Blood Updated: 07/25/25 0746     Phosphorus 4.5 mg/dL     TSH Rfx On Abnormal To Free T4 [[id number]]  (Normal) Collected: 07/25/25 0538    Specimen: Blood Updated: 07/25/25 0746     TSH 0.865 uIU/mL     POC Glucose Once [[id number]]  (Abnormal) Collected: 07/25/25 0738    Specimen: Blood Updated: 07/25/25 0739     Glucose 177 mg/dL     CBC (No Diff) [[id number]]  (Abnormal) Collected: 07/25/25 0538    Specimen: Blood Updated: 07/25/25 0630     WBC 9.86 10*3/mm3      RBC 3.83 10*6/mm3      Hemoglobin 11.6 g/dL      Hematocrit 35.1 %      MCV 91.6 fL      MCH 30.3 pg      MCHC 33.0 g/dL      RDW 12.8 %      RDW-SD 42.9 fl      MPV 9.4 fL      Platelets 307 10*3/mm3     POC Glucose Once [[id number]]  (Abnormal) Collected: 07/24/25 2023    Specimen: Blood Updated: 07/24/25 2026     Glucose 179 mg/dL     POC Glucose Once [[id number]]  (Abnormal) Collected: 07/24/25 1725    Specimen: Blood Updated: 07/24/25 1729     Glucose 152 mg/dL     Protein, Urine, Random - Urine, Clean Catch [[id number]] Collected: 07/24/25 1056    Specimen: Urine, Clean Catch Updated: 07/24/25 1622     Total Protein, Urine 174.6 mg/dL     Narrative:      Reference intervals for random urine have not been established.  Clinical usage is dependent upon physician's interpretation in combination  with other laboratory tests.       Creatinine Urine Random (kidney function) GFR component - Urine, Clean Catch [[id number]] Collected: 07/24/25 1056    Specimen: Urine, Clean Catch Updated: 07/24/25 1622     Creatinine, Urine 30.5 mg/dL     Narrative:      Reference intervals for random urine have not been established.  Clinical usage is dependent upon physician's interpretation in combination with other laboratory tests.       POC Glucose Once [[id number]]  (Abnormal) Collected: 07/24/25 1135    Specimen: Blood Updated: 07/24/25 1137     Glucose 203 mg/dL     Urinalysis With Culture If Indicated - Urine, Clean Catch [[id number]]  (Abnormal) Collected: 07/24/25 1056    Specimen: Urine, Clean Catch Updated: 07/24/25 1103     Color, UA Yellow     Appearance, UA Clear     pH, UA <=5.0     Specific Gravity, UA 1.016     Glucose, UA Negative     Ketones, UA Negative     Bilirubin, UA Negative     Blood, UA Trace     Protein, UA >=300 mg/dL (3+)     Leuk Esterase, UA Negative     Nitrite, UA Negative     Urobilinogen, UA 0.2 E.U./dL    Narrative:      In absence of clinical symptoms, the presence of pyuria, bacteria, and/or nitrites on the urinalysis result does not correlate with infection.    Urinalysis, Microscopic Only - Urine, Clean Catch [[id number]] Collected: 07/24/25 1056    Specimen: Urine, Clean Catch Updated: 07/24/25 1103     RBC, UA 0-2 /HPF      WBC, UA 0-2 /HPF      Comment: Urine culture not indicated.        Bacteria, UA None Seen /HPF      Squamous Epithelial Cells, UA 0-2 /HPF      Hyaline Casts, UA 7-12 /LPF      Methodology Automated Microscopy    Hemoglobin A1c [[id number]]  (Abnormal) Collected: 07/24/25 0523    Specimen: Blood Updated: 07/24/25 0829     Hemoglobin A1C 6.40 %     Narrative:      Hemoglobin A1C Ranges:    Increased Risk for Diabetes  5.7% to 6.4%  Diabetes                     >= 6.5%  Diabetic Goal                < 7.0%    POC Glucose Once [[id number]]  (Abnormal) Collected: 07/24/25  0730    Specimen: Blood Updated: 07/24/25 0731     Glucose 233 mg/dL     Basic Metabolic Panel [[id number]]  (Abnormal) Collected: 07/24/25 0522    Specimen: Blood Updated: 07/24/25 0656     Glucose 227 mg/dL      BUN 17.0 mg/dL      Creatinine 1.18 mg/dL      Sodium 142 mmol/L      Potassium 4.4 mmol/L      Chloride 111 mmol/L      CO2 20.2 mmol/L      Calcium 9.4 mg/dL      BUN/Creatinine Ratio 14.4     Anion Gap 10.8 mmol/L      eGFR 52.0 mL/min/1.73     Narrative:      GFR Categories in Chronic Kidney Disease (CKD)              GFR Category          GFR (mL/min/1.73)    Interpretation  G1                    90 or greater        Normal or high (1)  G2                    60-89                Mild decrease (1)  G3a                   45-59                Mild to moderate decrease  G3b                   30-44                Moderate to severe decrease  G4                    15-29                Severe decrease  G5                    14 or less           Kidney failure    (1)In the absence of evidence of kidney disease, neither GFR category G1 or G2 fulfill the criteria for CKD.    eGFR calculation 2021 CKD-EPI creatinine equation, which does not include race as a factor    High Sensitivity Troponin T [[id number]]  (Normal) Collected: 07/24/25 0522    Specimen: Blood Updated: 07/24/25 0656     HS Troponin T 13 ng/L     Narrative:      High Sensitive Troponin T Reference Range:  <14.0 ng/L- Negative Female for AMI  <22.0 ng/L- Negative Male for AMI  >=14 - Abnormal Female indicating possible myocardial injury.  >=22 - Abnormal Male indicating possible myocardial injury.   Clinicians would have to utilize clinical acumen, EKG, Troponin, and serial changes to determine if it is an Acute Myocardial Infarction or myocardial injury due to an underlying chronic condition.         CBC (No Diff) [[id number]]  (Abnormal) Collected: 07/24/25 0523    Specimen: Blood Updated: 07/24/25 0633     WBC 9.42 10*3/mm3      RBC 3.91 10*6/mm3       Hemoglobin 11.6 g/dL      Hematocrit 36.4 %      MCV 93.1 fL      MCH 29.7 pg      MCHC 31.9 g/dL      RDW 12.7 %      RDW-SD 42.5 fl      MPV 9.5 fL      Platelets 299 10*3/mm3     High Sensitivity Troponin T 1Hr [[id number]]  (Abnormal) Collected: 07/23/25 2135    Specimen: Blood Updated: 07/23/25 2156     HS Troponin T 16 ng/L      Troponin T Numeric Delta -1 ng/L      Troponin T % Delta -6    Narrative:      High Sensitive Troponin T Reference Range:  <14.0 ng/L- Negative Female for AMI  <22.0 ng/L- Negative Male for AMI  >=14 - Abnormal Female indicating possible myocardial injury.  >=22 - Abnormal Male indicating possible myocardial injury.   Clinicians would have to utilize clinical acumen, EKG, Troponin, and serial changes to determine if it is an Acute Myocardial Infarction or myocardial injury due to an underlying chronic condition.         BNP [[id number]]  (Normal) Collected: 07/23/25 2029    Specimen: Blood Updated: 07/23/25 2059     proBNP 883.7 pg/mL     Narrative:      This assay is used as an aid in the diagnosis of individuals suspected of having heart failure. It can be used as an aid in the diagnosis of acute decompensated heart failure (ADHF) in patients presenting with signs and symptoms of ADHF to the emergency department (ED). In addition, NT-proBNP of <300 pg/mL indicates ADHF is not likely.    Age Range Result Interpretation  NT-proBNP Concentration (pg/mL:      <50             Positive            >450                   Gray                 300-450                    Negative             <300    50-75           Positive            >900                  Gray                300-900                  Negative            <300      >75             Positive            >1800                  Gray                300-1800                  Negative            <300    High Sensitivity Troponin T [[id number]]  (Abnormal) Collected: 07/23/25 2029    Specimen: Blood Updated: 07/23/25 2059     HS  Troponin T 17 ng/L     Narrative:      High Sensitive Troponin T Reference Range:  <14.0 ng/L- Negative Female for AMI  <22.0 ng/L- Negative Male for AMI  >=14 - Abnormal Female indicating possible myocardial injury.  >=22 - Abnormal Male indicating possible myocardial injury.   Clinicians would have to utilize clinical acumen, EKG, Troponin, and serial changes to determine if it is an Acute Myocardial Infarction or myocardial injury due to an underlying chronic condition.         Comprehensive Metabolic Panel [[id number]]  (Abnormal) Collected: 07/23/25 2029    Specimen: Blood Updated: 07/23/25 2052     Glucose 104 mg/dL      BUN 15.6 mg/dL      Creatinine 1.13 mg/dL      Sodium 142 mmol/L      Potassium 4.1 mmol/L      Chloride 111 mmol/L      CO2 23.0 mmol/L      Calcium 9.7 mg/dL      Total Protein 5.7 g/dL      Albumin 3.4 g/dL      ALT (SGPT) 7 U/L      AST (SGOT) 8 U/L      Alkaline Phosphatase 99 U/L      Total Bilirubin <0.2 mg/dL      Globulin 2.3 gm/dL      A/G Ratio 1.5 g/dL      BUN/Creatinine Ratio 13.8     Anion Gap 8.0 mmol/L      eGFR 54.8 mL/min/1.73     Narrative:      GFR Categories in Chronic Kidney Disease (CKD)              GFR Category          GFR (mL/min/1.73)    Interpretation  G1                    90 or greater        Normal or high (1)  G2                    60-89                Mild decrease (1)  G3a                   45-59                Mild to moderate decrease  G3b                   30-44                Moderate to severe decrease  G4                    15-29                Severe decrease  G5                    14 or less           Kidney failure    (1)In the absence of evidence of kidney disease, neither GFR category G1 or G2 fulfill the criteria for CKD.    eGFR calculation 2021 CKD-EPI creatinine equation, which does not include race as a factor    D-dimer, Quantitative [[id number]]  (Normal) Collected: 07/23/25 2029    Specimen: Blood Updated: 07/23/25 2052     D-Dimer,  "Quantitative 0.60 MCGFEU/mL     Narrative:      According to the assay 's published package insert, a normal (<0.50 MCGFEU/mL) D-dimer result in conjunction with a non-high clinical probability assessment, excludes deep vein thrombosis (DVT) and pulmonary embolism (PE) with high sensitivity.    D-dimer values increase with age and this can make VTE exclusion of an older population difficult. To address this, the [employer], based on best available evidence and recent guidelines, recommends that clinicians use age-adjusted D-dimer thresholds in patients greater than 50 years of age with: a) a low probability of PE who do not meet all Pulmonary Embolism Rule Out Criteria, or b) in those with intermediate probability of PE.   The formula for an age-adjusted D-dimer cut-off is \"age/100\".  For example, a 60 year old patient would have an age-adjusted cut-off of 0.60 MCGFEU/mL and an 80 year old 0.80 MCGFEU/mL.    Roy Draw [[id number]] Collected: 07/23/25 2029    Specimen: Blood Updated: 07/23/25 2045    Narrative:      The following orders were created for panel order Roy Draw.  Procedure                               Abnormality         Status                     ---------                               -----------         ------                     Green Top (Gel)[[id number]]                                  Final result               Lavender Top[[id number]]                                     Final result               Gold Top - SST[[id number]]                                   Final result               Light Blue Top[[id number]]                                   Final result               Green Top (Gel)[[id number]]                                                               Please view results for these tests on the individual orders.    Green Top (Gel) [[id number]] Collected: 07/23/25 2029    Specimen: Blood Updated: 07/23/25 2045     Extra Tube Hold for add-ons.     Comment: " "Auto resulted.       Lavender Top [[id number]] Collected: 07/23/25 2029    Specimen: Blood Updated: 07/23/25 2045     Extra Tube hold for add-on     Comment: Auto resulted       Gold Top - SST [[id number]] Collected: 07/23/25 2029    Specimen: Blood Updated: 07/23/25 2045     Extra Tube Hold for add-ons.     Comment: Auto resulted.       Light Blue Top [[id number]] Collected: 07/23/25 2029    Specimen: Blood Updated: 07/23/25 2045     Extra Tube Hold for add-ons.     Comment: Auto resulted       CBC & Differential [[id number]]  (Abnormal) Collected: 07/23/25 2029    Specimen: Blood Updated: 07/23/25 2035    Narrative:      The following orders were created for panel order CBC & Differential.  Procedure                               Abnormality         Status                     ---------                               -----------         ------                     CBC Auto Differential[[id number]]        Abnormal            Final result                 Please view results for these tests on the individual orders.    CBC Auto Differential [[id number]]  (Abnormal) Collected: 07/23/25 2029    Specimen: Blood Updated: 07/23/25 2035     WBC 8.22 10*3/mm3      RBC 3.84 10*6/mm3      Hemoglobin 11.2 g/dL      Hematocrit 36.3 %      MCV 94.5 fL      MCH 29.2 pg      MCHC 30.9 g/dL      RDW 13.2 %      RDW-SD 46.7 fl      MPV 8.8 fL      Platelets 289 10*3/mm3      Neutrophil % 61.0 %      Lymphocyte % 24.2 %      Monocyte % 11.7 %      Eosinophil % 2.4 %      Basophil % 0.6 %      Immature Grans % 0.1 %      Neutrophils, Absolute 5.01 10*3/mm3      Lymphocytes, Absolute 1.99 10*3/mm3      Monocytes, Absolute 0.96 10*3/mm3      Eosinophils, Absolute 0.20 10*3/mm3      Basophils, Absolute 0.05 10*3/mm3      Immature Grans, Absolute 0.01 10*3/mm3           /61 (BP Location: Right arm, Patient Position: Lying)   Pulse 74   Temp 97.3 °F (36.3 °C) (Oral)   Resp 18   Ht 157.5 cm (62\")   Wt 121 kg (267 lb 12.8 oz)   SpO2 95%  "  BMI 48.98 kg/m²     Discharge Exam:  General Appearance:    Alert, cooperative, no distress                          Head:    Normocephalic, without obvious abnormality, atraumatic                          Eyes:                            Throat:   Lips, tongue, gums normal                          Neck:   Supple, symmetrical, trachea midline, no JVD                        Lungs:     Clear to auscultation bilaterally, respirations unlabored                Chest Wall:    No tenderness or deformity                        Heart:    Regular rate and rhythm, S1 and S2 normal, no murmur,no  Rub  or gallop                  Abdomen:     Soft, non-tender, bowel sounds active, no masses, no                                                        organomegaly                  Extremities:   Extremities normal, atraumatic, no cyanosis or edema                             Skin:   Skin is warm and dry,  no rashes or palpable lesions                  Neurologic:   no focal deficits noted     Disposition:  Home    Activity as tolerated    Diet as tolerated  Diet Order   Procedures    Diet: Cardiac, Fluid Restriction (240 mL/tray); Healthy Heart (2-3 Na+); 1500 mL/day; Fluid Consistency: Thin (IDDSI 0)       Patient Instructions:      Discharge Medications        New Medications        Instructions Start Date   cephalexin 500 MG capsule  Commonly known as: KEFLEX   500 mg, Oral, 3 Times Daily      hydrALAZINE 25 MG tablet  Commonly known as: APRESOLINE   75 mg, Oral, Every 8 Hours Scheduled      HYDROcodone-acetaminophen 5-325 MG per tablet  Commonly known as: NORCO   1 tablet, Oral, Every 4 Hours PRN      miconazole 2 % cream  Commonly known as: MICOTIN   1 Application, Topical, 2 Times Daily PRN      miconazole 2 % powder  Commonly known as: MICOTIN   1 Application, Topical, Every 12 Hours Scheduled             Continue These Medications        Instructions Start Date   albuterol sulfate  (90 Base) MCG/ACT  inhaler  Commonly known as: Proventil HFA   2 puffs, Inhalation, Every 6 Hours PRN      amLODIPine 10 MG tablet  Commonly known as: NORVASC   10 mg, Oral, Daily, Needs appointment with PCP      aspirin 81 MG chewable tablet   81 mg, Daily      atenolol 100 MG tablet  Commonly known as: TENORMIN   100 mg, Oral, Daily      Breo Ellipta 100-25 MCG/INH aerosol powder   Generic drug: Fluticasone Furoate-Vilanterol   1 puff, Inhalation, Daily - RT      busPIRone 7.5 MG tablet  Commonly known as: BUSPAR   7.5 mg, Oral, Daily      ciclopirox 0.77 % cream  Commonly known as: LOPROX   APPLY  CREAM TOPICALLY TO AFFECTED AREA TWICE DAILY AS DIRECTED      desoximetasone 0.25 % ointment  Commonly known as: TOPICORT   Topical, Every 12 Hours Scheduled      doxazosin 2 MG tablet  Commonly known as: CARDURA   2 mg, Nightly      Kerendia 20 MG tablet  Generic drug: Finerenone   1 tablet, Oral, Daily      lisinopril 20 MG tablet  Commonly known as: PRINIVIL,ZESTRIL   20 mg, Oral, 2 Times Daily, Needs appointment with PCP      NovoLIN 70/30 (70-30) 100 UNIT/ML injection  Generic drug: insulin NPH-insulin regular   Patient stating she takes 50 units in am      NovoLIN N 100 UNIT/ML injection  Generic drug: insulin NPH   Patient stating she takes 30-45 units in evening      oxybutynin 5 MG tablet  Commonly known as: DITROPAN   5 mg, Oral, 2 Times Daily      pioglitazone 30 MG tablet  Commonly known as: ACTOS   30 mg, Oral, Daily      PROBIOTIC PRODUCT PO   Take  by mouth.      rosuvastatin 20 MG tablet  Commonly known as: Crestor   20 mg, Oral, Daily      sodium bicarbonate 650 MG tablet   650 mg, 3 Times Daily      venlafaxine  MG 24 hr capsule  Commonly known as: EFFEXOR-XR   150 mg, Oral, Daily             Future Appointments   Date Time Provider Department Center   9/11/2025  1:00 PM [facility]   9/16/2025  1:00 PM Ashley Holt APRN [facility]      Contact information for follow-up  providers       Ashley Holt APRN Follow up in 1 week(s).    Specialties: Family Medicine, Nurse Practitioner  Contact information:  [address]  [facility] [address]  [phone]                       Contact information for after-discharge care       Durable Medical Equipment       [facility] .    Service: Durable Medical Equipment  Contact information:  [address] [place] [address] [phone]                                 Discharge Order (From admission, onward)       Start     Ordered    07/30/25 1008  Discharge patient  Once        Expected Discharge Date: 07/30/25   Discharge Disposition: Home or Self Care   Physician of Record for Attribution - Please select from Treatment Team: ASH SPARROW [[id number]]   Review needed by CMO to determine Physician of Record: No      Question Answer Comment   Physician of Record for Attribution - Please select from Treatment Team ASH SPARROW    Review needed by CMO to determine Physician of Record No        07/30/25 1012                    Total time spent discharging patient including evaluation,post hospitalization follow up,  medication and post hospitalization instructions and education total time exceeds 30 minutes.    Signed:  Ash Sparrow MD  7/30/2025  10:43 EDT

## 2025-07-31 ENCOUNTER — TRANSITIONAL CARE MANAGEMENT TELEPHONE ENCOUNTER (OUTPATIENT)
Dept: CALL CENTER | Facility: HOSPITAL | Age: 65
End: 2025-07-31
Payer: MEDICARE

## 2025-07-31 NOTE — OUTREACH NOTE
Call Center TCM Note      Flowsheet Row Responses   [facility] patient discharged from? [facility]   Does the patient have one of the following disease processes/diagnoses(primary or secondary)? Other   TCM attempt successful? Yes   Call start time 1514   Call end time 1517   Discharge diagnosis Cellulitis   Meds reviewed with patient/caregiver? Yes   Is the patient having any side effects they believe may be caused by any medication additions or changes? No   Does the patient have all medications ordered at discharge? Yes   Is the patient taking all medications as directed (includes completed medication regime)? Yes   Comments Pt will call after she checks her husbands schedule   Does the patient have an appointment with their PCP within 7-14 days of discharge? No   Nursing Interventions Routed TCM call to PCP office, Patient declined scheduling/rescheduling appointment at this time   Has home health visited the patient within 72 hours of discharge? N/A   What DME was ordered? [employer] for rolling walker   Has all DME been delivered? Yes   Psychosocial issues? No   Did the patient receive a copy of their discharge instructions? Yes   Nursing interventions Reviewed instructions with patient   What is the patient's perception of their health status since discharge? Same  [Pt reports she is feeling better, reports some itching to site,  encouraged to avoid scratching to avoid worse infection.  Taking meds as ordered]   Is the patient/caregiver able to teach back signs and symptoms related to disease process for when to call PCP? Yes   Is the patient/caregiver able to teach back signs and symptoms related to disease process for when to call 911? Yes   TCM call completed? Yes   Call end time 1517            REBECCA SANDERS - Registered Nurse    7/31/2025, 15:18 EDT

## 2025-07-31 NOTE — CASE MANAGEMENT/SOCIAL WORK
Case Management Discharge Note      Final Note: Home with spouse transport and a rolling walker from [employer].    Provided Post Acute Provider List?: Yes  Post Acute Provider List: DME Supplier  Provided Post Acute Provider Quality & Resource List?: Yes  Delivered To: Patient  Method of Delivery: In person    Selected Continued Care - Discharged on 7/30/2025 Admission date: 7/23/2025 - Discharge disposition: Home or Self Care      Destination    No services have been selected for the patient.                Durable Medical Equipment       Service Provider Services Address Phone Fax Patient Preferred    [facility] [address], [place] [address] [phone] --              Dialysis/Infusion    No services have been selected for the patient.                Home Medical Care    No services have been selected for the patient.                Therapy    No services have been selected for the patient.                Community Resources    No services have been selected for the patient.                Community & DME    No services have been selected for the patient.                    Transportation Services  Transportation: Private Transportation  Private: Car    Final Discharge Disposition Code: 01 - home or self-care

## 2025-07-31 NOTE — OUTREACH NOTE
Call Center TCM Note      Flowsheet Row Responses   [facility] patient discharged from? [place]   Does the patient have one of the following disease processes/diagnoses(primary or secondary)? Other   TCM attempt successful? No  [verbal release for ]   Unsuccessful attempts Attempt 1  [attempted pt and ]   Call Status Left message            REBECCA Borrego Registered Nurse    7/31/2025, 08:44 EDT

## 2025-08-08 ENCOUNTER — OFFICE VISIT (OUTPATIENT)
Dept: FAMILY MEDICINE CLINIC | Facility: CLINIC | Age: 65
End: 2025-08-08
Payer: MEDICARE

## 2025-08-08 VITALS
TEMPERATURE: 98.2 F | RESPIRATION RATE: 16 BRPM | BODY MASS INDEX: 49.65 KG/M2 | OXYGEN SATURATION: 94 % | HEIGHT: 62 IN | HEART RATE: 67 BPM | WEIGHT: 269.8 LBS | DIASTOLIC BLOOD PRESSURE: 60 MMHG | SYSTOLIC BLOOD PRESSURE: 160 MMHG

## 2025-08-08 DIAGNOSIS — G89.29 CHRONIC BILATERAL LOW BACK PAIN WITHOUT SCIATICA: ICD-10-CM

## 2025-08-08 DIAGNOSIS — Z79.4 TYPE 2 DIABETES MELLITUS WITH OTHER DIABETIC KIDNEY COMPLICATION, WITH LONG-TERM CURRENT USE OF INSULIN: Primary | ICD-10-CM

## 2025-08-08 DIAGNOSIS — E11.29 TYPE 2 DIABETES MELLITUS WITH OTHER DIABETIC KIDNEY COMPLICATION, WITH LONG-TERM CURRENT USE OF INSULIN: Primary | ICD-10-CM

## 2025-08-08 DIAGNOSIS — N18.31 STAGE 3A CHRONIC KIDNEY DISEASE: ICD-10-CM

## 2025-08-08 DIAGNOSIS — D64.9 ANEMIA, UNSPECIFIED TYPE: ICD-10-CM

## 2025-08-08 DIAGNOSIS — R80.9 PROTEINURIA, UNSPECIFIED TYPE: ICD-10-CM

## 2025-08-08 DIAGNOSIS — D17.23 LIPOMA OF RIGHT LOWER EXTREMITY: ICD-10-CM

## 2025-08-08 DIAGNOSIS — M54.50 CHRONIC BILATERAL LOW BACK PAIN WITHOUT SCIATICA: ICD-10-CM

## 2025-08-08 LAB
BILIRUB BLD-MCNC: NEGATIVE MG/DL
CLARITY, POC: ABNORMAL
COLOR UR: YELLOW
GLUCOSE UR STRIP-MCNC: NEGATIVE MG/DL
KETONES UR QL: NEGATIVE
LEUKOCYTE EST, POC: NEGATIVE
NITRITE UR-MCNC: NEGATIVE MG/ML
PH UR: 6 [PH] (ref 5–8)
PROT UR STRIP-MCNC: ABNORMAL MG/DL
RBC # UR STRIP: NEGATIVE /UL
SP GR UR: 1.02 (ref 1–1.03)
UROBILINOGEN UR QL: NORMAL

## 2025-08-09 LAB
ALBUMIN SERPL-MCNC: 3.5 G/DL (ref 3.9–4.9)
ALP SERPL-CCNC: 72 IU/L (ref 44–121)
ALT SERPL-CCNC: 6 IU/L (ref 0–32)
AST SERPL-CCNC: 13 IU/L (ref 0–40)
BASOPHILS # BLD AUTO: 0.1 X10E3/UL (ref 0–0.2)
BASOPHILS NFR BLD AUTO: 1 %
BILIRUB SERPL-MCNC: <0.2 MG/DL (ref 0–1.2)
BUN SERPL-MCNC: 17 MG/DL (ref 8–27)
BUN/CREAT SERPL: 14 (ref 12–28)
CALCIUM SERPL-MCNC: 10 MG/DL (ref 8.7–10.3)
CHLORIDE SERPL-SCNC: 106 MMOL/L (ref 96–106)
CO2 SERPL-SCNC: 22 MMOL/L (ref 20–29)
CREAT SERPL-MCNC: 1.19 MG/DL (ref 0.57–1)
EGFRCR SERPLBLD CKD-EPI 2021: 51 ML/MIN/1.73
EOSINOPHIL # BLD AUTO: 0.1 X10E3/UL (ref 0–0.4)
EOSINOPHIL NFR BLD AUTO: 2 %
ERYTHROCYTE [DISTWIDTH] IN BLOOD BY AUTOMATED COUNT: 12.3 % (ref 11.7–15.4)
FOLATE SERPL-MCNC: 6.8 NG/ML
GLOBULIN SER CALC-MCNC: 1.9 G/DL (ref 1.5–4.5)
GLUCOSE SERPL-MCNC: 144 MG/DL (ref 70–99)
HBA1C MFR BLD: 6.4 % (ref 4.8–5.6)
HCT VFR BLD AUTO: 35.2 % (ref 34–46.6)
HGB BLD-MCNC: 11.2 G/DL (ref 11.1–15.9)
IMM GRANULOCYTES # BLD AUTO: 0 X10E3/UL (ref 0–0.1)
IMM GRANULOCYTES NFR BLD AUTO: 0 %
IRON SATN MFR SERPL: 20 % (ref 15–55)
IRON SERPL-MCNC: 51 UG/DL (ref 27–139)
LYMPHOCYTES # BLD AUTO: 1.3 X10E3/UL (ref 0.7–3.1)
LYMPHOCYTES NFR BLD AUTO: 17 %
MCH RBC QN AUTO: 29.6 PG (ref 26.6–33)
MCHC RBC AUTO-ENTMCNC: 31.8 G/DL (ref 31.5–35.7)
MCV RBC AUTO: 93 FL (ref 79–97)
MONOCYTES # BLD AUTO: 0.8 X10E3/UL (ref 0.1–0.9)
MONOCYTES NFR BLD AUTO: 10 %
NEUTROPHILS # BLD AUTO: 5.7 X10E3/UL (ref 1.4–7)
NEUTROPHILS NFR BLD AUTO: 70 %
PLATELET # BLD AUTO: 402 X10E3/UL (ref 150–450)
POTASSIUM SERPL-SCNC: 4.8 MMOL/L (ref 3.5–5.2)
PROT SERPL-MCNC: 5.4 G/DL (ref 6–8.5)
RBC # BLD AUTO: 3.78 X10E6/UL (ref 3.77–5.28)
SODIUM SERPL-SCNC: 141 MMOL/L (ref 134–144)
TIBC SERPL-MCNC: 253 UG/DL (ref 250–450)
UIBC SERPL-MCNC: 202 UG/DL (ref 118–369)
VIT B12 SERPL-MCNC: 343 PG/ML (ref 232–1245)
WBC # BLD AUTO: 7.9 X10E3/UL (ref 3.4–10.8)

## 2025-08-14 PROBLEM — G89.29 CHRONIC BILATERAL LOW BACK PAIN WITHOUT SCIATICA: Status: ACTIVE | Noted: 2025-08-14

## 2025-08-14 PROBLEM — M54.50 CHRONIC BILATERAL LOW BACK PAIN WITHOUT SCIATICA: Status: ACTIVE | Noted: 2025-08-14

## 2025-08-18 DIAGNOSIS — B35.4 TINEA CORPORIS: ICD-10-CM

## 2025-08-18 RX ORDER — CICLOPIROX OLAMINE 7.7 MG/G
CREAM TOPICAL
Qty: 30 G | Refills: 0 | Status: SHIPPED | OUTPATIENT
Start: 2025-08-18

## 2025-08-26 ENCOUNTER — APPOINTMENT (OUTPATIENT)
Dept: GENERAL RADIOLOGY | Facility: HOSPITAL | Age: 65
End: 2025-08-26
Payer: MEDICARE

## 2025-08-26 ENCOUNTER — HOSPITAL ENCOUNTER (EMERGENCY)
Facility: HOSPITAL | Age: 65
Discharge: HOME | End: 2025-08-26
Attending: EMERGENCY MEDICINE
Payer: MEDICARE

## 2025-08-26 VITALS
OXYGEN SATURATION: 94 % | TEMPERATURE: 98 F | BODY MASS INDEX: 49.49 KG/M2 | WEIGHT: 268.96 LBS | HEART RATE: 68 BPM | HEIGHT: 62 IN | DIASTOLIC BLOOD PRESSURE: 69 MMHG | RESPIRATION RATE: 18 BRPM | SYSTOLIC BLOOD PRESSURE: 173 MMHG

## 2025-08-26 DIAGNOSIS — L03.311 CELLULITIS OF ABDOMINAL WALL: Primary | ICD-10-CM

## 2025-08-26 DIAGNOSIS — H01.004 BLEPHARITIS OF LEFT UPPER EYELID, UNSPECIFIED TYPE: ICD-10-CM

## 2025-08-26 DIAGNOSIS — B37.9 CANDIDA INFECTION: ICD-10-CM

## 2025-08-26 LAB
ALBUMIN SERPL-MCNC: 3.5 G/DL (ref 3.5–5.2)
ALBUMIN/GLOB SERPL: 1.3 G/DL
ALP SERPL-CCNC: 90 U/L (ref 39–117)
ALT SERPL W P-5'-P-CCNC: 6 U/L (ref 1–33)
ANION GAP SERPL CALCULATED.3IONS-SCNC: 10.5 MMOL/L (ref 5–15)
AST SERPL-CCNC: 9 U/L (ref 1–32)
BASOPHILS # BLD AUTO: 0.02 10*3/MM3 (ref 0–0.2)
BASOPHILS NFR BLD AUTO: 0.3 % (ref 0–1.5)
BILIRUB SERPL-MCNC: 0.2 MG/DL (ref 0–1.2)
BUN SERPL-MCNC: 11.8 MG/DL (ref 8–23)
BUN/CREAT SERPL: 10.4 (ref 7–25)
CALCIUM SPEC-SCNC: 10.2 MG/DL (ref 8.6–10.5)
CHLORIDE SERPL-SCNC: 107 MMOL/L (ref 98–107)
CO2 SERPL-SCNC: 22.5 MMOL/L (ref 22–29)
CREAT SERPL-MCNC: 1.13 MG/DL (ref 0.57–1)
DEPRECATED RDW RBC AUTO: 44.5 FL (ref 37–54)
EGFRCR SERPLBLD CKD-EPI 2021: 54.8 ML/MIN/1.73
EOSINOPHIL # BLD AUTO: 0.09 10*3/MM3 (ref 0–0.4)
EOSINOPHIL NFR BLD AUTO: 1.3 % (ref 0.3–6.2)
ERYTHROCYTE [DISTWIDTH] IN BLOOD BY AUTOMATED COUNT: 12.7 % (ref 12.3–15.4)
GEN 5 1HR TROPONIN T REFLEX: 17 NG/L
GLOBULIN UR ELPH-MCNC: 2.7 GM/DL
GLUCOSE SERPL-MCNC: 155 MG/DL (ref 65–99)
HCT VFR BLD AUTO: 38.5 % (ref 34–46.6)
HGB BLD-MCNC: 11.7 G/DL (ref 12–15.9)
IMM GRANULOCYTES # BLD AUTO: 0.02 10*3/MM3 (ref 0–0.05)
IMM GRANULOCYTES NFR BLD AUTO: 0.3 % (ref 0–0.5)
LIPASE SERPL-CCNC: 12 U/L (ref 13–60)
LYMPHOCYTES # BLD AUTO: 1.32 10*3/MM3 (ref 0.7–3.1)
LYMPHOCYTES NFR BLD AUTO: 18.8 % (ref 19.6–45.3)
MAGNESIUM SERPL-MCNC: 1.9 MG/DL (ref 1.6–2.4)
MCH RBC QN AUTO: 28.7 PG (ref 26.6–33)
MCHC RBC AUTO-ENTMCNC: 30.4 G/DL (ref 31.5–35.7)
MCV RBC AUTO: 94.4 FL (ref 79–97)
MONOCYTES # BLD AUTO: 0.74 10*3/MM3 (ref 0.1–0.9)
MONOCYTES NFR BLD AUTO: 10.5 % (ref 5–12)
NEUTROPHILS NFR BLD AUTO: 4.84 10*3/MM3 (ref 1.7–7)
NEUTROPHILS NFR BLD AUTO: 68.8 % (ref 42.7–76)
NT-PROBNP SERPL-MCNC: 781.6 PG/ML (ref 0–900)
PLATELET # BLD AUTO: 333 10*3/MM3 (ref 140–450)
PMV BLD AUTO: 9.1 FL (ref 6–12)
POTASSIUM SERPL-SCNC: 3.7 MMOL/L (ref 3.5–5.2)
PROT SERPL-MCNC: 6.2 G/DL (ref 6–8.5)
QT INTERVAL: 424 MS
QTC INTERVAL: 431 MS
RBC # BLD AUTO: 4.08 10*6/MM3 (ref 3.77–5.28)
SODIUM SERPL-SCNC: 140 MMOL/L (ref 136–145)
TROPONIN T % DELTA: 0
TROPONIN T NUMERIC DELTA: 0 NG/L
TROPONIN T SERPL HS-MCNC: 17 NG/L
WBC NRBC COR # BLD AUTO: 7.03 10*3/MM3 (ref 3.4–10.8)

## 2025-08-26 PROCEDURE — 25010000002 FUROSEMIDE PER 20 MG

## 2025-08-26 PROCEDURE — 83880 ASSAY OF NATRIURETIC PEPTIDE: CPT

## 2025-08-26 PROCEDURE — 85025 COMPLETE CBC W/AUTO DIFF WBC: CPT

## 2025-08-26 PROCEDURE — 80053 COMPREHEN METABOLIC PANEL: CPT

## 2025-08-26 PROCEDURE — 93005 ELECTROCARDIOGRAM TRACING: CPT

## 2025-08-26 PROCEDURE — 84484 ASSAY OF TROPONIN QUANT: CPT

## 2025-08-26 PROCEDURE — 99284 EMERGENCY DEPT VISIT MOD MDM: CPT | Performed by: EMERGENCY MEDICINE

## 2025-08-26 PROCEDURE — 71046 X-RAY EXAM CHEST 2 VIEWS: CPT

## 2025-08-26 PROCEDURE — 25010000002 CEFTRIAXONE PER 250 MG

## 2025-08-26 PROCEDURE — 83735 ASSAY OF MAGNESIUM: CPT

## 2025-08-26 PROCEDURE — 83690 ASSAY OF LIPASE: CPT

## 2025-08-26 RX ORDER — HYDROCODONE BITARTRATE AND ACETAMINOPHEN 5; 325 MG/1; MG/1
1 TABLET ORAL ONCE AS NEEDED
Refills: 0 | Status: DISCONTINUED | OUTPATIENT
Start: 2025-08-26 | End: 2025-08-26 | Stop reason: HOSPADM

## 2025-08-26 RX ORDER — IPRATROPIUM BROMIDE AND ALBUTEROL SULFATE 2.5; .5 MG/3ML; MG/3ML
3 SOLUTION RESPIRATORY (INHALATION) ONCE
Status: COMPLETED | OUTPATIENT
Start: 2025-08-26 | End: 2025-08-26

## 2025-08-26 RX ORDER — FUROSEMIDE 10 MG/ML
40 INJECTION INTRAMUSCULAR; INTRAVENOUS ONCE
Status: COMPLETED | OUTPATIENT
Start: 2025-08-26 | End: 2025-08-26

## 2025-08-26 RX ORDER — CEPHALEXIN 500 MG/1
500 CAPSULE ORAL 2 TIMES DAILY
Qty: 14 CAPSULE | Refills: 0 | Status: SHIPPED | OUTPATIENT
Start: 2025-08-26 | End: 2025-09-02

## 2025-08-26 RX ORDER — ERYTHROMYCIN 5 MG/G
OINTMENT OPHTHALMIC 4 TIMES DAILY
Qty: 1 G | Refills: 0 | Status: SHIPPED | OUTPATIENT
Start: 2025-08-26 | End: 2025-09-02

## 2025-08-26 RX ORDER — FLUCONAZOLE 150 MG/1
150 TABLET ORAL ONCE
Status: COMPLETED | OUTPATIENT
Start: 2025-08-26 | End: 2025-08-26

## 2025-08-26 RX ORDER — SODIUM CHLORIDE 0.9 % (FLUSH) 0.9 %
10 SYRINGE (ML) INJECTION AS NEEDED
Status: DISCONTINUED | OUTPATIENT
Start: 2025-08-26 | End: 2025-08-26 | Stop reason: HOSPADM

## 2025-08-26 RX ADMIN — FLUCONAZOLE 150 MG: 150 TABLET ORAL at 15:59

## 2025-08-26 RX ADMIN — IPRATROPIUM BROMIDE AND ALBUTEROL SULFATE 3 ML: .5; 3 SOLUTION RESPIRATORY (INHALATION) at 14:20

## 2025-08-26 RX ADMIN — FUROSEMIDE 40 MG: 10 INJECTION, SOLUTION INTRAMUSCULAR; INTRAVENOUS at 15:57

## 2025-08-26 RX ADMIN — HYDROCODONE BITARTRATE AND ACETAMINOPHEN 1 TABLET: 5; 325 TABLET ORAL at 14:50

## 2025-08-26 RX ADMIN — CEFTRIAXONE SODIUM 1000 MG: 1 INJECTION, POWDER, FOR SOLUTION INTRAMUSCULAR; INTRAVENOUS at 15:59

## 2025-08-29 RX ORDER — HYDRALAZINE HYDROCHLORIDE 25 MG/1
75 TABLET, FILM COATED ORAL EVERY 8 HOURS SCHEDULED
Qty: 126 TABLET | Refills: 0 | Status: SHIPPED | OUTPATIENT
Start: 2025-08-29